# Patient Record
Sex: FEMALE | Race: BLACK OR AFRICAN AMERICAN | Employment: UNEMPLOYED
[De-identification: names, ages, dates, MRNs, and addresses within clinical notes are randomized per-mention and may not be internally consistent; named-entity substitution may affect disease eponyms.]

---
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
## 2018-01-25 ENCOUNTER — HOSPITAL ENCOUNTER (EMERGENCY)
Age: 18
Discharge: HOME | End: 2018-01-26
Attending: EMERGENCY MEDICINE
Payer: COMMERCIAL

## 2018-01-25 ENCOUNTER — APPOINTMENT (OUTPATIENT)
Dept: GENERAL RADIOLOGY | Age: 18
End: 2018-01-25
Payer: COMMERCIAL

## 2018-01-25 VITALS
OXYGEN SATURATION: 99 % | SYSTOLIC BLOOD PRESSURE: 115 MMHG | HEIGHT: 57 IN | WEIGHT: 180 LBS | HEART RATE: 98 BPM | TEMPERATURE: 97.2 F | DIASTOLIC BLOOD PRESSURE: 77 MMHG | BODY MASS INDEX: 38.83 KG/M2 | RESPIRATION RATE: 18 BRPM

## 2018-01-25 DIAGNOSIS — J45.41 MODERATE PERSISTENT ASTHMA WITH EXACERBATION: Primary | ICD-10-CM

## 2018-01-25 PROCEDURE — 6360000002 HC RX W HCPCS: Performed by: STUDENT IN AN ORGANIZED HEALTH CARE EDUCATION/TRAINING PROGRAM

## 2018-01-25 PROCEDURE — 71046 X-RAY EXAM CHEST 2 VIEWS: CPT

## 2018-01-25 PROCEDURE — 99285 EMERGENCY DEPT VISIT HI MDM: CPT

## 2018-01-25 RX ORDER — DEXAMETHASONE SODIUM PHOSPHATE 10 MG/ML
10 INJECTION INTRAMUSCULAR; INTRAVENOUS ONCE
Status: COMPLETED | OUTPATIENT
Start: 2018-01-25 | End: 2018-01-25

## 2018-01-25 RX ORDER — ALBUTEROL SULFATE 2.5 MG/3ML
5 SOLUTION RESPIRATORY (INHALATION)
Status: DISCONTINUED | OUTPATIENT
Start: 2018-01-25 | End: 2018-01-26 | Stop reason: HOSPADM

## 2018-01-25 RX ORDER — ALBUTEROL SULFATE 90 UG/1
2 AEROSOL, METERED RESPIRATORY (INHALATION)
Status: DISCONTINUED | OUTPATIENT
Start: 2018-01-25 | End: 2018-01-26 | Stop reason: HOSPADM

## 2018-01-25 RX ORDER — IPRATROPIUM BROMIDE AND ALBUTEROL SULFATE 2.5; .5 MG/3ML; MG/3ML
1 SOLUTION RESPIRATORY (INHALATION)
Status: DISCONTINUED | OUTPATIENT
Start: 2018-01-25 | End: 2018-01-26 | Stop reason: HOSPADM

## 2018-01-25 RX ORDER — IBUPROFEN 800 MG/1
800 TABLET ORAL ONCE
Status: COMPLETED | OUTPATIENT
Start: 2018-01-26 | End: 2018-01-26

## 2018-01-25 RX ADMIN — DEXAMETHASONE SODIUM PHOSPHATE 10 MG: 10 INJECTION INTRAMUSCULAR; INTRAVENOUS at 23:33

## 2018-01-25 ASSESSMENT — PAIN DESCRIPTION - LOCATION: LOCATION: CHEST

## 2018-01-25 ASSESSMENT — PAIN DESCRIPTION - PAIN TYPE: TYPE: ACUTE PAIN

## 2018-01-25 ASSESSMENT — PAIN SCALES - GENERAL: PAINLEVEL_OUTOF10: 6

## 2018-01-26 LAB
DIRECT EXAM: NORMAL
Lab: NORMAL
SPECIMEN DESCRIPTION: NORMAL
STATUS: NORMAL

## 2018-01-26 PROCEDURE — 87804 INFLUENZA ASSAY W/OPTIC: CPT

## 2018-01-26 PROCEDURE — 94640 AIRWAY INHALATION TREATMENT: CPT

## 2018-01-26 PROCEDURE — 6370000000 HC RX 637 (ALT 250 FOR IP): Performed by: EMERGENCY MEDICINE

## 2018-01-26 PROCEDURE — 6360000002 HC RX W HCPCS: Performed by: STUDENT IN AN ORGANIZED HEALTH CARE EDUCATION/TRAINING PROGRAM

## 2018-01-26 PROCEDURE — 94664 DEMO&/EVAL PT USE INHALER: CPT

## 2018-01-26 RX ORDER — PREDNISONE 10 MG/1
TABLET ORAL
Qty: 20 TABLET | Refills: 0 | Status: SHIPPED | OUTPATIENT
Start: 2018-01-26 | End: 2018-02-05

## 2018-01-26 RX ADMIN — ALBUTEROL SULFATE 5 MG: 5 SOLUTION RESPIRATORY (INHALATION) at 00:40

## 2018-01-26 RX ADMIN — IBUPROFEN 800 MG: 800 TABLET, FILM COATED ORAL at 00:46

## 2018-01-26 ASSESSMENT — ENCOUNTER SYMPTOMS
NAUSEA: 0
CHEST TIGHTNESS: 0
TROUBLE SWALLOWING: 0
WHEEZING: 1
COLOR CHANGE: 0
PHOTOPHOBIA: 0
CHOKING: 0
SHORTNESS OF BREATH: 1
CONSTIPATION: 0
COUGH: 0
ABDOMINAL DISTENTION: 0
VOMITING: 0
BACK PAIN: 0
DIARRHEA: 0
ABDOMINAL PAIN: 0
SORE THROAT: 0

## 2018-01-26 ASSESSMENT — PAIN SCALES - GENERAL: PAINLEVEL_OUTOF10: 7

## 2018-01-26 NOTE — ED NOTES
JOEY called provider Sy Altman to assist with transportation home upon D/c from ED   [address] [place] [address]  01/26/18 5348

## 2018-01-26 NOTE — ED NOTES
Body aches, asthma exacerbation. Pt states symptoms began approx 2 days ago. Chills, denies fever. Pt alert and oriented. NAD at this time. Even non labored RR. Will continue to monitor.         Leonid Hill RN  01/25/18 8049

## 2018-01-26 NOTE — ED PROVIDER NOTES
1 [facility]     Emergency Department     Faculty Attestation    I performed a history and physical examination of the patient and discussed management with the resident. I have reviewed and agree with the residents findings including all diagnostic interpretations, and treatment plans as written. Any areas of disagreement are noted on the chart. I was personally present for the key portions of any procedures. I have documented in the chart those procedures where I was not present during the key portions. I have reviewed the emergency nurses triage note. I agree with the chief complaint, past medical history, past surgical history, allergies, medications, social and family history as documented unless otherwise noted below. Documentation of the HPI, Physical Exam and Medical Decision Making performed by scribannemarie is based on my personal performance of the HPI, PE and MDM. For Physician Assistant/ Nurse Practitioner cases/documentation I have personally evaluated this patient and have completed at least one if not all key elements of the E/M (history, physical exam, and MDM). Additional findings are as noted. Primary Care Physician: Luis Enrique Parsons MD    History: This is a 21 y.o. female who presents to the Emergency Department with complaint of worsening shortness of breath, and cough. Ongoing over the past 4 days. Patient has a history of asthma, uses an albuterol inhaler but states it has not been working. Also complains of body aches, and chills here G denies any fevers, no productive sputum, but does have some runny nose and congestion. She also had an episode of emesis yesterday and 1 earlier this morning. Patient has tolerated oral intake since last episode of emesis, she denies any abdominal pain. Her last menstrual period finished yesterday. She denies any other vaginal bleeding or vaginal discharge.     Physical:   height is 4' 9\" (1.448 m)
is not diaphoretic. Plan     DIAGNOSTIC ORDERS:  Orders Placed This Encounter   Procedures    RAPID INFLUENZA A/B ANTIGENS    XR CHEST STANDARD (2 VW)     MEDICATION ORDERS:  Orders Placed This Encounter   Medications    DISCONTD: albuterol (PROVENTIL) nebulizer solution 5 mg    DISCONTD: ipratropium-albuterol (DUONEB) nebulizer solution 1 ampule    DISCONTD: albuterol (PROVENTIL) nebulizer solution 5 mg    DISCONTD: albuterol sulfate  (90 Base) MCG/ACT inhaler 2 puff    DISCONTD: albuterol sulfate  (90 Base) MCG/ACT inhaler 2 puff    DISCONTD: ipratropium (ATROVENT HFA) 17 MCG/ACT inhaler 2 puff    DISCONTD: ipratropium (ATROVENT) 0.02 % nebulizer solution 0.5 mg    dexamethasone (DECADRON) injection 10 mg    ibuprofen (ADVIL;MOTRIN) tablet 800 mg    predniSONE (DELTASONE) 10 MG tablet     Sig: Take 4 tablets by mouth once daily for 5 days     Dispense:  20 tablet     Refill:  0     Differential diagnosis      Asthma:  Evaluate for: mild/ moderate/ severe respiratory distress, toxic appearance, dry cough/ nasal congestion, speaking in full sentences, nasal flaring, inspiratory/ expiratory wheezing, retractions and abdominal breathing. Past intubations, hospitalizations, ED visits, steroid use/ home meds, triggers, fevers/ chills. Diagnostic Results     LABS:  Results for orders placed or performed during the hospital encounter of 01/25/18   RAPID INFLUENZA A/B ANTIGENS   Result Value Ref Range    Specimen Description . NASOPHARYNGEAL SWAB     Special Requests NOT REPORTED     Direct Exam PRESUMPTIVE NEGATIVE for Influenza A + B antigens. Direct Exam       PCR testing to confirm this result is available upon request.  Specimen will be    Direct Exam        saved in the laboratory for 7 days. Please call [phone] if PCR testing is    Direct Exam  indicated.      Direct Exam       [facility] [address] [facility], [address] [place] [address] [phone]    Status FINAL 01/26/2018

## 2018-02-19 ENCOUNTER — HOSPITAL ENCOUNTER (EMERGENCY)
Age: 18
Discharge: HOME | End: 2018-02-19
Attending: EMERGENCY MEDICINE
Payer: COMMERCIAL

## 2018-02-19 VITALS
TEMPERATURE: 97.9 F | HEART RATE: 89 BPM | RESPIRATION RATE: 12 BRPM | SYSTOLIC BLOOD PRESSURE: 122 MMHG | DIASTOLIC BLOOD PRESSURE: 78 MMHG | BODY MASS INDEX: 39.27 KG/M2 | WEIGHT: 200 LBS | OXYGEN SATURATION: 100 % | HEIGHT: 60 IN

## 2018-02-19 DIAGNOSIS — J34.89 STUFFY AND RUNNY NOSE: ICD-10-CM

## 2018-02-19 DIAGNOSIS — H92.03 OTALGIA OF BOTH EARS: ICD-10-CM

## 2018-02-19 DIAGNOSIS — J02.9 ACUTE PHARYNGITIS, UNSPECIFIED ETIOLOGY: ICD-10-CM

## 2018-02-19 DIAGNOSIS — I10 HYPERTENSION, UNSPECIFIED TYPE: ICD-10-CM

## 2018-02-19 DIAGNOSIS — B34.9 VIRAL ILLNESS: Primary | ICD-10-CM

## 2018-02-19 PROCEDURE — 99282 EMERGENCY DEPT VISIT SF MDM: CPT

## 2018-02-19 PROCEDURE — 6370000000 HC RX 637 (ALT 250 FOR IP): Performed by: EMERGENCY MEDICINE

## 2018-02-19 RX ORDER — IBUPROFEN 600 MG/1
600 TABLET ORAL EVERY 6 HOURS PRN
Qty: 30 TABLET | Refills: 0 | Status: SHIPPED | OUTPATIENT
Start: 2018-02-19 | End: 2018-05-03 | Stop reason: ALTCHOICE

## 2018-02-19 RX ORDER — IBUPROFEN 800 MG/1
800 TABLET ORAL ONCE
Status: COMPLETED | OUTPATIENT
Start: 2018-02-19 | End: 2018-02-19

## 2018-02-19 RX ORDER — ACETAMINOPHEN 325 MG/1
650 TABLET ORAL EVERY 6 HOURS PRN
Qty: 30 TABLET | Refills: 0 | Status: SHIPPED | OUTPATIENT
Start: 2018-02-19 | End: 2018-05-02 | Stop reason: SDUPTHER

## 2018-02-19 RX ADMIN — BENZOCAINE, MENTHOL 1 LOZENGE: 15; 3.6 LOZENGE ORAL at 07:40

## 2018-02-19 RX ADMIN — IBUPROFEN 800 MG: 800 TABLET, FILM COATED ORAL at 07:40

## 2018-02-19 ASSESSMENT — PAIN DESCRIPTION - LOCATION: LOCATION: THROAT;EAR

## 2018-02-19 ASSESSMENT — PAIN DESCRIPTION - PAIN TYPE: TYPE: ACUTE PAIN

## 2018-02-19 ASSESSMENT — PAIN DESCRIPTION - DESCRIPTORS: DESCRIPTORS: DULL

## 2018-02-19 ASSESSMENT — PAIN SCALES - GENERAL: PAINLEVEL_OUTOF10: 7

## 2018-02-19 ASSESSMENT — PAIN SCALES - WONG BAKER: WONGBAKER_NUMERICALRESPONSE: 4

## 2018-02-19 NOTE — ED PROVIDER NOTES
form     Other Topics Concern    Not on file     Social History Narrative    No narrative on file       Family History   Problem Relation Age of Onset    Asthma Mother     Lupus Mother     High Blood Pressure Father     Diabetes Father     Diabetes Maternal Grandfather     High Blood Pressure Maternal Grandfather     Heart Disease Paternal Grandmother     Diabetes Paternal Grandmother     High Blood Pressure Paternal Grandfather     Heart Disease Paternal Uncle     Heart Disease Paternal Aunt     Arthritis Maternal Aunt     Cancer Maternal Aunt     Birth Defects Neg Hx     Depression Neg Hx     Early Death Neg Hx     Hearing Loss Neg Hx     High Cholesterol Neg Hx     Kidney Disease Neg Hx     Learning Disabilities Neg Hx     Mental Illness Neg Hx     Mental Retardation Neg Hx     Miscarriages / Stillbirths Neg Hx     Stroke Neg Hx     Substance Abuse Neg Hx     Vision Loss Neg Hx     Other Neg Hx        Allergies:  Peanut-containing drug products and Food    Home Medications:  Prior to Admission medications    Medication Sig Start Date End Date Taking?  Authorizing Provider   ibuprofen (ADVIL;MOTRIN) 600 MG tablet Take 1 tablet by mouth every 6 hours as needed for Pain 2/19/18  Yes Lane Sorenson MD   Benzocaine-Menthol (CEPACOL EXTRA STRENGTH) 15-2.6 MG LOZG lozenge Take 1 lozenge by mouth 3 times daily as needed for Sore Throat 2/19/18  Yes Lane Sorenson MD   acetaminophen (TYLENOL) 325 MG tablet Take 2 tablets by mouth every 6 hours as needed for Pain 2/19/18  Yes Lane Sorenson MD   Spacer/Aero-Holding Duc Gallardo 1 Device by Does not apply route daily 9/8/17  Yes Kisha Robin MD   VENTOLIN  (90 Base) MCG/ACT inhaler Inhale 2 puffs into the lungs every 6 hours as needed for Wheezing 7/6/17  Yes Robert Palomares MD   EPINEPHrine (EPIPEN) 0.3 MG/0.3ML SOAJ injection Use as directed for allergic reaction call 911 Give second dose in 5 to 10 minutes if needed 7/1/16 8/29/25 Yes Maddie Negron MD   sodium chloride (OCEAN NASAL SPRAY) 0.65 % nasal spray 1 spray by Nasal route as needed for Congestion 9/19/17   Delma Hinojosa MD   fluticasone (FLOVENT HFA) 220 MCG/ACT inhaler Inhale 2 puffs into the lungs 2 times daily 9/12/17 9/12/18  Anthony Emmanuel MD   butalbital-APAP-caffeine (FIORICET) -40 MG CAPS per capsule Take 1 capsule by mouth every 4 hours as needed for Headaches 9/8/17   Uri Pehlps MD   topiramate (TOPAMAX) 25 MG tablet Take 1 tablet by mouth nightly 9/8/17   Uri Phelps MD   beclomethasone (QVAR) 80 MCG/ACT inhaler Inhale 1 puff into the lungs 2 times daily 9/8/17   Uri Phelps MD   loratadine (CLARITIN) 10 MG tablet Take 1 tablet by mouth daily 9/8/17   Uri Phelps MD   gentamicin (GARAMYCIN) 0.3 % ophthalmic solution 2 drops in the left eye every 4 hours while awake until symptoms gone for a full 24 hours. 8/24/17   Jassi Mehta PA-C   famotidine (PEPCID) 20 MG tablet Take 1 tablet by mouth 2 times daily 6/14/17   Hali Byrd MD   ondansetron (ZOFRAN) 4 MG tablet Take 1 tablet by mouth every 8 hours as needed for Nausea or Vomiting 3/5/17   Dara Sorenson MD   diphenhydrAMINE (BENADRYL) 25 MG tablet Take 1 tablet by mouth every 8 hours as needed for Itching or Allergies 3/5/17   Dara Sorenson MD   vitamin E 400 UNIT capsule Take 1 capsule by mouth 2 times daily 11/21/16   Daniella Hart DO   mometasone-formoterol [facility]) 200-5 MCG/ACT inhaler Inhale 2 puffs into the lungs 2 times daily 10/11/16   Ольга Jackson MD   cetirizine (ZYRTEC ALLERGY) 10 MG tablet Take 1 tablet by mouth daily as needed for Allergies 9/23/16   Denisse Rico MD   fluticasone (FLONASE) 50 MCG/ACT nasal spray Instill 1 spray into each nostril once daily for allergies and stuffy nose.  9/23/16   Denisse Rico MD       REVIEW OF SYSTEMS    (2-9 systems for level 4, 10 or more for level 5)        ROS: For rest of ROS please see HPI  Constitutional: Denied fever  Pscyh: Denied altered mental status  Integumentary:Denies skin changes, rashes  Eyes: Denied change in vision, eye pain  GI: Denied change in stools, blood in stools    PHYSICAL EXAM   (up to 7 for level 4, 8 or more for level 5)      INITIAL VITALS:   /78   Pulse 89   Temp 97.9 °F (36.6 °C) (Oral)   Resp 12   Ht 5' (1.524 m)   Wt 200 lb (90.7 kg)   LMP 01/24/2018   SpO2 100%   BMI 39.06 kg/m²       Vital signs reviewed. Nursing note reviewed    Constitutional: Well developed; well-nourished resting comfortably  HENT: Normocephalic, atraumatic, MMM, TM clear bilaterally no lad, + tonsillitis no exudates or erythema with slight cobblestoning. Uvula is midline there is no trismus or drooling or bulging of a tonsil, sinus without purulent drainage but there is clear drainage, there is no submandibular swelling, erythema, patient is tolerating secretions  Eyes: Conj nl  Neck: ROM nl Supple  Pulmonary/Chest Wall: Effort normal limit, clear to ausculte bilaterally   Abdomen: Soft, non-tender  Musculoskeletal: Normal ROM as witnessed by ambulation  Neurological: Alert and Oriented x3,   Skin: Warm and dry cap refill <2 seconds   Psych: Mood/affect normal, behavior normal    DIFFERENTIAL  DIAGNOSIS     PLAN (LABS / IMAGING / EKG):  No orders of the defined types were placed in this encounter.       MEDICATIONS ORDERED:  Orders Placed This Encounter   Medications    benzocaine-menthol (CEPACOL SORE THROAT) lozenge 1 lozenge    ibuprofen (ADVIL;MOTRIN) tablet 800 mg    ibuprofen (ADVIL;MOTRIN) 600 MG tablet     Sig: Take 1 tablet by mouth every 6 hours as needed for Pain     Dispense:  30 tablet     Refill:  0    Benzocaine-Menthol (CEPACOL EXTRA STRENGTH) 15-2.6 MG LOZG lozenge     Sig: Take 1 lozenge by mouth 3 times daily as needed for Sore Throat     Dispense:  30 lozenge     Refill:  0    acetaminophen (TYLENOL) 325 MG tablet     Sig: Take 2 tablets by mouth every 6 hours as needed for

## 2018-02-20 ENCOUNTER — HOSPITAL ENCOUNTER (EMERGENCY)
Age: 18
Discharge: HOME | End: 2018-02-21
Attending: EMERGENCY MEDICINE
Payer: COMMERCIAL

## 2018-02-20 VITALS
WEIGHT: 199.56 LBS | OXYGEN SATURATION: 100 % | HEIGHT: 58 IN | HEART RATE: 65 BPM | TEMPERATURE: 97.8 F | SYSTOLIC BLOOD PRESSURE: 118 MMHG | BODY MASS INDEX: 41.89 KG/M2 | RESPIRATION RATE: 16 BRPM | DIASTOLIC BLOOD PRESSURE: 69 MMHG

## 2018-02-20 DIAGNOSIS — H66.001 ACUTE SUPPURATIVE OTITIS MEDIA OF RIGHT EAR WITHOUT SPONTANEOUS RUPTURE OF TYMPANIC MEMBRANE, RECURRENCE NOT SPECIFIED: Primary | ICD-10-CM

## 2018-02-20 LAB
DIRECT EXAM: NORMAL
Lab: NORMAL
SPECIMEN DESCRIPTION: NORMAL
STATUS: NORMAL

## 2018-02-20 PROCEDURE — 96372 THER/PROPH/DIAG INJ SC/IM: CPT

## 2018-02-20 PROCEDURE — 99283 EMERGENCY DEPT VISIT LOW MDM: CPT

## 2018-02-20 PROCEDURE — 6360000002 HC RX W HCPCS: Performed by: EMERGENCY MEDICINE

## 2018-02-20 PROCEDURE — 87804 INFLUENZA ASSAY W/OPTIC: CPT

## 2018-02-20 PROCEDURE — 2500000003 HC RX 250 WO HCPCS: Performed by: EMERGENCY MEDICINE

## 2018-02-20 RX ORDER — AMOXICILLIN 500 MG/1
500 CAPSULE ORAL 3 TIMES DAILY
Qty: 21 CAPSULE | Refills: 0 | Status: SHIPPED | OUTPATIENT
Start: 2018-02-20 | End: 2018-02-27

## 2018-02-20 RX ORDER — KETOROLAC TROMETHAMINE 30 MG/ML
60 INJECTION, SOLUTION INTRAMUSCULAR; INTRAVENOUS ONCE
Status: COMPLETED | OUTPATIENT
Start: 2018-02-20 | End: 2018-02-20

## 2018-02-20 RX ORDER — LIDOCAINE HYDROCHLORIDE 10 MG/ML
5 INJECTION, SOLUTION INFILTRATION; PERINEURAL ONCE
Status: COMPLETED | OUTPATIENT
Start: 2018-02-20 | End: 2018-02-20

## 2018-02-20 RX ORDER — CEFTRIAXONE 1 G/1
1 INJECTION, POWDER, FOR SOLUTION INTRAMUSCULAR; INTRAVENOUS ONCE
Status: COMPLETED | OUTPATIENT
Start: 2018-02-20 | End: 2018-02-20

## 2018-02-20 RX ORDER — HYDROCODONE BITARTRATE AND ACETAMINOPHEN 5; 325 MG/1; MG/1
1 TABLET ORAL EVERY 6 HOURS PRN
Qty: 10 TABLET | Refills: 0 | Status: SHIPPED | OUTPATIENT
Start: 2018-02-20 | End: 2018-02-23

## 2018-02-20 RX ADMIN — KETOROLAC TROMETHAMINE 60 MG: 30 INJECTION, SOLUTION INTRAMUSCULAR at 23:24

## 2018-02-20 RX ADMIN — LIDOCAINE HYDROCHLORIDE 5 ML: 10 INJECTION, SOLUTION INFILTRATION; PERINEURAL at 23:24

## 2018-02-20 RX ADMIN — CEFTRIAXONE SODIUM 1 G: 1 INJECTION, POWDER, FOR SOLUTION INTRAMUSCULAR; INTRAVENOUS at 23:23

## 2018-02-20 ASSESSMENT — ENCOUNTER SYMPTOMS
COUGH: 1
SORE THROAT: 1
GASTROINTESTINAL NEGATIVE: 1

## 2018-02-20 ASSESSMENT — PAIN DESCRIPTION - FREQUENCY: FREQUENCY: CONTINUOUS

## 2018-02-20 ASSESSMENT — PAIN DESCRIPTION - LOCATION
LOCATION: EAR;THROAT
LOCATION: EAR;THROAT

## 2018-02-20 ASSESSMENT — PAIN DESCRIPTION - PAIN TYPE
TYPE: ACUTE PAIN
TYPE: ACUTE PAIN

## 2018-02-20 ASSESSMENT — PAIN SCALES - GENERAL
PAINLEVEL_OUTOF10: 10
PAINLEVEL_OUTOF10: 10

## 2018-02-20 ASSESSMENT — PAIN DESCRIPTION - DESCRIPTORS: DESCRIPTORS: ACHING

## 2018-02-20 ASSESSMENT — PAIN DESCRIPTION - ORIENTATION: ORIENTATION: RIGHT

## 2018-02-21 NOTE — ED PROVIDER NOTES
TempSrc: Oral    SpO2: 100% 100%   Weight: 199 lb 14.4 oz (90.7 kg) 199 lb 9 oz (90.5 kg)   Height: (!) 4' 10\" (1.473 m) (!) 4' 10\" (1.473 m)       Physical exam reflects a well-nourished well-hydrated female. She is currently afebrile but she has had both Tylenol and Motrin earlier today. She is alert conversive appropriate in behavior. Right TM is bulging and erythematous with effusion noted. She has canal erythema noted. Mild rhinorrhea noted. Left TM is dark. She has very small nonspecific scattered cervical lymphadenopathy. Oropharyngeal exam shows no erythema exudate or lesion. Heart regular rate and rhythm normal S1-S2 no murmurs rubs gallops. Lungs are clear to auscultation no wheezes rales rhonchi. Integument without rash or lesion. No neurovascular deficits are noted      DIAGNOSTIC RESULTS         LABS:  Labs Reviewed   RAPID INFLUENZA A/B ANTIGENS     DIRECT EXAM.   Flu A/B Ag Detection   Collected: 02/20/18 2330   Resulting lab: [facility]   Value: PRESUMPTIVE NEGATIVE for Influenza A + B antigens. Comment:        All other labs were within normal range or not returned as of this dictation. EMERGENCY DEPARTMENT COURSE and DIFFERENTIAL DIAGNOSIS/MDM:   Vitals:    Vitals:    02/20/18 2304 02/20/18 2320   BP: 118/69 118/69   Pulse: 65 65   Resp: 16 16   Temp: 97.8 °F (36.6 °C) 97.8 °F (36.6 °C)   TempSrc: Oral    SpO2: 100% 100%   Weight: 199 lb 14.4 oz (90.7 kg) 199 lb 9 oz (90.5 kg)   Height: (!) 4' 10\" (1.473 m) (!) 4' 10\" (1.473 m)      Patient is evaluated. Today she does have evidence of acute otitis media. She is started on antibiotic coverage with Rocephin pain treated with Toradol while here. Flu swab is obtained. She is discharged home on conservative management with regard to her ear infection she is advised follow-up in the outpatient setting. CONSULTS:  None    PROCEDURES:  None    FINAL IMPRESSION      1.  Acute suppurative otitis media of right

## 2018-02-21 NOTE — ED NOTES
Pt presents to ED with reports of right sided ear pain. Pt states she has a hx of ear infections. Pt denies any other concerns at this time. Respirations even, non labored. No distress to report.       Sadie Sam RN  02/20/18 0703

## 2018-02-28 ENCOUNTER — OFFICE VISIT (OUTPATIENT)
Dept: INTERNAL MEDICINE | Age: 18
End: 2018-02-28
Payer: COMMERCIAL

## 2018-02-28 ENCOUNTER — HOSPITAL ENCOUNTER (OUTPATIENT)
Age: 18
Setting detail: SPECIMEN
Discharge: HOME | End: 2018-02-28
Payer: COMMERCIAL

## 2018-02-28 VITALS
WEIGHT: 202 LBS | DIASTOLIC BLOOD PRESSURE: 65 MMHG | HEART RATE: 193 BPM | BODY MASS INDEX: 42.22 KG/M2 | SYSTOLIC BLOOD PRESSURE: 107 MMHG

## 2018-02-28 DIAGNOSIS — R73.03 PREDIABETES: Primary | ICD-10-CM

## 2018-02-28 DIAGNOSIS — J45.30 MILD PERSISTENT ALLERGIC ASTHMA: ICD-10-CM

## 2018-02-28 DIAGNOSIS — R71.8 MICROCYTOSIS: ICD-10-CM

## 2018-02-28 LAB
ABSOLUTE EOS #: 0.11 K/UL (ref 0–0.44)
ABSOLUTE IMMATURE GRANULOCYTE: <0.03 K/UL (ref 0–0.3)
ABSOLUTE LYMPH #: 2.98 K/UL (ref 1.2–5.2)
ABSOLUTE MONO #: 0.37 K/UL (ref 0.1–1.4)
BASOPHILS # BLD: 1 % (ref 0–2)
BASOPHILS ABSOLUTE: 0.03 K/UL (ref 0–0.2)
DIFFERENTIAL TYPE: ABNORMAL
EOSINOPHILS RELATIVE PERCENT: 2 % (ref 1–4)
HBA1C MFR BLD: 5.7 %
HCT VFR BLD CALC: 39.1 % (ref 36.3–47.1)
HEMOGLOBIN: 11.4 G/DL (ref 11.9–15.1)
IMMATURE GRANULOCYTES: 0 %
LYMPHOCYTES # BLD: 45 % (ref 25–45)
MCH RBC QN AUTO: 22.6 PG (ref 25.2–33.5)
MCHC RBC AUTO-ENTMCNC: 29.2 G/DL (ref 28.4–34.8)
MCV RBC AUTO: 77.4 FL (ref 82.6–102.9)
MONOCYTES # BLD: 6 % (ref 2–8)
NRBC AUTOMATED: 0 PER 100 WBC
PDW BLD-RTO: 14.2 % (ref 11.8–14.4)
PLATELET # BLD: 272 K/UL (ref 138–453)
PLATELET ESTIMATE: ABNORMAL
PMV BLD AUTO: 11.5 FL (ref 8.1–13.5)
RBC # BLD: 5.05 M/UL (ref 3.95–5.11)
RBC # BLD: ABNORMAL 10*6/UL
SEG NEUTROPHILS: 46 % (ref 34–64)
SEGMENTED NEUTROPHILS ABSOLUTE COUNT: 3.02 K/UL (ref 1.8–8)
WBC # BLD: 6.5 K/UL (ref 4.5–13.5)
WBC # BLD: ABNORMAL 10*3/UL

## 2018-02-28 PROCEDURE — 83020 HEMOGLOBIN ELECTROPHORESIS: CPT

## 2018-02-28 PROCEDURE — 99213 OFFICE O/P EST LOW 20 MIN: CPT | Performed by: INTERNAL MEDICINE

## 2018-02-28 PROCEDURE — 36415 COLL VENOUS BLD VENIPUNCTURE: CPT

## 2018-02-28 PROCEDURE — G8417 CALC BMI ABV UP PARAM F/U: HCPCS | Performed by: INTERNAL MEDICINE

## 2018-02-28 PROCEDURE — G8484 FLU IMMUNIZE NO ADMIN: HCPCS | Performed by: INTERNAL MEDICINE

## 2018-02-28 PROCEDURE — 85025 COMPLETE CBC W/AUTO DIFF WBC: CPT

## 2018-02-28 PROCEDURE — 1036F TOBACCO NON-USER: CPT | Performed by: INTERNAL MEDICINE

## 2018-02-28 PROCEDURE — 83036 HEMOGLOBIN GLYCOSYLATED A1C: CPT | Performed by: INTERNAL MEDICINE

## 2018-02-28 PROCEDURE — G8427 DOCREV CUR MEDS BY ELIG CLIN: HCPCS | Performed by: INTERNAL MEDICINE

## 2018-02-28 RX ORDER — ALBUTEROL SULFATE 1.25 MG/3ML
1 SOLUTION RESPIRATORY (INHALATION)
COMMUNITY
End: 2018-04-23 | Stop reason: ALTCHOICE

## 2018-02-28 ASSESSMENT — PATIENT HEALTH QUESTIONNAIRE - PHQ9
2. FEELING DOWN, DEPRESSED OR HOPELESS: 0
SUM OF ALL RESPONSES TO PHQ QUESTIONS 1-9: 0
SUM OF ALL RESPONSES TO PHQ9 QUESTIONS 1 & 2: 0
1. LITTLE INTEREST OR PLEASURE IN DOING THINGS: 0

## 2018-03-01 ENCOUNTER — TELEPHONE (OUTPATIENT)
Dept: INTERNAL MEDICINE CLINIC | Age: 18
End: 2018-03-01

## 2018-03-01 DIAGNOSIS — D50.9 MICROCYTIC ANEMIA: Primary | ICD-10-CM

## 2018-03-01 LAB
HGB ELECTROPHORESIS INTERP: NORMAL
PATHOLOGIST: NORMAL

## 2018-03-13 ENCOUNTER — HOSPITAL ENCOUNTER (OUTPATIENT)
Age: 18
Setting detail: SPECIMEN
Discharge: HOME | End: 2018-03-13
Payer: COMMERCIAL

## 2018-03-13 DIAGNOSIS — D50.9 MICROCYTIC ANEMIA: ICD-10-CM

## 2018-03-13 LAB
FERRITIN: 51 UG/L (ref 13–150)
IRON SATURATION: 32 % (ref 20–55)
IRON: 99 UG/DL (ref 37–145)
TOTAL IRON BINDING CAPACITY: 312 UG/DL (ref 250–450)
UNSATURATED IRON BINDING CAPACITY: 213 UG/DL (ref 112–347)

## 2018-03-13 PROCEDURE — 36415 COLL VENOUS BLD VENIPUNCTURE: CPT

## 2018-03-13 PROCEDURE — 82728 ASSAY OF FERRITIN: CPT

## 2018-03-13 PROCEDURE — 83540 ASSAY OF IRON: CPT

## 2018-03-13 PROCEDURE — 83550 IRON BINDING TEST: CPT

## 2018-04-23 ENCOUNTER — OFFICE VISIT (OUTPATIENT)
Dept: INTERNAL MEDICINE | Age: 18
End: 2018-04-23
Payer: COMMERCIAL

## 2018-04-23 ENCOUNTER — TELEPHONE (OUTPATIENT)
Dept: INTERNAL MEDICINE | Age: 18
End: 2018-04-23

## 2018-04-23 VITALS
HEIGHT: 58 IN | HEART RATE: 78 BPM | DIASTOLIC BLOOD PRESSURE: 82 MMHG | SYSTOLIC BLOOD PRESSURE: 133 MMHG | BODY MASS INDEX: 42.61 KG/M2 | WEIGHT: 203 LBS

## 2018-04-23 DIAGNOSIS — J45.40 MODERATE PERSISTENT ASTHMA WITHOUT COMPLICATION: ICD-10-CM

## 2018-04-23 PROCEDURE — 1036F TOBACCO NON-USER: CPT | Performed by: INTERNAL MEDICINE

## 2018-04-23 PROCEDURE — G8417 CALC BMI ABV UP PARAM F/U: HCPCS | Performed by: INTERNAL MEDICINE

## 2018-04-23 PROCEDURE — 99213 OFFICE O/P EST LOW 20 MIN: CPT | Performed by: INTERNAL MEDICINE

## 2018-04-23 PROCEDURE — 99213 OFFICE O/P EST LOW 20 MIN: CPT

## 2018-04-23 PROCEDURE — G8427 DOCREV CUR MEDS BY ELIG CLIN: HCPCS | Performed by: INTERNAL MEDICINE

## 2018-04-23 RX ORDER — IPRATROPIUM BROMIDE AND ALBUTEROL SULFATE 2.5; .5 MG/3ML; MG/3ML
1 SOLUTION RESPIRATORY (INHALATION) EVERY 4 HOURS
Qty: 360 ML | Status: CANCELLED | OUTPATIENT
Start: 2018-04-23

## 2018-04-23 RX ORDER — ALBUTEROL SULFATE 90 UG/1
2 AEROSOL, METERED RESPIRATORY (INHALATION) EVERY 6 HOURS PRN
Qty: 1 INHALER | Refills: 3 | Status: SHIPPED | OUTPATIENT
Start: 2018-04-23 | End: 2018-06-24

## 2018-04-23 RX ORDER — ALBUTEROL SULFATE 2.5 MG/3ML
2.5 SOLUTION RESPIRATORY (INHALATION) EVERY 6 HOURS PRN
Qty: 120 EACH | Refills: 3 | Status: SHIPPED | OUTPATIENT
Start: 2018-04-23 | End: 2018-06-26 | Stop reason: SDUPTHER

## 2018-04-23 RX ORDER — MONTELUKAST SODIUM 10 MG/1
10 TABLET ORAL NIGHTLY
Qty: 90 TABLET | Refills: 1 | Status: SHIPPED | OUTPATIENT
Start: 2018-04-23 | End: 2019-01-10

## 2018-04-23 RX ORDER — ALBUTEROL SULFATE 1.25 MG/3ML
1 SOLUTION RESPIRATORY (INHALATION)
Qty: 360 ML | Status: CANCELLED | OUTPATIENT
Start: 2018-04-23

## 2018-04-23 RX ORDER — FLUTICASONE PROPIONATE 220 UG/1
2 AEROSOL, METERED RESPIRATORY (INHALATION) 2 TIMES DAILY
Qty: 1 INHALER | Refills: 3 | Status: CANCELLED | OUTPATIENT
Start: 2018-04-23 | End: 2019-04-23

## 2018-04-27 ENCOUNTER — OFFICE VISIT (OUTPATIENT)
Dept: INTERNAL MEDICINE | Age: 18
End: 2018-04-27
Payer: COMMERCIAL

## 2018-04-27 VITALS
WEIGHT: 207.4 LBS | DIASTOLIC BLOOD PRESSURE: 69 MMHG | BODY MASS INDEX: 43.35 KG/M2 | SYSTOLIC BLOOD PRESSURE: 101 MMHG | HEART RATE: 84 BPM

## 2018-04-27 DIAGNOSIS — M79.602 PAIN OF LEFT UPPER EXTREMITY: ICD-10-CM

## 2018-04-27 DIAGNOSIS — J45.40 MODERATE PERSISTENT ASTHMA WITHOUT COMPLICATION: Primary | ICD-10-CM

## 2018-04-27 DIAGNOSIS — J02.9 SORE THROAT: ICD-10-CM

## 2018-04-27 DIAGNOSIS — E66.01 MORBID OBESITY WITH BMI OF 40.0-44.9, ADULT (HCC): ICD-10-CM

## 2018-04-27 PROCEDURE — 99213 OFFICE O/P EST LOW 20 MIN: CPT | Performed by: INTERNAL MEDICINE

## 2018-04-27 PROCEDURE — G8417 CALC BMI ABV UP PARAM F/U: HCPCS | Performed by: INTERNAL MEDICINE

## 2018-04-27 PROCEDURE — 99213 OFFICE O/P EST LOW 20 MIN: CPT

## 2018-04-27 PROCEDURE — G8427 DOCREV CUR MEDS BY ELIG CLIN: HCPCS | Performed by: INTERNAL MEDICINE

## 2018-04-27 PROCEDURE — 1036F TOBACCO NON-USER: CPT | Performed by: INTERNAL MEDICINE

## 2018-05-02 ENCOUNTER — HOSPITAL ENCOUNTER (EMERGENCY)
Age: 18
Discharge: HOME | End: 2018-05-02
Attending: EMERGENCY MEDICINE
Payer: COMMERCIAL

## 2018-05-02 VITALS
HEART RATE: 85 BPM | OXYGEN SATURATION: 98 % | DIASTOLIC BLOOD PRESSURE: 77 MMHG | RESPIRATION RATE: 16 BRPM | TEMPERATURE: 97.2 F | SYSTOLIC BLOOD PRESSURE: 121 MMHG

## 2018-05-02 DIAGNOSIS — J06.9 UPPER RESPIRATORY TRACT INFECTION, UNSPECIFIED TYPE: Primary | ICD-10-CM

## 2018-05-02 PROCEDURE — 94640 AIRWAY INHALATION TREATMENT: CPT

## 2018-05-02 PROCEDURE — 6370000000 HC RX 637 (ALT 250 FOR IP): Performed by: NURSE PRACTITIONER

## 2018-05-02 PROCEDURE — 99284 EMERGENCY DEPT VISIT MOD MDM: CPT

## 2018-05-02 RX ORDER — GUAIFENESIN 600 MG/1
1200 TABLET, EXTENDED RELEASE ORAL 2 TIMES DAILY
Qty: 14 TABLET | Refills: 0 | Status: SHIPPED | OUTPATIENT
Start: 2018-05-02 | End: 2018-05-02

## 2018-05-02 RX ORDER — CETIRIZINE HYDROCHLORIDE 10 MG/1
10 TABLET ORAL DAILY
Qty: 30 TABLET | Refills: 0 | Status: SHIPPED | OUTPATIENT
Start: 2018-05-02 | End: 2018-05-02

## 2018-05-02 RX ORDER — ACETAMINOPHEN 500 MG
500 TABLET ORAL EVERY 6 HOURS PRN
Qty: 30 TABLET | Refills: 0 | Status: SHIPPED | OUTPATIENT
Start: 2018-05-02 | End: 2018-06-26 | Stop reason: ALTCHOICE

## 2018-05-02 RX ORDER — CETIRIZINE HYDROCHLORIDE 10 MG/1
10 TABLET ORAL DAILY
Qty: 30 TABLET | Refills: 0 | Status: SHIPPED | OUTPATIENT
Start: 2018-05-02 | End: 2018-05-21 | Stop reason: SDUPTHER

## 2018-05-02 RX ORDER — ALBUTEROL SULFATE 90 UG/1
2 AEROSOL, METERED RESPIRATORY (INHALATION) EVERY 6 HOURS PRN
Status: DISCONTINUED | OUTPATIENT
Start: 2018-05-02 | End: 2018-05-02 | Stop reason: HOSPADM

## 2018-05-02 RX ORDER — ACETAMINOPHEN 500 MG
500 TABLET ORAL EVERY 6 HOURS PRN
Qty: 30 TABLET | Refills: 0 | Status: SHIPPED | OUTPATIENT
Start: 2018-05-02 | End: 2018-05-02

## 2018-05-02 RX ORDER — GUAIFENESIN 600 MG/1
1200 TABLET, EXTENDED RELEASE ORAL 2 TIMES DAILY
Qty: 14 TABLET | Refills: 0 | Status: SHIPPED | OUTPATIENT
Start: 2018-05-02 | End: 2018-05-03 | Stop reason: ALTCHOICE

## 2018-05-02 RX ORDER — ACETAMINOPHEN 500 MG
1000 TABLET ORAL ONCE
Status: COMPLETED | OUTPATIENT
Start: 2018-05-02 | End: 2018-05-02

## 2018-05-02 RX ADMIN — ALBUTEROL SULFATE 2 PUFF: 90 AEROSOL, METERED RESPIRATORY (INHALATION) at 11:43

## 2018-05-02 RX ADMIN — ACETAMINOPHEN 1000 MG: 500 TABLET ORAL at 11:46

## 2018-05-02 ASSESSMENT — ENCOUNTER SYMPTOMS
COUGH: 1
SHORTNESS OF BREATH: 0
SORE THROAT: 0
RHINORRHEA: 1
SINUS PAIN: 1

## 2018-05-02 ASSESSMENT — PAIN DESCRIPTION - FREQUENCY: FREQUENCY: CONTINUOUS

## 2018-05-02 ASSESSMENT — PAIN SCALES - GENERAL
PAINLEVEL_OUTOF10: 9
PAINLEVEL_OUTOF10: 9

## 2018-05-02 ASSESSMENT — PAIN DESCRIPTION - DESCRIPTORS: DESCRIPTORS: ACHING

## 2018-05-02 ASSESSMENT — PAIN DESCRIPTION - LOCATION: LOCATION: HEAD

## 2018-05-03 ENCOUNTER — OFFICE VISIT (OUTPATIENT)
Dept: INTERNAL MEDICINE | Age: 18
End: 2018-05-03
Payer: COMMERCIAL

## 2018-05-03 VITALS
BODY MASS INDEX: 43.87 KG/M2 | HEART RATE: 84 BPM | WEIGHT: 209 LBS | HEIGHT: 58 IN | DIASTOLIC BLOOD PRESSURE: 82 MMHG | SYSTOLIC BLOOD PRESSURE: 113 MMHG

## 2018-05-03 DIAGNOSIS — G43.709 CHRONIC MIGRAINE WITHOUT AURA WITHOUT STATUS MIGRAINOSUS, NOT INTRACTABLE: Primary | ICD-10-CM

## 2018-05-03 DIAGNOSIS — J45.40 MODERATE PERSISTENT ASTHMA WITHOUT COMPLICATION: ICD-10-CM

## 2018-05-03 PROCEDURE — 99213 OFFICE O/P EST LOW 20 MIN: CPT | Performed by: INTERNAL MEDICINE

## 2018-05-03 PROCEDURE — 99213 OFFICE O/P EST LOW 20 MIN: CPT | Performed by: STUDENT IN AN ORGANIZED HEALTH CARE EDUCATION/TRAINING PROGRAM

## 2018-05-03 RX ORDER — BUTALBITAL, ACETAMINOPHEN AND CAFFEINE 300; 40; 50 MG/1; MG/1; MG/1
1 CAPSULE ORAL EVERY 4 HOURS PRN
Qty: 120 CAPSULE | Refills: 2 | Status: SHIPPED | OUTPATIENT
Start: 2018-05-03 | End: 2018-06-26 | Stop reason: ALTCHOICE

## 2018-05-03 RX ORDER — TOPIRAMATE 25 MG/1
25 TABLET ORAL NIGHTLY
Qty: 30 TABLET | Refills: 0 | Status: SHIPPED | OUTPATIENT
Start: 2018-05-03 | End: 2018-06-26 | Stop reason: ALTCHOICE

## 2018-05-14 ENCOUNTER — HOSPITAL ENCOUNTER (EMERGENCY)
Age: 18
Discharge: HOME | End: 2018-05-14
Attending: EMERGENCY MEDICINE
Payer: COMMERCIAL

## 2018-05-14 VITALS
TEMPERATURE: 98.8 F | SYSTOLIC BLOOD PRESSURE: 105 MMHG | RESPIRATION RATE: 24 BRPM | WEIGHT: 204 LBS | DIASTOLIC BLOOD PRESSURE: 72 MMHG | HEIGHT: 59 IN | OXYGEN SATURATION: 98 % | HEART RATE: 89 BPM | BODY MASS INDEX: 41.12 KG/M2

## 2018-05-14 DIAGNOSIS — J45.21 MILD INTERMITTENT ASTHMA WITH EXACERBATION: Primary | ICD-10-CM

## 2018-05-14 PROCEDURE — 6370000000 HC RX 637 (ALT 250 FOR IP): Performed by: EMERGENCY MEDICINE

## 2018-05-14 PROCEDURE — 94640 AIRWAY INHALATION TREATMENT: CPT

## 2018-05-14 PROCEDURE — 6360000002 HC RX W HCPCS: Performed by: EMERGENCY MEDICINE

## 2018-05-14 PROCEDURE — G0382 LEV 3 HOSP TYPE B ED VISIT: HCPCS

## 2018-05-14 RX ORDER — ONDANSETRON 4 MG/1
4 TABLET, ORALLY DISINTEGRATING ORAL EVERY 8 HOURS PRN
Qty: 10 TABLET | Refills: 0 | Status: SHIPPED | OUTPATIENT
Start: 2018-05-14 | End: 2018-07-20 | Stop reason: ALTCHOICE

## 2018-05-14 RX ORDER — ALBUTEROL SULFATE 90 UG/1
2 AEROSOL, METERED RESPIRATORY (INHALATION)
Status: DISCONTINUED | OUTPATIENT
Start: 2018-05-14 | End: 2018-05-14 | Stop reason: HOSPADM

## 2018-05-14 RX ORDER — PREDNISONE 50 MG/1
50 TABLET ORAL DAILY
Qty: 4 TABLET | Refills: 0 | Status: SHIPPED | OUTPATIENT
Start: 2018-05-14 | End: 2018-05-21 | Stop reason: ALTCHOICE

## 2018-05-14 RX ORDER — PREDNISONE 20 MG/1
40 TABLET ORAL ONCE
Status: COMPLETED | OUTPATIENT
Start: 2018-05-14 | End: 2018-05-14

## 2018-05-14 RX ORDER — ALBUTEROL SULFATE 90 UG/1
2 AEROSOL, METERED RESPIRATORY (INHALATION)
Status: DISCONTINUED | OUTPATIENT
Start: 2018-05-14 | End: 2018-05-14

## 2018-05-14 RX ORDER — ALBUTEROL SULFATE 2.5 MG/3ML
5 SOLUTION RESPIRATORY (INHALATION)
Status: DISCONTINUED | OUTPATIENT
Start: 2018-05-14 | End: 2018-05-14

## 2018-05-14 RX ORDER — IPRATROPIUM BROMIDE AND ALBUTEROL SULFATE 2.5; .5 MG/3ML; MG/3ML
1 SOLUTION RESPIRATORY (INHALATION)
Status: DISCONTINUED | OUTPATIENT
Start: 2018-05-14 | End: 2018-05-14

## 2018-05-14 RX ORDER — ONDANSETRON 4 MG/1
4 TABLET, FILM COATED ORAL ONCE
Status: COMPLETED | OUTPATIENT
Start: 2018-05-14 | End: 2018-05-14

## 2018-05-14 RX ORDER — IBUPROFEN 800 MG/1
800 TABLET ORAL ONCE
Status: COMPLETED | OUTPATIENT
Start: 2018-05-14 | End: 2018-05-14

## 2018-05-14 RX ORDER — ALBUTEROL SULFATE 2.5 MG/3ML
5 SOLUTION RESPIRATORY (INHALATION)
Status: DISCONTINUED | OUTPATIENT
Start: 2018-05-14 | End: 2018-05-14 | Stop reason: HOSPADM

## 2018-05-14 RX ORDER — IBUPROFEN 800 MG/1
800 TABLET ORAL EVERY 8 HOURS PRN
Qty: 30 TABLET | Refills: 0 | Status: SHIPPED | OUTPATIENT
Start: 2018-05-14 | End: 2018-07-13 | Stop reason: SDUPTHER

## 2018-05-14 RX ADMIN — IBUPROFEN 800 MG: 800 TABLET ORAL at 11:37

## 2018-05-14 RX ADMIN — ONDANSETRON HYDROCHLORIDE 4 MG: 4 TABLET, FILM COATED ORAL at 11:36

## 2018-05-14 RX ADMIN — PREDNISONE 40 MG: 20 TABLET ORAL at 11:37

## 2018-05-14 RX ADMIN — ALBUTEROL SULFATE 5 MG: 5 SOLUTION RESPIRATORY (INHALATION) at 11:07

## 2018-05-14 ASSESSMENT — ENCOUNTER SYMPTOMS
SINUS PRESSURE: 0
COUGH: 0
NAUSEA: 1
SORE THROAT: 1
SHORTNESS OF BREATH: 1
VOMITING: 0
ABDOMINAL PAIN: 0
RHINORRHEA: 1
BLOOD IN STOOL: 0
EYE PAIN: 0
CONSTIPATION: 1
BACK PAIN: 0
DIARRHEA: 0
WHEEZING: 1

## 2018-05-14 ASSESSMENT — PAIN SCALES - GENERAL
PAINLEVEL_OUTOF10: 9
PAINLEVEL_OUTOF10: 9

## 2018-05-14 ASSESSMENT — PAIN DESCRIPTION - PAIN TYPE: TYPE: ACUTE PAIN

## 2018-05-14 ASSESSMENT — PAIN DESCRIPTION - FREQUENCY: FREQUENCY: CONTINUOUS

## 2018-05-14 ASSESSMENT — PAIN DESCRIPTION - DESCRIPTORS: DESCRIPTORS: ACHING

## 2018-05-14 ASSESSMENT — PAIN DESCRIPTION - ORIENTATION: ORIENTATION: OTHER (COMMENT)

## 2018-05-14 ASSESSMENT — PAIN DESCRIPTION - LOCATION: LOCATION: GENERALIZED

## 2018-05-15 ENCOUNTER — TELEPHONE (OUTPATIENT)
Dept: INTERNAL MEDICINE | Age: 18
End: 2018-05-15

## 2018-05-21 ENCOUNTER — OFFICE VISIT (OUTPATIENT)
Dept: INTERNAL MEDICINE | Age: 18
End: 2018-05-21
Payer: COMMERCIAL

## 2018-05-21 VITALS
DIASTOLIC BLOOD PRESSURE: 84 MMHG | SYSTOLIC BLOOD PRESSURE: 138 MMHG | HEIGHT: 58 IN | OXYGEN SATURATION: 98 % | HEART RATE: 61 BPM | BODY MASS INDEX: 42.82 KG/M2 | WEIGHT: 204 LBS

## 2018-05-21 DIAGNOSIS — M54.9 MID BACK PAIN: ICD-10-CM

## 2018-05-21 DIAGNOSIS — J45.40 MODERATE PERSISTENT ASTHMA WITHOUT COMPLICATION: ICD-10-CM

## 2018-05-21 DIAGNOSIS — J30.2 CHRONIC SEASONAL ALLERGIC RHINITIS, UNSPECIFIED TRIGGER: Primary | ICD-10-CM

## 2018-05-21 PROCEDURE — 99213 OFFICE O/P EST LOW 20 MIN: CPT | Performed by: INTERNAL MEDICINE

## 2018-05-21 PROCEDURE — 1036F TOBACCO NON-USER: CPT | Performed by: INTERNAL MEDICINE

## 2018-05-21 PROCEDURE — G8427 DOCREV CUR MEDS BY ELIG CLIN: HCPCS | Performed by: INTERNAL MEDICINE

## 2018-05-21 PROCEDURE — G8417 CALC BMI ABV UP PARAM F/U: HCPCS | Performed by: INTERNAL MEDICINE

## 2018-05-21 RX ORDER — CETIRIZINE HYDROCHLORIDE 10 MG/1
10 TABLET ORAL DAILY
Qty: 30 TABLET | Refills: 2 | Status: SHIPPED | OUTPATIENT
Start: 2018-05-21 | End: 2018-09-26

## 2018-06-24 ENCOUNTER — HOSPITAL ENCOUNTER (EMERGENCY)
Age: 18
Discharge: HOME | End: 2018-06-25
Attending: EMERGENCY MEDICINE
Payer: COMMERCIAL

## 2018-06-24 ENCOUNTER — APPOINTMENT (OUTPATIENT)
Dept: GENERAL RADIOLOGY | Age: 18
End: 2018-06-24
Payer: COMMERCIAL

## 2018-06-24 VITALS
WEIGHT: 180 LBS | BODY MASS INDEX: 37.62 KG/M2 | TEMPERATURE: 98 F | OXYGEN SATURATION: 99 % | DIASTOLIC BLOOD PRESSURE: 83 MMHG | RESPIRATION RATE: 20 BRPM | SYSTOLIC BLOOD PRESSURE: 134 MMHG | HEART RATE: 90 BPM

## 2018-06-24 DIAGNOSIS — J45.40 MODERATE PERSISTENT ASTHMA WITHOUT COMPLICATION: ICD-10-CM

## 2018-06-24 DIAGNOSIS — M79.631 RIGHT FOREARM PAIN: ICD-10-CM

## 2018-06-24 DIAGNOSIS — J45.41 MODERATE PERSISTENT ASTHMA WITH EXACERBATION: Primary | ICD-10-CM

## 2018-06-24 PROCEDURE — 6360000002 HC RX W HCPCS: Performed by: STUDENT IN AN ORGANIZED HEALTH CARE EDUCATION/TRAINING PROGRAM

## 2018-06-24 PROCEDURE — 73090 X-RAY EXAM OF FOREARM: CPT

## 2018-06-24 PROCEDURE — 99283 EMERGENCY DEPT VISIT LOW MDM: CPT

## 2018-06-24 PROCEDURE — 6370000000 HC RX 637 (ALT 250 FOR IP): Performed by: STUDENT IN AN ORGANIZED HEALTH CARE EDUCATION/TRAINING PROGRAM

## 2018-06-24 PROCEDURE — 94640 AIRWAY INHALATION TREATMENT: CPT

## 2018-06-24 PROCEDURE — 71046 X-RAY EXAM CHEST 2 VIEWS: CPT

## 2018-06-24 PROCEDURE — 96372 THER/PROPH/DIAG INJ SC/IM: CPT

## 2018-06-24 RX ORDER — ALBUTEROL SULFATE 2.5 MG/3ML
5 SOLUTION RESPIRATORY (INHALATION)
Status: DISCONTINUED | OUTPATIENT
Start: 2018-06-24 | End: 2018-06-24

## 2018-06-24 RX ORDER — KETOROLAC TROMETHAMINE 15 MG/ML
15 INJECTION, SOLUTION INTRAMUSCULAR; INTRAVENOUS ONCE
Status: COMPLETED | OUTPATIENT
Start: 2018-06-24 | End: 2018-06-24

## 2018-06-24 RX ORDER — PREDNISONE 20 MG/1
60 TABLET ORAL ONCE
Status: COMPLETED | OUTPATIENT
Start: 2018-06-24 | End: 2018-06-24

## 2018-06-24 RX ORDER — ALBUTEROL SULFATE 90 UG/1
2 AEROSOL, METERED RESPIRATORY (INHALATION)
Status: DISCONTINUED | OUTPATIENT
Start: 2018-06-24 | End: 2018-06-24

## 2018-06-24 RX ORDER — IPRATROPIUM BROMIDE AND ALBUTEROL SULFATE 2.5; .5 MG/3ML; MG/3ML
1 SOLUTION RESPIRATORY (INHALATION)
Status: DISCONTINUED | OUTPATIENT
Start: 2018-06-24 | End: 2018-06-24

## 2018-06-24 RX ORDER — ALBUTEROL SULFATE 90 UG/1
2 AEROSOL, METERED RESPIRATORY (INHALATION) EVERY 6 HOURS PRN
Qty: 1 INHALER | Refills: 3 | Status: SHIPPED | OUTPATIENT
Start: 2018-06-24 | End: 2018-09-18 | Stop reason: SDUPTHER

## 2018-06-24 RX ORDER — ALBUTEROL SULFATE 90 UG/1
2 AEROSOL, METERED RESPIRATORY (INHALATION)
Status: DISCONTINUED | OUTPATIENT
Start: 2018-06-24 | End: 2018-06-25 | Stop reason: HOSPADM

## 2018-06-24 RX ORDER — PREDNISONE 50 MG/1
50 TABLET ORAL DAILY
Qty: 4 TABLET | Refills: 0 | Status: SHIPPED | OUTPATIENT
Start: 2018-06-24 | End: 2018-06-26 | Stop reason: ALTCHOICE

## 2018-06-24 RX ORDER — PREDNISONE 20 MG/1
50 TABLET ORAL ONCE
Status: DISCONTINUED | OUTPATIENT
Start: 2018-06-24 | End: 2018-06-24

## 2018-06-24 RX ADMIN — ALBUTEROL SULFATE 5 MG: 5 SOLUTION RESPIRATORY (INHALATION) at 22:41

## 2018-06-24 RX ADMIN — KETOROLAC TROMETHAMINE 15 MG: 15 INJECTION, SOLUTION INTRAMUSCULAR; INTRAVENOUS at 22:54

## 2018-06-24 RX ADMIN — PREDNISONE 60 MG: 20 TABLET ORAL at 23:04

## 2018-06-24 ASSESSMENT — PAIN DESCRIPTION - PAIN TYPE: TYPE: ACUTE PAIN

## 2018-06-24 ASSESSMENT — PAIN DESCRIPTION - LOCATION: LOCATION: WRIST;CHEST

## 2018-06-24 ASSESSMENT — PAIN SCALES - GENERAL
PAINLEVEL_OUTOF10: 9
PAINLEVEL_OUTOF10: 9

## 2018-06-25 ASSESSMENT — ENCOUNTER SYMPTOMS
VOMITING: 0
WHEEZING: 1
SHORTNESS OF BREATH: 1
NAUSEA: 0
RHINORRHEA: 1

## 2018-06-26 ENCOUNTER — OFFICE VISIT (OUTPATIENT)
Dept: INTERNAL MEDICINE | Age: 18
End: 2018-06-26
Payer: COMMERCIAL

## 2018-06-26 ENCOUNTER — HOSPITAL ENCOUNTER (OUTPATIENT)
Age: 18
Setting detail: SPECIMEN
Discharge: HOME | End: 2018-06-26
Payer: COMMERCIAL

## 2018-06-26 VITALS
DIASTOLIC BLOOD PRESSURE: 66 MMHG | WEIGHT: 214 LBS | HEART RATE: 77 BPM | HEIGHT: 58 IN | SYSTOLIC BLOOD PRESSURE: 125 MMHG | BODY MASS INDEX: 44.92 KG/M2

## 2018-06-26 DIAGNOSIS — J30.2 CHRONIC SEASONAL ALLERGIC RHINITIS, UNSPECIFIED TRIGGER: ICD-10-CM

## 2018-06-26 DIAGNOSIS — J45.51 SEVERE PERSISTENT ASTHMA WITH ACUTE EXACERBATION: ICD-10-CM

## 2018-06-26 DIAGNOSIS — M79.89 PAIN AND SWELLING OF RIGHT FOREARM: Primary | ICD-10-CM

## 2018-06-26 DIAGNOSIS — R73.02 IGT (IMPAIRED GLUCOSE TOLERANCE): ICD-10-CM

## 2018-06-26 DIAGNOSIS — E66.01 MORBID OBESITY WITH BMI OF 40.0-44.9, ADULT (HCC): ICD-10-CM

## 2018-06-26 DIAGNOSIS — M79.631 PAIN AND SWELLING OF RIGHT FOREARM: Primary | ICD-10-CM

## 2018-06-26 LAB — IGG: 861 MG/DL (ref 700–1600)

## 2018-06-26 PROCEDURE — G8427 DOCREV CUR MEDS BY ELIG CLIN: HCPCS | Performed by: INTERNAL MEDICINE

## 2018-06-26 PROCEDURE — 99212 OFFICE O/P EST SF 10 MIN: CPT | Performed by: INTERNAL MEDICINE

## 2018-06-26 PROCEDURE — 36415 COLL VENOUS BLD VENIPUNCTURE: CPT

## 2018-06-26 PROCEDURE — G8417 CALC BMI ABV UP PARAM F/U: HCPCS | Performed by: INTERNAL MEDICINE

## 2018-06-26 PROCEDURE — 1036F TOBACCO NON-USER: CPT | Performed by: INTERNAL MEDICINE

## 2018-06-26 PROCEDURE — 99214 OFFICE O/P EST MOD 30 MIN: CPT | Performed by: INTERNAL MEDICINE

## 2018-06-26 PROCEDURE — 82784 ASSAY IGA/IGD/IGG/IGM EACH: CPT

## 2018-06-26 RX ORDER — ALBUTEROL SULFATE 2.5 MG/3ML
2.5 SOLUTION RESPIRATORY (INHALATION) EVERY 6 HOURS PRN
Qty: 120 EACH | Refills: 3 | Status: SHIPPED | OUTPATIENT
Start: 2018-06-26 | End: 2021-03-03

## 2018-06-26 RX ORDER — FLUTICASONE PROPIONATE 50 MCG
SPRAY, SUSPENSION (ML) NASAL
Qty: 1 BOTTLE | Refills: 3 | Status: SHIPPED | OUTPATIENT
Start: 2018-06-26 | End: 2018-09-26

## 2018-06-26 ASSESSMENT — ENCOUNTER SYMPTOMS
COUGH: 1
SPUTUM PRODUCTION: 0
SHORTNESS OF BREATH: 1
WHEEZING: 1
SINUS PAIN: 0

## 2018-06-26 NOTE — PROGRESS NOTES
[facility]    Progress Note    Date of patient's visit: 6/26/2018    Patient's Name:  Karen Nguyen    YOB: 2000            Patient Care Team:  Esperanza Haywood MD as PCP - Favio Goldberg MD as PCP - S Attributed Provider    REASON FOR VISIT: Routine outpatient follow     Chief Complaint   Patient presents with    Follow-Up from Hospital     Pt was seen at [facility] on 6/25/18 for moderate persistant asthma with exacerbation and right forearm pain     Referral - General     Pt would like to see respiratory.  Wrist Pain     Pt states that she hit her R wrist the day she went to ER did xrays and said it looks okay but she states that she is still having pain          HISTORY OF PRESENT ILLNESS:    History was obtained from the patient. Karen Nguyen is a 21 y.o. is here for Follow-up after ER visit 2 days ago. She was seen there for asthma exacerbation and for right forearm and wrist pain. She was playing acting with some friends and injured her right wrist.  It has been very swollen. In the ER x-rays were negative. She was advised ice pack and ibuprofen. She is wearing a forearm splint. She continues to have a lot of swelling and tenderness especially in the tip of her ulna. She's also had problems with her asthma for the last few months. She says she is using her albuterol nebulizer treatments several times a day. She was discharged home yesterday with prednisone 50 mg. She has several allergies. She says she used to see a pulmonologist and had a lot of allergy testing done. She knows she has several allergies in the spring and summer to grass and pollen and tree nuts. She is on Zyrtec and Flonase daily along with Singulair but it's not helping. She would like a referral to a pulmonologist.  She says she is using all her inhalers regularly. She is morbidly obese. Also has prediabetes.   She is not a healthy eater. No exercise. Forearm xray 2018  FINDINGS:   There is no evidence of acute fracture.  There is normal alignment.  No acute   joint abnormality.  No focal osseous lesion. No focal soft tissue abnormality.           Impression   No acute osseous abnormality. Past Medical History:   Diagnosis Date    Allergic     Allergic rhinitis     Allergy     Asthma     GERD (gastroesophageal reflux disease)     Headache(784.0) 3/1/2012    Morbid obesity with BMI of 40.0-44.9, adult ([address] 12/8/2016    Pure hypercholesterolemia 12/8/2016    Unspecified sleep apnea     Vision disturbance 1/31/2014    wears glassses       Past Surgical History:   Procedure Laterality Date    ADENOIDECTOMY      OTHER SURGICAL HISTORY  08/28/14    Nexplanon placement    TONSILLECTOMY           ALLERGIES      Allergies   Allergen Reactions    Peanut-Containing Drug Products     Food Rash     Peanuts,walnuts,cashews,pecans apples cherries       MEDICATIONS:      Current Outpatient Prescriptions on File Prior to Visit   Medication Sig Dispense Refill    albuterol sulfate HFA (VENTOLIN HFA) 108 (90 Base) MCG/ACT inhaler Inhale 2 puffs into the lungs every 6 hours as needed for Wheezing 1 Inhaler 3    mometasone-formoterol (DULERA) 200-5 MCG/ACT inhaler Inhale 2 puffs into the lungs 2 times daily Rinse mouth after using.  1 Inhaler 2    cetirizine (ZYRTEC) 10 MG tablet Take 1 tablet by mouth daily 30 tablet 2    ondansetron (ZOFRAN ODT) 4 MG disintegrating tablet Take 1 tablet by mouth every 8 hours as needed for Nausea 10 tablet 0    ibuprofen (ADVIL;MOTRIN) 800 MG tablet Take 1 tablet by mouth every 8 hours as needed for Pain 30 tablet 0    albuterol (PROVENTIL) (2.5 MG/3ML) 0.083% nebulizer solution Take 3 mLs by nebulization every 6 hours as needed for Wheezing 120 each 3    montelukast (SINGULAIR) 10 MG tablet Take 1 tablet by mouth nightly 90 tablet 1    fluticasone (FLONASE) 50 MCG/ACT nasal spray Instill 214 lb (97.1 kg) (98 %, Z= 2.14)*   06/24/18 180 lb (81.6 kg) (94 %, Z= 1.59)*   05/21/18 204 lb (92.5 kg) (98 %, Z= 2.00)*     * Growth percentiles are based on [facility] 2-20 Years data. Physical Exam   Constitutional: She is oriented to person, place, and time. No distress. HENT:   Head: Normocephalic and atraumatic. Mouth/Throat: Oropharynx is clear and moist.   Eyes: Conjunctivae and EOM are normal. Pupils are equal, round, and reactive to light. No scleral icterus. Neck: Normal range of motion. Neck supple. Cardiovascular: Normal rate, regular rhythm and normal heart sounds. Pulmonary/Chest: Effort normal. No respiratory distress. She has wheezes. She has no rales. She exhibits no tenderness. Musculoskeletal: She exhibits tenderness. She exhibits no edema. Arms:  Lymphadenopathy:     She has no cervical adenopathy. Neurological: She is alert and oriented to person, place, and time. She has normal sensation and normal strength. Skin: She is not diaphoretic. Nursing note and vitals reviewed.         LABORATORY FINDINGS:    CBC:  Lab Results   Component Value Date    WBC 6.5 02/28/2018    HGB 11.4 02/28/2018     02/28/2018     10/11/2011     BMP:    Lab Results   Component Value Date     01/28/2017    K 3.9 01/28/2017     01/28/2017    CO2 23 01/28/2017    BUN 14 01/28/2017    CREATININE 0.63 01/28/2017    GLUCOSE 120 01/28/2017    GLUCOSE 78 09/22/2011     HEMOGLOBIN A1C:   Lab Results   Component Value Date    LABA1C 5.7 02/28/2018     MICROALBUMIN URINE: No results found for: MICROALBUR  FASTING LIPID PANEL:  Lab Results   Component Value Date    CHOL 209 (H) 09/23/2016    HDL 45 09/23/2016    TRIG 51 09/23/2016     Lab Results   Component Value Date    LDLCHOLESTEROL 154 (H) 09/23/2016       LIVER PROFILE:  Lab Results   Component Value Date    ALT 16 01/28/2017    AST 21 01/28/2017    PROT 8.0 01/28/2017    BILITOT 0.37 01/28/2017    BILIDIR 0.08 01/28/2017

## 2018-07-01 ASSESSMENT — ENCOUNTER SYMPTOMS
BLURRED VISION: 0
BACK PAIN: 0
BLOOD IN STOOL: 0
EYE REDNESS: 0
ABDOMINAL PAIN: 0
NAUSEA: 0
CONSTIPATION: 0

## 2018-07-03 ENCOUNTER — TELEPHONE (OUTPATIENT)
Dept: INTERNAL MEDICINE | Age: 18
End: 2018-07-03

## 2018-07-03 DIAGNOSIS — K59.00 CONSTIPATION, UNSPECIFIED CONSTIPATION TYPE: Primary | ICD-10-CM

## 2018-07-03 RX ORDER — POLYETHYLENE GLYCOL 3350 17 G/17G
17 POWDER, FOR SOLUTION ORAL DAILY
Qty: 510 G | Refills: 0 | Status: SHIPPED | OUTPATIENT
Start: 2018-07-03 | End: 2018-07-20 | Stop reason: ALTCHOICE

## 2018-07-03 RX ORDER — DOCUSATE SODIUM 100 MG/1
100 CAPSULE, LIQUID FILLED ORAL DAILY PRN
Qty: 30 CAPSULE | Refills: 0 | Status: SHIPPED | OUTPATIENT
Start: 2018-07-03 | End: 2018-07-20 | Stop reason: ALTCHOICE

## 2018-07-13 ENCOUNTER — TELEPHONE (OUTPATIENT)
Dept: INTERNAL MEDICINE | Age: 18
End: 2018-07-13

## 2018-07-13 RX ORDER — IBUPROFEN 800 MG/1
800 TABLET ORAL EVERY 8 HOURS PRN
Qty: 60 TABLET | Refills: 0 | Status: SHIPPED | OUTPATIENT
Start: 2018-07-13 | End: 2018-07-24

## 2018-07-16 ENCOUNTER — HOSPITAL ENCOUNTER (EMERGENCY)
Age: 18
Discharge: HOME | End: 2018-07-16
Attending: EMERGENCY MEDICINE
Payer: COMMERCIAL

## 2018-07-16 VITALS
SYSTOLIC BLOOD PRESSURE: 145 MMHG | OXYGEN SATURATION: 98 % | HEART RATE: 106 BPM | BODY MASS INDEX: 35.34 KG/M2 | RESPIRATION RATE: 18 BRPM | WEIGHT: 180 LBS | DIASTOLIC BLOOD PRESSURE: 91 MMHG | HEIGHT: 60 IN | TEMPERATURE: 98.8 F

## 2018-07-16 DIAGNOSIS — J02.9 VIRAL PHARYNGITIS: Primary | ICD-10-CM

## 2018-07-16 LAB
DIRECT EXAM: NORMAL
Lab: NORMAL
SPECIMEN DESCRIPTION: NORMAL
STATUS: NORMAL

## 2018-07-16 PROCEDURE — 87880 STREP A ASSAY W/OPTIC: CPT

## 2018-07-16 PROCEDURE — 96372 THER/PROPH/DIAG INJ SC/IM: CPT

## 2018-07-16 PROCEDURE — 6360000002 HC RX W HCPCS: Performed by: EMERGENCY MEDICINE

## 2018-07-16 PROCEDURE — 99283 EMERGENCY DEPT VISIT LOW MDM: CPT

## 2018-07-16 PROCEDURE — 6370000000 HC RX 637 (ALT 250 FOR IP): Performed by: EMERGENCY MEDICINE

## 2018-07-16 RX ORDER — KETOROLAC TROMETHAMINE 30 MG/ML
30 INJECTION, SOLUTION INTRAMUSCULAR; INTRAVENOUS ONCE
Status: COMPLETED | OUTPATIENT
Start: 2018-07-16 | End: 2018-07-16

## 2018-07-16 RX ORDER — BENZONATATE 100 MG/1
100 CAPSULE ORAL 3 TIMES DAILY PRN
Qty: 30 CAPSULE | Refills: 0 | Status: SHIPPED | OUTPATIENT
Start: 2018-07-16 | End: 2018-07-20 | Stop reason: ALTCHOICE

## 2018-07-16 RX ORDER — BENZONATATE 100 MG/1
200 CAPSULE ORAL ONCE
Status: COMPLETED | OUTPATIENT
Start: 2018-07-16 | End: 2018-07-16

## 2018-07-16 RX ADMIN — BENZONATATE 200 MG: 100 CAPSULE ORAL at 21:23

## 2018-07-16 RX ADMIN — BENZOCAINE AND MENTHOL 1 LOZENGE: 15; 3.6 LOZENGE ORAL at 21:23

## 2018-07-16 RX ADMIN — KETOROLAC TROMETHAMINE 30 MG: 30 INJECTION, SOLUTION INTRAMUSCULAR at 21:23

## 2018-07-16 ASSESSMENT — ENCOUNTER SYMPTOMS
NAUSEA: 0
SHORTNESS OF BREATH: 0
DIARRHEA: 0
EYE DISCHARGE: 0
COUGH: 1
VOMITING: 0
EYE PAIN: 0
CONSTIPATION: 1
SORE THROAT: 1
ABDOMINAL PAIN: 0

## 2018-07-16 ASSESSMENT — PAIN DESCRIPTION - PAIN TYPE: TYPE: ACUTE PAIN

## 2018-07-16 ASSESSMENT — PAIN SCALES - GENERAL: PAINLEVEL_OUTOF10: 9

## 2018-07-16 ASSESSMENT — PAIN DESCRIPTION - LOCATION: LOCATION: THROAT;HEAD

## 2018-07-17 ENCOUNTER — HOSPITAL ENCOUNTER (EMERGENCY)
Age: 18
Discharge: HOME | End: 2018-07-17
Attending: EMERGENCY MEDICINE
Payer: COMMERCIAL

## 2018-07-17 ENCOUNTER — CARE COORDINATION (OUTPATIENT)
Dept: CARE COORDINATION | Age: 18
End: 2018-07-17

## 2018-07-17 VITALS
OXYGEN SATURATION: 98 % | DIASTOLIC BLOOD PRESSURE: 78 MMHG | TEMPERATURE: 98.2 F | HEART RATE: 88 BPM | SYSTOLIC BLOOD PRESSURE: 135 MMHG | RESPIRATION RATE: 16 BRPM

## 2018-07-17 DIAGNOSIS — R52 BODY ACHES: Primary | ICD-10-CM

## 2018-07-17 DIAGNOSIS — B34.9 VIRAL ILLNESS: ICD-10-CM

## 2018-07-17 PROCEDURE — G0382 LEV 3 HOSP TYPE B ED VISIT: HCPCS

## 2018-07-17 PROCEDURE — 6370000000 HC RX 637 (ALT 250 FOR IP): Performed by: EMERGENCY MEDICINE

## 2018-07-17 RX ORDER — IBUPROFEN 800 MG/1
800 TABLET ORAL ONCE
Status: COMPLETED | OUTPATIENT
Start: 2018-07-17 | End: 2018-07-17

## 2018-07-17 RX ADMIN — IBUPROFEN 800 MG: 800 TABLET, FILM COATED ORAL at 17:18

## 2018-07-17 ASSESSMENT — ENCOUNTER SYMPTOMS
CONSTIPATION: 0
CHEST TIGHTNESS: 0
NAUSEA: 0
RHINORRHEA: 0
DIARRHEA: 0
VOMITING: 0
ABDOMINAL PAIN: 0
SORE THROAT: 1
SHORTNESS OF BREATH: 0
BACK PAIN: 0
BLOOD IN STOOL: 0

## 2018-07-17 ASSESSMENT — PAIN SCALES - GENERAL: PAINLEVEL_OUTOF10: 8

## 2018-07-17 ASSESSMENT — PAIN DESCRIPTION - PAIN TYPE: TYPE: ACUTE PAIN

## 2018-07-17 ASSESSMENT — PAIN DESCRIPTION - LOCATION: LOCATION: GENERALIZED

## 2018-07-17 NOTE — ED PROVIDER NOTES
deterioration in this patient's condition which required my urgent intervention. Total critical care time was  0   minutes. This excludes any time for separately reportable procedures.        [address], [place]  07/17/18 3143

## 2018-07-17 NOTE — ED PROVIDER NOTES
[facility]  Emergency Department  Faculty Attestation     I performed a history and physical examination of the patient and discussed management with the resident. I reviewed the residents note and agree with the documented findings and plan of care. Any areas of disagreement are noted on the chart. I was personally present for the key portions of any procedures. I have documented in the chart those procedures where I was not present during the key portions. I have reviewed the emergency nurses triage note. I agree with the chief complaint, past medical history, past surgical history, allergies, medications, social and family history as documented unless otherwise noted below. For Physician Assistant/ Nurse Practitioner cases/documentation I have personally evaluated this patient and have completed at least one if not all key elements of the E/M (history, physical exam, and MDM). Additional findings are as noted. Primary Care Physician:  Esperanza Haywood MD    Screenings:  [unfilled]    CHIEF COMPLAINT       Chief Complaint   Patient presents with    Constipation     X2 weeks, tried stool softeners at home    Pharyngitis     x2 days    Headache     X2 days, tried motrin and tylenol at home with no relief       RECENT VITALS:   Temp: 98.8 °F (37.1 °C),  Pulse: 106, Resp: 18, BP: (!) 145/91    LABS:  Labs Reviewed   STREP SCREEN GROUP A THROAT       Radiology  No orders to display         Attending Physician Additional  Notes    Patient's been having recurrent sore throats for the past 2 years ever since she had a tonsillectomy. She has no fevers rhinorrhea cough congestion. No hoarseness or change in voice. She also has chronic abdominal pain is on relax. She states her abdominal illness hard. She has been moving her bowels today. On exam she is hypertensive tachycardic afebrile nontoxic. Normal speech.   Extremities are moist.  There is minimal injection of the tonsillar pillars. Abdomen is soft nontender no distention or tympany. Impression is pharyngitis, constipation, possible dehydration. Plan is strep screen, high-fiber diet, consider Metamucil, continue Duralex, consider enema, saltwater gargling, lozenges, follow-up. Marta Mars.  Rosalind Wilson MD, [facility]  Attending Emergency  Physician               Margarita Van MD  07/16/18 [id number]

## 2018-07-17 NOTE — ED PROVIDER NOTES
nebulizer solution Take 3 mLs by nebulization every 6 hours as needed for Wheezing 6/26/18  Yes Kirk Moser MD   fluticasone [facility]) 50 MCG/ACT nasal spray Instill 1 spray into each nostril once daily for allergies and stuffy nose. 6/26/18  Yes Kirk Moser MD   albuterol sulfate HFA (VENTOLIN HFA) 108 (90 Base) MCG/ACT inhaler Inhale 2 puffs into the lungs every 6 hours as needed for Wheezing 6/24/18  Yes Michelle Le MD   mometasone-formoterol (DULERA) 200-5 MCG/ACT inhaler Inhale 2 puffs into the lungs 2 times daily Rinse mouth after using. 6/24/18  Yes Michelle Le MD   cetirizine (ZYRTEC) 10 MG tablet Take 1 tablet by mouth daily 5/21/18  Yes Rae Frankel, MD   ondansetron (ZOFRAN ODT) 4 MG disintegrating tablet Take 1 tablet by mouth every 8 hours as needed for Nausea 5/14/18  Yes Frutoso Base, DO   montelukast (SINGULAIR) 10 MG tablet Take 1 tablet by mouth nightly 4/23/18  Yes Rae Frankel, MD   vitamin E 400 UNIT capsule Take 1 capsule by mouth 2 times daily 11/21/16  Yes Shukri Mcclure,    magnesium hydroxide (MILK OF MAGNESIA) 400 MG/5ML suspension Take 30 mLs by mouth daily as needed for Constipation 7/3/18   Kirk Moser MD       REVIEW OF SYSTEMS    (2-9 systems for level 4, 10 or more for level 5)      Review of Systems   Constitutional: Negative for chills, diaphoresis and fever. HENT: Positive for sore throat. Negative for congestion. Eyes: Negative for pain and discharge. Respiratory: Positive for cough. Negative for shortness of breath. Cardiovascular: Negative for chest pain and leg swelling. Gastrointestinal: Positive for constipation. Negative for abdominal pain, diarrhea, nausea and vomiting. Endocrine: Negative for polydipsia and polyuria. Genitourinary: Negative for dysuria, hematuria, vaginal bleeding, vaginal discharge and vaginal pain. Musculoskeletal: Negative for neck pain and neck stiffness. Skin: Negative for pallor and rash. Allergic/Immunologic: Negative for environmental allergies and food allergies. Neurological: Positive for headaches. Negative for dizziness, weakness, light-headedness and numbness. Hematological: Negative for adenopathy. Does not bruise/bleed easily. Psychiatric/Behavioral: Negative for hallucinations and suicidal ideas. PHYSICAL EXAM   (up to 7 for level 4, 8 or more for level 5)      INITIAL VITALS:   BP (!) 145/91   Pulse 106   Temp 98.8 °F (37.1 °C) (Oral)   Resp 18   Ht 5' (1.524 m)   Wt 180 lb (81.6 kg)   LMP 06/10/2018   SpO2 98%   BMI 35.15 kg/m²     Physical Exam   Constitutional: She is oriented to person, place, and time. She appears well-developed and well-nourished. No distress. Resting comfortably back, playing on phone, no distress   HENT:   Head: Normocephalic and atraumatic. Mouth/Throat: Oropharynx is clear and moist.   No signs of trauma to the scalp or face  Minimal erythema to the posterior oral pharynx, no exudate, no peritonsillar abscess   Eyes: Conjunctivae and EOM are normal. Pupils are equal, round, and reactive to light. Extraocular movements intact bilaterally without discomfort   Neck: Normal range of motion. Neck supple. Neck supple, no signs of meningismus, no cervical lymphadenopathy   Cardiovascular: Normal rate and regular rhythm. Exam reveals no gallop and no friction rub. No murmur heard. Pulmonary/Chest: Effort normal and breath sounds normal. No respiratory distress. She has no wheezes. She has no rales. Lungs clear to auscultation bilaterally   Abdominal: Soft. Bowel sounds are normal. There is no tenderness. There is no rebound and no guarding. Abdomen may be slightly distended, is soft, nontender, no masses   Musculoskeletal: She exhibits no edema or deformity. Neurological: She is alert and oriented to person, place, and time. She exhibits normal muscle tone.    Alert and oriented ×3  Cranial nerves II through XII grossly intact bilaterally  5/5 strength and sensation intact in bilateral upper and lower extremities  Negative finger-nose bilaterally, negative heel-to-shin bilaterally, negative pronator drift   Skin: Skin is warm and dry. No rash noted. Psychiatric: She has a normal mood and affect. Thought content normal.   Nursing note and vitals reviewed. DIFFERENTIAL  DIAGNOSIS     PLAN (LABS / IMAGING / EKG):  Orders Placed This Encounter   Procedures    Strep Screen Group A Throat       MEDICATIONS ORDERED:  Orders Placed This Encounter   Medications    ketorolac (TORADOL) injection 30 mg    benzonatate (TESSALON) capsule 200 mg    benzocaine-menthol (CEPACOL SORE THROAT) lozenge 1 lozenge    benzonatate (TESSALON PERLES) 100 MG capsule     Sig: Take 1 capsule by mouth 3 times daily as needed for Cough     Dispense:  30 capsule     Refill:  0    menthol-cetylpyridinium (CEPACOL REGULAR STRENGTH) 3 MG lozenge     Sig: Take 1 lozenge by mouth as needed for Sore Throat     Dispense:  30 lozenge     Refill:  0       DDX: Strep pharyngitis, mononucleosis, viral pharyngitis, tension headache, dehydration, migraine, constipation    DIAGNOSTIC RESULTS / EMERGENCY DEPARTMENT COURSE / MDM     LABS:  Results for orders placed or performed during the hospital encounter of 07/16/18   Strep Screen Group A Throat   Result Value Ref Range    Specimen Description . THROAT     Special Requests NOT REPORTED     Direct Exam       Rapid Strep A negative. A negative Rapid Group A Strep Screen result does not    Direct Exam        rule out the possibility of Group A Streptococci in the specimen. A Group A    Direct Exam  Strep DNA test is available upon request.     Status FINAL 07/16/2018        IMPRESSION: 78-year-old female complaining of sore throat without any evidence of peritonsillar abscess or exudate, afebrile, appears well, will swab for strep, treat symptoms.   Patient with constipation although having daily bowel movements, using Colace and laxatives at home, declining any further treatment here, abdomen is soft and nontender, no nausea or vomiting. Headache is mild in nature, consistent with viral syndrome, no signs of meningismus, afebrile, patient appears well, neuro intact, no trauma to the head, will treat with Toradol and reassess. RADIOLOGY:  None    EKG  None    All EKG's are interpreted by the Emergency Department Physician who either signs or Co-signs this chart in the absence of a cardiologist.    EMERGENCY DEPARTMENT COURSE:  Strep swab negative, on reassessment patient states she feels mildly better after Toradol and throat lozenges, on reassessment she still appears well, no longer tachycardic, heart rate in the 90s, discussed discharge plan patient, follow-up plan and return precautions, patient extends and agrees with discharge plan. PROCEDURES:  None    CONSULTS:  None    CRITICAL CARE:  None    FINAL IMPRESSION      1.  Viral pharyngitis          DISPOSITION / PLAN     DISPOSITION        PATIENT REFERRED TO:  Jackie Kelly MD  [facility] [address]  [phone]    Schedule an appointment as soon as possible for a visit in 3 days        DISCHARGE MEDICATIONS:  New Prescriptions    BENZONATATE (TESSALON PERLES) 100 MG CAPSULE    Take 1 capsule by mouth 3 times daily as needed for Cough    MENTHOL-CETYLPYRIDINIUM (CEPACOL REGULAR STRENGTH) 3 MG LOZENGE    Take 1 lozenge by mouth as needed for Sore Throat       Kayla Cullen DO  Emergency Medicine Resident    (Please note that portions of this note were completed with a voice recognition program.  Efforts were made to edit the dictations but occasionally words are mis-transcribed.)       Kayla Cullen DO  Resident  07/16/18 [id number]

## 2018-07-17 NOTE — ED PROVIDER NOTES
[facility] ED  Emergency Department Encounter  Emergency Medicine Resident     Pt Name: Sasha Martinez  MRN: [id number]  [place] 2000  Date of evaluation: 7/17/18  PCP:  Manoj Jason MD    CHIEF COMPLAINT       Chief Complaint   Patient presents with    Generalized Body Aches       HISTORY OF PRESENT ILLNESS  (Location/Symptom, Timing/Onset, Context/Setting, Quality, Duration, Modifying Factors, Severity.)      Sasha Martinez is a 21 y.o. female who presents with Complaints of generalized body aches. Patient states that her symptoms have been ongoing for the past week with associated congestion and cough and chills but no fever, chest pain, shortness of breath, abdominal pain, nausea, vomiting, or changes in bowel or urinary habits. Patient states that she has history of constipation for which she takes 2 different stool softeners. Patient states that she took Tylenol 2 hours prior to arrival.  Patient initially stated to nurse that she had not filled her prescription for menthol and Tessalon Perles but admitted to me that she has been taking these. She also reports history of asthma for which she has not needed to use her inhaler. PAST MEDICAL / SURGICAL / SOCIAL / FAMILY HISTORY      has a past medical history of Allergic; Allergic rhinitis; Allergy; Asthma; GERD (gastroesophageal reflux disease); Headache(784.0); Morbid obesity with BMI of 40.0-44.9, adult ([facility], [place] [address] Pure hypercholesterolemia; Unspecified sleep apnea; and Vision disturbance. has a past surgical history that includes Adenoidectomy; Tonsillectomy; and other surgical history (08/28/14). Social History     Social History    Marital status: Single     Spouse name: N/A    Number of children: N/A    Years of education: N/A     Occupational History    Not on file.      Social History Main Topics    Smoking status: Former Smoker     Types: Cigarettes     Start date: 8/11/2016    Smokeless tobacco: Never Used      Comment:  visitors smoke outside    Alcohol use No    Drug use: No    Sexual activity: Yes     Partners: Male      Comment: See adol form     Other Topics Concern    Not on file     Social History Narrative    No narrative on file       Family History   Problem Relation Age of Onset    Asthma Mother     Lupus Mother     High Blood Pressure Father     Diabetes Father     Diabetes Maternal Grandfather     High Blood Pressure Maternal Grandfather     Heart Disease Paternal Grandmother     Diabetes Paternal Grandmother     High Blood Pressure Paternal Grandfather     Heart Disease Paternal Uncle     Heart Disease Paternal Aunt     Arthritis Maternal Aunt     Cancer Maternal Aunt     Birth Defects Neg Hx     Depression Neg Hx     Early Death Neg Hx     Hearing Loss Neg Hx     High Cholesterol Neg Hx     Kidney Disease Neg Hx     Learning Disabilities Neg Hx     Mental Illness Neg Hx     Mental Retardation Neg Hx     Miscarriages / Stillbirths Neg Hx     Stroke Neg Hx     Substance Abuse Neg Hx     Vision Loss Neg Hx     Other Neg Hx        Allergies:  Peanut-containing drug products and Food    Home Medications:  Prior to Admission medications    Medication Sig Start Date End Date Taking?  Authorizing Provider   benzonatate (TESSALON PERLES) 100 MG capsule Take 1 capsule by mouth 3 times daily as needed for Cough 7/16/18 7/23/18  Nic Marcelo, DO   menthol-cetylpyridinium (CEPACOL REGULAR STRENGTH) 3 MG lozenge Take 1 lozenge by mouth as needed for Sore Throat 7/16/18   Marcene Esters, DO   ibuprofen (ADVIL;MOTRIN) 800 MG tablet Take 1 tablet by mouth every 8 hours as needed for Pain 7/13/18   Rae Frankel, MD   polyethylene glycol (MIRALAX) powder Take 17 g by mouth daily 7/3/18 8/2/18  Kirk Moser MD   docusate sodium (COLACE) 100 MG capsule Take 1 capsule by mouth daily as needed for Constipation 7/3/18   Kirk Moser MD   magnesium hydroxide (MILK OF 07/17/18. IMPRESSION: 20-year-old female presented with complaints of myalgia ×1 week with associated cough and congestion. Patient was seen yesterday for severe complaint and sent home with Tessalon Perles and menthol. Patient is well-appearing and does not appear to be in acute distress. Stable vitals. Benign exam except for mild erythema noted in the oropharynx. Tonsils are not present. Patient likely has a viral infection and was advised to continue taking medications are prescribed to her yesterday as well as Tylenol and that she can alternate between Tylenol and Motrin every 4 hours for the next few days as needed for pain. Patient given a dose of Motrin here. Patient stated that she had enough Motrin at home and did not need a prescription. Patient advised to follow up with PCP and to return to the ED for any worsening symptoms. Patient voiced understanding and agreed with plan. RADIOLOGY:  None    EKG  None    All EKG's are interpreted by the Emergency Department Physician who either signs or Co-signs this chart in the absence of a cardiologist.    EMERGENCY DEPARTMENT COURSE:  Refer to [facility]    PROCEDURES:  None    CONSULTS:  None    CRITICAL CARE:  None    FINAL IMPRESSION      1. Body aches    2.  Viral illness          DISPOSITION / PLAN     DISPOSITION Decision To Discharge 07/17/2018 05:06:39 PM      PATIENT REFERRED TO:  Sulaiman Renteria MD  [facility] [address] [address]  [phone]    Call today  As needed, If symptoms worsen      DISCHARGE MEDICATIONS:  New Prescriptions    No medications on file       Ashley Olivo MD  Emergency Medicine Resident    (Please note that portions of this note were completed with a voice recognition program.  Efforts were made to edit the dictations but occasionally words are mis-transcribed.)        Ashley Olivo MD  Resident  07/17/18 [id number]

## 2018-07-18 ENCOUNTER — CARE COORDINATION (OUTPATIENT)
Dept: CARE COORDINATION | Age: 18
End: 2018-07-18

## 2018-07-20 ENCOUNTER — OFFICE VISIT (OUTPATIENT)
Dept: PULMONOLOGY | Age: 18
End: 2018-07-20
Payer: COMMERCIAL

## 2018-07-20 ENCOUNTER — APPOINTMENT (OUTPATIENT)
Dept: GENERAL RADIOLOGY | Age: 18
End: 2018-07-20
Payer: COMMERCIAL

## 2018-07-20 ENCOUNTER — HOSPITAL ENCOUNTER (EMERGENCY)
Age: 18
Discharge: HOME | End: 2018-07-20
Payer: COMMERCIAL

## 2018-07-20 VITALS
TEMPERATURE: 98.2 F | HEART RATE: 118 BPM | DIASTOLIC BLOOD PRESSURE: 71 MMHG | BODY MASS INDEX: 41.62 KG/M2 | WEIGHT: 212 LBS | OXYGEN SATURATION: 100 % | RESPIRATION RATE: 18 BRPM | SYSTOLIC BLOOD PRESSURE: 151 MMHG | HEIGHT: 60 IN

## 2018-07-20 VITALS
HEIGHT: 59 IN | OXYGEN SATURATION: 97 % | SYSTOLIC BLOOD PRESSURE: 135 MMHG | WEIGHT: 209 LBS | HEART RATE: 94 BPM | DIASTOLIC BLOOD PRESSURE: 89 MMHG | BODY MASS INDEX: 42.13 KG/M2

## 2018-07-20 VITALS — OXYGEN SATURATION: 97 % | HEIGHT: 58 IN | BODY MASS INDEX: 43.87 KG/M2 | HEART RATE: 76 BPM | WEIGHT: 209 LBS

## 2018-07-20 DIAGNOSIS — J45.50 SEVERE PERSISTENT ASTHMA, UNSPECIFIED WHETHER COMPLICATED: Primary | ICD-10-CM

## 2018-07-20 DIAGNOSIS — E66.9 OBESITY (BMI 35.0-39.9 WITHOUT COMORBIDITY): ICD-10-CM

## 2018-07-20 DIAGNOSIS — J20.9 ACUTE BRONCHITIS, UNSPECIFIED ORGANISM: ICD-10-CM

## 2018-07-20 DIAGNOSIS — J45.40 MODERATE PERSISTENT ASTHMA WITHOUT COMPLICATION: Primary | ICD-10-CM

## 2018-07-20 DIAGNOSIS — K59.00 CONSTIPATION, UNSPECIFIED CONSTIPATION TYPE: ICD-10-CM

## 2018-07-20 DIAGNOSIS — G47.33 OSA (OBSTRUCTIVE SLEEP APNEA): ICD-10-CM

## 2018-07-20 DIAGNOSIS — J30.0 CHRONIC VASOMOTOR RHINITIS: ICD-10-CM

## 2018-07-20 DIAGNOSIS — J02.9 ACUTE PHARYNGITIS, UNSPECIFIED ETIOLOGY: Primary | ICD-10-CM

## 2018-07-20 LAB
-: ABNORMAL
ABSOLUTE EOS #: 0.1 K/UL (ref 0–0.4)
ABSOLUTE IMMATURE GRANULOCYTE: ABNORMAL K/UL (ref 0–0.3)
ABSOLUTE LYMPH #: 2.1 K/UL (ref 1.2–5.2)
ABSOLUTE MONO #: 0.4 K/UL (ref 0.2–0.8)
ALBUMIN SERPL-MCNC: 3.9 G/DL (ref 3.5–5.2)
ALBUMIN/GLOBULIN RATIO: ABNORMAL (ref 1–2.5)
ALP BLD-CCNC: 82 U/L (ref 35–104)
ALT SERPL-CCNC: 38 U/L (ref 5–33)
AMORPHOUS: ABNORMAL
AMYLASE: 44 U/L (ref 28–100)
ANION GAP SERPL CALCULATED.3IONS-SCNC: 13 MMOL/L (ref 9–17)
AST SERPL-CCNC: 29 U/L
BACTERIA: ABNORMAL
BASOPHILS # BLD: 0 % (ref 0–2)
BASOPHILS ABSOLUTE: 0 K/UL (ref 0–0.2)
BILIRUB SERPL-MCNC: 0.21 MG/DL (ref 0.3–1.2)
BILIRUBIN DIRECT: <0.08 MG/DL
BILIRUBIN URINE: NEGATIVE
BILIRUBIN, INDIRECT: ABNORMAL MG/DL (ref 0–1)
BUN BLDV-MCNC: 11 MG/DL (ref 6–20)
BUN/CREAT BLD: 17 (ref 9–20)
CALCIUM SERPL-MCNC: 9.4 MG/DL (ref 8.6–10.4)
CASTS UA: ABNORMAL /LPF
CHLORIDE BLD-SCNC: 103 MMOL/L (ref 98–107)
CO2: 24 MMOL/L (ref 20–31)
COLOR: YELLOW
COMMENT UA: ABNORMAL
CREAT SERPL-MCNC: 0.63 MG/DL (ref 0.5–0.9)
CRYSTALS, UA: ABNORMAL /HPF
D-DIMER QUANTITATIVE: <0.17 MG/L FEU
DIFFERENTIAL TYPE: ABNORMAL
DIRECT EXAM: NORMAL
EOSINOPHILS RELATIVE PERCENT: 1 % (ref 1–4)
EPITHELIAL CELLS UA: ABNORMAL /HPF (ref 0–5)
GFR AFRICAN AMERICAN: ABNORMAL ML/MIN
GFR NON-AFRICAN AMERICAN: ABNORMAL ML/MIN
GFR SERPL CREATININE-BSD FRML MDRD: ABNORMAL ML/MIN/{1.73_M2}
GFR SERPL CREATININE-BSD FRML MDRD: ABNORMAL ML/MIN/{1.73_M2}
GLOBULIN: ABNORMAL G/DL (ref 1.5–3.8)
GLUCOSE BLD-MCNC: 111 MG/DL (ref 70–99)
GLUCOSE URINE: NEGATIVE
HCG QUALITATIVE: NEGATIVE
HCT VFR BLD CALC: 38.4 % (ref 36–46)
HEMOGLOBIN: 12 G/DL (ref 12–16)
IMMATURE GRANULOCYTES: ABNORMAL %
KETONES, URINE: NEGATIVE
LACTIC ACID, SEPSIS WHOLE BLOOD: NORMAL MMOL/L (ref 0.5–1.9)
LACTIC ACID, SEPSIS: 1.3 MMOL/L (ref 0.5–1.9)
LEUKOCYTE ESTERASE, URINE: NEGATIVE
LIPASE: 30 U/L (ref 13–60)
LYMPHOCYTES # BLD: 21 % (ref 25–45)
Lab: NORMAL
MAGNESIUM: 2 MG/DL (ref 1.7–2.2)
MCH RBC QN AUTO: 22.9 PG (ref 26–34)
MCHC RBC AUTO-ENTMCNC: 31.2 G/DL (ref 31–37)
MCV RBC AUTO: 73.6 FL (ref 80–100)
MONOCYTES # BLD: 4 % (ref 2–8)
MONONUCLEOSIS SCREEN: NEGATIVE
MUCUS: ABNORMAL
NITRITE, URINE: NEGATIVE
NRBC AUTOMATED: ABNORMAL PER 100 WBC
OTHER OBSERVATIONS UA: ABNORMAL
PDW BLD-RTO: 13.5 % (ref 11.5–14.5)
PH UA: 6 (ref 5–8)
PLATELET # BLD: 275 K/UL (ref 130–400)
PLATELET ESTIMATE: ABNORMAL
PMV BLD AUTO: 9.4 FL (ref 6–12)
POTASSIUM SERPL-SCNC: 3 MMOL/L (ref 3.7–5.3)
PROTEIN UA: NEGATIVE
RBC # BLD: 5.22 M/UL (ref 4–5.2)
RBC # BLD: ABNORMAL 10*6/UL
RBC UA: ABNORMAL /HPF (ref 0–2)
RENAL EPITHELIAL, UA: ABNORMAL /HPF
SEG NEUTROPHILS: 74 % (ref 34–64)
SEGMENTED NEUTROPHILS ABSOLUTE COUNT: 7.8 K/UL (ref 1.8–8)
SODIUM BLD-SCNC: 140 MMOL/L (ref 135–144)
SPECIFIC GRAVITY UA: 1.03 (ref 1–1.03)
SPECIMEN DESCRIPTION: NORMAL
STATUS: NORMAL
TOTAL PROTEIN: 7.3 G/DL (ref 6.4–8.3)
TRICHOMONAS: ABNORMAL
TURBIDITY: ABNORMAL
URINE HGB: NEGATIVE
UROBILINOGEN, URINE: NORMAL
WBC # BLD: 10.4 K/UL (ref 4.5–13.5)
WBC # BLD: ABNORMAL 10*3/UL
WBC UA: ABNORMAL /HPF (ref 0–5)
YEAST: ABNORMAL

## 2018-07-20 PROCEDURE — 83690 ASSAY OF LIPASE: CPT

## 2018-07-20 PROCEDURE — 94726 PLETHYSMOGRAPHY LUNG VOLUMES: CPT | Performed by: INTERNAL MEDICINE

## 2018-07-20 PROCEDURE — 83735 ASSAY OF MAGNESIUM: CPT

## 2018-07-20 PROCEDURE — 74022 RADEX COMPL AQT ABD SERIES: CPT

## 2018-07-20 PROCEDURE — 94375 RESPIRATORY FLOW VOLUME LOOP: CPT | Performed by: INTERNAL MEDICINE

## 2018-07-20 PROCEDURE — 99204 OFFICE O/P NEW MOD 45 MIN: CPT | Performed by: INTERNAL MEDICINE

## 2018-07-20 PROCEDURE — 1036F TOBACCO NON-USER: CPT | Performed by: INTERNAL MEDICINE

## 2018-07-20 PROCEDURE — 94664 DEMO&/EVAL PT USE INHALER: CPT

## 2018-07-20 PROCEDURE — 85025 COMPLETE CBC W/AUTO DIFF WBC: CPT

## 2018-07-20 PROCEDURE — 70360 X-RAY EXAM OF NECK: CPT

## 2018-07-20 PROCEDURE — 87040 BLOOD CULTURE FOR BACTERIA: CPT

## 2018-07-20 PROCEDURE — G8427 DOCREV CUR MEDS BY ELIG CLIN: HCPCS | Performed by: INTERNAL MEDICINE

## 2018-07-20 PROCEDURE — 81001 URINALYSIS AUTO W/SCOPE: CPT

## 2018-07-20 PROCEDURE — 87086 URINE CULTURE/COLONY COUNT: CPT

## 2018-07-20 PROCEDURE — 86308 HETEROPHILE ANTIBODY SCREEN: CPT

## 2018-07-20 PROCEDURE — 82150 ASSAY OF AMYLASE: CPT

## 2018-07-20 PROCEDURE — 85379 FIBRIN DEGRADATION QUANT: CPT

## 2018-07-20 PROCEDURE — 84703 CHORIONIC GONADOTROPIN ASSAY: CPT

## 2018-07-20 PROCEDURE — 94729 DIFFUSING CAPACITY: CPT | Performed by: INTERNAL MEDICINE

## 2018-07-20 PROCEDURE — 80048 BASIC METABOLIC PNL TOTAL CA: CPT

## 2018-07-20 PROCEDURE — 87880 STREP A ASSAY W/OPTIC: CPT

## 2018-07-20 PROCEDURE — 83605 ASSAY OF LACTIC ACID: CPT

## 2018-07-20 PROCEDURE — G8417 CALC BMI ABV UP PARAM F/U: HCPCS | Performed by: INTERNAL MEDICINE

## 2018-07-20 PROCEDURE — 6360000002 HC RX W HCPCS

## 2018-07-20 PROCEDURE — 6370000000 HC RX 637 (ALT 250 FOR IP)

## 2018-07-20 PROCEDURE — 80076 HEPATIC FUNCTION PANEL: CPT

## 2018-07-20 PROCEDURE — 99284 EMERGENCY DEPT VISIT MOD MDM: CPT

## 2018-07-20 PROCEDURE — 94640 AIRWAY INHALATION TREATMENT: CPT

## 2018-07-20 PROCEDURE — 2580000003 HC RX 258

## 2018-07-20 RX ORDER — DEXAMETHASONE SODIUM PHOSPHATE 10 MG/ML
10 INJECTION INTRAMUSCULAR; INTRAVENOUS ONCE
Status: COMPLETED | OUTPATIENT
Start: 2018-07-20 | End: 2018-07-20

## 2018-07-20 RX ORDER — CEPHALEXIN 500 MG/1
500 CAPSULE ORAL 3 TIMES DAILY
Qty: 21 CAPSULE | Refills: 0 | Status: SHIPPED | OUTPATIENT
Start: 2018-07-20 | End: 2018-09-18 | Stop reason: ALTCHOICE

## 2018-07-20 RX ORDER — AZITHROMYCIN 500 MG/1
500 TABLET, FILM COATED ORAL DAILY
Qty: 3 TABLET | Refills: 0 | Status: SHIPPED | OUTPATIENT
Start: 2018-07-20 | End: 2018-07-23

## 2018-07-20 RX ORDER — 0.9 % SODIUM CHLORIDE 0.9 %
1000 INTRAVENOUS SOLUTION INTRAVENOUS ONCE
Status: COMPLETED | OUTPATIENT
Start: 2018-07-20 | End: 2018-07-20

## 2018-07-20 RX ORDER — PREDNISONE 20 MG/1
20 TABLET ORAL 2 TIMES DAILY
Qty: 10 TABLET | Refills: 0 | Status: SHIPPED | OUTPATIENT
Start: 2018-07-20 | End: 2018-07-30

## 2018-07-20 RX ORDER — POLYETHYLENE GLYCOL 3350 17 G/17G
17 POWDER, FOR SOLUTION ORAL DAILY
Qty: 1 BOTTLE | Refills: 0 | Status: SHIPPED | OUTPATIENT
Start: 2018-07-20 | End: 2018-07-27

## 2018-07-20 RX ORDER — PREDNISONE 20 MG/1
40 TABLET ORAL DAILY
Qty: 20 TABLET | Refills: 3 | Status: SHIPPED | OUTPATIENT
Start: 2018-07-20 | End: 2018-07-20 | Stop reason: ALTCHOICE

## 2018-07-20 RX ORDER — FLUCONAZOLE 150 MG/1
150 TABLET ORAL ONCE
Qty: 1 TABLET | Refills: 0 | Status: SHIPPED | OUTPATIENT
Start: 2018-07-20 | End: 2018-07-20

## 2018-07-20 RX ADMIN — DEXAMETHASONE SODIUM PHOSPHATE 10 MG: 10 INJECTION INTRAMUSCULAR; INTRAVENOUS at 20:55

## 2018-07-20 RX ADMIN — SODIUM CHLORIDE 1000 ML: 9 INJECTION, SOLUTION INTRAVENOUS at 20:55

## 2018-07-20 RX ADMIN — RACEPINEPHRINE HYDROCHLORIDE 11.25 MG: 11.25 SOLUTION RESPIRATORY (INHALATION) at 20:17

## 2018-07-20 ASSESSMENT — PAIN DESCRIPTION - LOCATION: LOCATION: ABDOMEN

## 2018-07-20 ASSESSMENT — PAIN SCALES - GENERAL: PAINLEVEL_OUTOF10: 9

## 2018-07-20 ASSESSMENT — PAIN DESCRIPTION - FREQUENCY: FREQUENCY: CONTINUOUS

## 2018-07-20 ASSESSMENT — PAIN DESCRIPTION - PAIN TYPE: TYPE: ACUTE PAIN

## 2018-07-20 ASSESSMENT — PAIN DESCRIPTION - PROGRESSION: CLINICAL_PROGRESSION: GRADUALLY WORSENING

## 2018-07-20 ASSESSMENT — PAIN DESCRIPTION - ONSET: ONSET: ON-GOING

## 2018-07-20 ASSESSMENT — PAIN DESCRIPTION - DESCRIPTORS: DESCRIPTORS: CONSTANT

## 2018-07-20 NOTE — PROGRESS NOTES
to lose much weight. In spite of her efforts. She does have clinical features suggestive of sleep apnea syndrome. She does snore. Her sleep is not refreshing. Doesn't complain of morning headaches and is sleepy during the daytime. As mentioned above. She does have symptoms of vasomotor rhinitis. Patient does not have any pets at home. She does not smoke and does not use any drugs. No flowsheet data found. Review of Systems -the use of the system was conducted for all other system including upper and lower extremities and no additional information was noted other than being overweight. General ROS: negative for - chills, fatigue, fever or weight loss  ENT ROS: negative for - headaches, oral lesions or sore throat  Cardiovascular ROS: no chest pain , orthopnea or pnd   Gastrointestinal ROS: no abdominal pain, change in bowel habits, or black or bloody stools  Skin - no rash   Neuro - no blurry vision , no loc . No focal weakness   msk - no jt tenderness or swelling    Vascular - no claudication , rest completed and negative   Lymphatic - complete and negative   Hematology - oncology - complete and negative   Allergy immunology - complete and negative    no burning or hematuria       LUNG CANCER SCREENING     1. CRITERIA MET    []     CT ORDERED  []      2. CRITERIA NOT MET   [x]      3. REFUSED                    []        REASON CRITERIA NOT MET     1. SMOKING LESS THAN 30 PY  [x]      2. AGE LESS THAN 55 or GREATER 77 YEARS  [x]      3. QUIT SMOKING 15 YEARS OR GREATER   []      4. RECENT CT WITH IN 11 MONTHS    []      5. LIFE EXPECTANCY < 5 YEARS   []      6.  SIGNS  AND SYMPTOMS OF LUNG CANCER   []           Immunization   Immunization History   Administered Date(s) Administered    DTaP 1998, 01/27/1999, 03/26/1999, 02/28/2000, 04/05/2005    HPV Gardasil Quadrivalent 11/23/2011, 01/31/2014, 06/27/2014    Hepatitis A 10/11/2013, 06/27/2014    Hepatitis B, unspecified formulation  OTHER SURGICAL HISTORY  08/28/14    Nexplanon placement    TONSILLECTOMY                Not in a hospital admission. Allergies   Allergen Reactions    Peanut-Containing Drug Products     Food Rash     Peanuts,walnuts,cashews,pecans apples cherries     History   Smoking Status    Former Smoker    Types: Cigarettes    Start date: 8/11/2016   Smokeless Tobacco    Never Used     Comment:  visitors smoke outside     Prior to Admission medications    Medication Sig Start Date End Date Taking? Authorizing Provider   predniSONE (DELTASONE) 20 MG tablet Take 2 tablets by mouth daily for 10 days 7/20/18 7/30/18 Yes Julio Johnson MD   azithromycin (ZITHROMAX) 500 MG tablet Take 1 tablet by mouth daily for 3 days 7/20/18 7/23/18 Yes Julio Johnson MD   ibuprofen (ADVIL;MOTRIN) 800 MG tablet Take 1 tablet by mouth every 8 hours as needed for Pain 7/13/18  Yes Ann Padron MD   polyethylene glycol (MIRALAX) powder Take 17 g by mouth daily 7/3/18 8/2/18 Yes Caryle Donate, MD   magnesium hydroxide (MILK OF MAGNESIA) 400 MG/5ML suspension Take 30 mLs by mouth daily as needed for Constipation 7/3/18  Yes Caryle Donate, MD   fluticasone (FLONASE) 50 MCG/ACT nasal spray Instill 1 spray into each nostril once daily for allergies and stuffy nose.  6/26/18  Yes Caryle Donate, MD   cetirizine (ZYRTEC) 10 MG tablet Take 1 tablet by mouth daily 5/21/18  Yes Ann Padron MD   montelukast (SINGULAIR) 10 MG tablet Take 1 tablet by mouth nightly 4/23/18  Yes Ann Padron MD   vitamin E 400 UNIT capsule Take 1 capsule by mouth 2 times daily 11/21/16  Yes Marleen Mariano DO   albuterol (PROVENTIL) (2.5 MG/3ML) 0.083% nebulizer solution Take 3 mLs by nebulization every 6 hours as needed for Wheezing 6/26/18   Caryle Donate, MD   albuterol sulfate HFA (VENTOLIN HFA) 108 (90 Base) MCG/ACT inhaler Inhale 2 puffs into the lungs every 6 hours as needed for Wheezing 6/24/18   Kayley Baires MD mometasone-formoterol (DULERA) 200-5 MCG/ACT inhaler Inhale 2 puffs into the lungs 2 times daily Rinse mouth after using. 6/24/18   Eliana Bustamante MD         Physical Exam  General Appearance:    Alert, cooperative, no distress, appears stated age, Morbid obesity    Head:    Normocephalic, without obvious abnormality, atraumatic   Eyes reveal no jaundice     Nasal mucosa not congested. No polyps are noted. No sinus tenderness is noted     . Throat examination is unremarkable     Her examination is not    :    Neck:   Supple, symmetrical, trachea midline, no adenopathy;     thyroid:  no enlargement/tenderness/nodules; no carotid    bruit or JVD. Short obese neck    Back:     Symmetric, no curvature, ROM normal, no CVA tenderness   Lungs:       Poor air entry on both sides. There are expiration prolonged expiratory rhonchi are audible on both sides    Chest Wall:    No tenderness or deformity      Heart:    Regular rate and rhythm, S1 and S2 normal, no murmur, rub        or gallop no rvh                           Abdomen:                                                 Pulses:                                            Lymph nodes:                    Neurologic:                  Soft, non-tender, bowel sounds active all four quadrants,     no masses, no organomegaly         2+ and symmetric all extremities            Cervical, supraclavicular not enlarged or matted or tender      CNII-XII intact, normal strength 5/5 . Sensation grossly normal  and reflexes normal 2+  throughout     Clubbing   Lower ext edema No1+   [] , 2 +  [] , 3+   []  Upper ext edema No       Musculoskeletal - no joint swelling or tenderness or synovitis               /89   Pulse 94   Ht 4' 11\" (1.499 m)   Wt 209 lb (94.8 kg)   LMP 06/10/2018   SpO2 97% Comment: at room air and at rest  BMI 42.21 kg/m²     CXR  The last chest x-ray was reviewed. It was within normal limits.       CT Scans  No CT scan    No echo  Ventilatory dysfunction is well preserved diffusion capacity. This is probably a combination restriction and obstruction. function study revealed mild      Assessment   Diagnosis Orders   1. Severe persistent asthma, unspecified whether complicated     2. Obesity (BMI 35.0-39.9 without comorbidity)     3. Acute bronchitis, unspecified organism     4. Chronic vasomotor rhinitis     5. NELSON (obstructive sleep apnea)  Baseline Diagnostic Sleep Study       Plan:  1. Patient has a severe persistent asthma. I advised her to continue inhaled steroid in the form of gout. 2.   3. .  She'll continue albuterol on as-needed basis. I gave her a short course of prednisone 40 mg daily for next 5 days. I advised the patient to take adequate percussion against exposure to the hot, humid weather. 4. I also gave her a short course of antibiotics in the form of Zithromax Tri-Dionisio. I plan to see her follow-up in few weeks. Patient is concerned about the hoarseness of voice. I educated her that her hoarseness is very likely related to the use of inhaled steroids and should resolve with time. 5.   6. A pulmonary function study done in the office revealed that the patient has a mild degree of ventilatory dysfunction, probably a combination restriction and obstruction. Diffusion capacity is well preserved. Nitric oxide excretion was a high indicating intermittently changes in the airways. 7. Patient encouraged to lose weight. 8. She also likely has obstructive sleep apnea syndrome. His sleep study was ordered. 9. Patient was advised not to smoke, which could worsen her pulmonary symptoms. 10. She was advised to get influenza vaccine in the fall and also get Pneumovax. Electronically signed by Jules Worthy MD on   7/20/18 at 1:51 PM  Please note that this chart was generated using voice recognition Dragon dictation software.   Although every effort was made to ensure the accuracy of this automated transcription, some errors in

## 2018-07-21 LAB
CULTURE: NORMAL
Lab: NORMAL
SPECIMEN DESCRIPTION: NORMAL
STATUS: NORMAL

## 2018-07-21 ASSESSMENT — ENCOUNTER SYMPTOMS
SORE THROAT: 1
SINUS PAIN: 1
VOICE CHANGE: 1
SHORTNESS OF BREATH: 1
ABDOMINAL DISTENTION: 0
PHOTOPHOBIA: 0

## 2018-07-21 NOTE — ED NOTES
Patient on phone when trying to explain side effects of decadron. When med was given med started to yell that it hurt when it was explained to patient that she would feel a warmth to the alise area.        Susan Chaney RN  07/20/18 [id number]

## 2018-07-21 NOTE — ED NOTES
Patient comes in from home with throat pain and cough for three days. Patient does not volunteer much information and is more interested in phone.        Nancy Honeycutt RN  07/20/18 [id number]

## 2018-07-21 NOTE — ED NOTES
Patient sent home with supplies for milk and molassis enema. Patient voices understanding of /dc instructions and how to give enema. Patient is discharged with family.      Des Mcmahon RN  07/20/18 [id number]

## 2018-07-21 NOTE — ED PROVIDER NOTES
[address] [place] [address] ED  eMERGENCY dEPARTMENT eNCOUnter      Pt Name: Karen Nguyen  MRN: [id number]  [place] 2000  Date of evaluation: 7/20/2018  Provider: Rogelio Braswell MD    CHIEF COMPLAINT       Chief Complaint   Patient presents with    Abdominal Pain     no bm x2 weeks    Headache    Pharyngitis     laryngitis x 1 week         HISTORY OF PRESENT ILLNESS  (Location/Symptom, Timing/Onset, Context/Setting, Quality, Duration, Modifying Factors, Severity.)   Karen Nguyen is a 21 y.o. female who presents to the emergency department Presents with a chief complaint of sore throat cough and constipation. She states she hasn't had a bowel movement in 2 weeks. States had a sore throat for a week has been evaluated at various hospitals but not treated for anything. Past medical history is significant for constipation according to her mother who had called me and gave me some of her childhood history. Nursing Notes were reviewed.     ALLERGIES     Peanut-containing drug products and Food    CURRENT MEDICATIONS       Discharge Medication List as of 7/20/2018 10:14 PM      CONTINUE these medications which have NOT CHANGED    Details   azithromycin (ZITHROMAX) 500 MG tablet Take 1 tablet by mouth daily for 3 days, Disp-3 tablet, R-0Normal      magnesium hydroxide (MILK OF MAGNESIA) 400 MG/5ML suspension Take 30 mLs by mouth daily as needed for Constipation, Disp-100 mL, R-0Normal      albuterol (PROVENTIL) (2.5 MG/3ML) 0.083% nebulizer solution Take 3 mLs by nebulization every 6 hours as needed for Wheezing, Disp-120 each, R-3Normal      fluticasone (FLONASE) 50 MCG/ACT nasal spray Instill 1 spray into each nostril once daily for allergies and stuffy nose., Disp-1 Bottle, R-3Normal      albuterol sulfate HFA (VENTOLIN HFA) 108 (90 Base) MCG/ACT inhaler Inhale 2 puffs into the lungs every 6 hours as needed for Wheezing, Disp-1 Inhaler, R-3Print      mometasone-formoterol (DULERA) 200-5 Relation Status    Mother Alive    Father Alive    MGF Alive    PGM     Sister Alive    MGM Alive    PGF Alive    PUnc (Not Specified)    PAunt (Not Specified)    MAunt (Not Specified)    Neg Hx (Not Specified)        SOCIAL HISTORY      reports that she has quit smoking. Her smoking use included Cigarettes. She started smoking about 23 months ago. She has never used smokeless tobacco. She reports that she does not drink alcohol or use drugs. REVIEW OF SYSTEMS    (2-9 systems for level 4, 10 or more for level 5)     Review of Systems   Constitutional: Positive for activity change and appetite change. HENT: Positive for congestion, sinus pain, sore throat and voice change. Negative for drooling and nosebleeds. Eyes: Negative for photophobia and visual disturbance. Respiratory: Positive for shortness of breath. Gastrointestinal: Negative for abdominal distention. Endocrine: Negative for polyphagia. Genitourinary: Positive for dysuria and urgency. Musculoskeletal: Positive for myalgias. Neurological: Positive for dizziness, weakness and light-headedness. Psychiatric/Behavioral: Negative for agitation and behavioral problems. Except as noted above the remainder of the review of systems was reviewed and negative. PHYSICAL EXAM    (up to 7 for level 4, 8 or more for level 5)     ED Triage Vitals [18 1821]   BP Temp Temp Source Pulse Resp SpO2 Height Weight   (!) 151/71 98.2 °F (36.8 °C) Oral 118 18 100 % 5' (1.524 m) 212 lb (96.2 kg)       Physical Exam   Constitutional: She is oriented to person, place, and time. She appears well-developed and well-nourished. HENT:   Head: Normocephalic and atraumatic. Eyes: Conjunctivae are normal. Right eye exhibits no discharge. Neck: Neck supple. Cardiovascular: Normal rate. Pulmonary/Chest: Effort normal and breath sounds normal.   Abdominal: Soft.  Bowel sounds are normal.   Musculoskeletal: Normal range of Cardiomediastinal silhouette and bony thorax are without acute abnormality. No evidence of intraperitoneal free air. Nonspecific bowel gas pattern without evidence of obstruction. No abnormal calcifications. Osseous structures are intact. No acute process in the chest. No bowel obstruction or free air. Increased stool. Interpretation per the Radiologist below, if available at the time of this note:    XR Acute Abd Series Chest 1 VW   Final Result   No acute process in the chest.      No bowel obstruction or free air. Increased stool. XR Neck Soft Tissue   Final Result   Normal               ED BEDSIDE ULTRASOUND:   Performed by ED Physician - none    LABS:  Labs Reviewed   HEPATIC FUNCTION PANEL - Abnormal; Notable for the following:        Result Value    ALT 38 (*)     Total Bilirubin 0.21 (*)     All other components within normal limits   BASIC METABOLIC PANEL - Abnormal; Notable for the following:     Glucose 111 (*)     Potassium 3.0 (*)     All other components within normal limits   URINE RT REFLEX TO CULTURE - Abnormal; Notable for the following:     Turbidity UA SLIGHTLY CLOUDY (*)     All other components within normal limits   CBC WITH AUTO DIFFERENTIAL - Abnormal; Notable for the following:     RBC 5.22 (*)     MCV 73.6 (*)     MCH 22.9 (*)     Seg Neutrophils 74 (*)     Lymphocytes 21 (*)     All other components within normal limits   MICROSCOPIC URINALYSIS - Abnormal; Notable for the following:     Bacteria, UA MODERATE (*)     Mucus, UA 4+ (*)     All other components within normal limits   CULTURE BLOOD #1   CULTURE BLOOD #1   STREP SCREEN GROUP A THROAT   URINE CULTURE CLEAN CATCH   AMYLASE   LACTATE, SEPSIS   LIPASE   MAGNESIUM   MONONUCLEOSIS SCREEN   D-DIMER, QUANTITATIVE   HCG, SERUM, QUALITATIVE       All other labs were within normal range or not returned as of this dictation.     EMERGENCY DEPARTMENT COURSE and DIFFERENTIAL DIAGNOSIS/MDM:   Vitals:    Vitals:    07/20/18 1821   BP: (!) 151/71   Pulse: 118   Resp: 18   Temp: 98.2 °F (36.8 °C)   TempSrc: Oral   SpO2: 100%   Weight: 212 lb (96.2 kg)   Height: 5' (1.524 m)     Patient is discharged by his follow-up care    CONSULTS:  None    PROCEDURES:  Not clinically indicated. FINAL IMPRESSION      1. Acute pharyngitis, unspecified etiology    2.  Constipation, unspecified constipation type          DISPOSITION/PLAN   DISPOSITION Decision To Discharge 07/20/2018 10:05:31 PM      PATIENT REFERRED TO:   Sarina Kent MD  [facility]  [phone]            DISCHARGE MEDICATIONS:     Discharge Medication List as of 7/20/2018 10:14 PM      START taking these medications    Details   cephALEXin (KEFLEX) 500 MG capsule Take 1 capsule by mouth 3 times daily, Disp-21 capsule, R-0Print      fluconazole (DIFLUCAN) 150 MG tablet Take 1 tablet by mouth once for 1 dose, Disp-1 tablet, R-0Print      predniSONE (DELTASONE) 20 MG tablet Take 1 tablet by mouth 2 times daily for 10 days, Disp-10 tablet, R-0Print      polyethylene glycol (MIRALAX) powder Take 17 g by mouth daily for 7 days PRN constipation, Disp-1 Bottle, R-0Print                 (Please note that portions of this note were completed with a voice recognition program.  Efforts were made to edit the dictations but occasionally words are mis-transcribed.)    Tayler Rueda MD  Attending Emergency Physician           Tayler Rueda MD  07/21/18 [id number]

## 2018-07-23 ENCOUNTER — TELEPHONE (OUTPATIENT)
Dept: INTERNAL MEDICINE | Age: 18
End: 2018-07-23

## 2018-07-23 LAB — HCG, PREGNANCY URINE (POC): NEGATIVE

## 2018-07-23 RX ORDER — DOCUSATE SODIUM 100 MG/1
100 CAPSULE, LIQUID FILLED ORAL 2 TIMES DAILY
Qty: 60 CAPSULE | Refills: 2 | COMMUNITY
Start: 2018-07-23 | End: 2018-09-26

## 2018-07-23 NOTE — TELEPHONE ENCOUNTER
Pt would like a stool softner sent to [address] she said she has been constipated x 2 wks and also her back is hurting from it please review and advise    Health Maintenance   Topic Date Due    Chlamydia screen  03/05/2018    Pneumococcal med risk (1 of 1 - PPSV23) 07/27/2018 (Originally 9/5/2017)    Flu vaccine (1) 09/01/2018    A1C test (Diabetic or Prediabetic)  02/28/2019    DTaP/Tdap/Td vaccine (7 - Td) 11/23/2021    Meningococcal (MCV) Vaccine Age 0-22 Years  Completed    HIV screen  Completed             (applicable per patient's age: Cancer Screenings, Depression Screening, Fall Risk Screening, Immunizations)    Hemoglobin A1C (%)   Date Value   02/28/2018 5.7   08/16/2016 5.9     LDL Cholesterol (mg/dL)   Date Value   09/23/2016 154 (H)     AST (U/L)   Date Value   07/20/2018 29     ALT (U/L)   Date Value   07/20/2018 38 (H)     BUN (mg/dL)   Date Value   07/20/2018 11      (goal A1C is < 7)   (goal LDL is <100) need 30-50% reduction from baseline     BP Readings from Last 3 Encounters:   07/20/18 (!) 151/71   07/20/18 135/89   07/17/18 135/78    (goal /80)      All Future Testing planned in [facility]:  Lab Frequency Next Occurrence   Baseline Diagnostic Sleep Study Once 07/27/2018       Next Visit Date:  Future Appointments  Date Time Provider [facility]   8/31/2018 10:45 AM Izabel Allison MD Resp Spec [place]   9/28/2018 11:00 AM Scotty See MD [id number]            Patient Active Problem List:     Atopic dermatitis     Thalassemia minor     Food allergy     Allergic rhinitis     GERD (gastroesophageal reflux disease)     Chronic headache     Constipation     Hearing loss mild     Morbid obesity with BMI of 40.0-44.9, adult ([address]     Prediabetes     Pure hypercholesterolemia     Moderate persistent asthma without complication

## 2018-07-24 ENCOUNTER — APPOINTMENT (OUTPATIENT)
Dept: CT IMAGING | Age: 18
End: 2018-07-24
Payer: COMMERCIAL

## 2018-07-24 ENCOUNTER — HOSPITAL ENCOUNTER (EMERGENCY)
Age: 18
Discharge: HOME | End: 2018-07-24
Attending: EMERGENCY MEDICINE
Payer: COMMERCIAL

## 2018-07-24 VITALS
DIASTOLIC BLOOD PRESSURE: 78 MMHG | SYSTOLIC BLOOD PRESSURE: 135 MMHG | TEMPERATURE: 98.8 F | HEART RATE: 97 BPM | OXYGEN SATURATION: 99 % | RESPIRATION RATE: 16 BRPM

## 2018-07-24 DIAGNOSIS — S00.83XA CONTUSION OF FACE, INITIAL ENCOUNTER: Primary | ICD-10-CM

## 2018-07-24 PROCEDURE — 70486 CT MAXILLOFACIAL W/O DYE: CPT

## 2018-07-24 PROCEDURE — 6370000000 HC RX 637 (ALT 250 FOR IP): Performed by: STUDENT IN AN ORGANIZED HEALTH CARE EDUCATION/TRAINING PROGRAM

## 2018-07-24 PROCEDURE — 99283 EMERGENCY DEPT VISIT LOW MDM: CPT

## 2018-07-24 RX ORDER — IBUPROFEN 400 MG/1
600 TABLET ORAL ONCE
Status: COMPLETED | OUTPATIENT
Start: 2018-07-24 | End: 2018-07-24

## 2018-07-24 RX ORDER — IBUPROFEN 200 MG
400 TABLET ORAL EVERY 8 HOURS PRN
Qty: 30 TABLET | Refills: 0 | Status: SHIPPED | OUTPATIENT
Start: 2018-07-24 | End: 2018-09-18 | Stop reason: SDUPTHER

## 2018-07-24 RX ADMIN — IBUPROFEN 600 MG: 400 TABLET ORAL at 02:46

## 2018-07-24 ASSESSMENT — PAIN SCALES - GENERAL
PAINLEVEL_OUTOF10: 10
PAINLEVEL_OUTOF10: 10

## 2018-07-24 ASSESSMENT — PAIN DESCRIPTION - PAIN TYPE: TYPE: ACUTE PAIN

## 2018-07-24 ASSESSMENT — PAIN DESCRIPTION - ORIENTATION: ORIENTATION: LEFT

## 2018-07-24 ASSESSMENT — ENCOUNTER SYMPTOMS
SHORTNESS OF BREATH: 0
CONSTIPATION: 0
DIARRHEA: 0
TROUBLE SWALLOWING: 1
RHINORRHEA: 0
SORE THROAT: 1
NAUSEA: 0
VOMITING: 0
COUGH: 1

## 2018-07-24 ASSESSMENT — PAIN DESCRIPTION - LOCATION: LOCATION: FACE

## 2018-07-24 NOTE — ED PROVIDER NOTES
tablet Take 1 tablet by mouth every 8 hours as needed for Pain 7/13/18   Duy Coyle MD   magnesium hydroxide (MILK OF MAGNESIA) 400 MG/5ML suspension Take 30 mLs by mouth daily as needed for Constipation 7/3/18   Tanvir Katz MD   albuterol (PROVENTIL) (2.5 MG/3ML) 0.083% nebulizer solution Take 3 mLs by nebulization every 6 hours as needed for Wheezing 6/26/18   Tanvir Katz MD   fluticasone (FLONASE) 50 MCG/ACT nasal spray Instill 1 spray into each nostril once daily for allergies and stuffy nose. 6/26/18   Tanvir Katz MD   albuterol sulfate HFA (VENTOLIN HFA) 108 (90 Base) MCG/ACT inhaler Inhale 2 puffs into the lungs every 6 hours as needed for Wheezing 6/24/18   Ata Olivas MD   mometasone-formoterol (DULERA) 200-5 MCG/ACT inhaler Inhale 2 puffs into the lungs 2 times daily Rinse mouth after using. 6/24/18   Ata Olivas MD   cetirizine (ZYRTEC) 10 MG tablet Take 1 tablet by mouth daily 5/21/18   Austin Rico MD   montelukast (SINGULAIR) 10 MG tablet Take 1 tablet by mouth nightly 4/23/18   Duy Coyle MD   vitamin E 400 UNIT capsule Take 1 capsule by mouth 2 times daily 11/21/16   Sarina Saint, DO       REVIEW OF SYSTEMS    (2-9 systems for level 4, 10 or more for level 5)      Review of Systems   Constitutional: Negative for chills and fever. HENT: Positive for ear pain, sore throat and trouble swallowing. Negative for hearing loss, rhinorrhea and tinnitus. Eyes: Negative for visual disturbance. Respiratory: Positive for cough. Negative for shortness of breath. Cardiovascular: Negative for chest pain. Gastrointestinal: Negative for constipation, diarrhea, nausea and vomiting. Endocrine: Positive for cold intolerance. Musculoskeletal: Positive for neck pain. Neurological: Negative for light-headedness and numbness.        PHYSICAL EXAM   (up to 7 for level 4, 8 or more for level 5)      INITIAL VITALS:   /78   Pulse 97   Temp 98.8 °F (37.1 °C) (Oral) normal.  No radiopaque foreign body. Normal     Xr Chest Standard (2 Vw)    Result Date: 6/24/2018  EXAMINATION: TWO VIEWS OF THE CHEST 6/24/2018 11:14 pm COMPARISON: None. HISTORY: ORDERING SYSTEM PROVIDED HISTORY: asthma exacerbation TECHNOLOGIST PROVIDED HISTORY: Reason for exam:->asthma exacerbation FINDINGS: The lungs are without acute focal process. There is no effusion or pneumothorax. The cardiomediastinal silhouette is without acute process. The osseous structures are without acute process. No acute process. Xr Radius Ulna Right (2 Views)    Result Date: 6/24/2018  EXAMINATION: AP AND LATERAL XRAY VIEWS OF THE RIGHT FOREARM 6/24/2018 11:14 pm COMPARISON: October 20, 2016 HISTORY: ORDERING SYSTEM PROVIDED HISTORY: forearm pain TECHNOLOGIST PROVIDED HISTORY: Reason for exam:->forearm pain FINDINGS: There is no evidence of acute fracture. There is normal alignment. No acute joint abnormality. No focal osseous lesion. No focal soft tissue abnormality. No acute osseous abnormality. Xr Acute Abd Series Chest 1 Vw    Result Date: 7/20/2018  EXAMINATION: TWO XRAY VIEWS OF THE ABDOMEN AND SINGLE  XRAY VIEW OF THE CHEST 7/20/2018 8:34 pm COMPARISON: March 19, 2017 HISTORY: ORDERING SYSTEM PROVIDED HISTORY: Abdominal pain constipation TECHNOLOGIST PROVIDED HISTORY: Reason for exam:->Abdominal pain constipation Ordering Physician Provided Reason for Exam: belly pain Acuity: Unknown Type of Exam: Unknown Additional signs and symptoms: constipation Relevant Medical/Surgical History: asthma  GERD FINDINGS: Lungs are clear without acute process. No pleural effusion or pneumothorax. No focal consolidation or edema. Cardiomediastinal silhouette and bony thorax are without acute abnormality. No evidence of intraperitoneal free air. Nonspecific bowel gas pattern without evidence of obstruction. No abnormal calcifications. Osseous structures are intact.      No acute process in the chest. No bowel contusion vs facial fx    DISPOSITION / PLAN     DISPOSITION    Discharge to home with pain medication and Teslon pearls if CT facial bones is negative. PATIENT REFERRED TO:  No follow-up provider specified.     DISCHARGE MEDICATIONS:  New Prescriptions    No medications on file       Selam Guerrero MD  Transitional Year Resident    (Please note that portions of this note were completed with a voice recognition program.  Efforts were made to edit the dictations but occasionally words are mis-transcribed.)       Selam Guerrero MD  Resident  07/24/18 [id number]
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
## 2018-07-27 LAB
CULTURE: NORMAL
CULTURE: NORMAL
Lab: NORMAL
Lab: NORMAL
SPECIMEN DESCRIPTION: NORMAL
SPECIMEN DESCRIPTION: NORMAL
STATUS: NORMAL
STATUS: NORMAL

## 2018-08-02 ENCOUNTER — TELEPHONE (OUTPATIENT)
Dept: PULMONOLOGY | Age: 18
End: 2018-08-02

## 2018-08-02 NOTE — TELEPHONE ENCOUNTER
patient called and stated she needs a letter stating her asthma is not uncontrol so she can get SSI. I informed the patient to go and apply and if our office needs to fill out paperwork or the doctor than the social security office will have to fax us the forms (record request). And I spoke with the mother also and gave our fax number.

## 2018-09-18 ENCOUNTER — OFFICE VISIT (OUTPATIENT)
Dept: INTERNAL MEDICINE | Age: 18
End: 2018-09-18
Payer: COMMERCIAL

## 2018-09-18 VITALS
BODY MASS INDEX: 41.62 KG/M2 | DIASTOLIC BLOOD PRESSURE: 66 MMHG | HEART RATE: 65 BPM | SYSTOLIC BLOOD PRESSURE: 128 MMHG | HEIGHT: 60 IN | WEIGHT: 212 LBS

## 2018-09-18 DIAGNOSIS — M25.532 LEFT WRIST PAIN: ICD-10-CM

## 2018-09-18 DIAGNOSIS — M54.6 ACUTE MIDLINE THORACIC BACK PAIN: Primary | ICD-10-CM

## 2018-09-18 DIAGNOSIS — J45.40 MODERATE PERSISTENT ASTHMA WITHOUT COMPLICATION: ICD-10-CM

## 2018-09-18 DIAGNOSIS — E66.01 MORBID OBESITY WITH BMI OF 40.0-44.9, ADULT (HCC): ICD-10-CM

## 2018-09-18 PROCEDURE — 99213 OFFICE O/P EST LOW 20 MIN: CPT | Performed by: INTERNAL MEDICINE

## 2018-09-18 PROCEDURE — 1036F TOBACCO NON-USER: CPT | Performed by: INTERNAL MEDICINE

## 2018-09-18 PROCEDURE — G8417 CALC BMI ABV UP PARAM F/U: HCPCS | Performed by: INTERNAL MEDICINE

## 2018-09-18 PROCEDURE — 99211 OFF/OP EST MAY X REQ PHY/QHP: CPT | Performed by: INTERNAL MEDICINE

## 2018-09-18 PROCEDURE — G8427 DOCREV CUR MEDS BY ELIG CLIN: HCPCS | Performed by: INTERNAL MEDICINE

## 2018-09-18 RX ORDER — ALBUTEROL SULFATE 90 UG/1
2 AEROSOL, METERED RESPIRATORY (INHALATION) EVERY 6 HOURS PRN
Qty: 1 INHALER | Refills: 3 | Status: SHIPPED | OUTPATIENT
Start: 2018-09-18 | End: 2019-01-10

## 2018-09-18 RX ORDER — HYDROCORTISONE 0.5 %
CREAM (GRAM) TOPICAL 3 TIMES DAILY PRN
Qty: 30 G | Refills: 0 | Status: SHIPPED | OUTPATIENT
Start: 2018-09-18 | End: 2019-01-10

## 2018-09-18 RX ORDER — IBUPROFEN 200 MG
400 TABLET ORAL EVERY 8 HOURS PRN
Qty: 30 TABLET | Refills: 0 | Status: SHIPPED | OUTPATIENT
Start: 2018-09-18 | End: 2018-10-28

## 2018-09-18 RX ORDER — CYCLOBENZAPRINE HCL 5 MG
5 TABLET ORAL 2 TIMES DAILY PRN
Qty: 10 TABLET | Refills: 0 | Status: SHIPPED | OUTPATIENT
Start: 2018-09-18 | End: 2018-09-23

## 2018-09-18 ASSESSMENT — ENCOUNTER SYMPTOMS: BACK PAIN: 1

## 2018-09-18 NOTE — PROGRESS NOTES
[facility]    Progress Note    Date of patient's visit: 9/18/2018    Patient's Name:  Russell Crawford    YOB: 2000            Patient Care Team:  Christy Malone MD as PCP - Ирина Hernandez MD as PCP - S Attributed Provider  Benedicto Holcomb MD as Consulting Physician (Pulmonology)    REASON FOR VISIT: Routine outpatient follow     Chief Complaint   Patient presents with    Follow-Up from Hospital     pt states that she went to the urgent care for right index finger cut pt state that the stitches fell out this was 9/10   [facility] Maintenance     pt due for Chlamydia screen pt refused vaccines     Wrist Pain     pt state that she has left wrist pain          HISTORY OF PRESENT ILLNESS:    History was obtained from the patient. Russell Crawford is a 19 y.o. is here for Mid back pain and left wrist pain and a fissure on her right index finger. She was seen at urgent care and had stitches placed but she said this has come out and she still has the cut. She is morbidly obese. She doesn't exercise. She has mid back pain for 2 weeks. She is also today complaining of left wrist pain. She denies any injury. She does not work or do any lifting. No swelling. Last time she was complaining of right wrist pain which seems to have resolved.         Past Medical History:   Diagnosis Date    Allergic     Allergic rhinitis     Allergy     Asthma     GERD (gastroesophageal reflux disease)     Headache(784.0) 3/1/2012    Morbid obesity with BMI of 40.0-44.9, adult ([facility] [address] 12/8/2016    Pure hypercholesterolemia 12/8/2016    Unspecified sleep apnea     Vision disturbance 1/31/2014    wears glassses       Past Surgical History:   Procedure Laterality Date    ADENOIDECTOMY      OTHER SURGICAL HISTORY  08/28/14    Nexplanon placement    TONSILLECTOMY           ALLERGIES      Allergies   Allergen Reactions    Peanut-Containing symptoms completed and found to be normal except noted in the HPI    Review of Systems   Constitutional: Positive for malaise/fatigue. Respiratory: Negative for cough, sputum production and shortness of breath. Cardiovascular: Negative for chest pain, palpitations and leg swelling. Gastrointestinal: Negative for abdominal pain, constipation and nausea. Musculoskeletal: Positive for back pain and joint pain. Neurological: Negative for dizziness, tingling, sensory change and focal weakness. Psychiatric/Behavioral: Positive for depression. Negative for substance abuse and suicidal ideas. The patient is not nervous/anxious. PHYSICAL EXAM:      Vitals:    09/18/18 1438 09/18/18 1454   BP: (!) 141/62 128/66   Site: Right Upper Arm    Position: Sitting    Cuff Size: Large Adult    Pulse: 65    Weight: 212 lb (96.2 kg)    Height: 5' (1.524 m)      Body mass index is 41.4 kg/m². BP Readings from Last 3 Encounters:   09/18/18 128/66   07/24/18 135/78   07/20/18 (!) 151/71        Wt Readings from Last 3 Encounters:   09/18/18 212 lb (96.2 kg)   07/20/18 212 lb (96.2 kg) (98 %, Z= 2.11)*   07/20/18 209 lb (94.8 kg) (98 %, Z= 2.07)*     * Growth percentiles are based on [facility] 2-20 Years data. Physical Exam   Constitutional: She is oriented to person, place, and time and well-developed, well-nourished, and in no distress. No distress. HENT:   Head: Normocephalic and atraumatic. Eyes: Pupils are equal, round, and reactive to light. Conjunctivae and EOM are normal.   Cardiovascular: Normal rate, regular rhythm and normal heart sounds. Pulmonary/Chest: Effort normal and breath sounds normal. She has no wheezes. Musculoskeletal: She exhibits no edema. Left wrist: She exhibits normal range of motion, no tenderness, no bony tenderness and no swelling. Thoracic back: She exhibits normal range of motion, no tenderness, no bony tenderness and no deformity.    Neurological: She is alert and oriented to person, place, and time. Skin: She is not diaphoretic. Nursing note and vitals reviewed. LABORATORY FINDINGS:    CBC:  Lab Results   Component Value Date    WBC 10.4 07/20/2018    HGB 12.0 07/20/2018     07/20/2018     10/11/2011     BMP:    Lab Results   Component Value Date     07/20/2018    K 3.0 07/20/2018     07/20/2018    CO2 24 07/20/2018    BUN 11 07/20/2018    CREATININE 0.63 07/20/2018    GLUCOSE 111 07/20/2018    GLUCOSE 78 09/22/2011     HEMOGLOBIN A1C:   Lab Results   Component Value Date    LABA1C 5.7 02/28/2018     MICROALBUMIN URINE: No results found for: MICROALBUR  FASTING LIPID PANEL:  Lab Results   Component Value Date    CHOL 209 (H) 09/23/2016    HDL 45 09/23/2016    TRIG 51 09/23/2016     Lab Results   Component Value Date    LDLCHOLESTEROL 154 (H) 09/23/2016       LIVER PROFILE:  Lab Results   Component Value Date    ALT 38 07/20/2018    AST 29 07/20/2018    PROT 7.3 07/20/2018    BILITOT 0.21 07/20/2018    BILIDIR <0.08 07/20/2018    LABALBU 3.9 07/20/2018      THYROID FUNCTION:   Lab Results   Component Value Date    TSH 1.98 09/23/2016      URINE ANALYSIS: No results found for: LABURIN  ASSESSMENT AND PLAN:    1. Acute midline thoracic back pain  Back stretches    - cyclobenzaprine (FLEXERIL) 5 MG tablet; Take 1 tablet by mouth 2 times daily as needed for Muscle spasms  Dispense: 10 tablet; Refill: 0  - methyl salicylate-menthol (ТАТЬЯНА BEGUM GREASELESS) 10-15 % CREA; Apply topically 3 times daily as needed for Pain  Dispense: 30 g; Refill: 0    2. Moderate persistent asthma without complication    - albuterol sulfate HFA (VENTOLIN HFA) 108 (90 Base) MCG/ACT inhaler; Inhale 2 puffs into the lungs every 6 hours as needed for Wheezing  Dispense: 1 Inhaler; Refill: 3  - mometasone-formoterol (DULERA) 200-5 MCG/ACT inhaler; Inhale 2 puffs into the lungs 2 times daily Rinse mouth after using. Dispense: 1 Inhaler; Refill: 2    3.  Left wrist pain  No swelling or deformity  Tylenol prn    4. Morbid obesity with BMI of 40.0-44.9, adult (HCC)  Diet changes          FOLLOW UP AND INSTRUCTIONS:   1. No Follow-up on file.     2. Donald received counseling on the following healthy behaviors: nutrition and exercise      Sarah Kaufman  Attending Physician, [facility]   Faculty, Internal Medicine Residency Program  [facility]  9/18/2018, 3:03 PM

## 2018-09-23 ASSESSMENT — ENCOUNTER SYMPTOMS
CONSTIPATION: 0
SHORTNESS OF BREATH: 0
SPUTUM PRODUCTION: 0
ABDOMINAL PAIN: 0
COUGH: 0
NAUSEA: 0

## 2018-09-24 ENCOUNTER — TELEPHONE (OUTPATIENT)
Dept: INTERNAL MEDICINE | Age: 18
End: 2018-09-24

## 2018-09-24 DIAGNOSIS — G56.00 CARPAL TUNNEL SYNDROME, UNSPECIFIED LATERALITY: Primary | ICD-10-CM

## 2018-09-24 NOTE — TELEPHONE ENCOUNTER
Pt is calling asking for a brace for her left wrist -- she states that it has been swollen and in some pain especially at night time please advise          Health Maintenance   Topic Date Due    Pneumococcal med risk (1 of 1 - PPSV23) 09/05/2017    Chlamydia screen  03/05/2018    Flu vaccine (1) 09/01/2018    A1C test (Diabetic or Prediabetic)  02/28/2019    DTaP/Tdap/Td vaccine (7 - Td) 11/23/2021    Meningococcal (MCV) Vaccine Age 0-22 Years  Completed    HIV screen  Completed             (applicable per patient's age: Cancer Screenings, Depression Screening, Fall Risk Screening, Immunizations)    Hemoglobin A1C (%)   Date Value   02/28/2018 5.7   08/16/2016 5.9     LDL Cholesterol (mg/dL)   Date Value   09/23/2016 154 (H)     AST (U/L)   Date Value   07/20/2018 29     ALT (U/L)   Date Value   07/20/2018 38 (H)     BUN (mg/dL)   Date Value   07/20/2018 11      (goal A1C is < 7)   (goal LDL is <100) need 30-50% reduction from baseline     BP Readings from Last 3 Encounters:   09/18/18 128/66   07/24/18 135/78   07/20/18 (!) 151/71    (goal /80)      All Future Testing planned in [facility]:  Lab Frequency Next Occurrence   Baseline Diagnostic Sleep Study Once 07/27/2018       Next Visit Date:  Future Appointments  Date Time Provider [facility]   9/28/2018 1:45 PM Damien Anaya MD [address] [place] [address] [facility]   10/29/2018 10:00 AM Michaela Fernandez MD Resp Spec [facility]            Patient Active Problem List:     Atopic dermatitis     Thalassemia minor     Food allergy     Allergic rhinitis     GERD (gastroesophageal reflux disease)     Chronic headache     Constipation     Hearing loss mild     Morbid obesity with BMI of 40.0-44.9, adult ([address]     Prediabetes     Pure hypercholesterolemia     Moderate persistent asthma without complication

## 2018-09-26 ENCOUNTER — HOSPITAL ENCOUNTER (EMERGENCY)
Age: 18
Discharge: HOME | End: 2018-09-26
Attending: EMERGENCY MEDICINE
Payer: COMMERCIAL

## 2018-09-26 VITALS
RESPIRATION RATE: 16 BRPM | WEIGHT: 212 LBS | BODY MASS INDEX: 41.4 KG/M2 | TEMPERATURE: 97.7 F | HEART RATE: 72 BPM | DIASTOLIC BLOOD PRESSURE: 58 MMHG | OXYGEN SATURATION: 99 % | SYSTOLIC BLOOD PRESSURE: 130 MMHG

## 2018-09-26 DIAGNOSIS — K29.90 GASTRITIS AND DUODENITIS: Primary | ICD-10-CM

## 2018-09-26 PROCEDURE — 6370000000 HC RX 637 (ALT 250 FOR IP): Performed by: EMERGENCY MEDICINE

## 2018-09-26 PROCEDURE — 99283 EMERGENCY DEPT VISIT LOW MDM: CPT

## 2018-09-26 RX ORDER — CYCLOBENZAPRINE HCL 5 MG
5 TABLET ORAL 3 TIMES DAILY PRN
COMMUNITY
End: 2019-01-10

## 2018-09-26 RX ORDER — NAPROXEN 250 MG/1
500 TABLET ORAL 2 TIMES DAILY WITH MEALS
COMMUNITY
End: 2018-11-05 | Stop reason: ALTCHOICE

## 2018-09-26 RX ORDER — PANTOPRAZOLE SODIUM 40 MG/1
40 TABLET, DELAYED RELEASE ORAL DAILY
Qty: 30 TABLET | Refills: 0 | Status: SHIPPED | OUTPATIENT
Start: 2018-09-26 | End: 2019-01-10

## 2018-09-26 RX ORDER — MAGNESIUM HYDROXIDE/ALUMINUM HYDROXICE/SIMETHICONE 120; 1200; 1200 MG/30ML; MG/30ML; MG/30ML
30 SUSPENSION ORAL
Status: COMPLETED | OUTPATIENT
Start: 2018-09-26 | End: 2018-09-26

## 2018-09-26 RX ORDER — PANTOPRAZOLE SODIUM 40 MG/1
40 TABLET, DELAYED RELEASE ORAL ONCE
Status: COMPLETED | OUTPATIENT
Start: 2018-09-26 | End: 2018-09-26

## 2018-09-26 RX ADMIN — PANTOPRAZOLE SODIUM 40 MG: 40 TABLET, DELAYED RELEASE ORAL at 13:36

## 2018-09-26 RX ADMIN — ALUMINUM HYDROXIDE, MAGNESIUM HYDROXIDE, AND SIMETHICONE 30 ML: 200; 200; 20 SUSPENSION ORAL at 13:36

## 2018-09-26 ASSESSMENT — PAIN SCALES - GENERAL: PAINLEVEL_OUTOF10: 9

## 2018-09-26 ASSESSMENT — ENCOUNTER SYMPTOMS
BLOOD IN STOOL: 0
CONSTIPATION: 0
VOMITING: 0
DIARRHEA: 0
BACK PAIN: 0
CHEST TIGHTNESS: 0
ABDOMINAL PAIN: 1
RHINORRHEA: 0
SORE THROAT: 0
NAUSEA: 0
SHORTNESS OF BREATH: 0

## 2018-09-26 ASSESSMENT — PAIN DESCRIPTION - LOCATION: LOCATION: ABDOMEN

## 2018-09-26 ASSESSMENT — PAIN DESCRIPTION - ORIENTATION: ORIENTATION: LOWER

## 2018-09-26 NOTE — ED PROVIDER NOTES
[facility] ED  Emergency Department Encounter  Emergency Medicine Resident     Pt Name: Wil Wu  MRN: [id number]  [place] 2000  Date of evaluation: 9/26/18  PCP:  Gregg Soriano MD    [address] [place] [address]       Chief Complaint   Patient presents with    Abdominal Pain     no ujrinary problems, no n/v/d, no vag discharge or odor. no concern for STIs       HISTORY OF PRESENT ILLNESS  (Location/Symptom, Timing/Onset, Context/Setting, Quality, Duration, Modifying Factors, Severity.)      Wil Wu is a 19 y.o. female who presents with Complaints of intermittent midepigastric abdominal pain for the past week. Patient states that about 2 weeks ago she had back pain and went to her PCP who prescribed her some Flexeril which she's been taking since then. Patient also states that about one week ago she started taking naproxen 500 mg daily, Motrin 400 mg daily, and Pepcid 20 mg daily. Patient denies any prior history of reflux. She also denies any ectatic taste in her mouth, nausea, vomiting, chest pain, shortness of breath, vaginal discharge, dysuria, hematuria, urinary frequency, or changes in bowel habits. Patient denies prior history of abdominal surgery. She also denies being concerned for STDs or prior history of STDs. LMP was earlier this month and normal.    PAST MEDICAL / SURGICAL / SOCIAL / FAMILY HISTORY      has a past medical history of Allergic; Allergic rhinitis; Allergy; Asthma; GERD (gastroesophageal reflux disease); Headache(784.0); Morbid obesity with BMI of 40.0-44.9, adult ([address] Pure hypercholesterolemia; Unspecified sleep apnea; and Vision disturbance. has a past surgical history that includes Adenoidectomy; Tonsillectomy; and other surgical history (08/28/14).     Social History     Social History    Marital status: Single     Spouse name: N/A    Number of children: N/A    Years of education: N/A     Occupational History    Not on are equal, round, and reactive to light. Conjunctivae and EOM are normal. Right eye exhibits no discharge. Left eye exhibits no discharge. Neck: Normal range of motion. Neck supple. Cardiovascular: Normal rate, regular rhythm, normal heart sounds and intact distal pulses. Exam reveals no gallop and no friction rub. No murmur heard. Pulmonary/Chest: Effort normal and breath sounds normal. No respiratory distress. She has no wheezes. She has no rales. She exhibits no tenderness. Abdominal: Soft. Bowel sounds are normal. She exhibits no distension and no mass. There is no tenderness. There is no rebound and no guarding. Obese abdomen, very mild mid epigastric abdominal tenderness, no rebound tenderness, no tenderness in the right upper quadrant or right lower quadrant, negative Rovsing sign   Musculoskeletal: Normal range of motion. She exhibits no edema, tenderness or deformity. Neurological: She is alert and oriented to person, place, and time. Skin: Skin is warm and dry. She is not diaphoretic. DIFFERENTIAL  DIAGNOSIS     PLAN (LABS / IMAGING / EKG):  No orders of the defined types were placed in this encounter. MEDICATIONS ORDERED:  Orders Placed This Encounter   Medications    pantoprazole (PROTONIX) tablet 40 mg    aluminum & magnesium hydroxide-simethicone (MAALOX) 200-200-20 MG/5ML suspension 30 mL    pantoprazole (PROTONIX) 40 MG tablet     Sig: Take 1 tablet by mouth daily     Dispense:  30 tablet     Refill:  0       DDX: Gastritis, ulcers,     DIAGNOSTIC RESULTS / EMERGENCY DEPARTMENT COURSE / MDM     LABS:  No results found for this visit on 09/26/18. IMPRESSION: 78-year-old female presented with complaints of mild intermittent abdominal pain after taking multiple NSAIDs for the past week without any associated symptoms such as nausea, vomiting, metallic taste in mouth, or changes in bowel or urinary habits. No  complaints. LMP earlier this month.   No prior abdominal

## 2018-09-27 ENCOUNTER — CARE COORDINATION (OUTPATIENT)
Dept: CARE COORDINATION | Age: 18
End: 2018-09-27

## 2018-09-27 NOTE — CARE COORDINATION
Ambulatory Care Coordination ED Follow up Call       Reason for ED Visit:  Gastritis and duodenitis  Care Management Risk Score: CMRS 10  How are you feeling? :     not changed  Patient Reports the following:  Pt stills has abdominal pain. She states mainly at night. She states she just started taking Protonix. Write advised her to continue taking it and be sure to see PCP in on 10-16-18. If symptoms worsen, pt will call PCP office. Contact [facility] regarding any worsening symptoms from above. Did you call your PCP prior to going to the ED? No          Post Discharge Status:  What health concerns since you left the Emergency Room?  none    Do you have wounds that you are caring for at home? No    Do you have a follow up appt scheduled? yes    Review of Instructions:                                 Do you have any questions regarding your discharge instructions?:  No  Medications:    What questions do you have about your medications? No  Are you taking your medications as directed? If not - why? Yes   Can you afford your medications? yes  ADLS:  Do you need assistance of any kind at home? No   What assistance is needed?  none      FU appts/Provider:    Future Appointments  Date Time Provider [place] Rojas   10/16/2018 3:00 PM Vincent Lambert MD [facility] IM [facility]   10/29/2018 10:00 AM Juhi Puckett  [address] Maintenance Due   Topic Date Due    Pneumococcal med risk (1 of 1 - PPSV23) 09/05/2017    Chlamydia screen  03/05/2018    Flu vaccine (1) 09/01/2018     Patient advised to contact PCP office to have HM items/records faxed to PCP Office directly?   N/A

## 2018-10-11 ENCOUNTER — TELEPHONE (OUTPATIENT)
Dept: INTERNAL MEDICINE | Age: 18
End: 2018-10-11

## 2018-10-11 RX ORDER — FLUTICASONE PROPIONATE 50 MCG
2 SPRAY, SUSPENSION (ML) NASAL DAILY
Qty: 1 BOTTLE | Refills: 2 | Status: SHIPPED | OUTPATIENT
Start: 2018-10-11 | End: 2019-01-10

## 2018-10-11 RX ORDER — LORATADINE 10 MG/1
10 TABLET ORAL DAILY
Qty: 30 TABLET | Refills: 1 | Status: SHIPPED | OUTPATIENT
Start: 2018-10-11 | End: 2019-01-10

## 2018-10-28 ENCOUNTER — HOSPITAL ENCOUNTER (EMERGENCY)
Age: 18
Discharge: HOME | End: 2018-10-28
Attending: EMERGENCY MEDICINE
Payer: COMMERCIAL

## 2018-10-28 ENCOUNTER — APPOINTMENT (OUTPATIENT)
Dept: GENERAL RADIOLOGY | Age: 18
End: 2018-10-28
Payer: COMMERCIAL

## 2018-10-28 VITALS
WEIGHT: 215 LBS | BODY MASS INDEX: 46.38 KG/M2 | HEART RATE: 78 BPM | OXYGEN SATURATION: 97 % | RESPIRATION RATE: 14 BRPM | HEIGHT: 57 IN | TEMPERATURE: 98.9 F

## 2018-10-28 DIAGNOSIS — M25.532 LEFT WRIST PAIN: Primary | ICD-10-CM

## 2018-10-28 PROCEDURE — 6370000000 HC RX 637 (ALT 250 FOR IP): Performed by: EMERGENCY MEDICINE

## 2018-10-28 PROCEDURE — 73110 X-RAY EXAM OF WRIST: CPT

## 2018-10-28 PROCEDURE — 99283 EMERGENCY DEPT VISIT LOW MDM: CPT

## 2018-10-28 RX ORDER — IBUPROFEN 800 MG/1
800 TABLET ORAL EVERY 8 HOURS PRN
Qty: 30 TABLET | Refills: 0 | Status: SHIPPED | OUTPATIENT
Start: 2018-10-28 | End: 2019-01-10

## 2018-10-28 RX ORDER — ACETAMINOPHEN 500 MG
1000 TABLET ORAL ONCE
Status: COMPLETED | OUTPATIENT
Start: 2018-10-28 | End: 2018-10-28

## 2018-10-28 RX ORDER — IBUPROFEN 800 MG/1
800 TABLET ORAL ONCE
Status: COMPLETED | OUTPATIENT
Start: 2018-10-28 | End: 2018-10-28

## 2018-10-28 RX ORDER — ACETAMINOPHEN 500 MG
500 TABLET ORAL EVERY 6 HOURS PRN
Qty: 30 TABLET | Refills: 0 | Status: SHIPPED | OUTPATIENT
Start: 2018-10-28 | End: 2019-01-10

## 2018-10-28 RX ADMIN — IBUPROFEN 800 MG: 800 TABLET, FILM COATED ORAL at 22:36

## 2018-10-28 RX ADMIN — ACETAMINOPHEN 1000 MG: 500 TABLET ORAL at 22:36

## 2018-10-28 ASSESSMENT — PAIN DESCRIPTION - PAIN TYPE: TYPE: ACUTE PAIN

## 2018-10-28 ASSESSMENT — PAIN SCALES - GENERAL
PAINLEVEL_OUTOF10: 10
PAINLEVEL_OUTOF10: 10

## 2018-10-28 ASSESSMENT — PAIN DESCRIPTION - DESCRIPTORS: DESCRIPTORS: ACHING

## 2018-10-28 ASSESSMENT — PAIN DESCRIPTION - LOCATION: LOCATION: WRIST

## 2018-10-28 ASSESSMENT — PAIN DESCRIPTION - ORIENTATION: ORIENTATION: LEFT

## 2018-10-29 ENCOUNTER — CARE COORDINATION (OUTPATIENT)
Dept: CARE COORDINATION | Age: 18
End: 2018-10-29

## 2018-10-29 ASSESSMENT — ENCOUNTER SYMPTOMS
RHINORRHEA: 0
SHORTNESS OF BREATH: 0
ABDOMINAL PAIN: 0

## 2018-10-29 NOTE — ED PROVIDER NOTES
radiographs. EMERGENCY DEPARTMENT COURSE:    MDM:X-rays negative for any abnormalities. We'll place patient on a splint. We'll also give her anti-inflammatories. Gave her follow-up for hand surgery for follow-up of her acute on chronic pain. Advised return if symptoms worsen. No snuffbox tenderness. We'll discharge with follow-up and return as needed if symptoms do not improve or worsen. PROCEDURES:  None    CONSULTS:  None    FINAL IMPRESSION      1. Left wrist pain          DISPOSITION / PLAN     DISPOSITION  discharged      PATIENT REFERRED TO:  [facility]  [address] [facility] [address]  [phone]    If symptoms worsen    Anna Sheikh MD  [address] [id number]  [phone]      For follow up of left wrist pain.       DISCHARGE MEDICATIONS:  Discharge Medication List as of 10/28/2018 10:55 PM      START taking these medications    Details   acetaminophen (APAP EXTRA STRENGTH) 500 MG tablet Take 1 tablet by mouth every 6 hours as needed for Pain, Disp-30 tablet, R-0Print             Mariann Musa MD  Emergency Medicine Resident  [address] [facility]    (Please note that portions of this note were completed with a voice recognition program.  Efforts were made to edit thedictations but occasionally words are mis-transcribed.)       Mariann Musa MD  Resident  10/29/18 6744

## 2018-10-29 NOTE — CARE COORDINATION
Patient was called to follow up with most recent ER visit. There was no answer. A message was left on voicemail to have patient call back regarding ER visit. Office number given [phone].

## 2018-11-02 DIAGNOSIS — M25.532 LEFT WRIST PAIN: Primary | ICD-10-CM

## 2018-11-05 ENCOUNTER — OFFICE VISIT (OUTPATIENT)
Dept: ORTHOPEDIC SURGERY | Age: 18
End: 2018-11-05

## 2018-11-05 VITALS — HEIGHT: 57 IN | BODY MASS INDEX: 46.37 KG/M2 | WEIGHT: 214.95 LBS

## 2018-11-05 DIAGNOSIS — R22.32 MASS OF LEFT WRIST: ICD-10-CM

## 2018-11-05 DIAGNOSIS — M25.532 LEFT WRIST PAIN: Primary | ICD-10-CM

## 2018-11-05 PROCEDURE — 1036F TOBACCO NON-USER: CPT | Performed by: STUDENT IN AN ORGANIZED HEALTH CARE EDUCATION/TRAINING PROGRAM

## 2018-11-05 PROCEDURE — G8417 CALC BMI ABV UP PARAM F/U: HCPCS | Performed by: STUDENT IN AN ORGANIZED HEALTH CARE EDUCATION/TRAINING PROGRAM

## 2018-11-05 PROCEDURE — 99203 OFFICE O/P NEW LOW 30 MIN: CPT | Performed by: STUDENT IN AN ORGANIZED HEALTH CARE EDUCATION/TRAINING PROGRAM

## 2018-11-05 PROCEDURE — G8427 DOCREV CUR MEDS BY ELIG CLIN: HCPCS | Performed by: STUDENT IN AN ORGANIZED HEALTH CARE EDUCATION/TRAINING PROGRAM

## 2018-11-05 PROCEDURE — G8484 FLU IMMUNIZE NO ADMIN: HCPCS | Performed by: STUDENT IN AN ORGANIZED HEALTH CARE EDUCATION/TRAINING PROGRAM

## 2018-11-05 ASSESSMENT — ENCOUNTER SYMPTOMS
NAUSEA: 0
CHEST TIGHTNESS: 0
ABDOMINAL PAIN: 0
CHOKING: 0
EYE DISCHARGE: 0
WHEEZING: 0
VOMITING: 0
DIARRHEA: 0

## 2018-11-05 NOTE — PROGRESS NOTES
[address] [facility] [address]  Dept: [phone]    AmbulatoryOrthopedic New Patient Visit      CHIEF COMPLAINT:    Chief Complaint   Patient presents with    Wrist Pain     left       HISTORY OF PRESENT ILLNESS:      The patient is a 19 y.o. female who is being seen for consultation and evaluation of Dorsal left wrist pain has been present for the past few months. Patient states that this pain came on insidiously with no acute injury noted. Patient has had this previously about 2 years ago over the dorsal aspect of the wrist with no associated mass. Patient is noted this pea-sized mass over the dorsal aspect that is not to her knowledge changed in size since it presented. She initially presented to the emergency department about a week and a half ago where x-rays were taken and she was told there is no radiologic injury a present but a palpable mass. Patient denies any associated fevers, chills/weight loss. No other reported oncologic processes. Patient states that pain is made worse with wrist extension and when she applies forced to her wrists in a standard position. Denies any numbness or tingling to her hands or associated weakness.       Past Medical History:    Past Medical History:   Diagnosis Date    Acute bronchitis     unspecified organism    Allergic     Allergic rhinitis     Allergy     Asthma     Chronic vasomotor rhinitis     GERD (gastroesophageal reflux disease)     Headache(784.0) 3/1/2012    Morbid obesity with BMI of 40.0-44.9, adult ([address] 12/8/2016    Pure hypercholesterolemia 12/8/2016    Severe persistent asthma     Unspecified sleep apnea     Vision disturbance 1/31/2014    wears glassses       Past SurgicalHistory:    Past Surgical History:   Procedure Laterality Date    ADENOIDECTOMY      OTHER SURGICAL HISTORY  08/28/14    Nexplanon placement    TONSILLECTOMY         Current Medications:   Current

## 2019-01-10 ENCOUNTER — APPOINTMENT (OUTPATIENT)
Dept: GENERAL RADIOLOGY | Age: 19
End: 2019-01-10
Payer: MEDICAID

## 2019-01-10 ENCOUNTER — HOSPITAL ENCOUNTER (EMERGENCY)
Age: 19
Discharge: HOME | End: 2019-01-10
Attending: EMERGENCY MEDICINE
Payer: MEDICAID

## 2019-01-10 VITALS
SYSTOLIC BLOOD PRESSURE: 133 MMHG | OXYGEN SATURATION: 98 % | HEART RATE: 74 BPM | DIASTOLIC BLOOD PRESSURE: 74 MMHG | RESPIRATION RATE: 17 BRPM | TEMPERATURE: 98 F

## 2019-01-10 DIAGNOSIS — S63.632A SPRAIN OF INTERPHALANGEAL JOINT OF RIGHT MIDDLE FINGER, INITIAL ENCOUNTER: Primary | ICD-10-CM

## 2019-01-10 PROCEDURE — 73130 X-RAY EXAM OF HAND: CPT

## 2019-01-10 PROCEDURE — 99283 EMERGENCY DEPT VISIT LOW MDM: CPT

## 2019-01-10 RX ORDER — IBUPROFEN 800 MG/1
800 TABLET ORAL EVERY 8 HOURS PRN
Qty: 20 TABLET | Refills: 0 | Status: SHIPPED | OUTPATIENT
Start: 2019-01-10 | End: 2019-02-03

## 2019-01-10 ASSESSMENT — ENCOUNTER SYMPTOMS
COLOR CHANGE: 0
WHEEZING: 0
VOMITING: 0
ABDOMINAL PAIN: 0
COUGH: 0
NAUSEA: 0

## 2019-01-10 ASSESSMENT — PAIN SCALES - GENERAL: PAINLEVEL_OUTOF10: 10

## 2019-01-10 ASSESSMENT — PAIN DESCRIPTION - LOCATION: LOCATION: FINGER (COMMENT WHICH ONE)

## 2019-01-10 ASSESSMENT — PAIN DESCRIPTION - DESCRIPTORS: DESCRIPTORS: THROBBING

## 2019-01-10 ASSESSMENT — PAIN DESCRIPTION - ORIENTATION: ORIENTATION: RIGHT

## 2019-01-10 ASSESSMENT — PAIN DESCRIPTION - PAIN TYPE: TYPE: ACUTE PAIN

## 2019-01-11 ENCOUNTER — CARE COORDINATION (OUTPATIENT)
Dept: CARE COORDINATION | Age: 19
End: 2019-01-11

## 2019-02-03 ENCOUNTER — HOSPITAL ENCOUNTER (EMERGENCY)
Age: 19
Discharge: HOME | End: 2019-02-03
Attending: EMERGENCY MEDICINE
Payer: MEDICAID

## 2019-02-03 VITALS
SYSTOLIC BLOOD PRESSURE: 135 MMHG | BODY MASS INDEX: 40.32 KG/M2 | OXYGEN SATURATION: 99 % | HEIGHT: 59 IN | HEART RATE: 118 BPM | DIASTOLIC BLOOD PRESSURE: 80 MMHG | RESPIRATION RATE: 18 BRPM | WEIGHT: 200 LBS | TEMPERATURE: 97.7 F

## 2019-02-03 DIAGNOSIS — R11.2 NON-INTRACTABLE VOMITING WITH NAUSEA, UNSPECIFIED VOMITING TYPE: Primary | ICD-10-CM

## 2019-02-03 DIAGNOSIS — R10.9 ABDOMINAL PAIN, UNSPECIFIED ABDOMINAL LOCATION: ICD-10-CM

## 2019-02-03 LAB
-: ABNORMAL
ABSOLUTE EOS #: <0.03 K/UL (ref 0–0.44)
ABSOLUTE IMMATURE GRANULOCYTE: <0.03 K/UL (ref 0–0.3)
ABSOLUTE LYMPH #: 1.1 K/UL (ref 1.2–5.2)
ABSOLUTE MONO #: 0.38 K/UL (ref 0.1–1.4)
ALBUMIN SERPL-MCNC: 3.9 G/DL (ref 3.5–5.2)
ALBUMIN/GLOBULIN RATIO: 1.3 (ref 1–2.5)
ALP BLD-CCNC: 61 U/L (ref 35–104)
ALT SERPL-CCNC: 10 U/L (ref 5–33)
AMORPHOUS: ABNORMAL
ANION GAP SERPL CALCULATED.3IONS-SCNC: 11 MMOL/L (ref 9–17)
AST SERPL-CCNC: 12 U/L
BACTERIA: ABNORMAL
BASOPHILS # BLD: 0 % (ref 0–2)
BASOPHILS ABSOLUTE: <0.03 K/UL (ref 0–0.2)
BILIRUB SERPL-MCNC: 0.23 MG/DL (ref 0.3–1.2)
BILIRUBIN DIRECT: 0.09 MG/DL
BILIRUBIN URINE: NEGATIVE
BILIRUBIN, INDIRECT: 0.14 MG/DL (ref 0–1)
BUN BLDV-MCNC: 11 MG/DL (ref 6–20)
BUN/CREAT BLD: ABNORMAL (ref 9–20)
CALCIUM SERPL-MCNC: 9.1 MG/DL (ref 8.6–10.4)
CASTS UA: ABNORMAL /LPF (ref 0–8)
CHLORIDE BLD-SCNC: 108 MMOL/L (ref 98–107)
CO2: 22 MMOL/L (ref 20–31)
COLOR: YELLOW
COMMENT UA: ABNORMAL
CREAT SERPL-MCNC: 0.71 MG/DL (ref 0.5–0.9)
CRYSTALS, UA: ABNORMAL /HPF
DIFFERENTIAL TYPE: ABNORMAL
EKG ATRIAL RATE: 95 BPM
EKG P AXIS: 40 DEGREES
EKG P-R INTERVAL: 144 MS
EKG Q-T INTERVAL: 338 MS
EKG QRS DURATION: 74 MS
EKG QTC CALCULATION (BAZETT): 424 MS
EKG R AXIS: 45 DEGREES
EKG T AXIS: 49 DEGREES
EKG VENTRICULAR RATE: 95 BPM
EOSINOPHILS RELATIVE PERCENT: 0 % (ref 1–4)
EPITHELIAL CELLS UA: ABNORMAL /HPF (ref 0–5)
GFR AFRICAN AMERICAN: >60 ML/MIN
GFR NON-AFRICAN AMERICAN: >60 ML/MIN
GFR SERPL CREATININE-BSD FRML MDRD: ABNORMAL ML/MIN/{1.73_M2}
GFR SERPL CREATININE-BSD FRML MDRD: ABNORMAL ML/MIN/{1.73_M2}
GLOBULIN: ABNORMAL G/DL (ref 1.5–3.8)
GLUCOSE BLD-MCNC: 95 MG/DL (ref 70–99)
GLUCOSE URINE: NEGATIVE
HCG QUALITATIVE: NEGATIVE
HCT VFR BLD CALC: 41.8 % (ref 36.3–47.1)
HEMOGLOBIN: 12.4 G/DL (ref 11.9–15.1)
IMMATURE GRANULOCYTES: 0 %
KETONES, URINE: ABNORMAL
LEUKOCYTE ESTERASE, URINE: NEGATIVE
LIPASE: 22 U/L (ref 13–60)
LYMPHOCYTES # BLD: 19 % (ref 25–45)
MCH RBC QN AUTO: 23.4 PG (ref 25.2–33.5)
MCHC RBC AUTO-ENTMCNC: 29.7 G/DL (ref 28.4–34.8)
MCV RBC AUTO: 78.7 FL (ref 82.6–102.9)
MONOCYTES # BLD: 7 % (ref 2–8)
MUCUS: ABNORMAL
NITRITE, URINE: NEGATIVE
NRBC AUTOMATED: 0 PER 100 WBC
OTHER OBSERVATIONS UA: ABNORMAL
PDW BLD-RTO: 14.8 % (ref 11.8–14.4)
PH UA: >9 (ref 5–8)
PLATELET # BLD: 236 K/UL (ref 138–453)
PLATELET ESTIMATE: ABNORMAL
PMV BLD AUTO: 12.3 FL (ref 8.1–13.5)
POTASSIUM SERPL-SCNC: 3.4 MMOL/L (ref 3.7–5.3)
PROTEIN UA: ABNORMAL
RBC # BLD: 5.31 M/UL (ref 3.95–5.11)
RBC # BLD: ABNORMAL 10*6/UL
RBC UA: ABNORMAL /HPF (ref 0–4)
RENAL EPITHELIAL, UA: ABNORMAL /HPF
SEG NEUTROPHILS: 74 % (ref 34–64)
SEGMENTED NEUTROPHILS ABSOLUTE COUNT: 4.32 K/UL (ref 1.8–8)
SODIUM BLD-SCNC: 141 MMOL/L (ref 135–144)
SPECIFIC GRAVITY UA: 1.02 (ref 1–1.03)
TOTAL PROTEIN: 6.9 G/DL (ref 6.4–8.3)
TRICHOMONAS: ABNORMAL
TURBIDITY: CLEAR
URINE HGB: NEGATIVE
UROBILINOGEN, URINE: NORMAL
WBC # BLD: 5.9 K/UL (ref 4.5–13.5)
WBC # BLD: ABNORMAL 10*3/UL
WBC UA: ABNORMAL /HPF (ref 0–5)
YEAST: ABNORMAL

## 2019-02-03 PROCEDURE — 2580000003 HC RX 258: Performed by: EMERGENCY MEDICINE

## 2019-02-03 PROCEDURE — 93005 ELECTROCARDIOGRAM TRACING: CPT

## 2019-02-03 PROCEDURE — 81001 URINALYSIS AUTO W/SCOPE: CPT

## 2019-02-03 PROCEDURE — 80048 BASIC METABOLIC PNL TOTAL CA: CPT

## 2019-02-03 PROCEDURE — 85025 COMPLETE CBC W/AUTO DIFF WBC: CPT

## 2019-02-03 PROCEDURE — 96372 THER/PROPH/DIAG INJ SC/IM: CPT

## 2019-02-03 PROCEDURE — 96375 TX/PRO/DX INJ NEW DRUG ADDON: CPT

## 2019-02-03 PROCEDURE — 2500000003 HC RX 250 WO HCPCS: Performed by: EMERGENCY MEDICINE

## 2019-02-03 PROCEDURE — 99284 EMERGENCY DEPT VISIT MOD MDM: CPT

## 2019-02-03 PROCEDURE — 6360000002 HC RX W HCPCS: Performed by: EMERGENCY MEDICINE

## 2019-02-03 PROCEDURE — 84703 CHORIONIC GONADOTROPIN ASSAY: CPT

## 2019-02-03 PROCEDURE — 83690 ASSAY OF LIPASE: CPT

## 2019-02-03 PROCEDURE — 80076 HEPATIC FUNCTION PANEL: CPT

## 2019-02-03 PROCEDURE — 96374 THER/PROPH/DIAG INJ IV PUSH: CPT

## 2019-02-03 RX ORDER — 0.9 % SODIUM CHLORIDE 0.9 %
1000 INTRAVENOUS SOLUTION INTRAVENOUS ONCE
Status: COMPLETED | OUTPATIENT
Start: 2019-02-03 | End: 2019-02-03

## 2019-02-03 RX ORDER — DICYCLOMINE HYDROCHLORIDE 10 MG/ML
20 INJECTION INTRAMUSCULAR ONCE
Status: COMPLETED | OUTPATIENT
Start: 2019-02-03 | End: 2019-02-03

## 2019-02-03 RX ORDER — ONDANSETRON 2 MG/ML
4 INJECTION INTRAMUSCULAR; INTRAVENOUS ONCE
Status: COMPLETED | OUTPATIENT
Start: 2019-02-03 | End: 2019-02-03

## 2019-02-03 RX ORDER — DICYCLOMINE HYDROCHLORIDE 10 MG/1
10 CAPSULE ORAL EVERY 6 HOURS PRN
Qty: 10 CAPSULE | Refills: 0 | Status: SHIPPED | OUTPATIENT
Start: 2019-02-03 | End: 2019-05-09 | Stop reason: ALTCHOICE

## 2019-02-03 RX ORDER — ONDANSETRON 4 MG/1
4 TABLET, FILM COATED ORAL EVERY 8 HOURS PRN
Qty: 8 TABLET | Refills: 0 | Status: SHIPPED | OUTPATIENT
Start: 2019-02-03 | End: 2019-05-09 | Stop reason: ALTCHOICE

## 2019-02-03 RX ADMIN — DICYCLOMINE HYDROCHLORIDE 20 MG: 20 INJECTION, SOLUTION INTRAMUSCULAR at 14:06

## 2019-02-03 RX ADMIN — FAMOTIDINE 20 MG: 10 INJECTION INTRAVENOUS at 14:06

## 2019-02-03 RX ADMIN — SODIUM CHLORIDE 1000 ML: 9 INJECTION, SOLUTION INTRAVENOUS at 14:06

## 2019-02-03 RX ADMIN — ONDANSETRON 4 MG: 2 INJECTION INTRAMUSCULAR; INTRAVENOUS at 14:06

## 2019-02-03 ASSESSMENT — ENCOUNTER SYMPTOMS
ABDOMINAL PAIN: 1
CONSTIPATION: 0
EYE PAIN: 0
DIARRHEA: 0
SHORTNESS OF BREATH: 0
NAUSEA: 1
COUGH: 0
VOMITING: 1
RHINORRHEA: 0

## 2019-02-03 ASSESSMENT — PAIN SCALES - GENERAL: PAINLEVEL_OUTOF10: 10

## 2019-02-04 ENCOUNTER — APPOINTMENT (OUTPATIENT)
Dept: CT IMAGING | Age: 19
End: 2019-02-04
Payer: COMMERCIAL

## 2019-02-04 ENCOUNTER — HOSPITAL ENCOUNTER (OUTPATIENT)
Age: 19
Setting detail: OBSERVATION
Discharge: HOME | End: 2019-02-06
Attending: EMERGENCY MEDICINE | Admitting: EMERGENCY MEDICINE
Payer: COMMERCIAL

## 2019-02-04 DIAGNOSIS — R10.13 EPIGASTRIC ABDOMINAL PAIN: ICD-10-CM

## 2019-02-04 DIAGNOSIS — R11.2 INTRACTABLE VOMITING WITH NAUSEA, UNSPECIFIED VOMITING TYPE: ICD-10-CM

## 2019-02-04 DIAGNOSIS — R10.13 EPIGASTRIC PAIN: Primary | ICD-10-CM

## 2019-02-04 LAB
ABSOLUTE EOS #: <0.03 K/UL (ref 0–0.44)
ABSOLUTE IMMATURE GRANULOCYTE: <0.03 K/UL (ref 0–0.3)
ABSOLUTE LYMPH #: 1.24 K/UL (ref 1.2–5.2)
ABSOLUTE MONO #: 0.48 K/UL (ref 0.1–1.4)
ALBUMIN SERPL-MCNC: 3.7 G/DL (ref 3.5–5.2)
ALBUMIN/GLOBULIN RATIO: 1.3 (ref 1–2.5)
ALP BLD-CCNC: 58 U/L (ref 35–104)
ALT SERPL-CCNC: 12 U/L (ref 5–33)
ANION GAP SERPL CALCULATED.3IONS-SCNC: 10 MMOL/L (ref 9–17)
AST SERPL-CCNC: 15 U/L
BASOPHILS # BLD: 0 % (ref 0–2)
BASOPHILS ABSOLUTE: <0.03 K/UL (ref 0–0.2)
BILIRUB SERPL-MCNC: 0.18 MG/DL (ref 0.3–1.2)
BUN BLDV-MCNC: 11 MG/DL (ref 6–20)
BUN/CREAT BLD: ABNORMAL (ref 9–20)
CALCIUM SERPL-MCNC: 9 MG/DL (ref 8.6–10.4)
CHLORIDE BLD-SCNC: 107 MMOL/L (ref 98–107)
CO2: 22 MMOL/L (ref 20–31)
CREAT SERPL-MCNC: 0.69 MG/DL (ref 0.5–0.9)
DIFFERENTIAL TYPE: ABNORMAL
EOSINOPHILS RELATIVE PERCENT: 0 % (ref 1–4)
GFR AFRICAN AMERICAN: >60 ML/MIN
GFR NON-AFRICAN AMERICAN: >60 ML/MIN
GFR SERPL CREATININE-BSD FRML MDRD: ABNORMAL ML/MIN/{1.73_M2}
GFR SERPL CREATININE-BSD FRML MDRD: ABNORMAL ML/MIN/{1.73_M2}
GLUCOSE BLD-MCNC: 104 MG/DL (ref 70–99)
HCT VFR BLD CALC: 38.2 % (ref 36.3–47.1)
HEMOGLOBIN: 11.5 G/DL (ref 11.9–15.1)
IMMATURE GRANULOCYTES: 0 %
LIPASE: 26 U/L (ref 13–60)
LYMPHOCYTES # BLD: 23 % (ref 25–45)
MCH RBC QN AUTO: 23.8 PG (ref 25.2–33.5)
MCHC RBC AUTO-ENTMCNC: 30.1 G/DL (ref 28.4–34.8)
MCV RBC AUTO: 78.9 FL (ref 82.6–102.9)
MONOCYTES # BLD: 9 % (ref 2–8)
NRBC AUTOMATED: 0 PER 100 WBC
PDW BLD-RTO: 14.7 % (ref 11.8–14.4)
PLATELET # BLD: 201 K/UL (ref 138–453)
PLATELET ESTIMATE: ABNORMAL
PMV BLD AUTO: 11.8 FL (ref 8.1–13.5)
POTASSIUM SERPL-SCNC: 3.4 MMOL/L (ref 3.7–5.3)
RBC # BLD: 4.84 M/UL (ref 3.95–5.11)
RBC # BLD: ABNORMAL 10*6/UL
SEG NEUTROPHILS: 68 % (ref 34–64)
SEGMENTED NEUTROPHILS ABSOLUTE COUNT: 3.67 K/UL (ref 1.8–8)
SODIUM BLD-SCNC: 139 MMOL/L (ref 135–144)
TOTAL PROTEIN: 6.6 G/DL (ref 6.4–8.3)
WBC # BLD: 5.4 K/UL (ref 4.5–13.5)
WBC # BLD: ABNORMAL 10*3/UL

## 2019-02-04 PROCEDURE — 2500000003 HC RX 250 WO HCPCS: Performed by: EMERGENCY MEDICINE

## 2019-02-04 PROCEDURE — 6360000002 HC RX W HCPCS: Performed by: EMERGENCY MEDICINE

## 2019-02-04 PROCEDURE — 87493 C DIFF AMPLIFIED PROBE: CPT

## 2019-02-04 PROCEDURE — G0378 HOSPITAL OBSERVATION PER HR: HCPCS

## 2019-02-04 PROCEDURE — 99285 EMERGENCY DEPT VISIT HI MDM: CPT

## 2019-02-04 PROCEDURE — 83690 ASSAY OF LIPASE: CPT

## 2019-02-04 PROCEDURE — 6370000000 HC RX 637 (ALT 250 FOR IP): Performed by: STUDENT IN AN ORGANIZED HEALTH CARE EDUCATION/TRAINING PROGRAM

## 2019-02-04 PROCEDURE — 96375 TX/PRO/DX INJ NEW DRUG ADDON: CPT

## 2019-02-04 PROCEDURE — 96374 THER/PROPH/DIAG INJ IV PUSH: CPT

## 2019-02-04 PROCEDURE — 2580000003 HC RX 258: Performed by: EMERGENCY MEDICINE

## 2019-02-04 PROCEDURE — 96372 THER/PROPH/DIAG INJ SC/IM: CPT

## 2019-02-04 PROCEDURE — 85025 COMPLETE CBC W/AUTO DIFF WBC: CPT

## 2019-02-04 PROCEDURE — 6360000004 HC RX CONTRAST MEDICATION: Performed by: EMERGENCY MEDICINE

## 2019-02-04 PROCEDURE — 96376 TX/PRO/DX INJ SAME DRUG ADON: CPT

## 2019-02-04 PROCEDURE — 80053 COMPREHEN METABOLIC PANEL: CPT

## 2019-02-04 PROCEDURE — 74177 CT ABD & PELVIS W/CONTRAST: CPT

## 2019-02-04 RX ORDER — SODIUM CHLORIDE 0.9 % (FLUSH) 0.9 %
10 SYRINGE (ML) INJECTION EVERY 12 HOURS SCHEDULED
Status: DISCONTINUED | OUTPATIENT
Start: 2019-02-04 | End: 2019-02-06 | Stop reason: HOSPADM

## 2019-02-04 RX ORDER — FENTANYL CITRATE 50 UG/ML
50 INJECTION, SOLUTION INTRAMUSCULAR; INTRAVENOUS ONCE
Status: COMPLETED | OUTPATIENT
Start: 2019-02-04 | End: 2019-02-04

## 2019-02-04 RX ORDER — ACETAMINOPHEN 325 MG/1
650 TABLET ORAL EVERY 4 HOURS PRN
Status: DISCONTINUED | OUTPATIENT
Start: 2019-02-04 | End: 2019-02-06 | Stop reason: HOSPADM

## 2019-02-04 RX ORDER — MORPHINE SULFATE 4 MG/ML
2 INJECTION, SOLUTION INTRAMUSCULAR; INTRAVENOUS
Status: DISCONTINUED | OUTPATIENT
Start: 2019-02-04 | End: 2019-02-05

## 2019-02-04 RX ORDER — MORPHINE SULFATE 4 MG/ML
4 INJECTION, SOLUTION INTRAMUSCULAR; INTRAVENOUS
Status: DISCONTINUED | OUTPATIENT
Start: 2019-02-04 | End: 2019-02-05

## 2019-02-04 RX ORDER — ONDANSETRON 2 MG/ML
4 INJECTION INTRAMUSCULAR; INTRAVENOUS ONCE
Status: COMPLETED | OUTPATIENT
Start: 2019-02-04 | End: 2019-02-04

## 2019-02-04 RX ORDER — DIPHENHYDRAMINE HCL 25 MG
25 TABLET ORAL EVERY 6 HOURS PRN
Status: DISCONTINUED | OUTPATIENT
Start: 2019-02-04 | End: 2019-02-06 | Stop reason: HOSPADM

## 2019-02-04 RX ORDER — DICYCLOMINE HYDROCHLORIDE 10 MG/ML
20 INJECTION INTRAMUSCULAR ONCE
Status: COMPLETED | OUTPATIENT
Start: 2019-02-04 | End: 2019-02-04

## 2019-02-04 RX ORDER — MORPHINE SULFATE 4 MG/ML
4 INJECTION, SOLUTION INTRAMUSCULAR; INTRAVENOUS ONCE
Status: COMPLETED | OUTPATIENT
Start: 2019-02-04 | End: 2019-02-04

## 2019-02-04 RX ORDER — SODIUM CHLORIDE 0.9 % (FLUSH) 0.9 %
10 SYRINGE (ML) INJECTION PRN
Status: DISCONTINUED | OUTPATIENT
Start: 2019-02-04 | End: 2019-02-06 | Stop reason: HOSPADM

## 2019-02-04 RX ORDER — PROMETHAZINE HYDROCHLORIDE 25 MG/ML
6.25 INJECTION, SOLUTION INTRAMUSCULAR; INTRAVENOUS EVERY 6 HOURS PRN
Status: DISCONTINUED | OUTPATIENT
Start: 2019-02-04 | End: 2019-02-06 | Stop reason: HOSPADM

## 2019-02-04 RX ORDER — 0.9 % SODIUM CHLORIDE 0.9 %
1000 INTRAVENOUS SOLUTION INTRAVENOUS ONCE
Status: COMPLETED | OUTPATIENT
Start: 2019-02-04 | End: 2019-02-04

## 2019-02-04 RX ORDER — SODIUM CHLORIDE 9 MG/ML
INJECTION, SOLUTION INTRAVENOUS CONTINUOUS
Status: DISCONTINUED | OUTPATIENT
Start: 2019-02-04 | End: 2019-02-06 | Stop reason: HOSPADM

## 2019-02-04 RX ADMIN — MORPHINE SULFATE 4 MG: 4 INJECTION INTRAVENOUS at 09:54

## 2019-02-04 RX ADMIN — DIPHENHYDRAMINE HCL 25 MG: 25 TABLET ORAL at 18:43

## 2019-02-04 RX ADMIN — FAMOTIDINE 20 MG: 10 INJECTION, SOLUTION INTRAVENOUS at 12:04

## 2019-02-04 RX ADMIN — MORPHINE SULFATE 4 MG: 4 INJECTION INTRAVENOUS at 20:52

## 2019-02-04 RX ADMIN — MORPHINE SULFATE 4 MG: 4 INJECTION INTRAVENOUS at 13:19

## 2019-02-04 RX ADMIN — ONDANSETRON 4 MG: 2 INJECTION INTRAMUSCULAR; INTRAVENOUS at 09:54

## 2019-02-04 RX ADMIN — IOPAMIDOL 75 ML: 755 INJECTION, SOLUTION INTRAVENOUS at 10:41

## 2019-02-04 RX ADMIN — SODIUM CHLORIDE 1000 ML: 9 INJECTION, SOLUTION INTRAVENOUS at 09:54

## 2019-02-04 RX ADMIN — SODIUM CHLORIDE: 9 INJECTION, SOLUTION INTRAVENOUS at 13:19

## 2019-02-04 RX ADMIN — FENTANYL CITRATE 50 MCG: 50 INJECTION, SOLUTION INTRAMUSCULAR; INTRAVENOUS at 12:05

## 2019-02-04 RX ADMIN — DICYCLOMINE HYDROCHLORIDE 20 MG: 20 INJECTION, SOLUTION INTRAMUSCULAR at 12:05

## 2019-02-04 RX ADMIN — MORPHINE SULFATE 4 MG: 4 INJECTION INTRAVENOUS at 16:14

## 2019-02-04 ASSESSMENT — PAIN SCALES - GENERAL
PAINLEVEL_OUTOF10: 9
PAINLEVEL_OUTOF10: 10
PAINLEVEL_OUTOF10: 9
PAINLEVEL_OUTOF10: 10
PAINLEVEL_OUTOF10: 9
PAINLEVEL_OUTOF10: 10

## 2019-02-04 ASSESSMENT — ENCOUNTER SYMPTOMS
EYE PAIN: 0
SORE THROAT: 0
VOMITING: 1
COLOR CHANGE: 0
RHINORRHEA: 0
COUGH: 0
ABDOMINAL PAIN: 1
SHORTNESS OF BREATH: 0
NAUSEA: 1

## 2019-02-04 ASSESSMENT — PAIN DESCRIPTION - PAIN TYPE: TYPE: ACUTE PAIN

## 2019-02-04 ASSESSMENT — PAIN DESCRIPTION - DESCRIPTORS: DESCRIPTORS: ACHING;CRAMPING

## 2019-02-04 ASSESSMENT — PAIN DESCRIPTION - LOCATION: LOCATION: ABDOMEN

## 2019-02-04 ASSESSMENT — PAIN DESCRIPTION - FREQUENCY: FREQUENCY: INTERMITTENT

## 2019-02-05 PROCEDURE — 96376 TX/PRO/DX INJ SAME DRUG ADON: CPT

## 2019-02-05 PROCEDURE — 87324 CLOSTRIDIUM AG IA: CPT

## 2019-02-05 PROCEDURE — 96375 TX/PRO/DX INJ NEW DRUG ADDON: CPT

## 2019-02-05 PROCEDURE — 87449 NOS EACH ORGANISM AG IA: CPT

## 2019-02-05 PROCEDURE — G0378 HOSPITAL OBSERVATION PER HR: HCPCS

## 2019-02-05 PROCEDURE — 6360000002 HC RX W HCPCS: Performed by: EMERGENCY MEDICINE

## 2019-02-05 PROCEDURE — 6360000002 HC RX W HCPCS: Performed by: STUDENT IN AN ORGANIZED HEALTH CARE EDUCATION/TRAINING PROGRAM

## 2019-02-05 PROCEDURE — C9113 INJ PANTOPRAZOLE SODIUM, VIA: HCPCS | Performed by: STUDENT IN AN ORGANIZED HEALTH CARE EDUCATION/TRAINING PROGRAM

## 2019-02-05 PROCEDURE — 2580000003 HC RX 258: Performed by: EMERGENCY MEDICINE

## 2019-02-05 PROCEDURE — 2580000003 HC RX 258: Performed by: STUDENT IN AN ORGANIZED HEALTH CARE EDUCATION/TRAINING PROGRAM

## 2019-02-05 RX ORDER — PANTOPRAZOLE SODIUM 40 MG/1
40 TABLET, DELAYED RELEASE ORAL
Status: DISCONTINUED | OUTPATIENT
Start: 2019-02-06 | End: 2019-02-06 | Stop reason: HOSPADM

## 2019-02-05 RX ORDER — FENTANYL CITRATE 50 UG/ML
25 INJECTION, SOLUTION INTRAMUSCULAR; INTRAVENOUS
Status: DISCONTINUED | OUTPATIENT
Start: 2019-02-05 | End: 2019-02-06 | Stop reason: HOSPADM

## 2019-02-05 RX ORDER — 0.9 % SODIUM CHLORIDE 0.9 %
10 VIAL (ML) INJECTION ONCE
Status: COMPLETED | OUTPATIENT
Start: 2019-02-05 | End: 2019-02-05

## 2019-02-05 RX ORDER — PANTOPRAZOLE SODIUM 40 MG/10ML
40 INJECTION, POWDER, LYOPHILIZED, FOR SOLUTION INTRAVENOUS ONCE
Status: COMPLETED | OUTPATIENT
Start: 2019-02-05 | End: 2019-02-05

## 2019-02-05 RX ADMIN — MORPHINE SULFATE 4 MG: 4 INJECTION INTRAVENOUS at 08:45

## 2019-02-05 RX ADMIN — SODIUM CHLORIDE 10 ML: 9 INJECTION INTRAMUSCULAR; INTRAVENOUS; SUBCUTANEOUS at 13:00

## 2019-02-05 RX ADMIN — FENTANYL CITRATE 25 MCG: 50 INJECTION, SOLUTION INTRAMUSCULAR; INTRAVENOUS at 21:16

## 2019-02-05 RX ADMIN — FENTANYL CITRATE 25 MCG: 50 INJECTION, SOLUTION INTRAMUSCULAR; INTRAVENOUS at 13:04

## 2019-02-05 RX ADMIN — Medication 10 ML: at 21:16

## 2019-02-05 RX ADMIN — SODIUM CHLORIDE: 9 INJECTION, SOLUTION INTRAVENOUS at 08:28

## 2019-02-05 RX ADMIN — FENTANYL CITRATE 25 MCG: 50 INJECTION, SOLUTION INTRAMUSCULAR; INTRAVENOUS at 16:49

## 2019-02-05 RX ADMIN — PANTOPRAZOLE SODIUM 40 MG: 40 INJECTION, POWDER, FOR SOLUTION INTRAVENOUS at 13:00

## 2019-02-05 RX ADMIN — SODIUM CHLORIDE: 9 INJECTION, SOLUTION INTRAVENOUS at 16:51

## 2019-02-05 RX ADMIN — MORPHINE SULFATE 4 MG: 4 INJECTION INTRAVENOUS at 03:03

## 2019-02-05 ASSESSMENT — PAIN DESCRIPTION - DESCRIPTORS
DESCRIPTORS: CRAMPING
DESCRIPTORS: CRAMPING

## 2019-02-05 ASSESSMENT — PAIN DESCRIPTION - LOCATION
LOCATION: ABDOMEN
LOCATION: ABDOMEN

## 2019-02-05 ASSESSMENT — PAIN DESCRIPTION - PROGRESSION

## 2019-02-05 ASSESSMENT — PAIN SCALES - GENERAL
PAINLEVEL_OUTOF10: 9
PAINLEVEL_OUTOF10: 10
PAINLEVEL_OUTOF10: 10
PAINLEVEL_OUTOF10: 9
PAINLEVEL_OUTOF10: 9

## 2019-02-05 ASSESSMENT — PAIN DESCRIPTION - FREQUENCY: FREQUENCY: INTERMITTENT

## 2019-02-05 ASSESSMENT — PAIN DESCRIPTION - PAIN TYPE
TYPE: ACUTE PAIN
TYPE: ACUTE PAIN

## 2019-02-05 ASSESSMENT — PAIN DESCRIPTION - ONSET
ONSET: ON-GOING
ONSET: ON-GOING

## 2019-02-05 ASSESSMENT — PAIN DESCRIPTION - ORIENTATION: ORIENTATION: MID;UPPER

## 2019-02-06 VITALS
DIASTOLIC BLOOD PRESSURE: 95 MMHG | BODY MASS INDEX: 40.12 KG/M2 | WEIGHT: 199 LBS | HEART RATE: 68 BPM | RESPIRATION RATE: 16 BRPM | HEIGHT: 59 IN | SYSTOLIC BLOOD PRESSURE: 125 MMHG | TEMPERATURE: 98.8 F | OXYGEN SATURATION: 99 %

## 2019-02-06 LAB
ANION GAP SERPL CALCULATED.3IONS-SCNC: 13 MMOL/L (ref 9–17)
BUN BLDV-MCNC: 3 MG/DL (ref 6–20)
BUN/CREAT BLD: ABNORMAL (ref 9–20)
CALCIUM SERPL-MCNC: 8.8 MG/DL (ref 8.6–10.4)
CHLORIDE BLD-SCNC: 109 MMOL/L (ref 98–107)
CO2: 21 MMOL/L (ref 20–31)
CREAT SERPL-MCNC: 0.58 MG/DL (ref 0.5–0.9)
GFR AFRICAN AMERICAN: >60 ML/MIN
GFR NON-AFRICAN AMERICAN: >60 ML/MIN
GFR SERPL CREATININE-BSD FRML MDRD: ABNORMAL ML/MIN/{1.73_M2}
GFR SERPL CREATININE-BSD FRML MDRD: ABNORMAL ML/MIN/{1.73_M2}
GLUCOSE BLD-MCNC: 87 MG/DL (ref 70–99)
POTASSIUM SERPL-SCNC: 3.4 MMOL/L (ref 3.7–5.3)
SODIUM BLD-SCNC: 143 MMOL/L (ref 135–144)

## 2019-02-06 PROCEDURE — 2580000003 HC RX 258: Performed by: EMERGENCY MEDICINE

## 2019-02-06 PROCEDURE — 6370000000 HC RX 637 (ALT 250 FOR IP): Performed by: STUDENT IN AN ORGANIZED HEALTH CARE EDUCATION/TRAINING PROGRAM

## 2019-02-06 PROCEDURE — 6360000002 HC RX W HCPCS: Performed by: STUDENT IN AN ORGANIZED HEALTH CARE EDUCATION/TRAINING PROGRAM

## 2019-02-06 PROCEDURE — 6360000002 HC RX W HCPCS: Performed by: EMERGENCY MEDICINE

## 2019-02-06 PROCEDURE — 96375 TX/PRO/DX INJ NEW DRUG ADDON: CPT

## 2019-02-06 PROCEDURE — 80048 BASIC METABOLIC PNL TOTAL CA: CPT

## 2019-02-06 PROCEDURE — G0378 HOSPITAL OBSERVATION PER HR: HCPCS

## 2019-02-06 PROCEDURE — 36415 COLL VENOUS BLD VENIPUNCTURE: CPT

## 2019-02-06 PROCEDURE — 96376 TX/PRO/DX INJ SAME DRUG ADON: CPT

## 2019-02-06 RX ORDER — CAPSAICIN 0.025 %
CREAM (GRAM) TOPICAL 2 TIMES DAILY
Status: DISCONTINUED | OUTPATIENT
Start: 2019-02-06 | End: 2019-02-06 | Stop reason: HOSPADM

## 2019-02-06 RX ORDER — PANTOPRAZOLE SODIUM 40 MG/1
40 TABLET, DELAYED RELEASE ORAL
Qty: 30 TABLET | Refills: 0 | Status: SHIPPED | OUTPATIENT
Start: 2019-02-07 | End: 2019-02-12 | Stop reason: SDUPTHER

## 2019-02-06 RX ORDER — MAGNESIUM HYDROXIDE/ALUMINUM HYDROXICE/SIMETHICONE 120; 1200; 1200 MG/30ML; MG/30ML; MG/30ML
30 SUSPENSION ORAL EVERY 6 HOURS PRN
Status: DISCONTINUED | OUTPATIENT
Start: 2019-02-06 | End: 2019-02-06 | Stop reason: HOSPADM

## 2019-02-06 RX ORDER — KETOROLAC TROMETHAMINE 15 MG/ML
15 INJECTION, SOLUTION INTRAMUSCULAR; INTRAVENOUS EVERY 6 HOURS PRN
Status: DISCONTINUED | OUTPATIENT
Start: 2019-02-06 | End: 2019-02-06 | Stop reason: HOSPADM

## 2019-02-06 RX ORDER — DICYCLOMINE HYDROCHLORIDE 10 MG/ML
20 INJECTION INTRAMUSCULAR ONCE
Status: DISCONTINUED | OUTPATIENT
Start: 2019-02-06 | End: 2019-02-06 | Stop reason: HOSPADM

## 2019-02-06 RX ORDER — ONDANSETRON 2 MG/ML
4 INJECTION INTRAMUSCULAR; INTRAVENOUS EVERY 6 HOURS PRN
Status: DISCONTINUED | OUTPATIENT
Start: 2019-02-06 | End: 2019-02-06 | Stop reason: HOSPADM

## 2019-02-06 RX ADMIN — Medication 10 ML: at 06:50

## 2019-02-06 RX ADMIN — SODIUM CHLORIDE: 9 INJECTION, SOLUTION INTRAVENOUS at 09:04

## 2019-02-06 RX ADMIN — Medication 10 ML: at 01:29

## 2019-02-06 RX ADMIN — FENTANYL CITRATE 25 MCG: 50 INJECTION, SOLUTION INTRAMUSCULAR; INTRAVENOUS at 06:48

## 2019-02-06 RX ADMIN — SODIUM CHLORIDE: 9 INJECTION, SOLUTION INTRAVENOUS at 01:29

## 2019-02-06 RX ADMIN — PANTOPRAZOLE SODIUM 40 MG: 40 TABLET, DELAYED RELEASE ORAL at 09:01

## 2019-02-06 RX ADMIN — KETOROLAC TROMETHAMINE 15 MG: 15 INJECTION, SOLUTION INTRAMUSCULAR; INTRAVENOUS at 09:35

## 2019-02-06 RX ADMIN — FENTANYL CITRATE 25 MCG: 50 INJECTION, SOLUTION INTRAMUSCULAR; INTRAVENOUS at 01:29

## 2019-02-06 ASSESSMENT — PAIN DESCRIPTION - PROGRESSION
CLINICAL_PROGRESSION: NOT CHANGED

## 2019-02-06 ASSESSMENT — PAIN SCALES - GENERAL
PAINLEVEL_OUTOF10: 3
PAINLEVEL_OUTOF10: 10
PAINLEVEL_OUTOF10: 10
PAINLEVEL_OUTOF10: 8

## 2019-02-06 ASSESSMENT — PAIN DESCRIPTION - ONSET: ONSET: ON-GOING

## 2019-02-06 ASSESSMENT — PAIN DESCRIPTION - DESCRIPTORS: DESCRIPTORS: CRAMPING

## 2019-02-06 ASSESSMENT — PAIN DESCRIPTION - ORIENTATION: ORIENTATION: MID

## 2019-02-06 ASSESSMENT — PAIN DESCRIPTION - PAIN TYPE: TYPE: ACUTE PAIN

## 2019-02-06 ASSESSMENT — PAIN DESCRIPTION - LOCATION: LOCATION: ABDOMEN

## 2019-02-06 ASSESSMENT — PAIN DESCRIPTION - FREQUENCY: FREQUENCY: INTERMITTENT

## 2019-02-08 ENCOUNTER — TELEPHONE (OUTPATIENT)
Dept: INTERNAL MEDICINE | Age: 19
End: 2019-02-08

## 2019-02-12 ENCOUNTER — OFFICE VISIT (OUTPATIENT)
Dept: INTERNAL MEDICINE | Age: 19
End: 2019-02-12
Payer: MEDICAID

## 2019-02-12 VITALS
HEART RATE: 86 BPM | BODY MASS INDEX: 39.92 KG/M2 | WEIGHT: 198 LBS | SYSTOLIC BLOOD PRESSURE: 109 MMHG | HEIGHT: 59 IN | DIASTOLIC BLOOD PRESSURE: 75 MMHG

## 2019-02-12 DIAGNOSIS — J45.40 MODERATE PERSISTENT ASTHMA WITHOUT COMPLICATION: ICD-10-CM

## 2019-02-12 DIAGNOSIS — K21.9 GASTROESOPHAGEAL REFLUX DISEASE WITHOUT ESOPHAGITIS: ICD-10-CM

## 2019-02-12 DIAGNOSIS — F40.240 CLAUSTROPHOBIA: ICD-10-CM

## 2019-02-12 DIAGNOSIS — R10.13 EPIGASTRIC PAIN: Primary | ICD-10-CM

## 2019-02-12 PROCEDURE — 99211 OFF/OP EST MAY X REQ PHY/QHP: CPT | Performed by: INTERNAL MEDICINE

## 2019-02-12 PROCEDURE — 99214 OFFICE O/P EST MOD 30 MIN: CPT | Performed by: INTERNAL MEDICINE

## 2019-02-12 RX ORDER — ALBUTEROL SULFATE 90 UG/1
2 AEROSOL, METERED RESPIRATORY (INHALATION) EVERY 6 HOURS PRN
Qty: 1 INHALER | Refills: 3 | Status: SHIPPED | OUTPATIENT
Start: 2019-02-12 | End: 2019-06-12 | Stop reason: SDUPTHER

## 2019-02-12 RX ORDER — PANTOPRAZOLE SODIUM 40 MG/1
40 TABLET, DELAYED RELEASE ORAL
Qty: 30 TABLET | Refills: 2 | Status: SHIPPED | OUTPATIENT
Start: 2019-02-12 | End: 2019-05-09 | Stop reason: ALTCHOICE

## 2019-02-12 ASSESSMENT — PATIENT HEALTH QUESTIONNAIRE - PHQ9
SUM OF ALL RESPONSES TO PHQ QUESTIONS 1-9: 0
SUM OF ALL RESPONSES TO PHQ9 QUESTIONS 1 & 2: 0
1. LITTLE INTEREST OR PLEASURE IN DOING THINGS: 0
2. FEELING DOWN, DEPRESSED OR HOPELESS: 0
SUM OF ALL RESPONSES TO PHQ QUESTIONS 1-9: 0

## 2019-03-11 ENCOUNTER — APPOINTMENT (OUTPATIENT)
Dept: GENERAL RADIOLOGY | Age: 19
End: 2019-03-11
Payer: COMMERCIAL

## 2019-03-11 ENCOUNTER — HOSPITAL ENCOUNTER (EMERGENCY)
Age: 19
Discharge: HOME | End: 2019-03-12
Attending: EMERGENCY MEDICINE
Payer: COMMERCIAL

## 2019-03-11 VITALS
HEART RATE: 128 BPM | RESPIRATION RATE: 18 BRPM | HEIGHT: 59 IN | WEIGHT: 198 LBS | TEMPERATURE: 99 F | DIASTOLIC BLOOD PRESSURE: 88 MMHG | OXYGEN SATURATION: 98 % | BODY MASS INDEX: 39.92 KG/M2 | SYSTOLIC BLOOD PRESSURE: 114 MMHG

## 2019-03-11 DIAGNOSIS — J45.901 ASTHMA EXACERBATION, MILD: Primary | ICD-10-CM

## 2019-03-11 DIAGNOSIS — J45.40 MODERATE PERSISTENT ASTHMA WITHOUT COMPLICATION: ICD-10-CM

## 2019-03-11 LAB
-: ABNORMAL
ABSOLUTE EOS #: 0.08 K/UL (ref 0–0.44)
ABSOLUTE IMMATURE GRANULOCYTE: 0.03 K/UL (ref 0–0.3)
ABSOLUTE LYMPH #: 1.25 K/UL (ref 1.2–5.2)
ABSOLUTE MONO #: 0.49 K/UL (ref 0.1–1.4)
AMORPHOUS: ABNORMAL
ANION GAP SERPL CALCULATED.3IONS-SCNC: 11 MMOL/L (ref 9–17)
BACTERIA: ABNORMAL
BASOPHILS # BLD: 0 % (ref 0–2)
BASOPHILS ABSOLUTE: <0.03 K/UL (ref 0–0.2)
BILIRUBIN URINE: NEGATIVE
BUN BLDV-MCNC: 10 MG/DL (ref 6–20)
BUN/CREAT BLD: ABNORMAL (ref 9–20)
CALCIUM SERPL-MCNC: 9.2 MG/DL (ref 8.6–10.4)
CASTS UA: ABNORMAL /LPF (ref 0–8)
CHLORIDE BLD-SCNC: 102 MMOL/L (ref 98–107)
CO2: 22 MMOL/L (ref 20–31)
COLOR: YELLOW
COMMENT UA: ABNORMAL
CREAT SERPL-MCNC: 0.6 MG/DL (ref 0.5–0.9)
CRYSTALS, UA: ABNORMAL /HPF
DIFFERENTIAL TYPE: ABNORMAL
DIRECT EXAM: NORMAL
EOSINOPHILS RELATIVE PERCENT: 1 % (ref 1–4)
EPITHELIAL CELLS UA: ABNORMAL /HPF (ref 0–5)
GFR AFRICAN AMERICAN: >60 ML/MIN
GFR NON-AFRICAN AMERICAN: >60 ML/MIN
GFR SERPL CREATININE-BSD FRML MDRD: ABNORMAL ML/MIN/{1.73_M2}
GFR SERPL CREATININE-BSD FRML MDRD: ABNORMAL ML/MIN/{1.73_M2}
GLUCOSE BLD-MCNC: 120 MG/DL (ref 70–99)
GLUCOSE URINE: NEGATIVE
HCG QUALITATIVE: NEGATIVE
HCT VFR BLD CALC: 38.9 % (ref 36.3–47.1)
HEMOGLOBIN: 12 G/DL (ref 11.9–15.1)
IMMATURE GRANULOCYTES: 0 %
KETONES, URINE: NEGATIVE
LEUKOCYTE ESTERASE, URINE: NEGATIVE
LIPASE: 37 U/L (ref 13–60)
LYMPHOCYTES # BLD: 15 % (ref 25–45)
Lab: NORMAL
MCH RBC QN AUTO: 23.5 PG (ref 25.2–33.5)
MCHC RBC AUTO-ENTMCNC: 30.8 G/DL (ref 28.4–34.8)
MCV RBC AUTO: 76.1 FL (ref 82.6–102.9)
MONOCYTES # BLD: 6 % (ref 2–8)
MUCUS: ABNORMAL
NITRITE, URINE: NEGATIVE
NRBC AUTOMATED: 0 PER 100 WBC
OTHER OBSERVATIONS UA: ABNORMAL
PDW BLD-RTO: 14.9 % (ref 11.8–14.4)
PH UA: 8.5 (ref 5–8)
PLATELET # BLD: 242 K/UL (ref 138–453)
PLATELET ESTIMATE: ABNORMAL
PMV BLD AUTO: 11.9 FL (ref 8.1–13.5)
POTASSIUM SERPL-SCNC: 4.1 MMOL/L (ref 3.7–5.3)
PROTEIN UA: NEGATIVE
RBC # BLD: 5.11 M/UL (ref 3.95–5.11)
RBC # BLD: ABNORMAL 10*6/UL
RBC UA: ABNORMAL /HPF (ref 0–4)
RENAL EPITHELIAL, UA: ABNORMAL /HPF
SEG NEUTROPHILS: 78 % (ref 34–64)
SEGMENTED NEUTROPHILS ABSOLUTE COUNT: 6.75 K/UL (ref 1.8–8)
SODIUM BLD-SCNC: 135 MMOL/L (ref 135–144)
SPECIFIC GRAVITY UA: 1.01 (ref 1–1.03)
SPECIMEN DESCRIPTION: NORMAL
TRICHOMONAS: ABNORMAL
TURBIDITY: ABNORMAL
URINE HGB: NEGATIVE
UROBILINOGEN, URINE: NORMAL
WBC # BLD: 8.6 K/UL (ref 4.5–13.5)
WBC # BLD: ABNORMAL 10*3/UL
WBC UA: ABNORMAL /HPF (ref 0–5)
YEAST: ABNORMAL

## 2019-03-11 PROCEDURE — 84703 CHORIONIC GONADOTROPIN ASSAY: CPT

## 2019-03-11 PROCEDURE — 83690 ASSAY OF LIPASE: CPT

## 2019-03-11 PROCEDURE — 85025 COMPLETE CBC W/AUTO DIFF WBC: CPT

## 2019-03-11 PROCEDURE — 96374 THER/PROPH/DIAG INJ IV PUSH: CPT

## 2019-03-11 PROCEDURE — 6360000002 HC RX W HCPCS: Performed by: STUDENT IN AN ORGANIZED HEALTH CARE EDUCATION/TRAINING PROGRAM

## 2019-03-11 PROCEDURE — 71045 X-RAY EXAM CHEST 1 VIEW: CPT

## 2019-03-11 PROCEDURE — 94640 AIRWAY INHALATION TREATMENT: CPT

## 2019-03-11 PROCEDURE — 6360000002 HC RX W HCPCS: Performed by: EMERGENCY MEDICINE

## 2019-03-11 PROCEDURE — 6370000000 HC RX 637 (ALT 250 FOR IP): Performed by: STUDENT IN AN ORGANIZED HEALTH CARE EDUCATION/TRAINING PROGRAM

## 2019-03-11 PROCEDURE — 80048 BASIC METABOLIC PNL TOTAL CA: CPT

## 2019-03-11 PROCEDURE — 99285 EMERGENCY DEPT VISIT HI MDM: CPT

## 2019-03-11 PROCEDURE — 71046 X-RAY EXAM CHEST 2 VIEWS: CPT

## 2019-03-11 PROCEDURE — 2580000003 HC RX 258: Performed by: STUDENT IN AN ORGANIZED HEALTH CARE EDUCATION/TRAINING PROGRAM

## 2019-03-11 PROCEDURE — 87804 INFLUENZA ASSAY W/OPTIC: CPT

## 2019-03-11 PROCEDURE — 81001 URINALYSIS AUTO W/SCOPE: CPT

## 2019-03-11 RX ORDER — GUAIFENESIN DEXTROMETHORPHAN HYDROBROMIDE ORAL SOLUTION 10; 100 MG/5ML; MG/5ML
10 SOLUTION ORAL EVERY 4 HOURS PRN
Status: DISCONTINUED | OUTPATIENT
Start: 2019-03-11 | End: 2019-03-12 | Stop reason: HOSPADM

## 2019-03-11 RX ORDER — ALBUTEROL SULFATE 90 UG/1
2 AEROSOL, METERED RESPIRATORY (INHALATION) EVERY 6 HOURS PRN
Status: DISCONTINUED | OUTPATIENT
Start: 2019-03-11 | End: 2019-03-11

## 2019-03-11 RX ORDER — ACETAMINOPHEN 325 MG/1
650 TABLET ORAL ONCE
Status: COMPLETED | OUTPATIENT
Start: 2019-03-11 | End: 2019-03-11

## 2019-03-11 RX ORDER — PREDNISONE 10 MG/1
5 TABLET ORAL DAILY
Qty: 5 TABLET | Refills: 0 | Status: SHIPPED | OUTPATIENT
Start: 2019-03-11 | End: 2019-03-16

## 2019-03-11 RX ORDER — ALBUTEROL SULFATE 90 UG/1
2 AEROSOL, METERED RESPIRATORY (INHALATION) 4 TIMES DAILY PRN
Qty: 3 INHALER | Refills: 1 | Status: SHIPPED | OUTPATIENT
Start: 2019-03-11 | End: 2019-05-09 | Stop reason: SDUPTHER

## 2019-03-11 RX ORDER — ALBUTEROL SULFATE 90 UG/1
2 AEROSOL, METERED RESPIRATORY (INHALATION)
Status: DISCONTINUED | OUTPATIENT
Start: 2019-03-11 | End: 2019-03-12 | Stop reason: HOSPADM

## 2019-03-11 RX ORDER — METHYLPREDNISOLONE SODIUM SUCCINATE 40 MG/ML
40 INJECTION, POWDER, LYOPHILIZED, FOR SOLUTION INTRAMUSCULAR; INTRAVENOUS ONCE
Status: COMPLETED | OUTPATIENT
Start: 2019-03-11 | End: 2019-03-11

## 2019-03-11 RX ORDER — 0.9 % SODIUM CHLORIDE 0.9 %
1000 INTRAVENOUS SOLUTION INTRAVENOUS ONCE
Status: COMPLETED | OUTPATIENT
Start: 2019-03-11 | End: 2019-03-11

## 2019-03-11 RX ORDER — ALBUTEROL SULFATE 90 UG/1
2 AEROSOL, METERED RESPIRATORY (INHALATION)
Status: DISCONTINUED | OUTPATIENT
Start: 2019-03-11 | End: 2019-03-11

## 2019-03-11 RX ORDER — IPRATROPIUM BROMIDE AND ALBUTEROL SULFATE 2.5; .5 MG/3ML; MG/3ML
1 SOLUTION RESPIRATORY (INHALATION)
Status: DISCONTINUED | OUTPATIENT
Start: 2019-03-11 | End: 2019-03-11

## 2019-03-11 RX ORDER — GUAIFENESIN DEXTROMETHORPHAN HYDROBROMIDE ORAL SOLUTION 10; 100 MG/5ML; MG/5ML
10 SOLUTION ORAL ONCE
Status: COMPLETED | OUTPATIENT
Start: 2019-03-11 | End: 2019-03-11

## 2019-03-11 RX ORDER — ALBUTEROL SULFATE 2.5 MG/3ML
5 SOLUTION RESPIRATORY (INHALATION)
Status: DISCONTINUED | OUTPATIENT
Start: 2019-03-11 | End: 2019-03-11

## 2019-03-11 RX ADMIN — ACETAMINOPHEN 650 MG: 325 TABLET ORAL at 22:10

## 2019-03-11 RX ADMIN — IPRATROPIUM BROMIDE 0.5 MG: 0.5 SOLUTION RESPIRATORY (INHALATION) at 22:13

## 2019-03-11 RX ADMIN — Medication 10 ML: at 23:07

## 2019-03-11 RX ADMIN — SODIUM CHLORIDE 1000 ML: 9 INJECTION, SOLUTION INTRAVENOUS at 22:10

## 2019-03-11 RX ADMIN — METHYLPREDNISOLONE SODIUM SUCCINATE 40 MG: 40 INJECTION, POWDER, FOR SOLUTION INTRAMUSCULAR; INTRAVENOUS at 22:10

## 2019-03-11 RX ADMIN — ALBUTEROL SULFATE 5 MG: 5 SOLUTION RESPIRATORY (INHALATION) at 22:13

## 2019-03-11 ASSESSMENT — PAIN DESCRIPTION - FREQUENCY: FREQUENCY: INTERMITTENT

## 2019-03-11 ASSESSMENT — PAIN DESCRIPTION - LOCATION: LOCATION: ABDOMEN

## 2019-03-11 ASSESSMENT — ENCOUNTER SYMPTOMS
COLOR CHANGE: 0
RHINORRHEA: 0
CONSTIPATION: 0
EYE ITCHING: 0
WHEEZING: 1
ABDOMINAL PAIN: 1
VOMITING: 0
COUGH: 1
BACK PAIN: 0
EYE DISCHARGE: 0
DIARRHEA: 0
SHORTNESS OF BREATH: 1
ABDOMINAL DISTENTION: 0
NAUSEA: 0

## 2019-03-11 ASSESSMENT — PAIN SCALES - GENERAL
PAINLEVEL_OUTOF10: 10
PAINLEVEL_OUTOF10: 10

## 2019-03-11 ASSESSMENT — PAIN DESCRIPTION - PAIN TYPE: TYPE: ACUTE PAIN

## 2019-03-11 ASSESSMENT — PAIN DESCRIPTION - DESCRIPTORS: DESCRIPTORS: ACHING;TIGHTNESS

## 2019-03-12 ENCOUNTER — CARE COORDINATION (OUTPATIENT)
Dept: CARE COORDINATION | Age: 19
End: 2019-03-12

## 2019-03-12 ENCOUNTER — TELEPHONE (OUTPATIENT)
Dept: PULMONOLOGY | Age: 19
End: 2019-03-12

## 2019-03-12 DIAGNOSIS — J45.901 ASTHMA EXACERBATION, MILD: Primary | ICD-10-CM

## 2019-04-09 ENCOUNTER — TELEPHONE (OUTPATIENT)
Dept: GASTROENTEROLOGY | Age: 19
End: 2019-04-09

## 2019-05-09 ENCOUNTER — HOSPITAL ENCOUNTER (EMERGENCY)
Age: 19
Discharge: HOME | End: 2019-05-09
Attending: EMERGENCY MEDICINE
Payer: COMMERCIAL

## 2019-05-09 DIAGNOSIS — R10.30 LOWER ABDOMINAL PAIN: Primary | ICD-10-CM

## 2019-05-09 LAB
-: ABNORMAL
AMORPHOUS: ABNORMAL
BACTERIA: ABNORMAL
BILIRUBIN URINE: NEGATIVE
CASTS UA: ABNORMAL /LPF (ref 0–2)
COLOR: YELLOW
COMMENT UA: ABNORMAL
CRYSTALS, UA: ABNORMAL /HPF
EPITHELIAL CELLS UA: ABNORMAL /HPF (ref 0–5)
GLUCOSE URINE: NEGATIVE
HCG QUALITATIVE: POSITIVE
HCG QUANTITATIVE: ABNORMAL IU/L
HCG(URINE) PREGNANCY TEST: POSITIVE
KETONES, URINE: ABNORMAL
LEUKOCYTE ESTERASE, URINE: NEGATIVE
MUCUS: ABNORMAL
NITRITE, URINE: NEGATIVE
OTHER OBSERVATIONS UA: ABNORMAL
PH UA: 5.5 (ref 5–8)
PROTEIN UA: NEGATIVE
RBC UA: ABNORMAL /HPF (ref 0–2)
RENAL EPITHELIAL, UA: ABNORMAL /HPF
SPECIFIC GRAVITY UA: 1.04 (ref 1–1.03)
TRICHOMONAS: ABNORMAL
TURBIDITY: ABNORMAL
URINE HGB: NEGATIVE
UROBILINOGEN, URINE: NORMAL
WBC UA: ABNORMAL /HPF (ref 0–5)
YEAST: ABNORMAL

## 2019-05-09 PROCEDURE — 84703 CHORIONIC GONADOTROPIN ASSAY: CPT

## 2019-05-09 PROCEDURE — 81001 URINALYSIS AUTO W/SCOPE: CPT

## 2019-05-09 PROCEDURE — 87086 URINE CULTURE/COLONY COUNT: CPT

## 2019-05-09 PROCEDURE — 84702 CHORIONIC GONADOTROPIN TEST: CPT

## 2019-05-09 PROCEDURE — 99284 EMERGENCY DEPT VISIT MOD MDM: CPT

## 2019-05-09 RX ORDER — PRENATAL VIT/IRON FUM/FOLIC AC 27 MG-1 MG
1 TABLET ORAL EVERY MORNING
Qty: 30 TABLET | Refills: 0 | Status: SHIPPED | OUTPATIENT
Start: 2019-05-09 | End: 2020-08-10

## 2019-05-09 ASSESSMENT — ENCOUNTER SYMPTOMS
CHEST TIGHTNESS: 0
ABDOMINAL PAIN: 1
NAUSEA: 1
EYE DISCHARGE: 0
COLOR CHANGE: 0
VOMITING: 0
SHORTNESS OF BREATH: 0
EYE REDNESS: 0

## 2019-05-09 ASSESSMENT — PAIN SCALES - GENERAL: PAINLEVEL_OUTOF10: 8

## 2019-05-09 ASSESSMENT — PAIN DESCRIPTION - PAIN TYPE: TYPE: ACUTE PAIN

## 2019-05-09 ASSESSMENT — PAIN DESCRIPTION - LOCATION: LOCATION: ABDOMEN

## 2019-05-09 NOTE — ED PROVIDER NOTES
Care assumed from Dr. Lizet Luz,     Pt 4 wks pregnant. Pending hcg. Pt stable, probable discharge with followup for HCG repeat in two days.       Sherri Joe MD  05/09/19 0610

## 2019-05-09 NOTE — ED PROVIDER NOTES
[address]  ED  Emergency Department Encounter  Emergency Medicine Resident     Pt Name: Ariadne Russell  MRN: [id number]  [place] 2000  Date of evaluation: 19  PCP:  Alek Deng MD    CHIEF COMPLAINT       Chief Complaint   Patient presents with    Abdominal Cramping       HISTORY OFPRESENT ILLNESS  (Location/Symptom, Timing/Onset, Context/Setting, Quality, Duration, Modifying Factors,Severity.)      Ariadne Russell is a 19 y.o. female  who presents with abdominal cramping past 3 days. Patient is pregnant with last menstrual period 4/10. Estimated gestational age of 2 weeks. She had some mild nausea in the mornings but no vomiting. Denies any chest pain, shortness of breath, leg swelling. Denies any vaginal bleeding, vaginal discharge. PAST MEDICAL / SURGICAL / SOCIAL / FAMILY HISTORY      has a past medical history of Acute bronchitis, Allergic, Allergic rhinitis, Allergy, Asthma, Chronic vasomotor rhinitis, GERD (gastroesophageal reflux disease), Headache(784.0), Morbid obesity with BMI of 40.0-44.9, adult ([facility] [address] NELSON (obstructive sleep apnea), Pure hypercholesterolemia, Severe persistent asthma, Unspecified sleep apnea, and Vision disturbance. has a past surgical history that includes Adenoidectomy; Tonsillectomy; and other surgical history (14).     Social History     Socioeconomic History    Marital status: Single     Spouse name: Not on file    Number of children: Not on file    Years of education: Not on file    Highest education level: Not on file   Occupational History    Not on file   Social Needs    Financial resource strain: Not on file    Food insecurity:     Worry: Not on file     Inability: Not on file    Transportation needs:     Medical: Not on file     Non-medical: Not on file   Tobacco Use    Smoking status: Former Smoker     Types: Cigarettes     Start date: 2016    Smokeless tobacco: Never Used   [place] Tobacco comment:  visitors smoke outside   Substance and Sexual Activity    Alcohol use: No     Alcohol/week: 0.0 oz    Drug use: No    Sexual activity: Yes   Lifestyle    Physical activity:     Days per week: Not on file     Minutes per session: Not on file    Stress: Not on file   Relationships    Social connections:     Talks on phone: Not on file     Gets together: Not on file     Attends Latter-day service: Not on file     Active member of club or organization: Not on file     Attends meetings of clubs or organizations: Not on file     Relationship status: Not on file    Intimate partner violence:     Fear of current or ex partner: Not on file     Emotionally abused: Not on file     Physically abused: Not on file     Forced sexual activity: Not on file   Other Topics Concern    Not on file   Social History Narrative    Not on file       Family History   Problem Relation Age of Onset    Asthma Mother     Lupus Mother     High Blood Pressure Father     Diabetes Father     Diabetes Maternal Grandfather     High Blood Pressure Maternal Grandfather     Heart Disease Paternal Grandmother     Diabetes Paternal Grandmother     High Blood Pressure Paternal Grandfather     Heart Disease Paternal Uncle     Heart Disease Paternal Aunt     Arthritis Maternal Aunt     Cancer Maternal Aunt     Birth Defects Neg Hx     Depression Neg Hx     Early Death Neg Hx     Hearing Loss Neg Hx     High Cholesterol Neg Hx     Kidney Disease Neg Hx     Learning Disabilities Neg Hx     Mental Illness Neg Hx     Mental Retardation Neg Hx     Miscarriages / Stillbirths Neg Hx     Stroke Neg Hx     Substance Abuse Neg Hx     Vision Loss Neg Hx     Other Neg Hx        Allergies:  Peanut-containing drug products and Food    Home Medications:  Prior to Admission medications    Medication Sig Start Date End Date Taking?  Authorizing Provider   albuterol sulfate HFA (VENTOLIN HFA) 108 (90 Base) MCG/ACT inhaler performed. The medical necessity was to evaluate the uterus for the presence or absence of IUP. The structure studied was the uterus. FINDINGS:  The uterus was assessed and and an IUP questionably visualized. The study was technically adequate    CONSULTS:  None    CRITICAL CARE:  Please seeattending note    FINAL IMPRESSION      1. Lower abdominal pain          DISPOSITION / PLAN     DISPOSITION      Home     PATIENTREFERRED TO:  No follow-up provider specified.     DISCHARGE MEDICATIONS:  New Prescriptions    No medications on file       Rey Hoffman DO  EmergencyMedicine Resident    (Please note that portions of this note were completed with a voice recognition program.  Efforts were made to edit the dictations but occasionally words are mis-transcribed.)     Rey Hoffman DO  Resident  05/09/19 4067

## 2019-05-09 NOTE — ED PROVIDER NOTES
[address] [facility]     Emergency Department     Faculty Attestation    I performed a history and physical examination of the patient and discussed management with the resident. I reviewed the residents note and agree with the documented findings and plan of care. Any areas of disagreement are noted on the chart. I was personally present for the key portions of any procedures. I have documented in the chart those procedures where I was not present during the key portions. I have reviewed the emergency nurses triage note. I agree with the chief complaint, past medical history, past surgical history, allergies, medications, social and family history as documented unless otherwise noted below. For Physician Assistant/ Nurse Practitioner cases/documentation I have personally evaluated this patient and have completed at least one if not all key elements of the E/M (history, physical exam, and MDM). Additional findings are as noted. I have personally seen and evaluated the patient. I find the patient's history and physical exam are consistent with the NP/PA documentation. I agree with the care provided, treatment rendered, disposition and follow-up plan. HPI: , with lower abdominal cramping. No  Bleeding, no discharge. No back pain. No dysuria. Nausea in the morning, no emesis. Exam:  No abdominal tenderness to palpation    MDM/ED course:  Bedside ultrasound shows possible IUP, no definitive IUP. With no bleeding, hemodynamic changes, abdominal tenderness to palpation, or pelvic free fluid, doubt ectopic pregnancy. Will rule out UTI, confirm pregnancy, and discharge with OB follow up.     Critical Care    Kat Reese MD   Attending Emergency  Physician              Kat Reese MD  [phone]

## 2019-05-09 NOTE — DISCHARGE INSTR - COC
Continuity of Care Form    Patient Name: Zaki Larson   :  2000  MRN:  [id number]    Admit date:  2019  Discharge date:  ***    Code Status Order: Prior   Advance Directives:     Admitting Physician:  No admitting provider for patient encounter. PCP: Tye Earl MD    Discharging Nurse: [facility] Unit/Room#:   Discharging Unit Phone Number: ***    Emergency Contact:   Extended Emergency Contact Information  Primary Emergency Contact: Kirsten Holbrook  Address: [address] [place] [address], [address] Phone: [phone]  Relation: Parent  Secondary Emergency Contact: Syd Reddy  Address: [address] [place] [address], [address] Phone: [phone]  Relation: Parent    Past Surgical History:  Past Surgical History:   Procedure Laterality Date    ADENOIDECTOMY      OTHER SURGICAL HISTORY  14    Nexplanon placement    TONSILLECTOMY         Immunization History:   Immunization History   Administered Date(s) Administered    DTaP 1998, 1999, 1999, 2000, 2005    HPV Gardasil Quadrivalent 2011, 2014, 2014    Hepatitis A 10/11/2013, 2014    Hepatitis B, unspecified formulation 1998, 1998, 2000    Hib, unspecified formulation 1998, 1999, 1999, 2000    IPV (Ipol) 1998, 1999, 2000, 2005    Influenza Virus Vaccine 2010, 11/10/2010, 2011, 10/11/2013, 2015    MMR 1999, 2005    Meningococcal MCV4P (Menactra) 2011, 2015    Tdap (Boostrix, Adacel) 2011    Varicella (Varivax) 1999, 2011       Active Problems:  Patient Active Problem List   Diagnosis Code    Atopic dermatitis L20.9    Thalassemia minor     Asthma exacerbation, mild J45. [address] allergy Z91.018    Allergic rhinitis J30.9    GERD (gastroesophageal reflux disease) K21.9    Chronic headache R51    Constipation K59.00    Hearing loss mild H91.90    Morbid obesity with BMI of 40.0-44.9, adult (HCC) E66.01, Z68.41    Prediabetes R73.03    Pure hypercholesterolemia E78.00    Moderate persistent asthma without complication B80.37    Epigastric abdominal pain R10.13    Claustrophobia F40.240       Isolation/Infection:   Isolation          No Isolation            Nurse Assessment:  Last Vital Signs: There were no vitals taken for this visit. Last documented pain score (0-10 scale):    Last Weight:   Wt Readings from Last 1 Encounters:   19 198 lb (89.8 kg)     Mental Status:  {IP PT MENTAL STATUS:}    IV Access:  { DIANNE IV ACCESS:947634887}    Nursing Mobility/ADLs:  Walking   {CHP DME OYGF:702232675}  Transfer  {CHP DME AHLE:974725186}  Bathing  {CHP DME JAG}  Dressing  {CHP DME RWUT:407875541}  Toileting  {CHP DME QZKB:286212890}  Feeding  {CHP DME XCDY:488840993}  Med Admin  {CHP DME BFLM:045366218}  Med Delivery   { DIANNE MED Delivery:545360599}    Wound Care Documentation and Therapy:        Elimination:  Continence:   · Bowel: {YES / RS:79055}  · Bladder: {YES / QX:60038}  Urinary Catheter: {Urinary Catheter:407082992}   Colostomy/Ileostomy/Ileal Conduit: {YES / RHONDA:74769}       Date of Last BM: ***  No intake or output data in the 24 hours ending 19 1620  No intake/output data recorded.     Safety Concerns:     [address] [email or web address] Safety Concerns:788022480}    Impairments/Disabilities:      [address] [email or web address] Impairments/Disabilities:574075183}    Nutrition Therapy:  Current Nutrition Therapy:   [address] [email or web address] Diet List:350360735}    Routes of Feeding: {CHP DME Other Feedings:462504296}  Liquids: {Slp liquid thickness:58544}  Daily Fluid Restriction: {CHP DME Yes amt example:552521141}  Last Modified Barium Swallow with Video (Video Swallowing Test): {Done Not Done FCZW:663043171}    Treatments at the Time of Hospital Discharge:

## 2019-05-10 ENCOUNTER — TELEPHONE (OUTPATIENT)
Dept: OBGYN | Age: 19
End: 2019-05-10

## 2019-05-10 LAB
CULTURE: NO GROWTH
Lab: NORMAL
SPECIMEN DESCRIPTION: NORMAL

## 2019-05-20 DIAGNOSIS — Z34.90 PREGNANCY, UNSPECIFIED GESTATIONAL AGE: Primary | ICD-10-CM

## 2019-06-12 DIAGNOSIS — J45.40 MODERATE PERSISTENT ASTHMA WITHOUT COMPLICATION: ICD-10-CM

## 2019-06-12 RX ORDER — ALBUTEROL SULFATE 90 UG/1
2 AEROSOL, METERED RESPIRATORY (INHALATION) EVERY 6 HOURS PRN
Qty: 1 INHALER | Refills: 3 | Status: SHIPPED | OUTPATIENT
Start: 2019-06-12 | End: 2019-08-21 | Stop reason: SDUPTHER

## 2019-06-12 NOTE — TELEPHONE ENCOUNTER
Khoi Request for Ventolin. Patient was having trouble breathing at time of call. Writer suggested she go to walk in please advise. Last Visit Date: 2/12/19  Next Visit Date:  No future appointments. Health Maintenance   Topic Date Due    Pneumococcal 0-64 years Vaccine (1 of 1 - PPSV23) 09/05/2004    Chlamydia screen  03/05/2018    A1C test (Diabetic or Prediabetic)  02/28/2019    Flu vaccine (Season Ended) 09/01/2019    DTaP/Tdap/Td vaccine (7 - Td) 11/23/2021    HPV vaccine  Completed    Varicella Vaccine  Completed    Meningococcal (ACWY) Vaccine  Completed    HIV screen  Completed       Hemoglobin A1C (%)   Date Value   02/28/2018 5.7   08/16/2016 5.9             ( goal A1C is < 7)   No results found for: LABMICR  LDL Cholesterol (mg/dL)   Date Value   09/23/2016 154 (H)       (goal LDL is <100)   AST (U/L)   Date Value   02/04/2019 15     ALT (U/L)   Date Value   02/04/2019 12     BUN (mg/dL)   Date Value   03/11/2019 10     BP Readings from Last 3 Encounters:   03/11/19 114/88   02/12/19 109/75   02/06/19 (!) 125/95          (goal 120/80)    All Future Testing planned in [facility]  Lab Frequency Next Occurrence   Baseline Diagnostic Sleep Study Once 07/27/2018   US EXTREMITY JOINT LEFT NON VASC COMPLETE Once 11/12/2018   US OB Transvaginal Once 05/21/2019       Next Visit Date:  No future appointments.       Patient Active Problem List:     Atopic dermatitis     Thalassemia minor     Asthma exacerbation, mild     Food allergy     Allergic rhinitis     GERD (gastroesophageal reflux disease)     Chronic headache     Constipation     Hearing loss mild     Morbid obesity with BMI of 40.0-44.9, adult ([address]     Prediabetes     Pure hypercholesterolemia     Moderate persistent asthma without complication     Epigastric abdominal pain     Claustrophobia

## 2019-07-26 ENCOUNTER — HOSPITAL ENCOUNTER (EMERGENCY)
Age: 19
Discharge: HOME | End: 2019-07-26
Attending: EMERGENCY MEDICINE
Payer: COMMERCIAL

## 2019-07-26 VITALS
OXYGEN SATURATION: 100 % | SYSTOLIC BLOOD PRESSURE: 107 MMHG | HEART RATE: 69 BPM | BODY MASS INDEX: 38.38 KG/M2 | TEMPERATURE: 98.6 F | DIASTOLIC BLOOD PRESSURE: 62 MMHG | WEIGHT: 190 LBS | RESPIRATION RATE: 14 BRPM

## 2019-07-26 DIAGNOSIS — R10.13 ABDOMINAL PAIN, EPIGASTRIC: Primary | ICD-10-CM

## 2019-07-26 LAB
-: ABNORMAL
ABSOLUTE EOS #: 0.11 K/UL (ref 0–0.44)
ABSOLUTE IMMATURE GRANULOCYTE: <0.03 K/UL (ref 0–0.3)
ABSOLUTE LYMPH #: 2.58 K/UL (ref 1.2–5.2)
ABSOLUTE MONO #: 0.45 K/UL (ref 0.1–1.4)
AMORPHOUS: ABNORMAL
ANION GAP SERPL CALCULATED.3IONS-SCNC: 10 MMOL/L (ref 9–17)
BACTERIA: ABNORMAL
BASOPHILS # BLD: 1 % (ref 0–2)
BASOPHILS ABSOLUTE: 0.03 K/UL (ref 0–0.2)
BILIRUBIN URINE: NEGATIVE
BUN BLDV-MCNC: 14 MG/DL (ref 6–20)
BUN/CREAT BLD: ABNORMAL (ref 9–20)
CALCIUM SERPL-MCNC: 9.3 MG/DL (ref 8.6–10.4)
CASTS UA: ABNORMAL /LPF (ref 0–8)
CHLORIDE BLD-SCNC: 105 MMOL/L (ref 98–107)
CO2: 23 MMOL/L (ref 20–31)
COLOR: YELLOW
COMMENT UA: ABNORMAL
CREAT SERPL-MCNC: 0.59 MG/DL (ref 0.5–0.9)
CRYSTALS, UA: ABNORMAL /HPF
DIFFERENTIAL TYPE: ABNORMAL
EOSINOPHILS RELATIVE PERCENT: 2 % (ref 1–4)
EPITHELIAL CELLS UA: ABNORMAL /HPF (ref 0–5)
GFR AFRICAN AMERICAN: >60 ML/MIN
GFR NON-AFRICAN AMERICAN: >60 ML/MIN
GFR SERPL CREATININE-BSD FRML MDRD: ABNORMAL ML/MIN/{1.73_M2}
GFR SERPL CREATININE-BSD FRML MDRD: ABNORMAL ML/MIN/{1.73_M2}
GLUCOSE BLD-MCNC: 102 MG/DL (ref 70–99)
GLUCOSE URINE: NEGATIVE
HCG(URINE) PREGNANCY TEST: NEGATIVE
HCT VFR BLD CALC: 40.5 % (ref 36.3–47.1)
HEMOGLOBIN: 11.8 G/DL (ref 11.9–15.1)
IMMATURE GRANULOCYTES: 0 %
KETONES, URINE: NEGATIVE
LEUKOCYTE ESTERASE, URINE: NEGATIVE
LIPASE: 24 U/L (ref 13–60)
LYMPHOCYTES # BLD: 51 % (ref 25–45)
MCH RBC QN AUTO: 22.9 PG (ref 25.2–33.5)
MCHC RBC AUTO-ENTMCNC: 29.1 G/DL (ref 28.4–34.8)
MCV RBC AUTO: 78.6 FL (ref 82.6–102.9)
MONOCYTES # BLD: 9 % (ref 2–8)
MUCUS: ABNORMAL
NITRITE, URINE: NEGATIVE
NRBC AUTOMATED: 0 PER 100 WBC
OTHER OBSERVATIONS UA: ABNORMAL
PDW BLD-RTO: 14.3 % (ref 11.8–14.4)
PH UA: 6.5 (ref 5–8)
PLATELET # BLD: 211 K/UL (ref 138–453)
PLATELET ESTIMATE: ABNORMAL
PMV BLD AUTO: 11.8 FL (ref 8.1–13.5)
POTASSIUM SERPL-SCNC: 3.9 MMOL/L (ref 3.7–5.3)
PROTEIN UA: NEGATIVE
RBC # BLD: 5.15 M/UL (ref 3.95–5.11)
RBC # BLD: ABNORMAL 10*6/UL
RBC UA: ABNORMAL /HPF (ref 0–4)
RENAL EPITHELIAL, UA: ABNORMAL /HPF
SEG NEUTROPHILS: 37 % (ref 34–64)
SEGMENTED NEUTROPHILS ABSOLUTE COUNT: 1.85 K/UL (ref 1.8–8)
SODIUM BLD-SCNC: 138 MMOL/L (ref 135–144)
SPECIFIC GRAVITY UA: 1.03 (ref 1–1.03)
TRICHOMONAS: ABNORMAL
TURBIDITY: ABNORMAL
URINE HGB: NEGATIVE
UROBILINOGEN, URINE: NORMAL
WBC # BLD: 5 K/UL (ref 4.5–13.5)
WBC # BLD: ABNORMAL 10*3/UL
WBC UA: ABNORMAL /HPF (ref 0–5)
YEAST: ABNORMAL

## 2019-07-26 PROCEDURE — 81025 URINE PREGNANCY TEST: CPT

## 2019-07-26 PROCEDURE — 87086 URINE CULTURE/COLONY COUNT: CPT

## 2019-07-26 PROCEDURE — 80048 BASIC METABOLIC PNL TOTAL CA: CPT

## 2019-07-26 PROCEDURE — 81001 URINALYSIS AUTO W/SCOPE: CPT

## 2019-07-26 PROCEDURE — 99284 EMERGENCY DEPT VISIT MOD MDM: CPT

## 2019-07-26 PROCEDURE — 85025 COMPLETE CBC W/AUTO DIFF WBC: CPT

## 2019-07-26 PROCEDURE — 6370000000 HC RX 637 (ALT 250 FOR IP): Performed by: NURSE PRACTITIONER

## 2019-07-26 PROCEDURE — 83690 ASSAY OF LIPASE: CPT

## 2019-07-26 RX ORDER — DICYCLOMINE HYDROCHLORIDE 10 MG/1
10 CAPSULE ORAL ONCE
Status: COMPLETED | OUTPATIENT
Start: 2019-07-26 | End: 2019-07-26

## 2019-07-26 RX ADMIN — DICYCLOMINE HYDROCHLORIDE 10 MG: 10 CAPSULE ORAL at 22:09

## 2019-07-26 ASSESSMENT — ENCOUNTER SYMPTOMS
ABDOMINAL DISTENTION: 0
PHOTOPHOBIA: 0
VOMITING: 1
EYE PAIN: 0
NAUSEA: 0
BLOOD IN STOOL: 0
COUGH: 0
SHORTNESS OF BREATH: 0
TROUBLE SWALLOWING: 0
ANAL BLEEDING: 0
WHEEZING: 0
CONSTIPATION: 0
FACIAL SWELLING: 0
ABDOMINAL PAIN: 1
BACK PAIN: 0
RECTAL PAIN: 0
STRIDOR: 0
DIARRHEA: 0
CHEST TIGHTNESS: 0
VOICE CHANGE: 0

## 2019-07-26 ASSESSMENT — PAIN DESCRIPTION - PAIN TYPE: TYPE: ACUTE PAIN

## 2019-07-26 ASSESSMENT — PAIN DESCRIPTION - ORIENTATION: ORIENTATION: MID

## 2019-07-26 ASSESSMENT — PAIN SCALES - GENERAL: PAINLEVEL_OUTOF10: 10

## 2019-07-26 ASSESSMENT — PAIN DESCRIPTION - LOCATION: LOCATION: ABDOMEN

## 2019-07-27 LAB
CULTURE: NORMAL
Lab: NORMAL
SPECIMEN DESCRIPTION: NORMAL

## 2019-07-27 NOTE — ED PROVIDER NOTES
Cognition and memory are normal.   Nursing note and vitals reviewed. DIFFERENTIAL  DIAGNOSIS     GERD  Abdominal Cramping    PLAN (LABS / IMAGING / EKG):  Orders Placed This Encounter   Procedures    Urine Culture    Urinalysis Reflex to Culture    PREGNANCY, URINE    CBC Auto Differential    BASIC METABOLIC PANEL    LIPASE    Microscopic Urinalysis       MEDICATIONS ORDERED:  Orders Placed This Encounter   Medications    dicyclomine (BENTYL) capsule 10 mg       Controlled Substances Monitoring:      DIAGNOSTIC RESULTS / EMERGENCY DEPARTMENT COURSE / MDM     Plan to obtain urine, urine pregnant and basic labs. The patient stated she missed work yesterday and today so she would need a work note especially because she told them she was coming in for that purpose. The patient pain will be treated with Bentyl here in the ER. At this time the patient states her pain is feeling better. She is requesting to have her IV removed. The patient was updated that all of her lab results and urine results were normal today. Patient was instructed to follow-up with her PCP sometime next week. She was given the work note that she requested. She was instructed to return to the ER with any worsening of symptoms including chest pain, shortness of breath, dizziness, numbness or tingling, or vomiting with blood or blood in her stool. RADIOLOGY:   I directly visualized (with the attending physician) the following  imagesand reviewed the radiologist interpretations:  No results found.     No orders to display       LABS:  Results for orders placed or performed during the hospital encounter of 07/26/19   Urinalysis Reflex to Culture   Result Value Ref Range    Color, UA YELLOW YELLOW    Turbidity UA CLOUDY (A) CLEAR    Glucose, Ur NEGATIVE NEGATIVE    Bilirubin Urine NEGATIVE NEGATIVE    Ketones, Urine NEGATIVE NEGATIVE    Specific Gravity, UA 1.028 1.005 - 1.030    Urine Hgb NEGATIVE NEGATIVE    pH, UA 6.5 5.0 - 8.0 Crystals UA NOT REPORTED None /HPF    Epithelial Cells UA 10 TO 20 0 - 5 /HPF    Renal Epithelial, Urine NOT REPORTED 0 /HPF    Bacteria, UA FEW (A) None    Mucus, UA NOT REPORTED None    Trichomonas, UA NOT REPORTED None    Amorphous, UA NOT REPORTED None    Other Observations UA NOT REPORTED NOT REQ. Yeast, UA NOT REPORTED None         CONSULTS:  None    PROCEDURES:  None    FINAL IMPRESSION      1.  Abdominal pain, epigastric          DISPOSITION / PLAN     DISPOSITION     PATIENT REFERRED TO:  Kiran Huggins [address] [place] [address]  [phone]    Call in 3 days      [facility]  [address] [address]  [phone]  Go to   As needed, If symptoms worsen      DISCHARGE MEDICATIONS:  New Prescriptions    No medications on file       MARY Zayas CNP   Emergency Medicine Physician Assistant    (Please note that portions of this note were completed with a voice recognition program.  Efforts were made to edit thedictations but occasionally words are mis-transcribed.)       MARY Zayas CNP  07/26/19 2461

## 2019-07-29 ENCOUNTER — NURSE TRIAGE (OUTPATIENT)
Dept: OTHER | Facility: CLINIC | Age: 19
End: 2019-07-29

## 2019-07-29 NOTE — TELEPHONE ENCOUNTER
Reason for Disposition   New headache and weak immune system (e.g., HIV positive, cancer chemotherapy, chronic steroid treatment)    Protocols used: HEADACHE-ADULT-OH    Received call from [facility]. Patient c/o of headaches that have lasted for 2 weeks. The patient states the headache is always in the front of the head. She states her pain at this time is a 9/10 and that she has been using Tylenol and naps to treat but that that it has not been effective in helping with the headaches. She denies double vision, nausea, neck stiffness, history of migraines, or fever. Recommendation for disposition and care advice shared with patient and patient voices understanding. Call soft transferred to [facility] to schedule appointment to be seen today. Call disconnected by [facility], BLAISE ROCKWELL. [facility] Lom[facility] called back by this writer. Patient is not established with a MD that is current in her PCP office. Blue Lomeli will work the patient to schedule.

## 2019-08-21 DIAGNOSIS — J45.40 MODERATE PERSISTENT ASTHMA WITHOUT COMPLICATION: ICD-10-CM

## 2019-08-21 RX ORDER — ALBUTEROL SULFATE 90 UG/1
2 AEROSOL, METERED RESPIRATORY (INHALATION) EVERY 6 HOURS PRN
Qty: 1 INHALER | Refills: 0 | Status: SHIPPED | OUTPATIENT
Start: 2019-08-21 | End: 2019-09-23 | Stop reason: SDUPTHER

## 2019-09-23 ENCOUNTER — OFFICE VISIT (OUTPATIENT)
Dept: PRIMARY CARE CLINIC | Age: 19
End: 2019-09-23
Payer: COMMERCIAL

## 2019-09-23 VITALS
WEIGHT: 183.2 LBS | BODY MASS INDEX: 37 KG/M2 | TEMPERATURE: 97.9 F | SYSTOLIC BLOOD PRESSURE: 124 MMHG | DIASTOLIC BLOOD PRESSURE: 81 MMHG | OXYGEN SATURATION: 99 % | HEART RATE: 88 BPM

## 2019-09-23 DIAGNOSIS — J45.40 MODERATE PERSISTENT ASTHMA WITHOUT COMPLICATION: ICD-10-CM

## 2019-09-23 DIAGNOSIS — B35.3 TINEA PEDIS OF BOTH FEET: ICD-10-CM

## 2019-09-23 DIAGNOSIS — J45.901 ASTHMA EXACERBATION, MILD: Primary | ICD-10-CM

## 2019-09-23 PROCEDURE — 99214 OFFICE O/P EST MOD 30 MIN: CPT | Performed by: INTERNAL MEDICINE

## 2019-09-23 PROCEDURE — 1036F TOBACCO NON-USER: CPT | Performed by: INTERNAL MEDICINE

## 2019-09-23 PROCEDURE — G8427 DOCREV CUR MEDS BY ELIG CLIN: HCPCS | Performed by: INTERNAL MEDICINE

## 2019-09-23 PROCEDURE — G8417 CALC BMI ABV UP PARAM F/U: HCPCS | Performed by: INTERNAL MEDICINE

## 2019-09-23 RX ORDER — CLOTRIMAZOLE AND BETAMETHASONE DIPROPIONATE 10; .64 MG/G; MG/G
CREAM TOPICAL
Qty: 45 G | Refills: 0 | Status: SHIPPED | OUTPATIENT
Start: 2019-09-23 | End: 2020-08-10

## 2019-09-23 RX ORDER — ALBUTEROL SULFATE 90 UG/1
2 AEROSOL, METERED RESPIRATORY (INHALATION) EVERY 6 HOURS PRN
Qty: 1 INHALER | Refills: 0 | Status: SHIPPED | OUTPATIENT
Start: 2019-09-23 | End: 2019-10-29 | Stop reason: SDUPTHER

## 2019-09-23 ASSESSMENT — ENCOUNTER SYMPTOMS
WHEEZING: 1
SHORTNESS OF BREATH: 1

## 2019-09-23 NOTE — PROGRESS NOTES
Visit Information    Have you changed or started any medications since your last visit including any over-the-counter medicines, vitamins, or herbal medicines? no   Are you having any side effects from any of your medications? -  no  Have you stopped taking any of your medications? Is so, why? -  no    Have you seen any other physician or provider since your last visit? No  Have you had any other diagnostic tests since your last visit? No  Have you been seen in the emergency room and/or had an admission to a hospital since we last saw you? Yes - Records Obtained  Have you had your routine dental cleaning in the past 6 months? yes -     Have you activated your [employer] account? If not, what are your barriers?  Yes     Patient Care Team:  Lin Quijano MD as PCP - General (Internal Medicine)  Charlotte MD Franky [facility]  Dominique Lujan MD as Consulting Physician (Pulmonology)  MARY Yadav - CNP as Nurse Practitioner (Nurse Practitioner)    Medical History Review  Past Medical, Family, and Social History reviewed and does not contribute to the patient presenting condition    Health Maintenance   Topic Date Due    Pneumococcal 0-64 years Vaccine (1 of 1 - PPSV23) 09/05/2004    Chlamydia screen  03/05/2018    A1C test (Diabetic or Prediabetic)  02/28/2019    Flu vaccine (1) 09/01/2019    Cervical cancer screen  09/05/2019    DTaP/Tdap/Td vaccine (7 - Td) 11/23/2021    HPV vaccine  Completed    Varicella Vaccine  Completed    Meningococcal (ACWY) Vaccine  Completed    HIV screen  Completed

## 2019-10-25 DIAGNOSIS — J45.40 MODERATE PERSISTENT ASTHMA WITHOUT COMPLICATION: ICD-10-CM

## 2019-10-29 RX ORDER — ALBUTEROL SULFATE 90 UG/1
2 AEROSOL, METERED RESPIRATORY (INHALATION) EVERY 6 HOURS PRN
Qty: 1 INHALER | Refills: 0 | Status: SHIPPED | OUTPATIENT
Start: 2019-10-29 | End: 2019-11-20 | Stop reason: SDUPTHER

## 2019-11-13 ENCOUNTER — HOSPITAL ENCOUNTER (EMERGENCY)
Age: 19
Discharge: HOME | End: 2019-11-13
Attending: EMERGENCY MEDICINE
Payer: COMMERCIAL

## 2019-11-13 VITALS
TEMPERATURE: 96.8 F | OXYGEN SATURATION: 98 % | RESPIRATION RATE: 18 BRPM | HEIGHT: 59 IN | DIASTOLIC BLOOD PRESSURE: 92 MMHG | WEIGHT: 180 LBS | SYSTOLIC BLOOD PRESSURE: 133 MMHG | BODY MASS INDEX: 36.29 KG/M2 | HEART RATE: 74 BPM

## 2019-11-13 DIAGNOSIS — R11.2 NON-INTRACTABLE VOMITING WITH NAUSEA, UNSPECIFIED VOMITING TYPE: Primary | ICD-10-CM

## 2019-11-13 LAB
-: ABNORMAL
ABSOLUTE EOS #: 0.3 K/UL (ref 0–0.44)
ABSOLUTE IMMATURE GRANULOCYTE: <0.03 K/UL (ref 0–0.3)
ABSOLUTE LYMPH #: 2.03 K/UL (ref 1.1–3.7)
ABSOLUTE MONO #: 0.24 K/UL (ref 0.1–1.4)
ALBUMIN SERPL-MCNC: 4.4 G/DL (ref 3.5–5.2)
ALBUMIN/GLOBULIN RATIO: 1.2 (ref 1–2.5)
ALP BLD-CCNC: 76 U/L (ref 35–104)
ALT SERPL-CCNC: 21 U/L (ref 5–33)
AMORPHOUS: ABNORMAL
ANION GAP SERPL CALCULATED.3IONS-SCNC: 11 MMOL/L (ref 9–17)
AST SERPL-CCNC: 32 U/L
BACTERIA: ABNORMAL
BASOPHILS # BLD: 1 % (ref 0–2)
BASOPHILS ABSOLUTE: 0.03 K/UL (ref 0–0.2)
BILIRUB SERPL-MCNC: 0.2 MG/DL (ref 0.3–1.2)
BILIRUBIN URINE: NEGATIVE
BUN BLDV-MCNC: 14 MG/DL (ref 6–20)
BUN/CREAT BLD: ABNORMAL (ref 9–20)
CALCIUM SERPL-MCNC: 9.5 MG/DL (ref 8.6–10.4)
CASTS UA: ABNORMAL /LPF (ref 0–8)
CHLORIDE BLD-SCNC: 107 MMOL/L (ref 98–107)
CO2: 25 MMOL/L (ref 20–31)
COLOR: YELLOW
CREAT SERPL-MCNC: 0.57 MG/DL (ref 0.5–0.9)
CRYSTALS, UA: ABNORMAL /HPF
DIFFERENTIAL TYPE: ABNORMAL
EOSINOPHILS RELATIVE PERCENT: 7 % (ref 1–4)
EPITHELIAL CELLS UA: ABNORMAL /HPF (ref 0–5)
GFR AFRICAN AMERICAN: >60 ML/MIN
GFR NON-AFRICAN AMERICAN: >60 ML/MIN
GFR SERPL CREATININE-BSD FRML MDRD: ABNORMAL ML/MIN/{1.73_M2}
GFR SERPL CREATININE-BSD FRML MDRD: ABNORMAL ML/MIN/{1.73_M2}
GLUCOSE BLD-MCNC: 90 MG/DL (ref 70–99)
GLUCOSE URINE: NEGATIVE
HCG QUALITATIVE: NEGATIVE
HCT VFR BLD CALC: 43.5 % (ref 36.3–47.1)
HEMOGLOBIN: 12.9 G/DL (ref 11.9–15.1)
IMMATURE GRANULOCYTES: 0 %
KETONES, URINE: NEGATIVE
LEUKOCYTE ESTERASE, URINE: NEGATIVE
LIPASE: 27 U/L (ref 13–60)
LYMPHOCYTES # BLD: 45 % (ref 25–45)
MCH RBC QN AUTO: 23.3 PG (ref 25.2–33.5)
MCHC RBC AUTO-ENTMCNC: 29.7 G/DL (ref 28.4–34.8)
MCV RBC AUTO: 78.7 FL (ref 82.6–102.9)
MONOCYTES # BLD: 5 % (ref 2–8)
MUCUS: ABNORMAL
NITRITE, URINE: NEGATIVE
NRBC AUTOMATED: 0 PER 100 WBC
OTHER OBSERVATIONS UA: ABNORMAL
PDW BLD-RTO: 14 % (ref 11.8–14.4)
PH UA: 8 (ref 5–8)
PLATELET # BLD: 290 K/UL (ref 138–453)
PLATELET ESTIMATE: ABNORMAL
PMV BLD AUTO: 11.1 FL (ref 8.1–13.5)
POTASSIUM SERPL-SCNC: 4.5 MMOL/L (ref 3.7–5.3)
PROTEIN UA: ABNORMAL
RBC # BLD: 5.53 M/UL (ref 3.95–5.11)
RBC # BLD: ABNORMAL 10*6/UL
RBC UA: ABNORMAL /HPF (ref 0–4)
RENAL EPITHELIAL, UA: ABNORMAL /HPF
SEG NEUTROPHILS: 42 % (ref 34–64)
SEGMENTED NEUTROPHILS ABSOLUTE COUNT: 1.86 K/UL (ref 1.5–8.1)
SODIUM BLD-SCNC: 143 MMOL/L (ref 135–144)
SPECIFIC GRAVITY UA: 1.03 (ref 1–1.03)
TOTAL PROTEIN: 8.1 G/DL (ref 6.4–8.3)
TRICHOMONAS: ABNORMAL
TURBIDITY: ABNORMAL
URINE HGB: NEGATIVE
UROBILINOGEN, URINE: NORMAL
WBC # BLD: 4.5 K/UL (ref 4.5–13.5)
WBC # BLD: ABNORMAL 10*3/UL
WBC UA: ABNORMAL /HPF (ref 0–5)
YEAST: ABNORMAL

## 2019-11-13 PROCEDURE — 96374 THER/PROPH/DIAG INJ IV PUSH: CPT

## 2019-11-13 PROCEDURE — 81001 URINALYSIS AUTO W/SCOPE: CPT

## 2019-11-13 PROCEDURE — 2580000003 HC RX 258: Performed by: STUDENT IN AN ORGANIZED HEALTH CARE EDUCATION/TRAINING PROGRAM

## 2019-11-13 PROCEDURE — 99284 EMERGENCY DEPT VISIT MOD MDM: CPT

## 2019-11-13 PROCEDURE — 80053 COMPREHEN METABOLIC PANEL: CPT

## 2019-11-13 PROCEDURE — 84703 CHORIONIC GONADOTROPIN ASSAY: CPT

## 2019-11-13 PROCEDURE — 6360000002 HC RX W HCPCS: Performed by: STUDENT IN AN ORGANIZED HEALTH CARE EDUCATION/TRAINING PROGRAM

## 2019-11-13 PROCEDURE — 85025 COMPLETE CBC W/AUTO DIFF WBC: CPT

## 2019-11-13 PROCEDURE — 83690 ASSAY OF LIPASE: CPT

## 2019-11-13 PROCEDURE — 96372 THER/PROPH/DIAG INJ SC/IM: CPT

## 2019-11-13 RX ORDER — 0.9 % SODIUM CHLORIDE 0.9 %
1000 INTRAVENOUS SOLUTION INTRAVENOUS ONCE
Status: DISCONTINUED | OUTPATIENT
Start: 2019-11-13 | End: 2019-11-13 | Stop reason: HOSPADM

## 2019-11-13 RX ORDER — DICYCLOMINE HYDROCHLORIDE 10 MG/ML
20 INJECTION INTRAMUSCULAR ONCE
Status: COMPLETED | OUTPATIENT
Start: 2019-11-13 | End: 2019-11-13

## 2019-11-13 RX ORDER — DICYCLOMINE HYDROCHLORIDE 10 MG/1
10 CAPSULE ORAL EVERY 6 HOURS PRN
Qty: 20 CAPSULE | Refills: 0 | Status: SHIPPED | OUTPATIENT
Start: 2019-11-13 | End: 2020-08-10

## 2019-11-13 RX ORDER — ONDANSETRON 2 MG/ML
4 INJECTION INTRAMUSCULAR; INTRAVENOUS ONCE
Status: COMPLETED | OUTPATIENT
Start: 2019-11-13 | End: 2019-11-13

## 2019-11-13 RX ORDER — 0.9 % SODIUM CHLORIDE 0.9 %
1000 INTRAVENOUS SOLUTION INTRAVENOUS ONCE
Status: COMPLETED | OUTPATIENT
Start: 2019-11-13 | End: 2019-11-13

## 2019-11-13 RX ORDER — ONDANSETRON 4 MG/1
4 TABLET, FILM COATED ORAL EVERY 12 HOURS PRN
Qty: 10 TABLET | Refills: 0 | Status: SHIPPED | OUTPATIENT
Start: 2019-11-13 | End: 2020-08-10

## 2019-11-13 RX ORDER — ACETAMINOPHEN 325 MG/1
650 TABLET ORAL EVERY 8 HOURS PRN
Qty: 30 TABLET | Refills: 0 | Status: SHIPPED | OUTPATIENT
Start: 2019-11-13 | End: 2020-08-06 | Stop reason: ALTCHOICE

## 2019-11-13 RX ORDER — IBUPROFEN 400 MG/1
400 TABLET ORAL EVERY 8 HOURS PRN
Qty: 30 TABLET | Refills: 0 | Status: SHIPPED | OUTPATIENT
Start: 2019-11-13 | End: 2020-04-19

## 2019-11-13 RX ORDER — FENTANYL CITRATE 50 UG/ML
50 INJECTION, SOLUTION INTRAMUSCULAR; INTRAVENOUS ONCE
Status: COMPLETED | OUTPATIENT
Start: 2019-11-13 | End: 2019-11-13

## 2019-11-13 RX ADMIN — DICYCLOMINE HYDROCHLORIDE 20 MG: 10 INJECTION INTRAMUSCULAR at 16:24

## 2019-11-13 RX ADMIN — SODIUM CHLORIDE 1000 ML: 9 INJECTION, SOLUTION INTRAVENOUS at 14:45

## 2019-11-13 RX ADMIN — FENTANYL CITRATE 50 MCG: 50 INJECTION, SOLUTION INTRAMUSCULAR; INTRAVENOUS at 14:45

## 2019-11-13 RX ADMIN — ONDANSETRON 4 MG: 2 INJECTION INTRAMUSCULAR; INTRAVENOUS at 14:45

## 2019-11-13 ASSESSMENT — ENCOUNTER SYMPTOMS
BLOOD IN STOOL: 0
EYE REDNESS: 0
CONSTIPATION: 0
RHINORRHEA: 1
ABDOMINAL PAIN: 1
NAUSEA: 1
SORE THROAT: 0
DIARRHEA: 0
COUGH: 0
SHORTNESS OF BREATH: 0
VOMITING: 1
EYE ITCHING: 0

## 2019-11-13 ASSESSMENT — PAIN DESCRIPTION - LOCATION: LOCATION: HEAD

## 2019-11-13 ASSESSMENT — PAIN SCALES - GENERAL
PAINLEVEL_OUTOF10: 10
PAINLEVEL_OUTOF10: 10

## 2019-11-13 ASSESSMENT — PAIN DESCRIPTION - DESCRIPTORS: DESCRIPTORS: ACHING

## 2019-11-13 ASSESSMENT — PAIN DESCRIPTION - PAIN TYPE: TYPE: ACUTE PAIN

## 2019-11-20 DIAGNOSIS — J45.40 MODERATE PERSISTENT ASTHMA WITHOUT COMPLICATION: ICD-10-CM

## 2019-11-20 RX ORDER — ALBUTEROL SULFATE 90 UG/1
2 AEROSOL, METERED RESPIRATORY (INHALATION) EVERY 6 HOURS PRN
Qty: 1 INHALER | Refills: 0 | Status: SHIPPED | OUTPATIENT
Start: 2019-11-20 | End: 2020-03-09 | Stop reason: SDUPTHER

## 2019-12-30 ENCOUNTER — HOSPITAL ENCOUNTER (EMERGENCY)
Age: 19
Discharge: LEFT BEFORE DISCHARGE | End: 2019-12-30
Attending: EMERGENCY MEDICINE
Payer: COMMERCIAL

## 2020-01-31 ENCOUNTER — NURSE TRIAGE (OUTPATIENT)
Dept: OTHER | Facility: CLINIC | Age: 20
End: 2020-01-31

## 2020-01-31 NOTE — TELEPHONE ENCOUNTER
Reason for Disposition   MILD difficulty breathing (e.g., minimal/no SOB at rest, SOB with walking, pulse < 100) of new onset or worse than normal    Protocols used: BREATHING DIFFICULTY-ADULT-OH    Patient called [facility] pre-service center [facility]) to schedule appointment, with red flag complaint, transferred to RN access for triage. Patient calling in to report shortness of breath. States having increased shortness of breath for past two to three days. Reports having to use her inhaler more frequently recently. States experiencing shortness of breath with minimal exertion. Patient able to answer triager's questions without difficulty. Denied any other symptoms. Patient reports symptoms as documented above. Patient informed of disposition. Care advice as documented. Soft transfer to pre-service center to schedule appointment as recommended. Please do not respond to the triage nurse through this encounter. Any subsequent communication should be directly with the patient.

## 2020-02-15 ENCOUNTER — HOSPITAL ENCOUNTER (EMERGENCY)
Age: 20
Discharge: HOME | End: 2020-02-15
Attending: EMERGENCY MEDICINE
Payer: COMMERCIAL

## 2020-02-15 ENCOUNTER — APPOINTMENT (OUTPATIENT)
Dept: GENERAL RADIOLOGY | Age: 20
End: 2020-02-15
Payer: COMMERCIAL

## 2020-02-15 VITALS
BODY MASS INDEX: 35.99 KG/M2 | RESPIRATION RATE: 16 BRPM | HEIGHT: 56 IN | OXYGEN SATURATION: 99 % | SYSTOLIC BLOOD PRESSURE: 127 MMHG | HEART RATE: 87 BPM | DIASTOLIC BLOOD PRESSURE: 81 MMHG | WEIGHT: 160 LBS | TEMPERATURE: 98.1 F

## 2020-02-15 PROCEDURE — 6370000000 HC RX 637 (ALT 250 FOR IP): Performed by: EMERGENCY MEDICINE

## 2020-02-15 PROCEDURE — 73630 X-RAY EXAM OF FOOT: CPT

## 2020-02-15 PROCEDURE — 99283 EMERGENCY DEPT VISIT LOW MDM: CPT

## 2020-02-15 PROCEDURE — 73610 X-RAY EXAM OF ANKLE: CPT

## 2020-02-15 RX ORDER — IBUPROFEN 800 MG/1
800 TABLET ORAL EVERY 8 HOURS PRN
Qty: 30 TABLET | Refills: 0 | Status: SHIPPED | OUTPATIENT
Start: 2020-02-15 | End: 2020-03-17 | Stop reason: SDUPTHER

## 2020-02-15 RX ORDER — IBUPROFEN 800 MG/1
800 TABLET ORAL ONCE
Status: COMPLETED | OUTPATIENT
Start: 2020-02-15 | End: 2020-02-15

## 2020-02-15 RX ORDER — ACETAMINOPHEN 325 MG/1
650 TABLET ORAL EVERY 6 HOURS PRN
Qty: 60 TABLET | Refills: 0 | Status: SHIPPED | OUTPATIENT
Start: 2020-02-15 | End: 2020-08-06 | Stop reason: ALTCHOICE

## 2020-02-15 RX ORDER — ACETAMINOPHEN 325 MG/1
650 TABLET ORAL ONCE
Status: COMPLETED | OUTPATIENT
Start: 2020-02-15 | End: 2020-02-15

## 2020-02-15 RX ORDER — ONDANSETRON 4 MG/1
4 TABLET, ORALLY DISINTEGRATING ORAL ONCE
Status: COMPLETED | OUTPATIENT
Start: 2020-02-15 | End: 2020-02-15

## 2020-02-15 RX ADMIN — ONDANSETRON 4 MG: 4 TABLET, ORALLY DISINTEGRATING ORAL at 09:55

## 2020-02-15 RX ADMIN — IBUPROFEN 800 MG: 800 TABLET, FILM COATED ORAL at 09:33

## 2020-02-15 RX ADMIN — ACETAMINOPHEN 650 MG: 325 TABLET ORAL at 09:32

## 2020-02-15 ASSESSMENT — ENCOUNTER SYMPTOMS
SHORTNESS OF BREATH: 0
SORE THROAT: 0
BACK PAIN: 0
ABDOMINAL PAIN: 0
VOMITING: 0

## 2020-02-15 ASSESSMENT — PAIN SCALES - GENERAL
PAINLEVEL_OUTOF10: 10

## 2020-02-15 ASSESSMENT — PAIN DESCRIPTION - LOCATION: LOCATION: FOOT;ANKLE;LEG

## 2020-02-15 ASSESSMENT — PAIN DESCRIPTION - ORIENTATION: ORIENTATION: LEFT

## 2020-02-15 ASSESSMENT — PAIN DESCRIPTION - DESCRIPTORS: DESCRIPTORS: THROBBING

## 2020-02-15 ASSESSMENT — PAIN DESCRIPTION - FREQUENCY: FREQUENCY: CONTINUOUS

## 2020-02-15 ASSESSMENT — PAIN DESCRIPTION - PAIN TYPE: TYPE: ACUTE PAIN

## 2020-02-15 NOTE — ED NOTES
Pt and her friends are cursing, yelling, stating, \" we dont have all damn day, we need to get the fuck out of here and they hate this place\". They are being rude, shouting and talking with friends on speaker phone.       Kade Hamlin, SIDDHARTHA  02/15/20 3331

## 2020-02-15 NOTE — ED PROVIDER NOTES
Food    Home Medications:  Prior to Admission medications    Medication Sig Start Date End Date Taking? Authorizing Provider   ibuprofen (ADVIL;MOTRIN) 800 MG tablet Take 1 tablet by mouth every 8 hours as needed for Pain 2/15/20  Yes Lucille Summers MD   acetaminophen (TYLENOL) 325 MG tablet Take 2 tablets by mouth every 6 hours as needed for Pain 2/15/20  Yes Lucille Summers MD   albuterol sulfate HFA (VENTOLIN HFA) 108 (90 Base) MCG/ACT inhaler Inhale 2 puffs into the lungs every 6 hours as needed for Wheezing 11/20/19  Yes Alicia Murphy MD   acetaminophen (TYLENOL) 325 MG tablet Take 2 tablets by mouth every 8 hours as needed for Pain 11/13/19  Yes Valentina Vargas, DO   ibuprofen (IBU) 400 MG tablet Take 1 tablet by mouth every 8 hours as needed for Pain 11/13/19  Yes Valentina Vargas, DO   ondansetron [facility]) 4 MG tablet Take 1 tablet by mouth every 12 hours as needed for Nausea or Vomiting 11/13/19   Valentina Vargas, DO   dicyclomine (BENTYL) 10 MG capsule Take 1 capsule by mouth every 6 hours as needed (cramps) 11/13/19   Valentina Vargas, DO   clotrimazole-betamethasone (LOTRISONE) 1-0.05 % cream Apply topically 2 times daily. 9/23/19   Neeru Wilkinson MD   Prenatal Vit-Fe Fumarate-FA (PRENATAL/FOLIC ACID) TABS Take 1 tablet by mouth every morning  Patient not taking: Reported on 9/23/2019 5/9/19   Rochelle Friday, DO       REVIEW OF SYSTEMS    (2-9 systems for level 4, 10 or more for level 5)      Review of Systems   Constitutional: Negative for fever. HENT: Negative for sore throat. Eyes: Negative for visual disturbance. Respiratory: Negative for shortness of breath. Cardiovascular: Negative for chest pain. Gastrointestinal: Negative for abdominal pain and vomiting. Genitourinary: Negative for hematuria. Musculoskeletal: Positive for arthralgias. Negative for back pain and neck pain. Skin: Negative for rash and wound. Neurological: Negative for headaches.

## 2020-03-09 ENCOUNTER — OFFICE VISIT (OUTPATIENT)
Dept: PRIMARY CARE CLINIC | Age: 20
End: 2020-03-09
Payer: COMMERCIAL

## 2020-03-09 VITALS
TEMPERATURE: 98.3 F | SYSTOLIC BLOOD PRESSURE: 111 MMHG | WEIGHT: 187 LBS | DIASTOLIC BLOOD PRESSURE: 75 MMHG | HEART RATE: 92 BPM | BODY MASS INDEX: 41.92 KG/M2

## 2020-03-09 PROCEDURE — 1036F TOBACCO NON-USER: CPT | Performed by: INTERNAL MEDICINE

## 2020-03-09 PROCEDURE — G8417 CALC BMI ABV UP PARAM F/U: HCPCS | Performed by: INTERNAL MEDICINE

## 2020-03-09 PROCEDURE — G8427 DOCREV CUR MEDS BY ELIG CLIN: HCPCS | Performed by: INTERNAL MEDICINE

## 2020-03-09 PROCEDURE — G8484 FLU IMMUNIZE NO ADMIN: HCPCS | Performed by: INTERNAL MEDICINE

## 2020-03-09 PROCEDURE — 99213 OFFICE O/P EST LOW 20 MIN: CPT | Performed by: INTERNAL MEDICINE

## 2020-03-09 RX ORDER — AZITHROMYCIN 250 MG/1
TABLET, FILM COATED ORAL
Qty: 1 PACKET | Refills: 0 | Status: SHIPPED | OUTPATIENT
Start: 2020-03-09 | End: 2020-08-10

## 2020-03-09 RX ORDER — ALBUTEROL SULFATE 90 UG/1
2 AEROSOL, METERED RESPIRATORY (INHALATION) EVERY 6 HOURS PRN
Qty: 1 INHALER | Refills: 0 | Status: SHIPPED | OUTPATIENT
Start: 2020-03-09 | End: 2020-04-19 | Stop reason: SDUPTHER

## 2020-03-09 RX ORDER — ONDANSETRON 4 MG/1
4 TABLET, FILM COATED ORAL 3 TIMES DAILY PRN
Qty: 15 TABLET | Refills: 0 | Status: SHIPPED | OUTPATIENT
Start: 2020-03-09 | End: 2020-08-10

## 2020-03-09 ASSESSMENT — ENCOUNTER SYMPTOMS
SHORTNESS OF BREATH: 1
COUGH: 1

## 2020-03-09 ASSESSMENT — PATIENT HEALTH QUESTIONNAIRE - PHQ9
SUM OF ALL RESPONSES TO PHQ QUESTIONS 1-9: 0
2. FEELING DOWN, DEPRESSED OR HOPELESS: 0
1. LITTLE INTEREST OR PLEASURE IN DOING THINGS: 0
SUM OF ALL RESPONSES TO PHQ QUESTIONS 1-9: 0
SUM OF ALL RESPONSES TO PHQ9 QUESTIONS 1 & 2: 0

## 2020-03-09 NOTE — LETTER
[facility]  [facility] [address]  Phone: [phone]  Fax: [phone]    Jared Barahona MD        March 9, 2020     Patient: Jack Gerber   YOB: 2000   Date of Visit: 3/9/2020       To Whom it May Concern:    Jack Gerber was seen in my clinic on 3/9/2020. She may return to work on 3/11/2020. If you have any questions or concerns, please don't hesitate to call.     Sincerely,         Facundo Rubio, [address] [facility]

## 2020-03-09 NOTE — PROGRESS NOTES
Visit Information    Have you changed or started any medications since your last visit including any over-the-counter medicines, vitamins, or herbal medicines? no   Are you having any side effects from any of your medications? -  no  Have you stopped taking any of your medications? Is so, why? -  no    Have you seen any other physician or provider since your last visit? No  Have you had any other diagnostic tests since your last visit? No  Have you been seen in the emergency room and/or had an admission to a hospital since we last saw you? No  Have you had your routine dental cleaning in the past 6 months? no    Have you activated your [employer] account? If not, what are your barriers?  Yes     Patient Care Team:  Sherman Rod MD as PCP - [facility] Provider  Derick Blanton MD as Consulting Physician (Pulmonology)  MARY Alonso CNP as Nurse Practitioner (Nurse Practitioner)    Medical History Review  Past Medical, Family, and Social History reviewed and does not contribute to the patient presenting condition    Health Maintenance   Topic Date Due    Pneumococcal 0-64 years Vaccine (1 of 1 - PPSV23) 09/05/2004    Chlamydia screen  03/05/2018    A1C test (Diabetic or Prediabetic)  02/28/2019    Flu vaccine (1) 09/01/2019    Cervical cancer screen  09/05/2019    DTaP/Tdap/Td vaccine (7 - Td) 11/23/2021    Shingles Vaccine (1 of 2) 09/05/2048    Hepatitis A vaccine  Completed    Hepatitis B vaccine  Completed    Hib vaccine  Completed    HPV vaccine  Completed    Varicella vaccine  Completed    Meningococcal (ACWY) vaccine  Completed    HIV screen  Completed

## 2020-03-17 ENCOUNTER — HOSPITAL ENCOUNTER (EMERGENCY)
Age: 20
Discharge: HOME | End: 2020-03-17
Attending: EMERGENCY MEDICINE
Payer: COMMERCIAL

## 2020-03-17 VITALS
HEIGHT: 59 IN | RESPIRATION RATE: 17 BRPM | SYSTOLIC BLOOD PRESSURE: 130 MMHG | TEMPERATURE: 98.8 F | WEIGHT: 160 LBS | OXYGEN SATURATION: 99 % | DIASTOLIC BLOOD PRESSURE: 73 MMHG | HEART RATE: 82 BPM | BODY MASS INDEX: 32.25 KG/M2

## 2020-03-17 LAB
DIRECT EXAM: NORMAL
DIRECT EXAM: NORMAL
Lab: NORMAL
Lab: NORMAL
SPECIMEN DESCRIPTION: NORMAL
SPECIMEN DESCRIPTION: NORMAL

## 2020-03-17 PROCEDURE — 99283 EMERGENCY DEPT VISIT LOW MDM: CPT

## 2020-03-17 PROCEDURE — 6370000000 HC RX 637 (ALT 250 FOR IP): Performed by: STUDENT IN AN ORGANIZED HEALTH CARE EDUCATION/TRAINING PROGRAM

## 2020-03-17 PROCEDURE — 87804 INFLUENZA ASSAY W/OPTIC: CPT

## 2020-03-17 PROCEDURE — 87880 STREP A ASSAY W/OPTIC: CPT

## 2020-03-17 RX ORDER — IBUPROFEN 800 MG/1
800 TABLET ORAL ONCE
Status: COMPLETED | OUTPATIENT
Start: 2020-03-17 | End: 2020-03-17

## 2020-03-17 RX ORDER — BENZOCAINE AND MENTHOL 15; 2.6 MG/1; MG/1
1 LOZENGE ORAL
Qty: 20 LOZENGE | Refills: 0 | Status: SHIPPED | OUTPATIENT
Start: 2020-03-17 | End: 2020-08-10

## 2020-03-17 RX ORDER — IBUPROFEN 800 MG/1
800 TABLET ORAL EVERY 8 HOURS PRN
Qty: 30 TABLET | Refills: 0 | Status: SHIPPED | OUTPATIENT
Start: 2020-03-17 | End: 2020-04-19

## 2020-03-17 RX ADMIN — IBUPROFEN 800 MG: 800 TABLET, FILM COATED ORAL at 10:56

## 2020-03-17 ASSESSMENT — ENCOUNTER SYMPTOMS
SORE THROAT: 1
SHORTNESS OF BREATH: 0
ABDOMINAL PAIN: 0
NAUSEA: 0
VOMITING: 0

## 2020-03-17 ASSESSMENT — PAIN SCALES - GENERAL
PAINLEVEL_OUTOF10: 10
PAINLEVEL_OUTOF10: 10

## 2020-03-17 ASSESSMENT — PAIN DESCRIPTION - LOCATION: LOCATION: THROAT

## 2020-03-17 ASSESSMENT — PAIN DESCRIPTION - PAIN TYPE: TYPE: ACUTE PAIN

## 2020-03-17 NOTE — ED PROVIDER NOTES
[facility] ED  Emergency Department Encounter  EmergencyMedicine Resident     Pt Alyssa Zaragoza  MRN: [id number]  [place] 2000  Date of evaluation: 3/17/20  PCP:  No primary care provider on file. CHIEF COMPLAINT       Chief Complaint   Patient presents with    Pharyngitis     since yesterday. Associated body aches. HISTORY OF PRESENT ILLNESS  (Location/Symptom, Timing/Onset, Context/Setting, Quality, Duration, Modifying Factors, Severity.)      Rema Royal is a 22 y.o. female Patient planing of sore throat, chills, sweats, no cough, headache for 1 day. Patient recently stayed in a hotel locally but has not traveled outside of [facility]. Patient denying any vomiting but complains of loose stools. No abdominal pain. Patient does not have any chronic medical conditions or does not take any daily medications. She states that she did try 200 mg of Motrin which did not improve her headache. PAST MEDICAL / SURGICAL / SOCIAL / FAMILY HISTORY      has a past medical history of Acute bronchitis, Allergic, Allergic rhinitis, Allergy, Asthma, Chronic vasomotor rhinitis, GERD (gastroesophageal reflux disease), Headache(784.0), Morbid obesity with BMI of 40.0-44.9, adult ([address] NELSON (obstructive sleep apnea), Pure hypercholesterolemia, Severe persistent asthma, Unspecified sleep apnea, and Vision disturbance. has a past surgical history that includes Adenoidectomy; Tonsillectomy; and other surgical history (08/28/14).     Social History     Socioeconomic History    Marital status: Single     Spouse name: Not on file    Number of children: Not on file    Years of education: Not on file    Highest education level: Not on file   Occupational History    Not on file   Social Needs    Financial resource strain: Not on file    Food insecurity     Worry: Not on file     Inability: Not on file    Transportation needs     Medical: Not on file     Non-medical: Not on file   Tobacco Use    Smoking status: Former Smoker     Types: Cigarettes     Start date: 8/11/2016    Smokeless tobacco: Never Used    Tobacco comment:  visitors smoke outside   Substance and Sexual Activity    Alcohol use: No     Alcohol/week: 0.0 standard drinks    Drug use: No    Sexual activity: Yes   Lifestyle    Physical activity     Days per week: Not on file     Minutes per session: Not on file    Stress: Not on file   Relationships    Social connections     Talks on phone: Not on file     Gets together: Not on file     Attends Rastafarian service: Not on file     Active member of club or organization: Not on file     Attends meetings of clubs or organizations: Not on file     Relationship status: Not on file    Intimate partner violence     Fear of current or ex partner: Not on file     Emotionally abused: Not on file     Physically abused: Not on file     Forced sexual activity: Not on file   Other Topics Concern    Not on file   Social History Narrative    Not on file       Family History   Problem Relation Age of Onset    Asthma Mother     Lupus Mother     High Blood Pressure Father     Diabetes Father     Diabetes Maternal Grandfather     High Blood Pressure Maternal Grandfather     Heart Disease Paternal Grandmother     Diabetes Paternal Grandmother     High Blood Pressure Paternal Grandfather     Heart Disease Paternal Uncle     Heart Disease Paternal Aunt     Arthritis Maternal Aunt     Cancer Maternal Aunt     Birth Defects Neg Hx     Depression Neg Hx     Early Death Neg Hx     Hearing Loss Neg Hx     High Cholesterol Neg Hx     Kidney Disease Neg Hx     Learning Disabilities Neg Hx     Mental Illness Neg Hx     Mental Retardation Neg Hx     Miscarriages / Stillbirths Neg Hx     Stroke Neg Hx     Substance Abuse Neg Hx     Vision Loss Neg Hx     Other Neg Hx        Allergies:  Peanut-containing drug products and Food    Home Medications:  Prior to

## 2020-04-16 NOTE — ED PROVIDER NOTES
[facility]     Emergency Department     Faculty Note/ Attestation      Pt Name: Jonn Kaufman                                       MRN: [id number]  [place] 2000  Date of evaluation: 3/17/2020  Patients PCP:    No primary care provider on file. Attestation  I performed a history and physical examination of the patient/ or directly observed  and discussed management with the resident. I reviewed the residents note and agree with the documented findings and plan of care. Any areas of disagreement are noted on the chart. I was personally present for the key portions of any procedures. I have documented in the chart those procedures where I was not present during the key portions. I have reviewed the emergency nurses triage note. I agree with the chief complaint, past medical history, past surgical history, allergies, medications, social and family history as documented unless otherwise noted below. For Physician Assistant/ Nurse Practitioner cases/documentation I have personally evaluated this patient and have completed at least one if not all key elements of the E/M (history, physical exam, and MDM). Additional findings are as noted. Initial Screens:             Vitals:    Vitals:    03/17/20 1048   BP: 130/73   Pulse: 82   Resp: 17   Temp: 98.8 °F (37.1 °C)   TempSrc: Oral   SpO2: 99%   Weight: 160 lb (72.6 kg)   Height: 4' 11\" (1.499 m)       Chief Complaint      Chief Complaint   Patient presents with    Pharyngitis     since yesterday. Associated body aches. height is 4' 11\" (1.499 m) and weight is 160 lb (72.6 kg). Her oral temperature is 98.8 °F (37.1 °C). Her blood pressure is 130/73 and her pulse is 82. Her respiration is 17 and oxygen saturation is 99%.             DIAGNOSTIC RESULTS       RADIOLOGY:   No orders to display         LABS:  Labs Reviewed   RAPID INFLUENZA A/B ANTIGENS   STREP SCREEN GROUP A THROAT         EMERGENCY DEPARTMENT

## 2020-04-19 ENCOUNTER — APPOINTMENT (OUTPATIENT)
Dept: CT IMAGING | Age: 20
End: 2020-04-19
Payer: COMMERCIAL

## 2020-04-19 ENCOUNTER — APPOINTMENT (OUTPATIENT)
Dept: GENERAL RADIOLOGY | Age: 20
End: 2020-04-19
Payer: COMMERCIAL

## 2020-04-19 ENCOUNTER — HOSPITAL ENCOUNTER (EMERGENCY)
Age: 20
Discharge: HOME | End: 2020-04-19
Attending: EMERGENCY MEDICINE
Payer: COMMERCIAL

## 2020-04-19 VITALS
SYSTOLIC BLOOD PRESSURE: 148 MMHG | OXYGEN SATURATION: 98 % | BODY MASS INDEX: 32.32 KG/M2 | DIASTOLIC BLOOD PRESSURE: 82 MMHG | RESPIRATION RATE: 18 BRPM | HEIGHT: 59 IN | TEMPERATURE: 97.9 F | HEART RATE: 90 BPM

## 2020-04-19 LAB
CHP ED QC CHECK: NORMAL
PREGNANCY TEST URINE, POC: NEGATIVE

## 2020-04-19 PROCEDURE — 99284 EMERGENCY DEPT VISIT MOD MDM: CPT

## 2020-04-19 PROCEDURE — 6370000000 HC RX 637 (ALT 250 FOR IP): Performed by: STUDENT IN AN ORGANIZED HEALTH CARE EDUCATION/TRAINING PROGRAM

## 2020-04-19 PROCEDURE — 73502 X-RAY EXAM HIP UNI 2-3 VIEWS: CPT

## 2020-04-19 PROCEDURE — 72125 CT NECK SPINE W/O DYE: CPT

## 2020-04-19 PROCEDURE — 6370000000 HC RX 637 (ALT 250 FOR IP): Performed by: EMERGENCY MEDICINE

## 2020-04-19 RX ORDER — IBUPROFEN 800 MG/1
800 TABLET ORAL ONCE
Status: COMPLETED | OUTPATIENT
Start: 2020-04-19 | End: 2020-04-19

## 2020-04-19 RX ORDER — ALBUTEROL SULFATE 90 UG/1
2 AEROSOL, METERED RESPIRATORY (INHALATION) EVERY 6 HOURS PRN
Qty: 1 INHALER | Refills: 0 | Status: SHIPPED | OUTPATIENT
Start: 2020-04-19 | End: 2020-06-09 | Stop reason: SDUPTHER

## 2020-04-19 RX ORDER — CYCLOBENZAPRINE HCL 10 MG
10 TABLET ORAL NIGHTLY PRN
Qty: 5 TABLET | Refills: 0 | Status: SHIPPED | OUTPATIENT
Start: 2020-04-19 | End: 2020-04-24

## 2020-04-19 RX ORDER — CYCLOBENZAPRINE HCL 10 MG
10 TABLET ORAL ONCE
Status: COMPLETED | OUTPATIENT
Start: 2020-04-19 | End: 2020-04-19

## 2020-04-19 RX ORDER — IBUPROFEN 800 MG/1
800 TABLET ORAL EVERY 8 HOURS PRN
Qty: 30 TABLET | Refills: 0 | Status: SHIPPED | OUTPATIENT
Start: 2020-04-19 | End: 2020-08-06 | Stop reason: ALTCHOICE

## 2020-04-19 RX ORDER — ACETAMINOPHEN 500 MG
1000 TABLET ORAL ONCE
Status: COMPLETED | OUTPATIENT
Start: 2020-04-19 | End: 2020-04-19

## 2020-04-19 RX ADMIN — ACETAMINOPHEN 1000 MG: 500 TABLET ORAL at 12:38

## 2020-04-19 RX ADMIN — CYCLOBENZAPRINE HYDROCHLORIDE 10 MG: 10 TABLET, FILM COATED ORAL at 11:41

## 2020-04-19 RX ADMIN — IBUPROFEN 800 MG: 800 TABLET, FILM COATED ORAL at 11:41

## 2020-04-19 ASSESSMENT — PAIN DESCRIPTION - ORIENTATION: ORIENTATION: LEFT

## 2020-04-19 ASSESSMENT — ENCOUNTER SYMPTOMS
VOMITING: 0
ABDOMINAL PAIN: 0
NAUSEA: 0
SHORTNESS OF BREATH: 0
PHOTOPHOBIA: 0

## 2020-04-19 ASSESSMENT — PAIN SCALES - GENERAL
PAINLEVEL_OUTOF10: 9
PAINLEVEL_OUTOF10: 10
PAINLEVEL_OUTOF10: 10

## 2020-04-19 ASSESSMENT — PAIN DESCRIPTION - LOCATION: LOCATION: NECK;ARM;LEG

## 2020-04-19 ASSESSMENT — PAIN DESCRIPTION - DESCRIPTORS: DESCRIPTORS: ACHING;DISCOMFORT;SHARP;SHOOTING
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
## 2020-04-21 ENCOUNTER — HOSPITAL ENCOUNTER (EMERGENCY)
Age: 20
Discharge: HOME | End: 2020-04-21
Attending: EMERGENCY MEDICINE
Payer: COMMERCIAL

## 2020-04-21 ENCOUNTER — APPOINTMENT (OUTPATIENT)
Dept: GENERAL RADIOLOGY | Age: 20
End: 2020-04-21
Payer: COMMERCIAL

## 2020-04-21 VITALS
OXYGEN SATURATION: 100 % | TEMPERATURE: 98.9 F | SYSTOLIC BLOOD PRESSURE: 142 MMHG | HEART RATE: 63 BPM | RESPIRATION RATE: 16 BRPM | DIASTOLIC BLOOD PRESSURE: 102 MMHG

## 2020-04-21 PROCEDURE — 6370000000 HC RX 637 (ALT 250 FOR IP): Performed by: STUDENT IN AN ORGANIZED HEALTH CARE EDUCATION/TRAINING PROGRAM

## 2020-04-21 PROCEDURE — 99285 EMERGENCY DEPT VISIT HI MDM: CPT

## 2020-04-21 PROCEDURE — 73552 X-RAY EXAM OF FEMUR 2/>: CPT

## 2020-04-21 PROCEDURE — 71045 X-RAY EXAM CHEST 1 VIEW: CPT

## 2020-04-21 PROCEDURE — 94640 AIRWAY INHALATION TREATMENT: CPT

## 2020-04-21 RX ORDER — ACETAMINOPHEN 500 MG
1000 TABLET ORAL ONCE
Status: COMPLETED | OUTPATIENT
Start: 2020-04-21 | End: 2020-04-21

## 2020-04-21 RX ORDER — ALBUTEROL SULFATE 90 UG/1
2 AEROSOL, METERED RESPIRATORY (INHALATION) EVERY 6 HOURS PRN
Status: DISCONTINUED | OUTPATIENT
Start: 2020-04-21 | End: 2020-04-21 | Stop reason: HOSPADM

## 2020-04-21 RX ORDER — PREDNISONE 10 MG/1
TABLET ORAL
Qty: 20 TABLET | Refills: 0 | Status: SHIPPED | OUTPATIENT
Start: 2020-04-21 | End: 2020-05-01

## 2020-04-21 RX ORDER — IBUPROFEN 400 MG/1
400 TABLET ORAL ONCE
Status: DISCONTINUED | OUTPATIENT
Start: 2020-04-21 | End: 2020-04-21 | Stop reason: HOSPADM

## 2020-04-21 RX ORDER — PREDNISONE 20 MG/1
40 TABLET ORAL ONCE
Status: COMPLETED | OUTPATIENT
Start: 2020-04-21 | End: 2020-04-21

## 2020-04-21 RX ADMIN — Medication 2 PUFF: at 11:37

## 2020-04-21 RX ADMIN — ACETAMINOPHEN 1000 MG: 500 TABLET ORAL at 11:25

## 2020-04-21 RX ADMIN — PREDNISONE 40 MG: 20 TABLET ORAL at 12:28

## 2020-04-21 ASSESSMENT — ENCOUNTER SYMPTOMS
SORE THROAT: 0
SHORTNESS OF BREATH: 1
DIARRHEA: 0
BACK PAIN: 0
NAUSEA: 0
VOMITING: 0
WHEEZING: 1
COUGH: 1
CONSTIPATION: 0
PHOTOPHOBIA: 0
ABDOMINAL PAIN: 0

## 2020-04-21 ASSESSMENT — PAIN SCALES - GENERAL: PAINLEVEL_OUTOF10: 9
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
## 2020-04-22 ENCOUNTER — CARE COORDINATION (OUTPATIENT)
Dept: CARE COORDINATION | Age: 20
End: 2020-04-22

## 2020-04-22 ENCOUNTER — TELEPHONE (OUTPATIENT)
Dept: OBGYN CLINIC | Age: 20
End: 2020-04-22

## 2020-04-22 NOTE — TELEPHONE ENCOUNTER
Patient LOOP enrolled. Attempted to contact patient. Phone went straight to VM and VM box was not set up.

## 2020-05-18 ENCOUNTER — NURSE TRIAGE (OUTPATIENT)
Dept: OTHER | Facility: CLINIC | Age: 20
End: 2020-05-18

## 2020-05-18 NOTE — TELEPHONE ENCOUNTER
Caller complaining of severe abdominal pain x1 week. Caller states it came on suddenly and has been constant since. States it's worse after eating. Denies fever, constipation, diarrhea, nausea, or vomiting. Pain in is the middle of abdomen and is rated at a 9/10. Heating pad, rest, and ibuprofen help with symptoms. Triage recommendation is to go to ED now for further evaluation. Care advice provided per protocol. Reason for Disposition   SEVERE abdominal pain (e.g., excruciating)    Answer Assessment - Initial Assessment Questions  1. LOCATION: \"Where does it hurt? \"       Middle of abdomen. 2. RADIATION: \"Does the pain shoot anywhere else? \" (e.g., chest, back)      Denies. 3. ONSET: \"When did the pain begin? \" (e.g., minutes, hours or days ago)       Began a week ago. 4. SUDDEN: \"Gradual or sudden onset? \"  Sudden onset. 5. PATTERN \"Does the pain come and go, or is it constant? \"     - If constant: \"Is it getting better, staying the same, or worsening? \"       (Note: Constant means the pain never goes away completely; most serious pain is constant and it progresses)      - If intermittent: \"How long does it last?\" \"Do you have pain now? \"      (Note: Intermittent means the pain goes away completely between bouts)    Constant since it started a week ago, worse with eating. 6. SEVERITY: \"How bad is the pain? \"  (e.g., Scale 1-10; mild, moderate, or severe)    - MILD (1-3): doesn't interfere with normal activities, abdomen soft and not tender to touch     - MODERATE (4-7): interferes with normal activities or awakens from sleep, tender to touch     - SEVERE (8-10): excruciating pain, doubled over, unable to do any normal activities   9/10.   7. RECURRENT SYMPTOM: \"Have you ever had this type of abdominal pain before? \" If so, ask: \"When was the last time? \" and \"What happened that time? \"     Denies. 8. CAUSE: \"What do you think is causing the abdominal pain? \"     Unknown.    9. RELIEVING/AGGRAVATING FACTORS:

## 2020-05-27 ENCOUNTER — APPOINTMENT (OUTPATIENT)
Dept: ULTRASOUND IMAGING | Age: 20
End: 2020-05-27
Payer: COMMERCIAL

## 2020-05-27 ENCOUNTER — HOSPITAL ENCOUNTER (EMERGENCY)
Age: 20
Discharge: HOME | End: 2020-05-27
Attending: EMERGENCY MEDICINE
Payer: COMMERCIAL

## 2020-05-27 VITALS
TEMPERATURE: 97.9 F | BODY MASS INDEX: 36.29 KG/M2 | HEART RATE: 69 BPM | OXYGEN SATURATION: 100 % | WEIGHT: 180 LBS | HEIGHT: 59 IN | RESPIRATION RATE: 16 BRPM | DIASTOLIC BLOOD PRESSURE: 79 MMHG | SYSTOLIC BLOOD PRESSURE: 114 MMHG

## 2020-05-27 PROBLEM — D57.00 SICKLE CELL CRISIS (HCC): Status: ACTIVE | Noted: 2020-05-27

## 2020-05-27 LAB
-: ABNORMAL
ABSOLUTE EOS #: 0.21 K/UL (ref 0–0.44)
ABSOLUTE IMMATURE GRANULOCYTE: <0.03 K/UL (ref 0–0.3)
ABSOLUTE LYMPH #: 2.06 K/UL (ref 1.1–3.7)
ABSOLUTE MONO #: 0.37 K/UL (ref 0.1–1.4)
AMORPHOUS: ABNORMAL
ANION GAP SERPL CALCULATED.3IONS-SCNC: 10 MMOL/L (ref 9–17)
BACTERIA: ABNORMAL
BASOPHILS # BLD: 0 % (ref 0–2)
BASOPHILS ABSOLUTE: <0.03 K/UL (ref 0–0.2)
BILIRUBIN URINE: NEGATIVE
BUN BLDV-MCNC: 20 MG/DL (ref 6–20)
BUN/CREAT BLD: ABNORMAL (ref 9–20)
C-REACTIVE PROTEIN: 8.1 MG/L (ref 0–5)
CALCIUM SERPL-MCNC: 9 MG/DL (ref 8.6–10.4)
CASTS UA: ABNORMAL /LPF (ref 0–8)
CHLORIDE BLD-SCNC: 106 MMOL/L (ref 98–107)
CO2: 22 MMOL/L (ref 20–31)
COLOR: YELLOW
CREAT SERPL-MCNC: 0.62 MG/DL (ref 0.5–0.9)
CRYSTALS, UA: ABNORMAL /HPF
DIFFERENTIAL TYPE: ABNORMAL
EOSINOPHILS RELATIVE PERCENT: 3 % (ref 1–4)
EPITHELIAL CELLS UA: ABNORMAL /HPF (ref 0–5)
GFR AFRICAN AMERICAN: >60 ML/MIN
GFR NON-AFRICAN AMERICAN: >60 ML/MIN
GFR SERPL CREATININE-BSD FRML MDRD: ABNORMAL ML/MIN/{1.73_M2}
GFR SERPL CREATININE-BSD FRML MDRD: ABNORMAL ML/MIN/{1.73_M2}
GLUCOSE BLD-MCNC: 103 MG/DL (ref 70–99)
GLUCOSE URINE: NEGATIVE
HCG QUALITATIVE: NEGATIVE
HCT VFR BLD CALC: 40.6 % (ref 36.3–47.1)
HEMOGLOBIN: 12.2 G/DL (ref 11.9–15.1)
IMMATURE GRANULOCYTES: 0 %
KETONES, URINE: NEGATIVE
LEUKOCYTE ESTERASE, URINE: NEGATIVE
LIPASE: 29 U/L (ref 13–60)
LYMPHOCYTES # BLD: 34 % (ref 25–45)
MCH RBC QN AUTO: 23.7 PG (ref 25.2–33.5)
MCHC RBC AUTO-ENTMCNC: 30 G/DL (ref 28.4–34.8)
MCV RBC AUTO: 79 FL (ref 82.6–102.9)
MONOCYTES # BLD: 6 % (ref 2–8)
MUCUS: ABNORMAL
NITRITE, URINE: NEGATIVE
NRBC AUTOMATED: 0 PER 100 WBC
OTHER OBSERVATIONS UA: ABNORMAL
PDW BLD-RTO: 14.5 % (ref 11.8–14.4)
PH UA: 7.5 (ref 5–8)
PLATELET # BLD: 216 K/UL (ref 138–453)
PLATELET ESTIMATE: ABNORMAL
PMV BLD AUTO: 11.9 FL (ref 8.1–13.5)
POTASSIUM SERPL-SCNC: 4 MMOL/L (ref 3.7–5.3)
PROTEIN UA: NEGATIVE
RBC # BLD: 5.14 M/UL (ref 3.95–5.11)
RBC # BLD: ABNORMAL 10*6/UL
RBC UA: ABNORMAL /HPF (ref 0–4)
RENAL EPITHELIAL, UA: ABNORMAL /HPF
SEG NEUTROPHILS: 57 % (ref 34–64)
SEGMENTED NEUTROPHILS ABSOLUTE COUNT: 3.46 K/UL (ref 1.5–8.1)
SODIUM BLD-SCNC: 138 MMOL/L (ref 135–144)
SPECIFIC GRAVITY UA: 1.02 (ref 1–1.03)
TRICHOMONAS: ABNORMAL
TURBIDITY: ABNORMAL
URINE HGB: NEGATIVE
UROBILINOGEN, URINE: NORMAL
WBC # BLD: 6.1 K/UL (ref 4.5–13.5)
WBC # BLD: ABNORMAL 10*3/UL
WBC UA: ABNORMAL /HPF (ref 0–5)
YEAST: ABNORMAL

## 2020-05-27 PROCEDURE — 86140 C-REACTIVE PROTEIN: CPT

## 2020-05-27 PROCEDURE — 6370000000 HC RX 637 (ALT 250 FOR IP): Performed by: STUDENT IN AN ORGANIZED HEALTH CARE EDUCATION/TRAINING PROGRAM

## 2020-05-27 PROCEDURE — 85025 COMPLETE CBC W/AUTO DIFF WBC: CPT

## 2020-05-27 PROCEDURE — 81001 URINALYSIS AUTO W/SCOPE: CPT

## 2020-05-27 PROCEDURE — 80048 BASIC METABOLIC PNL TOTAL CA: CPT

## 2020-05-27 PROCEDURE — 96374 THER/PROPH/DIAG INJ IV PUSH: CPT

## 2020-05-27 PROCEDURE — 83690 ASSAY OF LIPASE: CPT

## 2020-05-27 PROCEDURE — 99284 EMERGENCY DEPT VISIT MOD MDM: CPT

## 2020-05-27 PROCEDURE — 84703 CHORIONIC GONADOTROPIN ASSAY: CPT

## 2020-05-27 PROCEDURE — 76705 ECHO EXAM OF ABDOMEN: CPT

## 2020-05-27 PROCEDURE — 6360000002 HC RX W HCPCS

## 2020-05-27 RX ORDER — DICYCLOMINE HYDROCHLORIDE 10 MG/1
10 CAPSULE ORAL EVERY 6 HOURS PRN
Qty: 20 CAPSULE | Refills: 0 | Status: SHIPPED | OUTPATIENT
Start: 2020-05-27 | End: 2020-08-10

## 2020-05-27 RX ORDER — ONDANSETRON 2 MG/ML
4 INJECTION INTRAMUSCULAR; INTRAVENOUS ONCE
Status: DISCONTINUED | OUTPATIENT
Start: 2020-05-27 | End: 2020-05-27

## 2020-05-27 RX ORDER — ONDANSETRON 2 MG/ML
INJECTION INTRAMUSCULAR; INTRAVENOUS
Status: COMPLETED
Start: 2020-05-27 | End: 2020-05-27

## 2020-05-27 RX ORDER — IBUPROFEN 800 MG/1
800 TABLET ORAL ONCE
Status: DISCONTINUED | OUTPATIENT
Start: 2020-05-27 | End: 2020-05-27

## 2020-05-27 RX ORDER — ONDANSETRON 4 MG/1
4 TABLET, FILM COATED ORAL EVERY 8 HOURS PRN
Qty: 15 TABLET | Refills: 0 | Status: SHIPPED | OUTPATIENT
Start: 2020-05-27 | End: 2020-08-10

## 2020-05-27 RX ORDER — ACETAMINOPHEN 500 MG
1000 TABLET ORAL ONCE
Status: COMPLETED | OUTPATIENT
Start: 2020-05-27 | End: 2020-05-27

## 2020-05-27 RX ADMIN — ONDANSETRON 4 MG: 2 INJECTION INTRAMUSCULAR; INTRAVENOUS at 08:42

## 2020-05-27 RX ADMIN — ACETAMINOPHEN 1000 MG: 500 TABLET ORAL at 08:20

## 2020-05-27 ASSESSMENT — PAIN DESCRIPTION - ONSET
ONSET: ON-GOING
ONSET: ON-GOING

## 2020-05-27 ASSESSMENT — PAIN SCALES - GENERAL
PAINLEVEL_OUTOF10: 10
PAINLEVEL_OUTOF10: 8

## 2020-05-27 ASSESSMENT — PAIN DESCRIPTION - PAIN TYPE
TYPE: ACUTE PAIN
TYPE: ACUTE PAIN

## 2020-05-27 ASSESSMENT — PAIN DESCRIPTION - LOCATION
LOCATION: ABDOMEN
LOCATION: ABDOMEN

## 2020-05-27 ASSESSMENT — PAIN DESCRIPTION - PROGRESSION
CLINICAL_PROGRESSION: NOT CHANGED
CLINICAL_PROGRESSION: NOT CHANGED

## 2020-05-27 ASSESSMENT — PAIN DESCRIPTION - DESCRIPTORS
DESCRIPTORS: CRAMPING
DESCRIPTORS: CRAMPING

## 2020-05-27 ASSESSMENT — PAIN DESCRIPTION - FREQUENCY: FREQUENCY: INTERMITTENT

## 2020-05-27 NOTE — ED PROVIDER NOTES
[address] [facility]  [phone]            DISCHARGE MEDICATIONS:  Discharge Medication List as of 5/27/2020 11:03 AM      START taking these medications    Details   !! dicyclomine (BENTYL) 10 MG capsule Take 1 capsule by mouth every 6 hours as needed (cramps), Disp-20 capsule, R-0Print      !! ondansetron (ZOFRAN) 4 MG tablet Take 1 tablet by mouth every 8 hours as needed for Nausea, Disp-15 tablet, R-0Print       !! - Potential duplicate medications found. Please discuss with provider.           Trent Arreaga DO  Emergency Medicine Resident    (Please note that portions of this note were completed with a voice recognition program.Efforts were made to edit the dictations but occasionally words are mis-transcribed.)        Trent Arreaga DO  Resident  05/27/20 [id number]

## 2020-05-27 NOTE — ED TRIAGE NOTES
Pt ambulatory to room 17 with steady gait, c/o abdominal pain, nausea, and a headache x1 week. Pt stated her abdominal pain is worse with eating and laying down. PT stated she took tylenol and motrin without any symptom relief. Denies chest pain/SOB or other symptoms at this time. Pt resting on stretch, NAD. Call light in reach.

## 2020-05-28 ENCOUNTER — CARE COORDINATION (OUTPATIENT)
Dept: CARE COORDINATION | Age: 20
End: 2020-05-28

## 2020-05-29 ENCOUNTER — CARE COORDINATION (OUTPATIENT)
Dept: CARE COORDINATION | Age: 20
End: 2020-05-29

## 2020-06-09 ENCOUNTER — TELEPHONE (OUTPATIENT)
Dept: PULMONOLOGY | Age: 20
End: 2020-06-09

## 2020-06-10 RX ORDER — ALBUTEROL SULFATE 90 UG/1
2 AEROSOL, METERED RESPIRATORY (INHALATION) EVERY 6 HOURS PRN
Qty: 1 INHALER | Refills: 0 | Status: SHIPPED | OUTPATIENT
Start: 2020-06-10 | End: 2020-07-02 | Stop reason: SDUPTHER

## 2020-06-17 ENCOUNTER — OFFICE VISIT (OUTPATIENT)
Dept: PRIMARY CARE CLINIC | Age: 20
End: 2020-06-17
Payer: COMMERCIAL

## 2020-06-17 VITALS
BODY MASS INDEX: 38.17 KG/M2 | HEART RATE: 75 BPM | SYSTOLIC BLOOD PRESSURE: 119 MMHG | OXYGEN SATURATION: 100 % | TEMPERATURE: 97.3 F | DIASTOLIC BLOOD PRESSURE: 72 MMHG | WEIGHT: 189 LBS

## 2020-06-17 PROCEDURE — 99213 OFFICE O/P EST LOW 20 MIN: CPT | Performed by: INTERNAL MEDICINE

## 2020-06-17 PROCEDURE — G8417 CALC BMI ABV UP PARAM F/U: HCPCS | Performed by: INTERNAL MEDICINE

## 2020-06-17 PROCEDURE — 1036F TOBACCO NON-USER: CPT | Performed by: INTERNAL MEDICINE

## 2020-06-17 PROCEDURE — G8427 DOCREV CUR MEDS BY ELIG CLIN: HCPCS | Performed by: INTERNAL MEDICINE

## 2020-06-17 RX ORDER — LOPERAMIDE HYDROCHLORIDE 2 MG/1
2 CAPSULE ORAL 2 TIMES DAILY PRN
Qty: 20 CAPSULE | Refills: 0 | Status: SHIPPED | OUTPATIENT
Start: 2020-06-17 | End: 2020-06-27

## 2020-06-17 ASSESSMENT — ENCOUNTER SYMPTOMS
ABDOMINAL PAIN: 1
DIARRHEA: 1

## 2020-06-17 NOTE — LETTER
[facility]  [facility] [address]  Phone: [phone]  Fax: [phone]    Caesar Williamson MD        June 17, 2020     Patient: Humble Berry   YOB: 2000   Date of Visit: 6/17/2020       To Whom it May Concern:    Humble Berry was seen in my clinic on 6/17/2020. She may return to work on June 22, 2020. If you have any questions or concerns, please don't hesitate to call.     Sincerely,         Caesar Williamson MD

## 2020-06-17 NOTE — PROGRESS NOTES
for Pain 60 tablet 0    acetaminophen (TYLENOL) 325 MG tablet Take 2 tablets by mouth every 8 hours as needed for Pain 30 tablet 0    dicyclomine (BENTYL) 10 MG capsule Take 1 capsule by mouth every 6 hours as needed (cramps) (Patient not taking: Reported on 6/17/2020) 20 capsule 0    ondansetron (ZOFRAN) 4 MG tablet Take 1 tablet by mouth every 8 hours as needed for Nausea (Patient not taking: Reported on 6/17/2020) 15 tablet 0    ibuprofen (ADVIL;MOTRIN) 800 MG tablet Take 1 tablet by mouth every 8 hours as needed for Pain (Patient not taking: Reported on 6/17/2020) 30 tablet 0    Benzocaine-Menthol (CEPACOL EXTRA STRENGTH) 15-2.6 MG LOZG lozenge Take 1 lozenge by mouth every 2 hours as needed for Sore Throat (Patient not taking: Reported on 6/17/2020) 20 lozenge 0    azithromycin (ZITHROMAX) 250 MG tablet Take 2 tabs (500 mg) on Day 1, and take 1 tab (250 mg) on days 2 through 5. (Patient not taking: Reported on 6/17/2020) 1 packet 0    ondansetron (ZOFRAN) 4 MG tablet Take 1 tablet by mouth 3 times daily as needed for Nausea or Vomiting (Patient not taking: Reported on 6/17/2020) 15 tablet 0    ondansetron (ZOFRAN) 4 MG tablet Take 1 tablet by mouth every 12 hours as needed for Nausea or Vomiting (Patient not taking: Reported on 6/17/2020) 10 tablet 0    dicyclomine (BENTYL) 10 MG capsule Take 1 capsule by mouth every 6 hours as needed (cramps) (Patient not taking: Reported on 6/17/2020) 20 capsule 0    clotrimazole-betamethasone (LOTRISONE) 1-0.05 % cream Apply topically 2 times daily. (Patient not taking: Reported on 6/17/2020) 45 g 0    Prenatal Vit-Fe Fumarate-FA (PRENATAL/FOLIC ACID) TABS Take 1 tablet by mouth every morning (Patient not taking: Reported on 9/23/2019) 30 tablet 0     No current facility-administered medications for this visit.       Allergies   Allergen Reactions    Peanut-Containing Drug Products     Food Rash     Peanuts,walnuts,cashews,pecans apples cherries       Health Maintenance   Topic Date Due    Pneumococcal 0-64 years Vaccine (1 of 3 - PCV13) 09/05/2004    Chlamydia screen  03/05/2018    A1C test (Diabetic or Prediabetic)  02/28/2019    Cervical cancer screen  09/05/2019    Flu vaccine (Season Ended) 09/01/2020    DTaP/Tdap/Td vaccine (7 - Td) 11/23/2021    Hepatitis A vaccine  Completed    Hepatitis B vaccine  Completed    Hib vaccine  Completed    HPV vaccine  Completed    Varicella vaccine  Completed    Meningococcal (ACWY) vaccine  Completed    HIV screen  Completed       Subjective:      Review of Systems   Gastrointestinal: Positive for abdominal pain and diarrhea. All other systems reviewed and are negative. Objective:     Physical Exam  Vitals signs reviewed. Constitutional:       Appearance: Normal appearance. HENT:      Head: Normocephalic and atraumatic. Pulmonary:      Effort: Pulmonary effort is normal.      Breath sounds: Normal breath sounds. Abdominal:      General: Abdomen is flat. Bowel sounds are normal.      Palpations: Abdomen is soft. Tenderness: There is abdominal tenderness in the right upper quadrant and left upper quadrant. There is no guarding or rebound. Skin:     General: Skin is warm and dry. Neurological:      General: No focal deficit present. Mental Status: She is alert and oriented to person, place, and time. Psychiatric:         Mood and Affect: Mood normal.         Behavior: Behavior normal.       /72 (Site: Left Lower Arm, Position: Sitting, Cuff Size: Medium Adult)   Pulse 75   Temp 97.3 °F (36.3 °C)   Wt 189 lb (85.7 kg)   SpO2 100%   Breastfeeding No   BMI 38.17 kg/m²       Assessment:       Diagnosis Orders   1. Pain of upper abdomen  XR ABDOMEN (2 VIEWS)   2. Diarrhea of presumed infectious origin  XR ABDOMEN (2 VIEWS)    Clostridium Difficile Toxin/Antigen    Giardia / Cryptosporidum antigens       Plan:      No follow-ups on file.     Orders Placed This Encounter   Medications

## 2020-06-22 ENCOUNTER — HOSPITAL ENCOUNTER (OUTPATIENT)
Dept: GENERAL RADIOLOGY | Age: 20
Discharge: HOME | End: 2020-06-24
Payer: COMMERCIAL

## 2020-06-22 ENCOUNTER — HOSPITAL ENCOUNTER (OUTPATIENT)
Age: 20
Discharge: HOME | End: 2020-06-22
Payer: COMMERCIAL

## 2020-06-22 ENCOUNTER — HOSPITAL ENCOUNTER (OUTPATIENT)
Age: 20
Discharge: HOME | End: 2020-06-24
Payer: COMMERCIAL

## 2020-06-22 PROCEDURE — 74019 RADEX ABDOMEN 2 VIEWS: CPT

## 2020-06-29 ENCOUNTER — TELEPHONE (OUTPATIENT)
Dept: PRIMARY CARE CLINIC | Age: 20
End: 2020-06-29

## 2020-07-02 ENCOUNTER — VIRTUAL VISIT (OUTPATIENT)
Dept: PULMONOLOGY | Age: 20
End: 2020-07-02
Payer: COMMERCIAL

## 2020-07-02 PROCEDURE — 99442 PR PHYS/QHP TELEPHONE EVALUATION 11-20 MIN: CPT | Performed by: INTERNAL MEDICINE

## 2020-07-02 RX ORDER — ALBUTEROL SULFATE 90 UG/1
2 AEROSOL, METERED RESPIRATORY (INHALATION) EVERY 6 HOURS PRN
Qty: 1 INHALER | Refills: 5 | Status: SHIPPED | OUTPATIENT
Start: 2020-07-02 | End: 2021-07-28 | Stop reason: SDUPTHER
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
## 2020-07-07 ENCOUNTER — TELEPHONE (OUTPATIENT)
Dept: PULMONOLOGY | Age: 20
End: 2020-07-07

## 2020-07-07 NOTE — TELEPHONE ENCOUNTER
RECEIVED A PA notice for Breo. This has been processed with covermymeds and the out come is Outcome   Approvedtoday   07-JUL-20:07-JUL-21 Breo Ellipta 200-25MCG/INH IN AEPB Quantity:60;      CALLED [facility] INFORMED KATHARINE OF THE APPROVAL.

## 2020-07-15 ENCOUNTER — TELEPHONE (OUTPATIENT)
Dept: PULMONOLOGY | Age: 20
End: 2020-07-15

## 2020-07-27 ENCOUNTER — OFFICE VISIT (OUTPATIENT)
Dept: PRIMARY CARE CLINIC | Age: 20
End: 2020-07-27
Payer: COMMERCIAL

## 2020-07-27 ENCOUNTER — HOSPITAL ENCOUNTER (OUTPATIENT)
Age: 20
Setting detail: SPECIMEN
Discharge: HOME | End: 2020-07-27
Payer: COMMERCIAL

## 2020-07-27 VITALS
OXYGEN SATURATION: 98 % | TEMPERATURE: 97.9 F | WEIGHT: 189 LBS | SYSTOLIC BLOOD PRESSURE: 113 MMHG | HEART RATE: 85 BPM | BODY MASS INDEX: 38.17 KG/M2 | DIASTOLIC BLOOD PRESSURE: 79 MMHG

## 2020-07-27 LAB
BILIRUBIN, POC: NEGATIVE
BLOOD URINE, POC: NEGATIVE
CLARITY, POC: ABNORMAL
COLOR, POC: YELLOW
GLUCOSE URINE, POC: NEGATIVE
KETONES, POC: NEGATIVE
LEUKOCYTE EST, POC: NEGATIVE
NITRITE, POC: NEGATIVE
PH, POC: 6
PROTEIN, POC: NEGATIVE
SPECIFIC GRAVITY, POC: 1.03
UROBILINOGEN, POC: NEGATIVE

## 2020-07-27 PROCEDURE — 1036F TOBACCO NON-USER: CPT | Performed by: NURSE PRACTITIONER

## 2020-07-27 PROCEDURE — G8417 CALC BMI ABV UP PARAM F/U: HCPCS | Performed by: NURSE PRACTITIONER

## 2020-07-27 PROCEDURE — 87338 HPYLORI STOOL AG IA: CPT

## 2020-07-27 PROCEDURE — G8427 DOCREV CUR MEDS BY ELIG CLIN: HCPCS | Performed by: NURSE PRACTITIONER

## 2020-07-27 PROCEDURE — 81003 URINALYSIS AUTO W/O SCOPE: CPT | Performed by: NURSE PRACTITIONER

## 2020-07-27 PROCEDURE — 99213 OFFICE O/P EST LOW 20 MIN: CPT | Performed by: NURSE PRACTITIONER

## 2020-07-27 RX ORDER — PANTOPRAZOLE SODIUM 20 MG/1
20 TABLET, DELAYED RELEASE ORAL
Qty: 30 TABLET | Refills: 0 | Status: SHIPPED | OUTPATIENT
Start: 2020-07-27 | End: 2020-09-10

## 2020-07-27 ASSESSMENT — ENCOUNTER SYMPTOMS
ABDOMINAL DISTENTION: 0
SHORTNESS OF BREATH: 0
DIARRHEA: 0
ANAL BLEEDING: 0
CONSTIPATION: 0
RECTAL PAIN: 0
NAUSEA: 1
ABDOMINAL PAIN: 1
BACK PAIN: 1
BLOOD IN STOOL: 0
VOMITING: 0
CHEST TIGHTNESS: 0

## 2020-07-27 NOTE — PROGRESS NOTES
[facility]  [id number] [facility] [address]  Dept: [phone]  Dept Fax: [phone]    Patient Care Team:  MARY Skinner CNP as PCP - General (Family Medicine)  Cooper Desouza MD as PCP - [address] [place] [address] [facility]  Timoteo Gonsalves MD as Consulting Physician (Pulmonology)  MARY Cruz CNP as Nurse Practitioner (Nurse Practitioner)    2020    Donald Reddy (:  2000) marko 22 y.o. female, here for evaluation of the following medical concerns:   Chief Complaint   Patient presents with   [address]     pt states that she has been having abdominal pain and that she has had it for a while. pt states that after she eats that her stomach hurts and that she gets full real fast. pt states that her stomach gets real hard         Marly Licona is a 23-year-old -American female here to establish care. She reports today that she is experiencing ongoing abdominal pain, mainly in lower abdomen. She admits to intermittent pain urination  No painful BM's, no straining to have a bowel movement  No diarrhea  Saw blood 2 weeks ago after urination, was not on her cycle  + abd pain within 5 minutes after eating, all foods bother her stomach    Is sexually active, prefers female partners. Not active in the last year. She has an upcoming gynecological appointment next month. The patient has been to the emergency room as well as the walk-in clinic for this complaint. She states the walk-in provider did order an x-ray as well as a stool sample, which she did return to the lab. Abdominal Pain   This is a chronic problem. The current episode started more than 1 month ago. The problem occurs daily (lasts a few hours). The pain is located in the suprapubic region, RLQ and LLQ. Associated symptoms include dysuria, headaches and nausea (after meals).  Pertinent negatives Benzocaine-Menthol (CEPACOL EXTRA STRENGTH) 15-2.6 MG LOZG lozenge Take 1 lozenge by mouth every 2 hours as needed for Sore Throat  Patient not taking: Reported on 7/27/2020  Jimmie Dee MD   azithromycin (ZITHROMAX) 250 MG tablet Take 2 tabs (500 mg) on Day 1, and take 1 tab (250 mg) on days 2 through 5. Patient not taking: Reported on 7/27/2020  Mandi Milan MD   ondansetron [facility]) 4 MG tablet Take 1 tablet by mouth 3 times daily as needed for Nausea or Vomiting  Patient not taking: Reported on 7/27/2020  Mandi Milan MD   acetaminophen (TYLENOL) 325 MG tablet Take 2 tablets by mouth every 6 hours as needed for Pain  Patient not taking: Reported on 7/27/2020  Demetri Arriaga MD   acetaminophen (TYLENOL) 325 MG tablet Take 2 tablets by mouth every 8 hours as needed for Pain  Patient not taking: Reported on 7/27/2020  Cherelle Collins DO   ondansetron [facility]) 4 MG tablet Take 1 tablet by mouth every 12 hours as needed for Nausea or Vomiting  Patient not taking: Reported on 7/27/2020  Cherelle Collins DO   dicyclomine (BENTYL) 10 MG capsule Take 1 capsule by mouth every 6 hours as needed (cramps)  Patient not taking: Reported on 7/27/2020  Cherelle Collins DO   clotrimazole-betamethasone (LOTRISONE) 1-0.05 % cream Apply topically 2 times daily.   Patient not taking: Reported on 7/27/2020  Mandi Milan MD   Prenatal Vit-Fe Fumarate-FA (PRENATAL/FOLIC ACID) TABS Take 1 tablet by mouth every morning  Patient not taking: Reported on 7/27/2020  Jeanine Hawk DO        Allergies   Allergen Reactions    Banana     Peanut-Containing Drug Products     Food Rash     Peanuts,walnuts,cashews,pecans apples cherries       Past Medical History:   Diagnosis Date    Acute bronchitis     unspecified organism    Allergic     Allergic rhinitis     Allergy     Asthma     Severe persistent asthma, unspecified whether complicated    Chronic vasomotor rhinitis     GERD (gastroesophageal reflux disease)     Headache(784.0) 3/1/2012    Morbid obesity with BMI of 40.0-44.9, adult ([facility] [address] 2016    NELSON (obstructive sleep apnea)     Pure hypercholesterolemia 2016    Severe persistent asthma     Unspecified sleep apnea     Vision disturbance 2014    wears glassses       Past Surgical History:   Procedure Laterality Date    ADENOIDECTOMY      OTHER SURGICAL HISTORY  14    Nexplanon placement    TONSILLECTOMY         Social History     Socioeconomic History    Marital status: Single     Spouse name: Not on file    Number of children: Not on file    Years of education: Not on file    Highest education level: Not on file   Occupational History    Not on file   Social Needs    Financial resource strain: Not on file    Food insecurity     Worry: Not on file     Inability: Not on file    Transportation needs     Medical: Not on file     Non-medical: Not on file   Tobacco Use    Smoking status: Former Smoker     Types: Cigarettes     Start date: 2016     Last attempt to quit: 2020     Years since quittin.2    Smokeless tobacco: Never Used    Tobacco comment:  visitors smoke outside   Substance and Sexual Activity    Alcohol use: No     Alcohol/week: 0.0 standard drinks    Drug use: No    Sexual activity: Yes   Lifestyle    Physical activity     Days per week: Not on file     Minutes per session: Not on file    Stress: Not on file   Relationships    Social connections     Talks on phone: Not on file     Gets together: Not on file     Attends Mandaen service: Not on file     Active member of club or organization: Not on file     Attends meetings of clubs or organizations: Not on file     Relationship status: Not on file    Intimate partner violence     Fear of current or ex partner: Not on file     Emotionally abused: Not on file     Physically abused: Not on file     Forced sexual activity: Not on file   Other Topics Concern    Not on file   Social History Narrative    Not on file        Family History   Problem Relation Age of Onset    Asthma Mother     Lupus Mother     High Blood Pressure Father     Diabetes Father     Diabetes Maternal Grandfather     High Blood Pressure Maternal Grandfather     Heart Disease Paternal Grandmother     Diabetes Paternal Grandmother     High Blood Pressure Paternal Grandfather     Heart Disease Paternal Uncle     Heart Disease Paternal Aunt     Arthritis Maternal Aunt     Cancer Maternal Aunt     Birth Defects Neg Hx     Depression Neg Hx     Early Death Neg Hx     Hearing Loss Neg Hx     High Cholesterol Neg Hx     Kidney Disease Neg Hx     Learning Disabilities Neg Hx     Mental Illness Neg Hx     Mental Retardation Neg Hx     Miscarriages / Stillbirths Neg Hx     Stroke Neg Hx     Substance Abuse Neg Hx     Vision Loss Neg Hx     Other Neg Hx     Ovarian Cancer Neg Hx     Breast Cancer Neg Hx        Vitals:    07/27/20 1058   BP: 113/79   Site: Left Upper Arm   Position: Sitting   Cuff Size: Large Adult   Pulse: 85   Temp: 97.9 °F (36.6 °C)   SpO2: 98%   Weight: 189 lb (85.7 kg)     Estimated body mass index is 38.17 kg/m² as calculated from the following:    Height as of 5/27/20: 4' 11\" (1.499 m). Weight as of this encounter: 189 lb (85.7 kg). Physical Exam  Vitals signs and nursing note reviewed. Constitutional:       General: She is not in acute distress. Appearance: Normal appearance. She is well-developed. HENT:      Head: Normocephalic and atraumatic. Eyes:      General: No scleral icterus. Pupils: Pupils are equal, round, and reactive to light. Neck:      Musculoskeletal: Normal range of motion and neck supple. Cardiovascular:      Rate and Rhythm: Normal rate and regular rhythm. Pulmonary:      Effort: Pulmonary effort is normal.      Breath sounds: Normal breath sounds. Abdominal:      General: Abdomen is protuberant.  Bowel sounds are normal. There is no distension. Palpations: Abdomen is soft. There is no mass. Tenderness: There is abdominal tenderness in the right upper quadrant, right lower quadrant and periumbilical area. There is no right CVA tenderness, left CVA tenderness, guarding or rebound. Skin:     General: Skin is warm and dry. Neurological:      General: No focal deficit present. Mental Status: She is alert and oriented to person, place, and time. Coordination: Coordination normal.   Psychiatric:         Mood and Affect: Mood normal.         Behavior: Behavior normal. Behavior is cooperative. Thought Content: Thought content normal.         Judgment: Judgment normal.         ASSESSMENT     Diagnosis Orders   1. Generalized abdominal pain  POCT Urinalysis No Micro (Auto)    VAGINITIS DNA PROBE    CT ABDOMEN PELVIS W IV CONTRAST Additional Contrast? Oral    H. Pylori Antigen, EIA    C.trachomatis N.gonorrhoeae DNA, Urine   2. Morbid obesity with BMI of 40.0-44.9, adult ([facility] [address]            PLAN:  Orders Placed This Encounter   Medications    pantoprazole (PROTONIX) 20 MG tablet     Sig: Take 1 tablet by mouth every morning (before breakfast)     Dispense:  30 tablet     Refill:  0         1. This is a 47-year-old homosexual female, patient denies any male partners. Urine pregnancy was checked in May in the ER and negative. UA completed in office, no blood or nitrates present. Will send to rule out STDs. 2. Vaginal swab obtained, patient did self swab. 3. On exam, pain is less pelvic. Right sided lower and upper abdominal pain to palpation. Ultrasound evaluation was completed in May to rule out gallstones, no evidence of gallbladder wall thickening or stones. Will further evaluate with a CT pelvis abdomen at this time. 4. Patient has a history of GERD. Also with complaints of pain within 5 to 10 minutes of eating. Concern for gastric ulcers.   Start PPI today as H2 blockers are currently unavailable due to Zantac recall. Stool for H. pylori ordered. There is no record of further stool studies resulted. KUB completed 6/22/2020, unremarkable. No hepatosplenomegaly, no constipation. 5. I encouraged bland diet at this time. Avoid greasy, spicy, and acidic foods for the next week. 6.   FOLLOW UP AND INSTRUCTIONS:  Return in about 2 weeks (around 8/10/2020) for Follow up after labs resulted. · Kalen Hernández received counseling on the following healthy behaviors:nutrition and medication adherence    · Discussed use, benefit, and side effects of prescribed medications. Barriers to  medication compliance addressed. All patient questions answered. Pt voiced  understanding. · Patient given educational materials - see patient instructions    · Patient advised to contact scheduling offices for any referrals or imaging orders  placed today if they have not been contacted in 48 hours. Return in about 2 weeks (around 8/10/2020) for Follow up after labs resulted. An  electronic signature was used to authenticate this note. --MARY Rosenbaum CNP on 7/27/2020 at 1:01 PM  Visit Information    Have you changed or started any medications since your last visit including any over-the-counter medicines, vitamins, or herbal medicines? no   Are you having any side effects from any of your medications? -  no  Have you stopped taking any of your medications? Is so, why? -  no    Have you seen any other physician or provider since your last visit? Yes - Records Requested  Have you had any other diagnostic tests since your last visit? No  Have you been seen in the emergency room and/or had an admission to a hospital since we last saw you? No  Have you had your routine dental cleaning in the past 6 months? no    Have you activated your [employer] account? If not, what are your barriers?  Yes     Patient Care Team:  MARY Rosenbaum CNP as PCP - General (Family Medicine)  Sina Mulligan MD as PCP - [facility] Provider  Wendi Rashid MD as Consulting Physician (Pulmonology)  MARY Hunter CNP as Nurse Practitioner (Nurse Practitioner)    Medical History Review  Past Medical, Family, and Social History reviewed and does not contribute to the patient presenting condition    Health Maintenance   Topic Date Due    Pneumococcal 0-64 years Vaccine (1 of 3 - PCV13) 09/05/2004    Chlamydia screen  03/05/2018    A1C test (Diabetic or Prediabetic)  02/28/2019    Cervical cancer screen  09/05/2019    Flu vaccine (1) 09/01/2020    DTaP/Tdap/Td vaccine (7 - Td) 11/23/2021    Hepatitis A vaccine  Completed    Hepatitis B vaccine  Completed    Hib vaccine  Completed    HPV vaccine  Completed    Varicella vaccine  Completed    Meningococcal (ACWY) vaccine  Completed    HIV screen  Completed

## 2020-07-28 ENCOUNTER — TELEPHONE (OUTPATIENT)
Dept: PRIMARY CARE CLINIC | Age: 20
End: 2020-07-28

## 2020-07-28 LAB
C. TRACHOMATIS DNA ,URINE: NEGATIVE
DIRECT EXAM: ABNORMAL
DIRECT EXAM: NEGATIVE
Lab: ABNORMAL
Lab: NORMAL
N. GONORRHOEAE DNA, URINE: NEGATIVE
SPECIMEN DESCRIPTION: ABNORMAL
SPECIMEN DESCRIPTION: NORMAL
SPECIMEN DESCRIPTION: NORMAL

## 2020-07-28 RX ORDER — METRONIDAZOLE 500 MG/1
500 TABLET ORAL 2 TIMES DAILY
Qty: 14 TABLET | Refills: 0 | Status: SHIPPED | OUTPATIENT
Start: 2020-07-28 | End: 2020-08-04

## 2020-07-28 NOTE — TELEPHONE ENCOUNTER
Let patient know their labs show they have bacterial vaginosis an infection  treated with flagyl  500 mg po bid x7d.     Keep her current follow-up as planned, along with the plan to continue her abdominal CT scan

## 2020-08-03 ENCOUNTER — TELEPHONE (OUTPATIENT)
Dept: PRIMARY CARE CLINIC | Age: 20
End: 2020-08-03

## 2020-08-03 NOTE — TELEPHONE ENCOUNTER
Patient called, wanted to let you know she has been vomiting after eating meals. She also stated she has been vomiting after meals, she does have her CT scan tomorrow.

## 2020-08-04 ENCOUNTER — HOSPITAL ENCOUNTER (OUTPATIENT)
Dept: CT IMAGING | Age: 20
Discharge: HOME | End: 2020-08-06
Payer: COMMERCIAL

## 2020-08-04 PROCEDURE — 6360000004 HC RX CONTRAST MEDICATION: Performed by: NURSE PRACTITIONER

## 2020-08-04 PROCEDURE — 74177 CT ABD & PELVIS W/CONTRAST: CPT

## 2020-08-04 RX ADMIN — IOHEXOL 50 ML: 240 INJECTION, SOLUTION INTRATHECAL; INTRAVASCULAR; INTRAVENOUS; ORAL at 15:18

## 2020-08-04 RX ADMIN — IOHEXOL 75 ML: 350 INJECTION, SOLUTION INTRAVENOUS at 15:16

## 2020-08-05 ENCOUNTER — APPOINTMENT (OUTPATIENT)
Dept: GENERAL RADIOLOGY | Age: 20
End: 2020-08-05
Payer: COMMERCIAL

## 2020-08-05 ENCOUNTER — HOSPITAL ENCOUNTER (EMERGENCY)
Age: 20
Discharge: HOME | End: 2020-08-06
Attending: EMERGENCY MEDICINE
Payer: COMMERCIAL

## 2020-08-05 VITALS
WEIGHT: 186 LBS | OXYGEN SATURATION: 98 % | RESPIRATION RATE: 16 BRPM | DIASTOLIC BLOOD PRESSURE: 80 MMHG | BODY MASS INDEX: 37.5 KG/M2 | HEIGHT: 59 IN | HEART RATE: 80 BPM | SYSTOLIC BLOOD PRESSURE: 116 MMHG | TEMPERATURE: 98.4 F

## 2020-08-05 PROCEDURE — 99283 EMERGENCY DEPT VISIT LOW MDM: CPT

## 2020-08-05 PROCEDURE — 6370000000 HC RX 637 (ALT 250 FOR IP): Performed by: STUDENT IN AN ORGANIZED HEALTH CARE EDUCATION/TRAINING PROGRAM

## 2020-08-05 RX ORDER — IBUPROFEN 800 MG/1
800 TABLET ORAL ONCE
Status: COMPLETED | OUTPATIENT
Start: 2020-08-05 | End: 2020-08-05

## 2020-08-05 RX ORDER — ACETAMINOPHEN 500 MG
1000 TABLET ORAL ONCE
Status: COMPLETED | OUTPATIENT
Start: 2020-08-05 | End: 2020-08-05

## 2020-08-05 RX ADMIN — IBUPROFEN 800 MG: 800 TABLET, FILM COATED ORAL at 23:48

## 2020-08-05 RX ADMIN — ACETAMINOPHEN 1000 MG: 500 TABLET ORAL at 23:48

## 2020-08-05 ASSESSMENT — ENCOUNTER SYMPTOMS
SHORTNESS OF BREATH: 0
EYE DISCHARGE: 0
ABDOMINAL PAIN: 0
EYE REDNESS: 0
COLOR CHANGE: 0

## 2020-08-05 ASSESSMENT — PAIN DESCRIPTION - FREQUENCY: FREQUENCY: CONTINUOUS

## 2020-08-05 ASSESSMENT — PAIN DESCRIPTION - ORIENTATION: ORIENTATION: LEFT

## 2020-08-05 ASSESSMENT — PAIN DESCRIPTION - LOCATION: LOCATION: WRIST

## 2020-08-05 ASSESSMENT — PAIN SCALES - GENERAL
PAINLEVEL_OUTOF10: 9
PAINLEVEL_OUTOF10: 9

## 2020-08-05 ASSESSMENT — PAIN DESCRIPTION - PAIN TYPE: TYPE: ACUTE PAIN

## 2020-08-05 ASSESSMENT — PAIN DESCRIPTION - DESCRIPTORS: DESCRIPTORS: THROBBING

## 2020-08-06 ENCOUNTER — HOSPITAL ENCOUNTER (OUTPATIENT)
Dept: SLEEP CENTER | Age: 20
Discharge: HOME | End: 2020-08-08
Payer: COMMERCIAL

## 2020-08-06 ENCOUNTER — APPOINTMENT (OUTPATIENT)
Dept: GENERAL RADIOLOGY | Age: 20
End: 2020-08-06
Payer: COMMERCIAL

## 2020-08-06 VITALS
BODY MASS INDEX: 37.5 KG/M2 | HEART RATE: 64 BPM | OXYGEN SATURATION: 94 % | RESPIRATION RATE: 16 BRPM | WEIGHT: 186 LBS | HEIGHT: 59 IN

## 2020-08-06 PROCEDURE — 73110 X-RAY EXAM OF WRIST: CPT

## 2020-08-06 PROCEDURE — 73130 X-RAY EXAM OF HAND: CPT

## 2020-08-06 PROCEDURE — 95810 POLYSOM 6/> YRS 4/> PARAM: CPT

## 2020-08-06 RX ORDER — IBUPROFEN 600 MG/1
600 TABLET ORAL EVERY 6 HOURS PRN
Qty: 20 TABLET | Refills: 0 | Status: SHIPPED | OUTPATIENT
Start: 2020-08-06 | End: 2021-01-06

## 2020-08-06 RX ORDER — ACETAMINOPHEN 500 MG
1000 TABLET ORAL EVERY 6 HOURS PRN
Qty: 30 TABLET | Refills: 0 | Status: SHIPPED | OUTPATIENT
Start: 2020-08-06 | End: 2020-11-05 | Stop reason: ALTCHOICE

## 2020-08-06 NOTE — ED PROVIDER NOTES
[facility]  ED  Emergency Department Encounter  Emergency Medicine Resident     Pt Name: Michelle Berry  MRN: [id number]  [place] 2000  Date of evaluation: 8/5/20  PCP:  MARY Healy CNP    CHIEF COMPLAINT       Chief Complaint   Patient presents with    Wrist Injury       HISTORY OFPRESENT ILLNESS  (Location/Symptom, Timing/Onset, Context/Setting, Quality, Duration, Modifying Factors,Severity.)      Michelle Berry is a 22 y.o. female who presents with left wrist pain. She states she fell on an outstretched hand approximately a week ago. Is complaining progressively worsening rest pain. Pain worse on extension of the fingers. Pain is moderate. Intermittent. Worse with palpation or movement. Denies any falling or striking of her head. Denies any loss of consciousness. PAST MEDICAL / SURGICAL / SOCIAL / FAMILY HISTORY      has a past medical history of Acute bronchitis, Allergic, Allergic rhinitis, Allergy, Asthma, Chronic vasomotor rhinitis, GERD (gastroesophageal reflux disease), Headache(784.0), Morbid obesity with BMI of 40.0-44.9, adult ([facility] [address] NELSON (obstructive sleep apnea), Pure hypercholesterolemia, Severe persistent asthma, Unspecified sleep apnea, and Vision disturbance. has a past surgical history that includes Adenoidectomy; Tonsillectomy; and other surgical history (08/28/14).     Social History     Socioeconomic History    Marital status: Single     Spouse name: Not on file    Number of children: Not on file    Years of education: Not on file    Highest education level: Not on file   Occupational History    Not on file   Social Needs    Financial resource strain: Not on file    Food insecurity     Worry: Not on file     Inability: Not on file    Transportation needs     Medical: Not on file     Non-medical: Not on file   Tobacco Use    Smoking status: Former Smoker     Types: Cigarettes     Start date: 8/11/2016     Last attempt to quit: 2020     Years since quittin.2    Smokeless tobacco: Never Used    Tobacco comment:  visitors smoke outside   Substance and Sexual Activity    Alcohol use: No     Alcohol/week: 0.0 standard drinks    Drug use: No    Sexual activity: Yes   Lifestyle    Physical activity     Days per week: Not on file     Minutes per session: Not on file    Stress: Not on file   Relationships    Social connections     Talks on phone: Not on file     Gets together: Not on file     Attends [employer] service: Not on file     Active member of club or organization: Not on file     Attends meetings of clubs or organizations: Not on file     Relationship status: Not on file    Intimate partner violence     Fear of current or ex partner: Not on file     Emotionally abused: Not on file     Physically abused: Not on file     Forced sexual activity: Not on file   Other Topics Concern    Not on file   Social History Narrative    Not on file       Family History   Problem Relation Age of Onset    Asthma Mother     Lupus Mother     High Blood Pressure Father     Diabetes Father     Diabetes Maternal Grandfather     High Blood Pressure Maternal Grandfather     Heart Disease Paternal Grandmother     Diabetes Paternal Grandmother     High Blood Pressure Paternal Grandfather     Heart Disease Paternal Uncle     Heart Disease Paternal Aunt     Arthritis Maternal Aunt     Cancer Maternal Aunt     Birth Defects Neg Hx     Depression Neg Hx     Early Death Neg Hx     Hearing Loss Neg Hx     High Cholesterol Neg Hx     Kidney Disease Neg Hx     Learning Disabilities Neg Hx     Mental Illness Neg Hx     Mental Retardation Neg Hx     Miscarriages / Stillbirths Neg Hx     Stroke Neg Hx     Substance Abuse Neg Hx     Vision Loss Neg Hx     Other Neg Hx     Ovarian Cancer Neg Hx     Breast Cancer Neg Hx        Allergies:  Banana;  Peanut-containing drug products; and Food    Home Medications:  Prior to Admission medications    Medication Sig Start Date End Date Taking? Authorizing Provider   acetaminophen (APAP EXTRA STRENGTH) 500 MG tablet Take 2 tablets by mouth every 6 hours as needed for Pain 8/6/20  Yes Belinda Combs, DO   ibuprofen (ADVIL;MOTRIN) 600 MG tablet Take 1 tablet by mouth every 6 hours as needed for Pain 8/6/20  Yes Belinda Combs, DO   pantoprazole (PROTONIX) 20 MG tablet Take 1 tablet by mouth every morning (before breakfast) 7/27/20 8/26/20  MARY Anderson - MADAY   mometasone-formoterol [facility]) 200-5 MCG/ACT inhaler Inhale 2 puffs into the lungs every 12 hours  Patient not taking: Reported on 7/27/2020 7/16/20   Kriss Wolf MD   albuterol sulfate HFA (VENTOLIN HFA) 108 (90 Base) MCG/ACT inhaler Inhale 2 puffs into the lungs every 6 hours as needed for Wheezing 7/2/20   Kriss Wolf MD   dicyclomine (BENTYL) 10 MG capsule Take 1 capsule by mouth every 6 hours as needed (cramps)  Patient not taking: Reported on 7/27/2020 5/27/20   Franklin Mckeon,    ondansetron (ZOFRAN) 4 MG tablet Take 1 tablet by mouth every 8 hours as needed for Nausea  Patient not taking: Reported on 7/27/2020 5/27/20   Karen Mckeon,    Benzocaine-Menthol (CEPACOL EXTRA STRENGTH) 15-2.6 MG LOZG lozenge Take 1 lozenge by mouth every 2 hours as needed for Sore Throat  Patient not taking: Reported on 7/27/2020 3/17/20   Verona Bourgeois MD   azithromycin (ZITHROMAX) 250 MG tablet Take 2 tabs (500 mg) on Day 1, and take 1 tab (250 mg) on days 2 through 5.   Patient not taking: Reported on 7/27/2020 3/9/20   MD dae Pop [facility]) 4 MG tablet Take 1 tablet by mouth 3 times daily as needed for Nausea or Vomiting  Patient not taking: Reported on 7/27/2020 3/9/20   MD dae Pop [facility] 4 MG tablet Take 1 tablet by mouth every 12 hours as needed for Nausea or Vomiting  Patient not taking: Reported on 7/27/2020 11/13/19   Mohan Hannon DO dicyclomine (BENTYL) 10 MG capsule Take 1 capsule by mouth every 6 hours as needed (cramps)  Patient not taking: Reported on 7/27/2020 11/13/19   Philipp Holland DO   clotrimazole-betamethasone (LOTRISONE) 1-0.05 % cream Apply topically 2 times daily. Patient not taking: Reported on 7/27/2020 9/23/19   Betty Fregoso MD   Prenatal Vit-Fe Fumarate-FA (PRENATAL/FOLIC ACID) TABS Take 1 tablet by mouth every morning  Patient not taking: Reported on 7/27/2020 5/9/19   Lorraine Huertas DO       REVIEW OF SYSTEMS    (2-9 systems for level 4, 10 or more for level 5)      Review of Systems   Constitutional: Negative for chills and fever. Eyes: Negative for discharge and redness. Respiratory: Negative for shortness of breath. Cardiovascular: Negative for chest pain. Gastrointestinal: Negative for abdominal pain. Genitourinary: Negative for flank pain. Musculoskeletal: Negative for myalgias. Skin: Negative for color change and rash. Allergic/Immunologic: Positive for environmental allergies. Neurological: Negative for headaches. Psychiatric/Behavioral: Negative for agitation and confusion. PHYSICAL EXAM   (up to 7 for level 4, 8 or more for level 5)     INITIAL VITALS:    height is 4' 11\" (1.499 m) and weight is 186 lb (84.4 kg). Her oral temperature is 98.4 °F (36.9 °C). Her blood pressure is 116/80 and her pulse is 80. Her respiration is 16 and oxygen saturation is 98%. Physical Exam  Vitals signs and nursing note reviewed. Constitutional:       Appearance: She is well-developed. HENT:      Head: Normocephalic and atraumatic. Nose: Nose normal.      Mouth/Throat:      Mouth: Mucous membranes are moist.   Eyes:      General: No scleral icterus. Conjunctiva/sclera: Conjunctivae normal.      Pupils: Pupils are equal, round, and reactive to light. Cardiovascular:      Rate and Rhythm: Normal rate and regular rhythm. Heart sounds: Normal heart sounds. No murmur.  No friction rub. No gallop. Pulmonary:      Effort: Pulmonary effort is normal. No respiratory distress. Breath sounds: Normal breath sounds. No wheezing or rales. Musculoskeletal: Normal range of motion. Comments: PMS intact distally. Tender to active extension of hand. Full strength to extension and flexion of fingers. Pulses 2/4. Cap refill less than 2 seconds. Mild tenderness over snuffbox. Skin:     General: Skin is warm and dry. Findings: No erythema or rash. Neurological:      Mental Status: She is alert and oriented to person, place, and time. Psychiatric:         Behavior: Behavior normal.         DIFFERENTIAL  DIAGNOSIS     PLAN (LABS / IMAGING / EKG):  Orders Placed This Encounter   Procedures    XR HAND LEFT (MIN 3 VIEWS)    XR WRIST LEFT (MIN 3 VIEWS)    [facility] Wrist Brace, Left       MEDICATIONS ORDERED:  Orders Placed This Encounter   Medications    ibuprofen (ADVIL;MOTRIN) tablet 800 mg    acetaminophen (TYLENOL) tablet 1,000 mg    acetaminophen (APAP EXTRA STRENGTH) 500 MG tablet     Sig: Take 2 tablets by mouth every 6 hours as needed for Pain     Dispense:  30 tablet     Refill:  0    ibuprofen (ADVIL;MOTRIN) 600 MG tablet     Sig: Take 1 tablet by mouth every 6 hours as needed for Pain     Dispense:  20 tablet     Refill:  0       DDX: Scaphoid fracture versus wrist fracture versus MCP fracture versus musculoskeletal pain. Initial MDM/Plan: 22 y.o. female who presents with wrist pain approximately week out from fall on outstretched hand. We had x-rays of hand and wrist.  Stable allergies for pain. Dissipate discharge. Will reassess after simple analgesics possible thumb spica.     DIAGNOSTIC RESULTS / EMERGENCY DEPARTMENT COURSE / MDM     LABS:  Labs Reviewed - No data to display      RADIOLOGY:  Xr Wrist Left (min 3 Views)    Result Date: 8/6/2020  EXAMINATION: THREE XRAY VIEWS OF THE LEFT HAND; 4 XRAY VIEWS OF THE LEFT WRIST 8/6/2020 12:49 am COMPARISON: None. HISTORY: ORDERING SYSTEM PROVIDED HISTORY: Fall, pain, scaphoid view please TECHNOLOGIST PROVIDED HISTORY: Fall, pain, scaphoid view please Reason for Exam: fall, pain Acuity: Acute Type of Exam: Initial; ORDERING SYSTEM PROVIDED HISTORY: Fall, pain TECHNOLOGIST PROVIDED HISTORY: Fall, pain Reason for Exam: fall, pain Acuity: Acute Type of Exam: Initial FINDINGS: No acute fracture or dislocation of the left hand. Joint spaces are preserved. No acute fracture or dislocation of the left wrist.  Joint spaces are preserved. No acute abnormality. Xr Hand Left (min 3 Views)    Result Date: 8/6/2020  EXAMINATION: THREE XRAY VIEWS OF THE LEFT HAND; 4 XRAY VIEWS OF THE LEFT WRIST 8/6/2020 12:49 am COMPARISON: None. HISTORY: ORDERING SYSTEM PROVIDED HISTORY: Fall, pain, scaphoid view please TECHNOLOGIST PROVIDED HISTORY: Fall, pain, scaphoid view please Reason for Exam: fall, pain Acuity: Acute Type of Exam: Initial; ORDERING SYSTEM PROVIDED HISTORY: Fall, pain TECHNOLOGIST PROVIDED HISTORY: Fall, pain Reason for Exam: fall, pain Acuity: Acute Type of Exam: Initial FINDINGS: No acute fracture or dislocation of the left hand. Joint spaces are preserved. No acute fracture or dislocation of the left wrist.  Joint spaces are preserved. No acute abnormality. EMERGENCY DEPARTMENT COURSE:  Patient well-appearing. X-rays negative. Reassessed with no pain over snuffbox. Did discuss with her that there is always the possibility of a occult scaphoid fracture however x-rays approximately a week out from injury make this extremely unlikely. I did discuss with her that she should follow-up with her primary care doctor  Approximately 2 weeks out from the injury if her symptoms do not improve after being in this brace. Simple analgesics and close follow-up.     · Based on the low acuity of concerning symptoms and improvement of symptoms, patient will be discharged with follow up and

## 2020-08-06 NOTE — ED PROVIDER NOTES
[address] [facility]     Emergency Department     Faculty Attestation    I performed a history and physical examination of the patient and discussed management with the resident. I have reviewed and agree with the residents findings including all diagnostic interpretations, and treatment plans as written. Any areas of disagreement are noted on the chart. I was personally present for the key portions of any procedures. I have documented in the chart those procedures where I was not present during the key portions. I have reviewed the emergency nurses triage note. I agree with the chief complaint, past medical history, past surgical history, allergies, medications, social and family history as documented unless otherwise noted below. Documentation of the HPI, Physical Exam and Medical Decision Making performed by scribannemarie is based on my personal performance of the HPI, PE and MDM. For Physician Assistant/ Nurse Practitioner cases/documentation I have personally evaluated this patient and have completed at least one if not all key elements of the E/M (history, physical exam, and MDM). Additional findings are as noted. 23 yo F L wrist pain pt fell from standing 1 wk prior, pt denies other injury,   pe vss, tender radial aspect L wrist, no deformity, nv intact, skin intact, compartments soft, distally nv intact,     L wrist xr-  L hand xr-    EKG Interpretation    Interpreted by me      CRITICAL CARE: There was a high probability of clinically significant/life threatening deterioration in this patient's condition which required my urgent intervention. Total critical care time was 0 minutes. This excludes any time for separately reportable procedures.        Neal 24, DO  08/05/20 [address] [place] [address],   08/06/20 858

## 2020-08-06 NOTE — ED NOTES
Pt arrived to ED c/o left wrist pain. Pt states about a week ago she was playing with her friends when she fell forward onto wrist. No deformity noted. No swelling. Pt reports n/t at night. Full sensation. Decreased movement. Radial pulse normal. Normal cap refill. RR even and NL. NAD. Pt denies CP, SOB, cough, fever. Dr. Goodwin Molt bedside for evaluation.      Karthik Pichardo RN  08/05/20 [id number]

## 2020-08-10 ENCOUNTER — TELEPHONE (OUTPATIENT)
Dept: GASTROENTEROLOGY | Age: 20
End: 2020-08-10

## 2020-08-10 ENCOUNTER — OFFICE VISIT (OUTPATIENT)
Dept: PRIMARY CARE CLINIC | Age: 20
End: 2020-08-10
Payer: COMMERCIAL

## 2020-08-10 VITALS
SYSTOLIC BLOOD PRESSURE: 119 MMHG | WEIGHT: 180 LBS | TEMPERATURE: 97.2 F | HEART RATE: 65 BPM | BODY MASS INDEX: 36.36 KG/M2 | DIASTOLIC BLOOD PRESSURE: 85 MMHG | OXYGEN SATURATION: 98 %

## 2020-08-10 PROBLEM — J44.9 CHRONIC OBSTRUCTIVE PULMONARY DISEASE (HCC): Status: ACTIVE | Noted: 2020-08-10

## 2020-08-10 PROCEDURE — 3023F SPIROM DOC REV: CPT | Performed by: NURSE PRACTITIONER

## 2020-08-10 PROCEDURE — 1036F TOBACCO NON-USER: CPT | Performed by: NURSE PRACTITIONER

## 2020-08-10 PROCEDURE — 99214 OFFICE O/P EST MOD 30 MIN: CPT | Performed by: NURSE PRACTITIONER

## 2020-08-10 PROCEDURE — G8427 DOCREV CUR MEDS BY ELIG CLIN: HCPCS | Performed by: NURSE PRACTITIONER

## 2020-08-10 PROCEDURE — G8417 CALC BMI ABV UP PARAM F/U: HCPCS | Performed by: NURSE PRACTITIONER

## 2020-08-10 PROCEDURE — G8926 SPIRO NO PERF OR DOC: HCPCS | Performed by: NURSE PRACTITIONER

## 2020-08-10 ASSESSMENT — ENCOUNTER SYMPTOMS
NAUSEA: 0
ABDOMINAL DISTENTION: 0
VOMITING: 0
CHOKING: 0
CHEST TIGHTNESS: 0
RECTAL PAIN: 0
DIARRHEA: 1
BACK PAIN: 1
BLOOD IN STOOL: 0
CONSTIPATION: 0
ABDOMINAL PAIN: 1
TROUBLE SWALLOWING: 0
ANAL BLEEDING: 0
SHORTNESS OF BREATH: 1

## 2020-08-10 NOTE — LETTER
[facility]  [address] [address]  Phone: [phone]  Fax: [address] [facility], MARY - CNP        August 10, 2020     Patient: Tyree Massey   YOB: 2000   Date of Visit: 8/10/2020       To Whom it May Concern:    Tyree Massey was seen in my clinic on 8/10/2020. She may return to work on August 10, 2020. If you have any questions or concerns, please don't hesitate to call.     Sincerely,         MARY Dumont CNP

## 2020-08-10 NOTE — TELEPHONE ENCOUNTER
Patient called to schedule from her referral.  Scheduled for 8/17/20 11:15 am at the [place] office. Address given of [address], ins, id, co-pay,mask, and no more than 5 minutes early. Writer thanked and call ended.

## 2020-08-10 NOTE — PROGRESS NOTES
[facility]  [id number] [facility] [address]  Dept: [phone]  Dept Fax: [phone]    Patient Care Team:  MARY Gregg CNP as PCP - General (Family Medicine)  MARY Gregg CNP as PCP - [facility] Empaneled Provider  Vladimir Hopkins MD as Consulting Physician (Pulmonology)  MARY Garcia CNP as Nurse Practitioner (Nurse Practitioner)    8/10/2020     Donald Reddy (:  [birth year])OQ a 22 y.o. female, here for evaluation of the following medical concerns:   Chief Complaint   Patient presents with    Wrist Pain     LT wrist     Abdominal Pain    Discuss Labs       Yossi Si is here today to follow-up for chronic abdominal pain, review test results, and discussed her continued left wrist pain following her emergency room visit. She finds the pain is improving, still has pain at night  Currently she is not experiencing N/V/D, no bloody stools. She has to use the bathroom within 15 minutes of eating, comes out loose. Pain is improved after a BM. She is still having pain in the upper abdomen  She is working on improving her diet, drinks only water. Avoiding spicy foods and greasy foods, has lost about 10 lobs since her last visit intentionally. Patient was seen  for concern of left wrist pain after a fall. X-rays were completed, no fracture found. The injury did occur as she fell on an outstretched hand 1 week prior to coming to the emergency room. She states the wrist throbbs at night time, waking up crying at night at times. She was sent home from the emergency room with a wrist brace. The patient states she wears at night, but not during the day as it limits her ability to empty trash cans and perform other housekeeping duties. She has not tried ice or heat, she is taking Motrin or Tylenol for the pain.     She also completed initial sleep apnea testing in . .    Review of Systems   Constitutional: Negative for appetite change, chills, fever and unexpected weight change. Early satiety, after about 3 bites   HENT: Negative for trouble swallowing. Respiratory: Positive for shortness of breath (at night when lying down). Negative for choking and chest tightness. Cardiovascular: Negative for chest pain and leg swelling. Gastrointestinal: Positive for abdominal pain and diarrhea. Negative for abdominal distention, anal bleeding, blood in stool, constipation, nausea, rectal pain and vomiting. Endocrine: Negative for polyuria. Genitourinary: Positive for dysuria. Negative for difficulty urinating, frequency, hematuria, menstrual problem, pelvic pain and vaginal discharge. Musculoskeletal: Positive for arthralgias and back pain. Negative for gait problem and joint swelling. Skin: Negative for rash and wound. Neurological: Positive for headaches. Prior to Visit Medications    Medication Sig Taking?  Authorizing Provider   mometasone-formoterol (DULERA) 200-5 MCG/ACT inhaler Inhale 2 puffs into the lungs every 12 hours Yes MARY Quintana CNP   acetaminophen (APAP EXTRA STRENGTH) 500 MG tablet Take 2 tablets by mouth every 6 hours as needed for Pain Yes Michael Camarillo, DO   ibuprofen (ADVIL;MOTRIN) 600 MG tablet Take 1 tablet by mouth every 6 hours as needed for Pain Yes Michael Camarillo, DO   pantoprazole (PROTONIX) 20 MG tablet Take 1 tablet by mouth every morning (before breakfast) Yes MARY Quintana CNP   albuterol sulfate HFA (VENTOLIN HFA) 108 (90 Base) MCG/ACT inhaler Inhale 2 puffs into the lungs every 6 hours as needed for Wheezing Yes James Lee MD        Social History     Tobacco Use    Smoking status: Former Smoker     Types: Cigarettes     Start date: 2016     Last attempt to quit: 2020     Years since quittin.2    Smokeless tobacco: Never Used    Tobacco comment:  visitors smoke outside   Substance Use Topics    Alcohol use: No     Alcohol/week: 0.0 standard drinks        Vitals:    08/10/20 0859   BP: 119/85   Site: Right Upper Arm   Position: Sitting   Cuff Size: Large Adult   Pulse: 65   Temp: 97.2 °F (36.2 °C)   SpO2: 98%   Weight: 180 lb (81.6 kg)     Estimated body mass index is 36.36 kg/m² as calculated from the following:    Height as of 8/6/20: 4' 11\" (1.499 m). Weight as of this encounter: 180 lb (81.6 kg). DIAGNOSTIC FINDINGS:  CBC:  Lab Results   Component Value Date    WBC 6.1 05/27/2020    HGB 12.2 05/27/2020     05/27/2020     10/11/2011       BMP:    Lab Results   Component Value Date     05/27/2020    K 4.0 05/27/2020     05/27/2020    CO2 22 05/27/2020    BUN 20 05/27/2020    CREATININE 0.62 05/27/2020    GLUCOSE 103 05/27/2020    GLUCOSE 78 09/22/2011       HEMOGLOBIN A1C:   Lab Results   Component Value Date    LABA1C 5.7 02/28/2018       FASTING LIPID PANEL:  Lab Results   Component Value Date    CHOL 209 (H) 09/23/2016    HDL 45 09/23/2016    TRIG 51 09/23/2016       Physical Exam  Vitals signs and nursing note reviewed. Constitutional:       General: She is not in acute distress. Appearance: Normal appearance. She is well-developed. HENT:      Head: Normocephalic and atraumatic. Eyes:      General: No scleral icterus. Pupils: Pupils are equal, round, and reactive to light. Neck:      Musculoskeletal: Normal range of motion and neck supple. Cardiovascular:      Rate and Rhythm: Normal rate and regular rhythm. Pulmonary:      Effort: Pulmonary effort is normal.      Breath sounds: Normal breath sounds. Abdominal:      General: Abdomen is protuberant. Bowel sounds are normal. There is no distension. Palpations: Abdomen is soft. There is no mass. Tenderness: There is abdominal tenderness in the right upper quadrant, right lower quadrant and periumbilical area.  There is no right CVA tenderness, left CVA tenderness, guarding or rebound. Skin:     General: Skin is warm and dry. Neurological:      General: No focal deficit present. Mental Status: She is alert and oriented to person, place, and time. Coordination: Coordination normal.   Psychiatric:         Mood and Affect: Mood normal.         Behavior: Behavior normal. Behavior is cooperative. Thought Content: Thought content normal.         Judgment: Judgment normal.         ASSESSMENT     Diagnosis Orders   1. Chronic diarrhea  [facility] [address]   2. Early satiety  [facility] [address]   3. Muscle strain of left wrist, subsequent encounter     4. Chronic obstructive pulmonary disease, unspecified COPD type ([facility] [address]            PLAN:  Orders Placed This Encounter   Medications    mometasone-formoterol (DULERA) 200-5 MCG/ACT inhaler     Sig: Inhale 2 puffs into the lungs every 12 hours     Dispense:  1 Inhaler     Refill:  5         1. At previous visit patient was started on a PPI for GERD. Vaginal swab was positive for BV, patient was treated accordingly. CT abdomen was completed, no concerning or acute findings found. H. pylori stool study was negative. Given persistent abdominal pain and early satiety, will refer to GI for further evaluation and possible EGD. 2. I encouraged continued dietary changes. Limit carbonated beverages, fatty greasy foods. 3. Parents shreey Haley, pulmonology did resend [place] inhaler but patient states it was the wrong pharmacy. Resent to [facility]. She admits to orthopnea and persistent shortness of breath. Will review PSG results once they are available  4. I advised the patient to continue bracing the wrist throughout the day in light of her wrist strain. Apply ice for 10 minutes roughly 2-3 times a day as well. Follow-up in 2 to 3 weeks of wrist pain not improving.     FOLLOW UP AND INSTRUCTIONS:  Return in about 3 months (around 11/10/2020), or if symptoms worsen or fail to improve, for copd. · Kalen Hernández received counseling on the following healthy behaviors:nutrition and medication adherence    · Discussed use, benefit, and side effects of prescribed medications. Barriers to  medication compliance addressed. All patient questions answered. Pt  verbalized understanding of all instructions given. · Patient given educational materials - see patient instructions      · Patient advised to contact scheduling offices for any referrals or imaging orders  placed today if they have not been contacted in 48 hours. Return in about 3 months (around 11/10/2020), or if symptoms worsen or fail to improve, for copd. An electronic signature was used to authenticate this note. --MARY Rosenbaum CNP on 8/10/2020 at 10:54 AM  Visit Information    Have you changed or started any medications since your last visit including any over-the-counter medicines, vitamins, or herbal medicines? no   Are you having any side effects from any of your medications? -  no  Have you stopped taking any of your medications? Is so, why? -  no    Have you seen any other physician or provider since your last visit? No  Have you had any other diagnostic tests since your last visit? Yes - Records Requested  Have you been seen in the emergency room and/or had an admission to a hospital since we last saw you? Yes - Records Requested  Have you had your routine dental cleaning in the past 6 months? no    Have you activated your [employer] account? If not, what are your barriers?  Yes     Patient Care Team:  MARY Rosenbaum CNP as PCP - General (Family Medicine)  MARY Rosenbaum CNP as PCP - [facility] Empaneled Provider  Angelic Valiente MD as Consulting Physician (Pulmonology)  MARY Arellano CNP as Nurse Practitioner (Nurse Practitioner)    Medical History Review  Past Medical, Family, and Social History reviewed and does not

## 2020-08-14 LAB — STATUS: NORMAL

## 2020-08-17 ENCOUNTER — OFFICE VISIT (OUTPATIENT)
Dept: GASTROENTEROLOGY | Age: 20
End: 2020-08-17
Payer: COMMERCIAL

## 2020-08-17 ENCOUNTER — HOSPITAL ENCOUNTER (OUTPATIENT)
Age: 20
Setting detail: SPECIMEN
Discharge: HOME | End: 2020-08-17
Payer: COMMERCIAL

## 2020-08-17 VITALS — HEIGHT: 59 IN | RESPIRATION RATE: 14 BRPM | BODY MASS INDEX: 37.5 KG/M2 | WEIGHT: 186 LBS | TEMPERATURE: 97.2 F

## 2020-08-17 PROCEDURE — G8427 DOCREV CUR MEDS BY ELIG CLIN: HCPCS | Performed by: INTERNAL MEDICINE

## 2020-08-17 PROCEDURE — G8417 CALC BMI ABV UP PARAM F/U: HCPCS | Performed by: INTERNAL MEDICINE

## 2020-08-17 PROCEDURE — 86671 FUNGUS NES ANTIBODY: CPT

## 2020-08-17 PROCEDURE — 36415 COLL VENOUS BLD VENIPUNCTURE: CPT

## 2020-08-17 PROCEDURE — 1036F TOBACCO NON-USER: CPT | Performed by: INTERNAL MEDICINE

## 2020-08-17 PROCEDURE — 86256 FLUORESCENT ANTIBODY TITER: CPT

## 2020-08-17 PROCEDURE — 82784 ASSAY IGA/IGD/IGG/IGM EACH: CPT

## 2020-08-17 PROCEDURE — 86255 FLUORESCENT ANTIBODY SCREEN: CPT

## 2020-08-17 PROCEDURE — 83516 IMMUNOASSAY NONANTIBODY: CPT

## 2020-08-17 PROCEDURE — 99204 OFFICE O/P NEW MOD 45 MIN: CPT | Performed by: INTERNAL MEDICINE

## 2020-08-17 RX ORDER — DICYCLOMINE HYDROCHLORIDE 10 MG/1
10 CAPSULE ORAL 4 TIMES DAILY
Qty: 120 CAPSULE | Refills: 3 | Status: ON HOLD | OUTPATIENT
Start: 2020-08-17 | End: 2020-10-08 | Stop reason: ALTCHOICE

## 2020-08-17 RX ORDER — POLYETHYLENE GLYCOL 3350 17 G/17G
POWDER, FOR SOLUTION ORAL
Qty: 238 G | Refills: 0 | Status: SHIPPED | OUTPATIENT
Start: 2020-08-17 | End: 2020-09-10

## 2020-08-17 ASSESSMENT — ENCOUNTER SYMPTOMS
RECTAL PAIN: 0
COUGH: 0
TROUBLE SWALLOWING: 0
DIARRHEA: 1
CONSTIPATION: 1
BLOOD IN STOOL: 0
WHEEZING: 1
NAUSEA: 0
ABDOMINAL PAIN: 1
VOMITING: 0
ANAL BLEEDING: 0
CHOKING: 0
ABDOMINAL DISTENTION: 0

## 2020-08-17 NOTE — PROGRESS NOTES
Reason for Referral:   Conception Geena, APRN - CNP  [address]  [facility], [address] [place] [address]    Chief Complaint   Patient presents with    Diarrhea     Patient is new, referred for diarrhea and abd pain. She states this has been going on for about 2 months. She has about twice daily. HISTORY OF PRESENT ILLNESS: Anette Fernandez is a 22 y.o. female , referred for evaluation of* abd pain, diarrhea   Young lady is here for the first time  Had multiple symptoms including diffuse abdominal pain and cramps  She had diarrhea right after she eats  Denied any blood or black stool  Denied any hematemesis  Denied any significant heartburn odynophagia or dysphagia or any other upper GI symptoms  Very limited with her symptoms  Had an ultrasound of the gallbladder had CAT scan of the abdomen which both were negative for GI pathology. She had some stool studies which are not resulted yet  Her lipase liver enzymes were normal  Review of system otherwise unremarkable      Past Medical,Family, and Social History reviewed and does contribute to the patient presentingcondition. Patient's PMH/PSH,SH,PSYCH Hx, MEDs, ALLERGIES, and ROS were all reviewed and updated in the appropriate sections.     PAST MEDICAL HISTORY:  Past Medical History:   Diagnosis Date    Acute bronchitis     unspecified organism    Allergic     Allergic rhinitis     Allergy     Asthma     Severe persistent asthma, unspecified whether complicated    Chronic vasomotor rhinitis     GERD (gastroesophageal reflux disease)     Headache(784.0) 3/1/2012    Morbid obesity with BMI of 40.0-44.9, adult ([facility] [address] 12/8/2016    NELSON (obstructive sleep apnea)     Pure hypercholesterolemia 12/8/2016    Severe persistent asthma     Unspecified sleep apnea     Vision disturbance 1/31/2014    wears glassses       Past Surgical History:   Procedure Laterality Date    ADENOIDECTOMY      OTHER SURGICAL HISTORY  08/28/14    Nexplanon placement    TONSILLECTOMY         CURRENT MEDICATIONS:    Current Outpatient Medications:     mometasone-formoterol (DULERA) 200-5 MCG/ACT inhaler, Inhale 2 puffs into the lungs every 12 hours, Disp: 1 Inhaler, Rfl: 5    acetaminophen (APAP EXTRA STRENGTH) 500 MG tablet, Take 2 tablets by mouth every 6 hours as needed for Pain, Disp: 30 tablet, Rfl: 0    ibuprofen (ADVIL;MOTRIN) 600 MG tablet, Take 1 tablet by mouth every 6 hours as needed for Pain, Disp: 20 tablet, Rfl: 0    pantoprazole (PROTONIX) 20 MG tablet, Take 1 tablet by mouth every morning (before breakfast), Disp: 30 tablet, Rfl: 0    albuterol sulfate HFA (VENTOLIN HFA) 108 (90 Base) MCG/ACT inhaler, Inhale 2 puffs into the lungs every 6 hours as needed for Wheezing, Disp: 1 Inhaler, Rfl: 5    ALLERGIES:   Allergies   Allergen Reactions    Banana     Peanut-Containing Drug Products     Food Rash     Peanuts,walnuts,cashews,pecans apples cherries       FAMILY HISTORY:       Problem Relation Age of Onset    Asthma Mother     Lupus Mother     High Blood Pressure Father     Diabetes Father     Diabetes Maternal Grandfather     High Blood Pressure Maternal Grandfather     Heart Disease Paternal Grandmother     Diabetes Paternal Grandmother     High Blood Pressure Paternal Grandfather     Heart Disease Paternal Uncle     Heart Disease Paternal Aunt     Arthritis Maternal Aunt     Cancer Maternal Aunt     Birth Defects Neg Hx     Depression Neg Hx     Early Death Neg Hx     Hearing Loss Neg Hx     High Cholesterol Neg Hx     Kidney Disease Neg Hx     Learning Disabilities Neg Hx     Mental Illness Neg Hx     Mental Retardation Neg Hx     Miscarriages / Stillbirths Neg Hx     Stroke Neg Hx     Substance Abuse Neg Hx     Vision Loss Neg Hx     Other Neg Hx     Ovarian Cancer Neg Hx     Breast Cancer Neg Hx          SOCIAL HISTORY:   Social History     Socioeconomic History    Marital status: Single     Spouse name: Not on file    Number of children: Not on file    Years of education: Not on file    Highest education level: Not on file   Occupational History    Not on file   Social Needs    Financial resource strain: Not on file    Food insecurity     Worry: Not on file     Inability: Not on file    Transportation needs     Medical: Not on file     Non-medical: Not on file   Tobacco Use    Smoking status: Former Smoker     Types: Cigarettes     Start date: 2016     Last attempt to quit: 2020     Years since quittin.2    Smokeless tobacco: Never Used    Tobacco comment:  visitors smoke outside   Substance and Sexual Activity    Alcohol use: No     Alcohol/week: 0.0 standard drinks    Drug use: No    Sexual activity: Yes   Lifestyle    Physical activity     Days per week: Not on file     Minutes per session: Not on file    Stress: Not on file   Relationships    Social connections     Talks on phone: Not on file     Gets together: Not on file     Attends [employer] service: Not on file     Active member of club or organization: Not on file     Attends meetings of clubs or organizations: Not on file     Relationship status: Not on file    Intimate partner violence     Fear of current or ex partner: Not on file     Emotionally abused: Not on file     Physically abused: Not on file     Forced sexual activity: Not on file   Other Topics Concern    Not on file   Social History Narrative    Not on file       REVIEW OF SYSTEMS: A 12-point review of systemswas obtained and pertinent positives and negatives were enumerated above in the history of present illness. All other reviewed systems / symptoms were negative. Review of Systems   Constitutional: Positive for fatigue. Negative for appetite change and unexpected weight change. HENT: Negative for trouble swallowing. Respiratory: Positive for wheezing. Negative for cough and choking. Cardiovascular: Positive for chest pain. Negative for palpitations and leg swelling. Gastrointestinal: Positive for abdominal pain, constipation and diarrhea. Negative for abdominal distention, anal bleeding, blood in stool, nausea, rectal pain and vomiting. Genitourinary: Negative for difficulty urinating. Allergic/Immunologic: Negative for environmental allergies and food allergies. Neurological: Negative for dizziness, weakness, light-headedness, numbness and headaches. Hematological: Does not bruise/bleed easily. Psychiatric/Behavioral: Negative for sleep disturbance. The patient is not nervous/anxious. LABORATORY DATA: Reviewed  Lab Results   Component Value Date    WBC 6.1 05/27/2020    HGB 12.2 05/27/2020    HCT 40.6 05/27/2020    MCV 79.0 (L) 05/27/2020     05/27/2020     05/27/2020    K 4.0 05/27/2020     05/27/2020    CO2 22 05/27/2020    BUN 20 05/27/2020    CREATININE 0.62 05/27/2020    LABPROT 7.5 10/27/2012    LABALBU 4.4 11/13/2019    BILITOT 0.20 (L) 11/13/2019    ALKPHOS 76 11/13/2019    AST 32 (H) 11/13/2019    ALT 21 11/13/2019    INR 0.9 10/05/2012         Lab Results   Component Value Date    RBC 5.14 (H) 05/27/2020    HGB 12.2 05/27/2020    MCV 79.0 (L) 05/27/2020    MCH 23.7 (L) 05/27/2020    MCHC 30.0 05/27/2020    RDW 14.5 (H) 05/27/2020    MPV 11.9 05/27/2020    BASOPCT 0 05/27/2020    LYMPHSABS 2.06 05/27/2020    MONOSABS 0.37 05/27/2020    NEUTROABS 3.46 05/27/2020    EOSABS 0.21 05/27/2020    BASOSABS <0.03 05/27/2020         DIAGNOSTIC TESTING:     Xr Wrist Left (min 3 Views)    Result Date: 8/6/2020  EXAMINATION: THREE XRAY VIEWS OF THE LEFT HAND; 4 XRAY VIEWS OF THE LEFT WRIST 8/6/2020 12:49 am COMPARISON: None.  HISTORY: ORDERING SYSTEM PROVIDED HISTORY: Fall, pain, scaphoid view please TECHNOLOGIST PROVIDED HISTORY: Fall, pain, scaphoid view please Reason for Exam: fall, pain Acuity: Acute Type of Exam: Initial; ORDERING SYSTEM PROVIDED HISTORY: Fall, pain TECHNOLOGIST PROVIDED HISTORY: Fall, pain Reason for Exam: fall, pain Acuity: Acute Type of Exam: Initial FINDINGS: No acute fracture or dislocation of the left hand. Joint spaces are preserved. No acute fracture or dislocation of the left wrist.  Joint spaces are preserved. No acute abnormality. Xr Hand Left (min 3 Views)    Result Date: 8/6/2020  EXAMINATION: THREE XRAY VIEWS OF THE LEFT HAND; 4 XRAY VIEWS OF THE LEFT WRIST 8/6/2020 12:49 am COMPARISON: None. HISTORY: ORDERING SYSTEM PROVIDED HISTORY: Fall, pain, scaphoid view please TECHNOLOGIST PROVIDED HISTORY: Fall, pain, scaphoid view please Reason for Exam: fall, pain Acuity: Acute Type of Exam: Initial; ORDERING SYSTEM PROVIDED HISTORY: Fall, pain TECHNOLOGIST PROVIDED HISTORY: Fall, pain Reason for Exam: fall, pain Acuity: Acute Type of Exam: Initial FINDINGS: No acute fracture or dislocation of the left hand. Joint spaces are preserved. No acute fracture or dislocation of the left wrist.  Joint spaces are preserved. No acute abnormality. Ct Abdomen Pelvis W Iv Contrast Additional Contrast? Oral    Result Date: 8/4/2020  EXAMINATION: CT OF THE ABDOMEN AND PELVIS WITH CONTRAST 8/4/2020 2:16 pm TECHNIQUE: CT of the abdomen and pelvis was performed with the administration of intravenous contrast. Multiplanar reformatted images are provided for review. Dose modulation, iterative reconstruction, and/or weight based adjustment of the mA/kV was utilized to reduce the radiation dose to as low as reasonably achievable. COMPARISON: 02/04/2019 HISTORY: ORDERING SYSTEM PROVIDED HISTORY: Generalized abdominal pain TECHNOLOGIST PROVIDED HISTORY: 1 month with abd pain, RUQ US done FINDINGS: Lower Chest: Normal heart size. Lung bases clear. Organs: The liver, spleen, pancreas, adrenal glands kidneys and gallbladder appear unremarkable. GI/Bowel: No evidence of bowel obstruction or inflammation. Normal appendix. Pelvis: Bladder and uterus appear unremarkable.   Right ovarian 2.1 cm cyst for which no follow-up imaging is recommended per guidelines below. Peritoneum/Retroperitoneum: Normal caliber abdominal aorta. No suspicious lymphadenopathy. No ascites or free air. Bones/Soft Tissues: No significant osseous abnormality. Negative CT examination of the abdomen and pelvis with no evidence of acute process. RECOMMENDATIONS: 2.1 cm benign appearing ovarian cyst. No follow-up imaging is recommended. Reference: J Am Anish Radiol 2013;10:675-681        Temp 97.2 °F (36.2 °C)   Resp 14   Ht 4' 11\" (1.499 m)   Wt 186 lb (84.4 kg)   BMI 37.57 kg/m²     PHYSICAL EXAMINATION: Vital signs reviewed per the nursing documentation. Body mass index is 37.57 kg/m². Physical Exam  Vitals signs and nursing note reviewed. Constitutional:       General: She is not in acute distress. Appearance: She is well-developed. She is not diaphoretic. HENT:      Head: Normocephalic. Mouth/Throat:      Pharynx: No oropharyngeal exudate. Eyes:      General: No scleral icterus. Pupils: Pupils are equal, round, and reactive to light. Neck:      Musculoskeletal: Normal range of motion and neck supple. Thyroid: No thyromegaly. Vascular: No JVD. Trachea: No tracheal deviation. Cardiovascular:      Rate and Rhythm: Normal rate and regular rhythm. Heart sounds: Normal heart sounds. No murmur. Pulmonary:      Effort: Pulmonary effort is normal. No respiratory distress. Breath sounds: Normal breath sounds. No wheezing. Abdominal:      General: Bowel sounds are normal. There is no distension. Palpations: Abdomen is soft. Tenderness: There is no abdominal tenderness. There is no guarding or rebound. Comments: No ascites   Musculoskeletal: Normal range of motion. Skin:     General: Skin is warm. Coloration: Skin is not pale. Findings: No erythema or rash. Comments: She is not diaphoretic   Neurological:      Mental Status: She is alert and oriented to person, place, and time. Deep Tendon Reflexes: Reflexes are normal and symmetric. Psychiatric:         Behavior: Behavior normal.         Thought Content: Thought content normal.         Judgment: Judgment normal.         IMPRESSION: Ms. Kp Ayoub is a 22 y.o. female with      Diagnosis Orders   1. Abdominal pain, generalized  EGD    COLONOSCOPY W/ OR W/O BIOPSY   2. Diarrhea, unspecified type  COLONOSCOPY W/ OR W/O BIOPSY    Celiac Disease Panel    IBD SEROLOGY 7     Will rule out IBD will rule out celiac  Patient might have IBS  Meanwhile I will start her on some Bentyl  We will proceed with the above testing to rule out IBD mainly      Diet/life style/natural hx /complication of the dx were all explained in details   Past medical, past surgical, social history, psychiatric history, medications or allergies, all reviewed and  updated    Spent 56 minutes providing patient education and counseling. Thank you for allowing me to participate in the care of Ms. Reddy. For any further questions please do not hesitate to contact me. I have reviewed and agree with the MA/RN ROS. Note is dictated utilizing voice recognition software. Unfortunately this leads to occasional typographical errors. Please contact our office if you have any questions.     Aisha Stroud MD  [facility]  O: #[phone]

## 2020-08-18 LAB
GLIADIN DEAMINIDATED PEPTIDE AB IGA: 0.7 U/ML
GLIADIN DEAMINIDATED PEPTIDE AB IGG: 6.1 U/ML
IGA: 190 MG/DL (ref 70–400)
TISSUE TRANSGLUTAMINASE IGA: 0.5 U/ML

## 2020-08-24 ENCOUNTER — TELEPHONE (OUTPATIENT)
Dept: GASTROENTEROLOGY | Age: 20
End: 2020-08-24

## 2020-08-24 ENCOUNTER — APPOINTMENT (OUTPATIENT)
Dept: GENERAL RADIOLOGY | Age: 20
End: 2020-08-24
Payer: COMMERCIAL

## 2020-08-24 ENCOUNTER — HOSPITAL ENCOUNTER (OUTPATIENT)
Age: 20
Setting detail: OBSERVATION
Discharge: HOME | End: 2020-08-25
Attending: EMERGENCY MEDICINE | Admitting: EMERGENCY MEDICINE
Payer: COMMERCIAL

## 2020-08-24 PROBLEM — R10.9 INTRACTABLE ABDOMINAL PAIN: Status: ACTIVE | Noted: 2020-08-24

## 2020-08-24 LAB
-: ABNORMAL
ABSOLUTE EOS #: 0.05 K/UL (ref 0–0.44)
ABSOLUTE IMMATURE GRANULOCYTE: <0.03 K/UL (ref 0–0.3)
ABSOLUTE LYMPH #: 2.38 K/UL (ref 1.1–3.7)
ABSOLUTE MONO #: 0.33 K/UL (ref 0.1–1.4)
ALBUMIN SERPL-MCNC: 3.6 G/DL (ref 3.5–5.2)
ALBUMIN/GLOBULIN RATIO: 1.4 (ref 1–2.5)
ALP BLD-CCNC: 50 U/L (ref 35–104)
ALT SERPL-CCNC: 17 U/L (ref 5–33)
AMORPHOUS: ABNORMAL
ANION GAP SERPL CALCULATED.3IONS-SCNC: 9 MMOL/L (ref 9–17)
AST SERPL-CCNC: 27 U/L
BACTERIA: ABNORMAL
BASOPHILS # BLD: 0 % (ref 0–2)
BASOPHILS ABSOLUTE: <0.03 K/UL (ref 0–0.2)
BILIRUB SERPL-MCNC: 0.18 MG/DL (ref 0.3–1.2)
BILIRUBIN URINE: NEGATIVE
BUN BLDV-MCNC: 13 MG/DL (ref 6–20)
BUN/CREAT BLD: ABNORMAL (ref 9–20)
CALCIUM SERPL-MCNC: 9.2 MG/DL (ref 8.6–10.4)
CASTS UA: ABNORMAL /LPF (ref 0–8)
CHLORIDE BLD-SCNC: 106 MMOL/L (ref 98–107)
CO2: 24 MMOL/L (ref 20–31)
COLOR: YELLOW
CREAT SERPL-MCNC: 0.53 MG/DL (ref 0.5–0.9)
CRYSTALS, UA: ABNORMAL /HPF
DIFFERENTIAL TYPE: ABNORMAL
EOSINOPHILS RELATIVE PERCENT: 1 % (ref 1–4)
EPITHELIAL CELLS UA: ABNORMAL /HPF (ref 0–5)
GFR AFRICAN AMERICAN: >60 ML/MIN
GFR NON-AFRICAN AMERICAN: >60 ML/MIN
GFR SERPL CREATININE-BSD FRML MDRD: ABNORMAL ML/MIN/{1.73_M2}
GFR SERPL CREATININE-BSD FRML MDRD: ABNORMAL ML/MIN/{1.73_M2}
GLUCOSE BLD-MCNC: 87 MG/DL (ref 70–99)
GLUCOSE URINE: NEGATIVE
HCG QUALITATIVE: NEGATIVE
HCT VFR BLD CALC: 38.2 % (ref 36.3–47.1)
HEMOGLOBIN: 11.1 G/DL (ref 11.9–15.1)
IMMATURE GRANULOCYTES: 0 %
KETONES, URINE: NEGATIVE
LEUKOCYTE ESTERASE, URINE: NEGATIVE
LIPASE: 32 U/L (ref 13–60)
LYMPHOCYTES # BLD: 52 % (ref 25–45)
MCH RBC QN AUTO: 23 PG (ref 25.2–33.5)
MCHC RBC AUTO-ENTMCNC: 29.1 G/DL (ref 28.4–34.8)
MCV RBC AUTO: 79.1 FL (ref 82.6–102.9)
MONOCYTES # BLD: 7 % (ref 2–8)
MUCUS: ABNORMAL
NITRITE, URINE: NEGATIVE
NRBC AUTOMATED: 0 PER 100 WBC
OTHER OBSERVATIONS UA: ABNORMAL
PDW BLD-RTO: 14.2 % (ref 11.8–14.4)
PH UA: 7 (ref 5–8)
PLATELET # BLD: 223 K/UL (ref 138–453)
PLATELET ESTIMATE: ABNORMAL
PMV BLD AUTO: 12.6 FL (ref 8.1–13.5)
POTASSIUM SERPL-SCNC: 4 MMOL/L (ref 3.7–5.3)
PROTEIN UA: NEGATIVE
RBC # BLD: 4.83 M/UL (ref 3.95–5.11)
RBC # BLD: ABNORMAL 10*6/UL
RBC UA: ABNORMAL /HPF (ref 0–4)
RENAL EPITHELIAL, UA: ABNORMAL /HPF
SEG NEUTROPHILS: 40 % (ref 34–64)
SEGMENTED NEUTROPHILS ABSOLUTE COUNT: 1.87 K/UL (ref 1.5–8.1)
SEND OUT REPORT: NORMAL
SODIUM BLD-SCNC: 139 MMOL/L (ref 135–144)
SPECIFIC GRAVITY UA: 1.02 (ref 1–1.03)
TEST NAME: NORMAL
TOTAL PROTEIN: 6.2 G/DL (ref 6.4–8.3)
TRICHOMONAS: ABNORMAL
TURBIDITY: ABNORMAL
URINE HGB: NEGATIVE
UROBILINOGEN, URINE: NORMAL
WBC # BLD: 4.7 K/UL (ref 4.5–13.5)
WBC # BLD: ABNORMAL 10*3/UL
WBC UA: ABNORMAL /HPF (ref 0–5)
YEAST: ABNORMAL

## 2020-08-24 PROCEDURE — 6360000002 HC RX W HCPCS: Performed by: STUDENT IN AN ORGANIZED HEALTH CARE EDUCATION/TRAINING PROGRAM

## 2020-08-24 PROCEDURE — 96374 THER/PROPH/DIAG INJ IV PUSH: CPT

## 2020-08-24 PROCEDURE — 2580000003 HC RX 258: Performed by: STUDENT IN AN ORGANIZED HEALTH CARE EDUCATION/TRAINING PROGRAM

## 2020-08-24 PROCEDURE — G0378 HOSPITAL OBSERVATION PER HR: HCPCS

## 2020-08-24 PROCEDURE — 84703 CHORIONIC GONADOTROPIN ASSAY: CPT

## 2020-08-24 PROCEDURE — 71046 X-RAY EXAM CHEST 2 VIEWS: CPT

## 2020-08-24 PROCEDURE — 83690 ASSAY OF LIPASE: CPT

## 2020-08-24 PROCEDURE — 80053 COMPREHEN METABOLIC PANEL: CPT

## 2020-08-24 PROCEDURE — 81001 URINALYSIS AUTO W/SCOPE: CPT

## 2020-08-24 PROCEDURE — 96372 THER/PROPH/DIAG INJ SC/IM: CPT

## 2020-08-24 PROCEDURE — 99285 EMERGENCY DEPT VISIT HI MDM: CPT

## 2020-08-24 PROCEDURE — 85025 COMPLETE CBC W/AUTO DIFF WBC: CPT

## 2020-08-24 RX ORDER — ONDANSETRON 2 MG/ML
4 INJECTION INTRAMUSCULAR; INTRAVENOUS EVERY 8 HOURS PRN
Status: DISCONTINUED | OUTPATIENT
Start: 2020-08-24 | End: 2020-08-25 | Stop reason: HOSPADM

## 2020-08-24 RX ORDER — KETOROLAC TROMETHAMINE 30 MG/ML
30 INJECTION, SOLUTION INTRAMUSCULAR; INTRAVENOUS EVERY 6 HOURS PRN
Status: DISCONTINUED | OUTPATIENT
Start: 2020-08-24 | End: 2020-08-25 | Stop reason: HOSPADM

## 2020-08-24 RX ORDER — ALBUTEROL SULFATE 90 UG/1
2 AEROSOL, METERED RESPIRATORY (INHALATION) EVERY 6 HOURS PRN
Status: DISCONTINUED | OUTPATIENT
Start: 2020-08-24 | End: 2020-08-25 | Stop reason: HOSPADM

## 2020-08-24 RX ORDER — DICYCLOMINE HYDROCHLORIDE 10 MG/1
10 CAPSULE ORAL 4 TIMES DAILY
Status: DISCONTINUED | OUTPATIENT
Start: 2020-08-24 | End: 2020-08-25 | Stop reason: HOSPADM

## 2020-08-24 RX ORDER — PANTOPRAZOLE SODIUM 20 MG/1
20 TABLET, DELAYED RELEASE ORAL
Status: DISCONTINUED | OUTPATIENT
Start: 2020-08-25 | End: 2020-08-25 | Stop reason: HOSPADM

## 2020-08-24 RX ORDER — ACETAMINOPHEN 325 MG/1
650 TABLET ORAL EVERY 4 HOURS PRN
Status: DISCONTINUED | OUTPATIENT
Start: 2020-08-24 | End: 2020-08-25 | Stop reason: HOSPADM

## 2020-08-24 RX ORDER — 0.9 % SODIUM CHLORIDE 0.9 %
1000 INTRAVENOUS SOLUTION INTRAVENOUS ONCE
Status: COMPLETED | OUTPATIENT
Start: 2020-08-24 | End: 2020-08-24

## 2020-08-24 RX ORDER — DICYCLOMINE HYDROCHLORIDE 10 MG/ML
20 INJECTION INTRAMUSCULAR ONCE
Status: COMPLETED | OUTPATIENT
Start: 2020-08-24 | End: 2020-08-24

## 2020-08-24 RX ORDER — SODIUM CHLORIDE 0.9 % (FLUSH) 0.9 %
10 SYRINGE (ML) INJECTION EVERY 12 HOURS SCHEDULED
Status: DISCONTINUED | OUTPATIENT
Start: 2020-08-24 | End: 2020-08-25 | Stop reason: HOSPADM

## 2020-08-24 RX ORDER — SODIUM CHLORIDE 0.9 % (FLUSH) 0.9 %
10 SYRINGE (ML) INJECTION PRN
Status: DISCONTINUED | OUTPATIENT
Start: 2020-08-24 | End: 2020-08-25 | Stop reason: HOSPADM

## 2020-08-24 RX ORDER — KETOROLAC TROMETHAMINE 30 MG/ML
30 INJECTION, SOLUTION INTRAMUSCULAR; INTRAVENOUS ONCE
Status: COMPLETED | OUTPATIENT
Start: 2020-08-24 | End: 2020-08-24

## 2020-08-24 RX ADMIN — SODIUM CHLORIDE 1000 ML: 9 INJECTION, SOLUTION INTRAVENOUS at 17:29

## 2020-08-24 RX ADMIN — KETOROLAC TROMETHAMINE 30 MG: 30 INJECTION, SOLUTION INTRAMUSCULAR at 18:55

## 2020-08-24 RX ADMIN — DICYCLOMINE HYDROCHLORIDE 20 MG: 10 INJECTION INTRAMUSCULAR at 17:29

## 2020-08-24 ASSESSMENT — PAIN DESCRIPTION - FREQUENCY: FREQUENCY: CONTINUOUS

## 2020-08-24 ASSESSMENT — PAIN DESCRIPTION - PROGRESSION: CLINICAL_PROGRESSION: NOT CHANGED

## 2020-08-24 ASSESSMENT — ENCOUNTER SYMPTOMS
NAUSEA: 0
BACK PAIN: 0
SHORTNESS OF BREATH: 0
CONSTIPATION: 0
CHEST TIGHTNESS: 0
RHINORRHEA: 0
BLOOD IN STOOL: 0
VOMITING: 0
EYE PAIN: 0
COUGH: 0
ABDOMINAL PAIN: 1
DIARRHEA: 0
SORE THROAT: 0

## 2020-08-24 ASSESSMENT — PAIN DESCRIPTION - ORIENTATION
ORIENTATION: LOWER
ORIENTATION: MID

## 2020-08-24 ASSESSMENT — PAIN DESCRIPTION - DESCRIPTORS: DESCRIPTORS: ACHING

## 2020-08-24 ASSESSMENT — PAIN SCALES - GENERAL
PAINLEVEL_OUTOF10: 10
PAINLEVEL_OUTOF10: 10
PAINLEVEL_OUTOF10: 7

## 2020-08-24 ASSESSMENT — PAIN DESCRIPTION - PAIN TYPE
TYPE: ACUTE PAIN
TYPE: ACUTE PAIN

## 2020-08-24 ASSESSMENT — PAIN DESCRIPTION - LOCATION
LOCATION: ABDOMEN
LOCATION: ABDOMEN

## 2020-08-24 ASSESSMENT — PAIN - FUNCTIONAL ASSESSMENT: PAIN_FUNCTIONAL_ASSESSMENT: ACTIVITIES ARE NOT PREVENTED

## 2020-08-24 ASSESSMENT — PAIN DESCRIPTION - ONSET: ONSET: ON-GOING

## 2020-08-24 NOTE — TELEPHONE ENCOUNTER
Patient called wanting to let the nurse know that this past Friday and Saturday when she coughed, she noticed there was blood coming up. Patient denies any blood as of yesterday and today, but wanted to make the nurse aware because she scheduled to have her procedure done on 9/9 with Dr. Keiko Cardozo. She also stated that she had to call of work today because of her abdominal pain.

## 2020-08-24 NOTE — LETTER
Anna Galvan was seen and treated in our emergency department on 8/24/2020. She may return to work on 08/26/20    If you have any questions or concerns, please don't hesitate to call.       Evan Padilla MD  Emergency Medicine PGY-1

## 2020-08-24 NOTE — ED NOTES
Pt to ED via triage a/o x3 with c/o abdominal pain. Pt stated she has been having pain for months. Pt denies any nausea and emesis. Pt stated she has had episodes of diarrhea. Pt stated she is scheduled for a colonoscopy on Sept 9th. Pt stated she has hx of asthma and uses an inhaler as needed. Vitals completed in triage. Call light in reach   Will continue to monitor.       Eda Guadalupe RN  08/24/20 [id number]

## 2020-08-24 NOTE — ED PROVIDER NOTES
[facility]  ED  Emergency Department Encounter  Emergency Medicine Resident     Pt Name: Marzella Habermann  MRN: [id number]  [place] 2000  Date of evaluation: 8/24/20  PCP:  MARY Cabral 1100       Chief Complaint   Patient presents with    Abdominal Pain     abdominal pain worsening over the weekend. Pt scheduled for scope and colonoscopy on 9/9       HISTORY OFPRESENT ILLNESS  (Location/Symptom, Timing/Onset, Context/Setting, Quality, Duration, Modifying Factors,Severity.)      Marzella Habermann is a 22 y. o.yo female who presents with several months of generalized abdominal pain/cramping which acutely worsened over the weekend. She says the pain is worse in her lower abdomen. She denies any provoking or relieving factors. She endorses some intermittent diarrhea and vomiting, which is nonbloody in nature. The diarrhea is relieved with Imodium. She reports the pain keeps her from eating, but the pain does not get worse with eating. She denies dysuria, hematuria, abnormal vaginal discharge, fevers or chills. Of note, this morning the patient \"coughed up blood. \"  Upon further discussion, she said that she cleared her throat and the sputum was blood-tinged, she did not have bloody emesis. This is happened twice over the weekend. She denies chest pain, leg swelling, difficulty breathing. She has been following with a gastroenterologist and has a colonoscopy and endoscopy scheduled for September 9. She has been previously worked up for similar abdominal pain with negative CT abdomen/pelvis.     PAST MEDICAL / SURGICAL / SOCIAL / FAMILY HISTORY      has a past medical history of Acute bronchitis, Allergic, Allergic rhinitis, Allergy, Asthma, Chronic vasomotor rhinitis, GERD (gastroesophageal reflux disease), Headache(784.0), Morbid obesity with BMI of 40.0-44.9, adult ([facility] [address] NELSON (obstructive sleep apnea), Pure hypercholesterolemia, Severe dose 8/17/20 8/24/20 Yes Hetal Aleman MD   mometasone-formoterol (DULERA) 200-5 MCG/ACT inhaler Inhale 2 puffs into the lungs every 12 hours 8/10/20  Yes MARY Boyd CNP   acetaminophen (APAP EXTRA STRENGTH) 500 MG tablet Take 2 tablets by mouth every 6 hours as needed for Pain 8/6/20  Yes Jimmie Garcia, DO   ibuprofen (ADVIL;MOTRIN) 600 MG tablet Take 1 tablet by mouth every 6 hours as needed for Pain 8/6/20  Yes Jimmiemaryam Garcia, DO   pantoprazole (PROTONIX) 20 MG tablet Take 1 tablet by mouth every morning (before breakfast) 7/27/20 8/26/20 Yes MARY Boyd CNP   albuterol sulfate HFA (VENTOLIN HFA) 108 (90 Base) MCG/ACT inhaler Inhale 2 puffs into the lungs every 6 hours as needed for Wheezing 7/2/20  Yes Sharri Prader, MD   magnesium citrate solution Take 296 mLs by mouth once for 1 dose 2/16/17 8/17/21 Yes MARY Sykes CNP       REVIEW OFSYSTEMS    (2-9 systems for level 4, 10 or more for level 5)      Review of Systems   Constitutional: Negative for appetite change, chills, fatigue and fever. HENT: Negative for congestion, rhinorrhea and sore throat. Eyes: Negative for pain and visual disturbance. Respiratory: Negative for cough, chest tightness and shortness of breath. Cardiovascular: Negative for chest pain, palpitations and leg swelling. Gastrointestinal: Positive for abdominal pain. Negative for blood in stool, constipation, diarrhea, nausea and vomiting. Endocrine: Negative for polyuria. Genitourinary: Negative for dysuria, flank pain, hematuria, menstrual problem, pelvic pain, vaginal bleeding and vaginal discharge. Musculoskeletal: Negative for back pain and neck pain. Skin: Negative for rash. Neurological: Negative for dizziness, light-headedness and headaches. Psychiatric/Behavioral: Negative for dysphoric mood and suicidal ideas.        PHYSICAL EXAM   (up to 7 for level 4, 8 or more forlevel 5)      INITIAL VITALS:   Vitals:    08/24/20 2145   BP: 121/88   Pulse: 64   Resp: 16   Temp: 98.1 °F (36.7 °C)   SpO2: 100%    /88   Pulse 64   Temp 98.1 °F (36.7 °C) (Oral)   Resp 16   Ht 4' 11\" (1.499 m)   Wt 180 lb (81.6 kg)   LMP 08/14/2020 (Approximate)   SpO2 100%   BMI 36.36 kg/m²       Physical Exam  Vitals signs and nursing note reviewed. Constitutional:       Appearance: Normal appearance. She is well-developed. She is obese. HENT:      Head: Normocephalic and atraumatic. Mouth/Throat:      Mouth: Mucous membranes are moist.      Pharynx: Oropharynx is clear. No oropharyngeal exudate or posterior oropharyngeal erythema. Eyes:      Extraocular Movements: Extraocular movements intact. Conjunctiva/sclera: Conjunctivae normal.      Pupils: Pupils are equal, round, and reactive to light. Neck:      Musculoskeletal: Normal range of motion and neck supple. Cardiovascular:      Rate and Rhythm: Normal rate and regular rhythm. Pulses: Normal pulses. Heart sounds: Normal heart sounds. Pulmonary:      Effort: Pulmonary effort is normal.      Breath sounds: Examination of the left-upper field reveals wheezing. Wheezing present. Abdominal:      General: Abdomen is protuberant. Bowel sounds are normal. There is no distension. Palpations: Abdomen is soft. Tenderness: There is abdominal tenderness in the right lower quadrant. There is no right CVA tenderness, left CVA tenderness, guarding or rebound. Negative signs include Farrar's sign. Musculoskeletal: Normal range of motion. Skin:     General: Skin is warm. Capillary Refill: Capillary refill takes less than 2 seconds. Neurological:      General: No focal deficit present. Mental Status: She is alert and oriented to person, place, and time.    Psychiatric:         Mood and Affect: Mood normal.         Behavior: Behavior normal.         DIFFERENTIAL  DIAGNOSIS     Initial MDM/Plan: 22 y.o. female who presents with acute worsening of several months of generalized abdominal pain. She endorses some nausea/vomiting as well as diarrhea, which has improved with Imodium. Denies fever, chills, dysuria, flank pain, hematuria, vaginal discharge or bleeding. She has been worked up for this abdominal pain through the emergency department as well as with a GI doctor, has had a CT abdomen pelvis which was negative. Has endoscopy/colonoscopy scheduled for early September. Additionally, she endorses 2 episodes of blood-tinged sputum upon coughing. This is not bloody emesis, she distinctly said it is from coughing. She does have asthma and reports requiring her inhaler more frequently in the heat, but denies chest pain, no dyspnea, denies leg swelling. Vital signs reviewed, within normal limits. Physical examination was notable for diffuse abdominal tenderness to palpation, slightly worse in the right lower quadrant than anywhere else. Will obtain basic abdominal labs-urinalysis, hCG, lipase, CMP, CBC. Bentyl for pain, liter of normal saline. Given unremarkable imaging in the past, will not obtain more abdominal imaging, however will order a chest x-ray due to blood-specked sputum. Given duration of symptoms, anticipate possible admission to observation unit for evaluation by GI to expedite colonoscopy/endoscopy. DIAGNOSTIC RESULTS / EMERGENCYDEPARTMENT COURSE / MDM     LABS:  Labs Reviewed   CBC WITH AUTO DIFFERENTIAL - Abnormal; Notable for the following components:       Result Value    Hemoglobin 11.1 (*)     MCV 79.1 (*)     MCH 23.0 (*)     Lymphocytes 52 (*)     All other components within normal limits   COMPREHENSIVE METABOLIC PANEL W/ REFLEX TO MG FOR LOW K - Abnormal; Notable for the following components:     Total Bilirubin 0.18 (*)     Total Protein 6.2 (*)     All other components within normal limits   URINALYSIS WITH MICROSCOPIC - Abnormal; Notable for the following components:    Turbidity UA CLOUDY (*)     Bacteria, UA FEW (*) All other components within normal limits   LIPASE   HCG, SERUM, QUALITATIVE         RADIOLOGY:  Xr Chest (2 Vw)    Result Date: 8/24/2020  EXAMINATION: TWO XRAY VIEWS OF THE CHEST 8/24/2020 5:51 pm COMPARISON: 04/21/2020 HISTORY: ORDERING SYSTEM PROVIDED HISTORY: blood in sputum; r/o pna TECHNOLOGIST PROVIDED HISTORY: blood in sputum; r/o pna Acuity: Unknown Type of Exam: Unknown FINDINGS: The cardiac silhouette is unremarkable. The costophrenic angles are sharp. There is no pneumothorax. Lungs are clear. The trachea is midline. The bones are grossly intact. No acute cardiopulmonary process. EMERGENCY DEPARTMENT COURSE:  ED Course as of Aug 24 2214   Mon Aug 24, 2020   1718 19yoF with acute worsening of several months of generalized abdominal pain, nausea, vomiting, diarrhea. Denies urinary symptoms, denies vaginal bleeding or vaginal discharge. Denies fevers, chills. No dyspnea, leg swelling, chest pain, fevers, chills. Did have 2 episodes of blood-specked sputum in the past few days, but again no other signs or symptoms of PE. Vital signs reviewed, all within normal limits. Physical exam was notable for minor wheezes in the left upper lung fields as well as diffuse abdominal tenderness, worse in the right lower quadrant. Plan is abdominal labs, chest x-ray rule out pneumonia given blood suspect sputum. Patient is already had work-up for this abdominal pain, including CT abdomen pelvis and is seeing a gastroenterologist with planned endoscopy/colonoscopy on September 9. We will consider observation admission to expedite GI scope    [JT]   1820 All labs within normal limits, chest x-ray unremarkable. [JT]   2211 GI to evaluate patient in obs setting, possible scope tomorrow. Admitted to obs    [JT]      ED Course User Index  [JT] Dena Carrillo MD          PROCEDURES:  None    CONSULTS:  IP CONSULT TO GI      FINAL IMPRESSION      1.  Generalized abdominal pain

## 2020-08-24 NOTE — ED PROVIDER NOTES
[address] [facility]     Emergency Department     Faculty Attestation    I performed a history and physical examination of the patient and discussed management with the resident. I reviewed the residents note and agree with the documented findings and plan of care. Any areas of disagreement are noted on the chart. I was personally present for the key portions of any procedures. I have documented in the chart those procedures where I was not present during the key portions. I have reviewed the emergency nurses triage note. I agree with the chief complaint, past medical history, past surgical history, allergies, medications, social and family history as documented unless otherwise noted below. For Physician Assistant/ Nurse Practitioner cases/documentation I have personally evaluated this patient and have completed at least one if not all key elements of the E/M (history, physical exam, and MDM). Additional findings are as noted. I have personally seen and evaluated the patient. I find the patient's history and physical exam are consistent with the NP/PA documentation. I agree with the care provided, treatment rendered, disposition and follow-up plan. 73-year-old female with history of several months of abdominal pain, being worked up by GI with negative CTA imaging in the past, scheduled for EGD and colonoscopy on 9/9 presenting with severe worsening of pain. Also concerned because she has been coughing up little flecks of blood. She denies any lower extremity swelling or chest pain. She does feel like she has a asthma exacerbation with the heat recently. She has had diarrhea, no vomiting but has felt nauseous.     Exam:  General: Laying on the bed, awake, alert and in no acute distress  CV: normal rate and regular rhythm  Lungs: Breathing comfortably on room air with no tachypnea, hypoxia, or increased work of breathing and Faint wheezing in the upper fields bilaterally  Abdomen: Soft, mildly tender in the lower abdomen, no point tenderness      Plan:  Blood work  Pain control  Chest x-ray to rule out pneumonia  Low suspicion for PE based on lack of risk factors  If not improving, plan to admit to observation for GI evaluation        Tina Bernal MD   Attending Emergency  Physician             Tina Bernal MD  08/24/20 [id number]

## 2020-08-24 NOTE — CARE COORDINATION
Case Management Initial Discharge Plan  Donald Reddy,             Met with:patient to discuss discharge plans. Information verified: address, contacts, phone number, , insurance Yes    Emergency Contact/Next of Kin name & number: Ori Harley @ [phone]    PCP: Tanvi Shaw, MARY - CNP  Date of last visit:  Other month     Insurance Provider: Mignon Fearing     Discharge Planning    Living Arrangements:  Alone   Support Systems:  Family Members, Friends/Neighbors    Home has multiple stories  none stairs to climb to get into front door, elevator stairs to climb to reach second floor  Location of bedroom/bathroom in home main     Patient able to perform ADL's:Independent    Current Services (outpatient & in home) none  DME equipment: none  DME provider: na    Receiving oral anticoagulation therapy? No    If indicated:   Physician managing anticoagulation treatment: na  Where does patient obtain lab work for ATC treatment? na      Potential Assistance Needed:  N/A    Patient agreeable to home care: No  [place] of choice provided:  n/a    Prior SNF/Rehab Placement and Facility: none  Agreeable to SNF/Rehab: No  [place] of choice provided: n/a     Evaluation: n/a    Expected Discharge date:       Patient expects to be discharged to:  home  Follow Up Appointment: Best Day/ Time:      Transportation provider: family   Transportation arrangements needed for discharge: No    Readmission Risk              Risk of Unplanned Readmission:        0             Does patient have a readmission risk score greater than 14?: No  If yes, follow-up appointment must be made within 7 days of discharge.      Goals of Care:  Get stomach to stop hurting       Discharge Plan: DC to home independently; has established pcp care, family to [employer]          Electronically signed by Zen Chau RN on 20 at 7:00 PM EDT

## 2020-08-25 VITALS
DIASTOLIC BLOOD PRESSURE: 67 MMHG | WEIGHT: 180 LBS | OXYGEN SATURATION: 99 % | BODY MASS INDEX: 36.29 KG/M2 | RESPIRATION RATE: 18 BRPM | TEMPERATURE: 98.2 F | HEART RATE: 62 BPM | SYSTOLIC BLOOD PRESSURE: 115 MMHG | HEIGHT: 59 IN

## 2020-08-25 PROCEDURE — 99244 OFF/OP CNSLTJ NEW/EST MOD 40: CPT | Performed by: INTERNAL MEDICINE

## 2020-08-25 PROCEDURE — 96376 TX/PRO/DX INJ SAME DRUG ADON: CPT

## 2020-08-25 PROCEDURE — 2580000003 HC RX 258: Performed by: STUDENT IN AN ORGANIZED HEALTH CARE EDUCATION/TRAINING PROGRAM

## 2020-08-25 PROCEDURE — 88350 IMFLUOR EA ADDL 1ANTB STN PX: CPT

## 2020-08-25 PROCEDURE — G0378 HOSPITAL OBSERVATION PER HR: HCPCS

## 2020-08-25 PROCEDURE — 6360000002 HC RX W HCPCS: Performed by: STUDENT IN AN ORGANIZED HEALTH CARE EDUCATION/TRAINING PROGRAM

## 2020-08-25 PROCEDURE — 6370000000 HC RX 637 (ALT 250 FOR IP): Performed by: STUDENT IN AN ORGANIZED HEALTH CARE EDUCATION/TRAINING PROGRAM

## 2020-08-25 PROCEDURE — 6370000000 HC RX 637 (ALT 250 FOR IP): Performed by: INTERNAL MEDICINE

## 2020-08-25 PROCEDURE — 83520 IMMUNOASSAY QUANT NOS NONAB: CPT

## 2020-08-25 PROCEDURE — 86140 C-REACTIVE PROTEIN: CPT

## 2020-08-25 PROCEDURE — 96375 TX/PRO/DX INJ NEW DRUG ADDON: CPT

## 2020-08-25 PROCEDURE — 88346 IMFLUOR 1ST 1ANTB STAIN PX: CPT

## 2020-08-25 PROCEDURE — 36415 COLL VENOUS BLD VENIPUNCTURE: CPT

## 2020-08-25 PROCEDURE — 82397 CHEMILUMINESCENT ASSAY: CPT

## 2020-08-25 PROCEDURE — 81479 UNLISTED MOLECULAR PATHOLOGY: CPT

## 2020-08-25 RX ORDER — POLYETHYLENE GLYCOL 3350 17 G/17G
17 POWDER, FOR SOLUTION ORAL DAILY
Status: DISCONTINUED | OUTPATIENT
Start: 2020-08-25 | End: 2020-08-25 | Stop reason: HOSPADM

## 2020-08-25 RX ORDER — BISACODYL 10 MG
10 SUPPOSITORY, RECTAL RECTAL ONCE
Status: COMPLETED | OUTPATIENT
Start: 2020-08-25 | End: 2020-08-25

## 2020-08-25 RX ADMIN — Medication 10 ML: at 08:56

## 2020-08-25 RX ADMIN — Medication 10 ML: at 10:22

## 2020-08-25 RX ADMIN — Medication 10 ML: at 01:53

## 2020-08-25 RX ADMIN — POLYETHYLENE GLYCOL 3350 17 G: 17 POWDER, FOR SOLUTION ORAL at 10:22

## 2020-08-25 RX ADMIN — ONDANSETRON 4 MG: 2 INJECTION INTRAMUSCULAR; INTRAVENOUS at 10:22

## 2020-08-25 RX ADMIN — KETOROLAC TROMETHAMINE 30 MG: 30 INJECTION, SOLUTION INTRAMUSCULAR; INTRAVENOUS at 01:48

## 2020-08-25 RX ADMIN — BISACODYL 10 MG: 10 SUPPOSITORY RECTAL at 10:22

## 2020-08-25 RX ADMIN — PANTOPRAZOLE SODIUM 20 MG: 20 TABLET, DELAYED RELEASE ORAL at 08:56

## 2020-08-25 RX ADMIN — DICYCLOMINE HYDROCHLORIDE 10 MG: 10 CAPSULE ORAL at 08:56

## 2020-08-25 ASSESSMENT — PAIN SCALES - GENERAL: PAINLEVEL_OUTOF10: 7

## 2020-08-25 NOTE — PROGRESS NOTES
[facility]  CDU / OBSERVATION eNCOUnter  Attending NOte       I performed a history and physical examination of the patient and discussed management with the resident. I reviewed the residents note and agree with the documented findings and plan of care. Any areas of disagreement are noted on the chart. I was personally present for the key portions of any procedures. I have documented in the chart those procedures where I was not present during the key portions. I have reviewed the nurses notes. I agree with the chief complaint, past medical history, past surgical history, allergies, medications, social and family history as documented unless otherwise noted below. The Family history, social history, and ROS are effectively unchanged since admission unless noted elsewhere in the chart. 64-year-old female admitted for intractable abdominal pain. The patient has an EGD and colonoscopy scheduled for September 9. She is feeling better today. She has ordered her lunch. She was evaluated by gastroenterology, and their opinion was that she should keep her appointment for the EGD and colonoscopy as scheduled. Patient is agreeable. She is requesting a work note for the day off work, and we will provide her with this.     Girma Cooper MD  Attending Emergency  Physician

## 2020-08-25 NOTE — DISCHARGE SUMMARY
CDU Discharge Summary        Patient:  Mehrdad Christensen  YOB: 2000    MRN: [id number]   Acct: [de-identified]    Primary Care Physician: MARY Pereira CNP    Admit date:  8/24/2020  4:54 PM  Discharge date: 08/25/20     Discharge Diagnoses:     Acute diffuse abdominal pain with diarrhea and constipation due to possible IBS  Improved with symptomatic management    Follow-up:  Call today/tomorrow for a follow up appointment with MARY Pereira CNP , or return to the Emergency Room with worsening symptoms    Stressed to patient the importance of following up with primary care doctor for further workup/management of symptoms. Pt verbalizes understanding and agrees with plan.     Discharge Medications:  Changes to medications         Nash Reddy[facility]   Home Medication Instructions AQX:[id number]    Printed on:08/25/20 1123   Medication Information                      acetaminophen (APAP EXTRA STRENGTH) 500 MG tablet  Take 2 tablets by mouth every 6 hours as needed for Pain             albuterol sulfate HFA (VENTOLIN HFA) 108 (90 Base) MCG/ACT inhaler  Inhale 2 puffs into the lungs every 6 hours as needed for Wheezing             bisacodyl (DULCOLAX) 5 MG EC tablet  TAKE 4 TABS AS DIRECTED BY PHYSICIAN OFFICE             dicyclomine (BENTYL) 10 MG capsule  Take 1 capsule by mouth 4 times daily             ibuprofen (ADVIL;MOTRIN) 600 MG tablet  Take 1 tablet by mouth every 6 hours as needed for Pain             magnesium citrate solution  Take 296 mLs by mouth once for 1 dose             magnesium citrate solution  Take 296 mLs by mouth once for 1 dose             mometasone-formoterol (DULERA) 200-5 MCG/ACT inhaler  Inhale 2 puffs into the lungs every 12 hours             pantoprazole (PROTONIX) 20 MG tablet  Take 1 tablet by mouth every morning (before breakfast)             polyethylene glycol (GLYCOLAX) 17 GM/SCOOP powder  Use as directed by following your patient instructions given by office.                  Diet:  DIET GENERAL; , Advance as tolerated     Activity:  As tolerated    Consultants: IP CONSULT TO GI    Procedures:  Not indicated     Diagnostic Test:   Results for orders placed or performed during the hospital encounter of 08/24/20   CBC Auto Differential   Result Value Ref Range    WBC 4.7 4.5 - 13.5 k/uL    RBC 4.83 3.95 - 5.11 m/uL    Hemoglobin 11.1 (L) 11.9 - 15.1 g/dL    Hematocrit 38.2 36.3 - 47.1 %    MCV 79.1 (L) 82.6 - 102.9 fL    MCH 23.0 (L) 25.2 - 33.5 pg    MCHC 29.1 28.4 - 34.8 g/dL    RDW 14.2 11.8 - 14.4 %    Platelets 266 169 - 867 k/uL    MPV 12.6 8.1 - 13.5 fL    NRBC Automated 0.0 0.0 per 100 WBC    Differential Type NOT REPORTED     Seg Neutrophils 40 34 - 64 %    Lymphocytes 52 (H) 25 - 45 %    Monocytes 7 2 - 8 %    Eosinophils % 1 1 - 4 %    Basophils 0 0 - 2 %    Immature Granulocytes 0 0 %    Segs Absolute 1.87 1.50 - 8.10 k/uL    Absolute Lymph # 2.38 1.10 - 3.70 k/uL    Absolute Mono # 0.33 0.10 - 1.40 k/uL    Absolute Eos # 0.05 0.00 - 0.44 k/uL    Basophils Absolute <0.03 0.00 - 0.20 k/uL    Absolute Immature Granulocyte <0.03 0.00 - 0.30 k/uL    WBC Morphology NOT REPORTED     RBC Morphology MICROCYTOSIS PRESENT     Platelet Estimate NOT REPORTED    Comprehensive Metabolic Panel w/ Reflex to MG   Result Value Ref Range    Glucose 87 70 - 99 mg/dL    BUN 13 6 - 20 mg/dL    CREATININE 0.53 0.50 - 0.90 mg/dL    Bun/Cre Ratio NOT REPORTED 9 - 20    Calcium 9.2 8.6 - 10.4 mg/dL    Sodium 139 135 - 144 mmol/L    Potassium 4.0 3.7 - 5.3 mmol/L    Chloride 106 98 - 107 mmol/L    CO2 24 20 - 31 mmol/L    Anion Gap 9 9 - 17 mmol/L    Alkaline Phosphatase 50 35 - 104 U/L    ALT 17 5 - 33 U/L    AST 27 <32 U/L    Total Bilirubin 0.18 (L) 0.3 - 1.2 mg/dL    Total Protein 6.2 (L) 6.4 - 8.3 g/dL    Alb 3.6 3.5 - 5.2 g/dL    Albumin/Globulin Ratio 1.4 1.0 - 2.5    GFR Non-African American >60 >60 mL/min    GFR African American >60 >60 mL/min    GFR Comment          GFR Staging NOT REPORTED    Lipase   Result Value Ref Range    Lipase 32 13 - 60 U/L   Urinalysis with Microscopic   Result Value Ref Range    Color, UA YELLOW YELLOW    Turbidity UA CLOUDY (A) CLEAR    Glucose, Ur NEGATIVE NEGATIVE    Bilirubin Urine NEGATIVE NEGATIVE    Ketones, Urine NEGATIVE NEGATIVE    Specific Gravity, UA 1.023 1.005 - 1.030    Urine Hgb NEGATIVE NEGATIVE    pH, UA 7.0 5.0 - 8.0    Protein, UA NEGATIVE NEGATIVE    Urobilinogen, Urine Normal Normal    Nitrite, Urine NEGATIVE NEGATIVE    Leukocyte Esterase, Urine NEGATIVE NEGATIVE    -          WBC, UA 5 TO 10 0 - 5 /HPF    RBC, UA None 0 - 4 /HPF    Casts UA  0 - 8 /LPF     2 TO 5 HYALINE Reference range defined for non-centrifuged specimen. Crystals, UA NOT REPORTED None /HPF    Epithelial Cells UA 5 TO 10 0 - 5 /HPF    Renal Epithelial, UA NOT REPORTED 0 /HPF    Bacteria, UA FEW (A) None    Mucus, UA NOT REPORTED None    Trichomonas, UA NOT REPORTED None    Amorphous, UA NOT REPORTED None    Other Observations UA NOT REPORTED NOT REQ. Yeast, UA NOT REPORTED None   HCG Qualitative, Serum   Result Value Ref Range    hCG Qual NEGATIVE NEGATIVE     Xr Chest (2 Vw)    Result Date: 8/24/2020  EXAMINATION: TWO XRAY VIEWS OF THE CHEST 8/24/2020 5:51 pm COMPARISON: 04/21/2020 HISTORY: ORDERING SYSTEM PROVIDED HISTORY: blood in sputum; r/o pna TECHNOLOGIST PROVIDED HISTORY: blood in sputum; r/o pna Acuity: Unknown Type of Exam: Unknown FINDINGS: The cardiac silhouette is unremarkable. The costophrenic angles are sharp. There is no pneumothorax. Lungs are clear. The trachea is midline. The bones are grossly intact. No acute cardiopulmonary process. Xr Wrist Left (min 3 Views)    Result Date: 8/6/2020  EXAMINATION: THREE XRAY VIEWS OF THE LEFT HAND; 4 XRAY VIEWS OF THE LEFT WRIST 8/6/2020 12:49 am COMPARISON: None.  HISTORY: ORDERING SYSTEM PROVIDED HISTORY: Fall, pain, scaphoid view please TECHNOLOGIST PROVIDED HISTORY: Fall, pain, scaphoid view please Reason for Exam: fall, pain Acuity: Acute Type of Exam: Initial; ORDERING SYSTEM PROVIDED HISTORY: Fall, pain TECHNOLOGIST PROVIDED HISTORY: Fall, pain Reason for Exam: fall, pain Acuity: Acute Type of Exam: Initial FINDINGS: No acute fracture or dislocation of the left hand. Joint spaces are preserved. No acute fracture or dislocation of the left wrist.  Joint spaces are preserved. No acute abnormality. Xr Hand Left (min 3 Views)    Result Date: 8/6/2020  EXAMINATION: THREE XRAY VIEWS OF THE LEFT HAND; 4 XRAY VIEWS OF THE LEFT WRIST 8/6/2020 12:49 am COMPARISON: None. HISTORY: ORDERING SYSTEM PROVIDED HISTORY: Fall, pain, scaphoid view please TECHNOLOGIST PROVIDED HISTORY: Fall, pain, scaphoid view please Reason for Exam: fall, pain Acuity: Acute Type of Exam: Initial; ORDERING SYSTEM PROVIDED HISTORY: Fall, pain TECHNOLOGIST PROVIDED HISTORY: Fall, pain Reason for Exam: fall, pain Acuity: Acute Type of Exam: Initial FINDINGS: No acute fracture or dislocation of the left hand. Joint spaces are preserved. No acute fracture or dislocation of the left wrist.  Joint spaces are preserved. No acute abnormality. Ct Abdomen Pelvis W Iv Contrast Additional Contrast? Oral    Result Date: 8/4/2020  EXAMINATION: CT OF THE ABDOMEN AND PELVIS WITH CONTRAST 8/4/2020 2:16 pm TECHNIQUE: CT of the abdomen and pelvis was performed with the administration of intravenous contrast. Multiplanar reformatted images are provided for review. Dose modulation, iterative reconstruction, and/or weight based adjustment of the mA/kV was utilized to reduce the radiation dose to as low as reasonably achievable. COMPARISON: 02/04/2019 HISTORY: ORDERING SYSTEM PROVIDED HISTORY: Generalized abdominal pain TECHNOLOGIST PROVIDED HISTORY: 1 month with abd pain, RUQ US done FINDINGS: Lower Chest: Normal heart size. Lung bases clear. Organs:  The liver, spleen, pancreas, adrenal glands kidneys and gallbladder appear unremarkable. GI/Bowel: No evidence of bowel obstruction or inflammation. Normal appendix. Pelvis: Bladder and uterus appear unremarkable. Right ovarian 2.1 cm cyst for which no follow-up imaging is recommended per guidelines below. Peritoneum/Retroperitoneum: Normal caliber abdominal aorta. No suspicious lymphadenopathy. No ascites or free air. Bones/Soft Tissues: No significant osseous abnormality. Negative CT examination of the abdomen and pelvis with no evidence of acute process. RECOMMENDATIONS: 2.1 cm benign appearing ovarian cyst. No follow-up imaging is recommended. Reference: J Am Anish Radiol 2013;10:[id number]           Physical Exam:    General appearance - NAD, AOx 3   Lungs -CTAB, no R/R/R  Heart - RRR, no M/R/G  Abdomen - Soft, NT/ND  Neurological:  MAEx4, No focal motor deficit, sensory loss  Extremities - Cap refil <2 sec in all ext., no edema  Skin -warm, dry      Hospital Course:  Clinical course has improved, labs and imaging reviewed. Clayton Larose originally presented to the hospital on 8/24/2020  4:54 PM. with acute diffuse abdominal pain, associated with diarrhea and constipation, work-up in the ED was unremarkable. At that time it was determined that She required further observation and GI consult. She was admitted and labs and imaging were followed daily. Imaging results as above. She is medically stable to be discharged. Disposition: Home    Patient stated that they will not drive themselves home from the hospital if they have gotten pain killers/ narcotics earlier that day and that they will arrange for transportation on their own or work with the  for a ride. Patient counseled NOT to drive while under the influence of narcotics/ pain killers. Condition: Good    Patient stable and ready for discharge home. I have discussed plan of care with patient and they are in understanding. They were instructed to read discharge paperwork.

## 2020-08-25 NOTE — CONSULTS
[facility]    GASTROENTEROLOGY CONSULT    Patient:   Read Rosalind Reddy   :    2000   Facility:   [facility]   Date:    2020  Admission Dx: Intractable abdominal pain [R10.9]  Requesting physician: Evelyne Reyes MD  Reason for consult:  Several months of abdominal pain, acutely worsened over weekend. Patient of Dr Katie Green, has EGD/colonoscopy scheduled for   CC : Abdominal pain and coughing up blood    SUBJECTIVE     HISTORY OF PRESENT ILLNESS  This is a 22 y.o. AA  female who was admitted 2020 with Intractable abdominal pain [R10.9]. We have been asked to see the patient in consultation by Evelyne Reyes MD for Several months of abdominal pain, acutely worsened over weekend. Patient of Dr Katie Green, has EGD/colonoscopy scheduled for 57    22-year-old female past medical history as described below presented to the hospital with reports of abdominal pain, and coughing up specs of blood. Patient reports she has been experiencing diffuse abdominal pain for the past 2 months. Pain described as sharp and constant. Aggravated by food and worse at night. Improved with heating pad and Bentyl. She has associated early satiety and 10 pound weight loss over the past month. No nausea or or vomiting. Patient reports irregular bowel habits that vary between diarrhea, normal every day stool and constipation. Patient currently experienced constipation with last stool 5 days ago. Patient denies any melena or hematochezia. No family history of IBD. She is following with Dr. Katie Green with ongoing work-up. Current work-up including imaging and serology have been nondiagnostic. Plan is for EGD and colonoscopy in early September. Patient reports she was started on Bentyl and PPI with improved symptoms. Patient reports her asthma has not been well controlled and she has been having some upper respiratory symptoms as well. Yesterday she was coughing up some blood and notified the GI office. Office recommended hospital evaluation. Patient denies any hematemesis. On physical exam patient denies any abdominal pain with palpation. Patient requesting diet    OBJECTIVE:     PAST MEDICAL/SURGICAL HISTORY  Past Medical History:   Diagnosis Date    Acute bronchitis     unspecified organism    Allergic     Allergic rhinitis     Allergy     Asthma     Severe persistent asthma, unspecified whether complicated    Chronic vasomotor rhinitis     GERD (gastroesophageal reflux disease)     Headache(784.0) 3/1/2012    Morbid obesity with BMI of 40.0-44.9, adult ([facility] [address] 12/8/2016    NELSON (obstructive sleep apnea)     Pure hypercholesterolemia 12/8/2016    Severe persistent asthma     Unspecified sleep apnea     Vision disturbance 1/31/2014    wears glassses     Past Surgical History:   Procedure Laterality Date    ADENOIDECTOMY      OTHER SURGICAL HISTORY  08/28/14    Nexplanon placement    TONSILLECTOMY         ALLERGIES:  Allergies   Allergen Reactions    Banana     Peanut-Containing Drug Products     Food Rash     Peanuts,walnuts,cashews,pecans apples cherries       HOME MEDICATIONS:  Prior to Admission medications    Medication Sig Start Date End Date Taking? Authorizing Provider   dicyclomine (BENTYL) 10 MG capsule Take 1 capsule by mouth 4 times daily 8/17/20  Yes Doug Jessica MD   polyethylene glycol (GLYCOLAX) 17 GM/SCOOP powder Use as directed by following your patient instructions given by office.  8/17/20 9/16/20 Yes Kalyn Cruz MD   bisacodyl (DULCOLAX) 5 MG EC tablet TAKE 4 TABS AS DIRECTED BY PHYSICIAN OFFICE 8/17/20  Yes Kalyn Cruz MD   magnesium citrate solution Take 296 mLs by mouth once for 1 dose 8/17/20 8/24/20 Yes Doug Jesisca MD   mometasone-formoterol (DULERA) 200-5 MCG/ACT inhaler Inhale 2 puffs into the lungs every 12 hours 8/10/20  Yes MARY Palacios - CNP   acetaminophen (APAP EXTRA STRENGTH) 500 MG tablet Take 2 tablets by mouth every 6 hours as needed for Pain 8/6/20  Yes Sruthi Deal, DO   ibuprofen (ADVIL;MOTRIN) 600 MG tablet Take 1 tablet by mouth every 6 hours as needed for Pain 8/6/20  Yes Sruthi Deal, DO   pantoprazole (PROTONIX) 20 MG tablet Take 1 tablet by mouth every morning (before breakfast) 7/27/20 8/26/20 Yes MARY Chatterjee CNP   albuterol sulfate HFA (VENTOLIN HFA) 108 (90 Base) MCG/ACT inhaler Inhale 2 puffs into the lungs every 6 hours as needed for Wheezing 7/2/20  Yes Verenice Nguyen MD   magnesium citrate solution Take 296 mLs by mouth once for 1 dose 2/16/17 8/17/21 Yes MARY Sykes CNP       CURRENT MEDICATIONS:  Scheduled Meds:   dicyclomine  10 mg Oral 4x Daily    pantoprazole  20 mg Oral QAM AC    mometasone-formoterol  2 puff Inhalation Q12H    sodium chloride flush  10 mL Intravenous 2 times per day    enoxaparin  40 mg Subcutaneous Daily     Continuous Infusions:  PRN Meds:albuterol sulfate HFA, sodium chloride flush, acetaminophen, ondansetron, ketorolac    SOCIAL HISTORY:     Tobacco:   reports that she quit smoking about 3 months ago. Her smoking use included cigarettes. She started smoking about 4 years ago. She has never used smokeless tobacco.  Alcohol:   reports no history of alcohol use. Illicit drugs:  reports no history of drug use.     FAMILY HISTORY:     Family History   Problem Relation Age of Onset    Asthma Mother     Lupus Mother     High Blood Pressure Father     Diabetes Father     Diabetes Maternal Grandfather     High Blood Pressure Maternal Grandfather     Heart Disease Paternal Grandmother     Diabetes Paternal Grandmother     High Blood Pressure Paternal Grandfather     Heart Disease Paternal Uncle     Heart Disease Paternal Aunt     Arthritis Maternal Aunt     Cancer Maternal Aunt     Birth Defects Neg Hx     Depression Neg Hx     Early Death Neg Hx     Hearing Loss Neg Hx     High Cholesterol Neg Hx     Kidney Disease Neg Hx     Learning Disabilities Neg Hx     Mental Illness Neg Hx     Mental Retardation Neg Hx     Miscarriages / Stillbirths Neg Hx     Stroke Neg Hx     Substance Abuse Neg Hx     Vision Loss Neg Hx     Other Neg Hx     Ovarian Cancer Neg Hx     Breast Cancer Neg Hx        REVIEW OF SYSTEMS:    Constitutional: No fever, no chills, no lethargy, no weakness. HEENT:  No headache, otalgia, itchy eyes, nasal discharge or sore throat. Cardiac:  No chest pain, dyspnea, orthopnea or PND. Chest:   + cough, phlegm or wheezing. + Hemoptysis  Abdomen:  + abdominal pain, no nausea or vomiting., + constipation  Neuro:  No focal weakness, abnormal movements or seizure like activity. Skin:   No rashes, no itching. :   No hematuria, no pyuria, no dysuria, no flank pain. Extremities:  No swelling or joint pains. ROS was otherwise negative except as mentioned in the [address] PHYSICAL EXAM:    /88   Pulse 64   Temp 98.1 °F (36.7 °C) (Oral)   Resp 16   Ht 4' 11\" (1.499 m)   Wt 180 lb (81.6 kg)   LMP 08/14/2020 (Approximate)   SpO2 100%   BMI 36.36 kg/m²     GENERAL:   Well developed, Well nourished, No apparent distress  HEAD:   Normocephalic, Atraumatic  EENT:   EOMI, Sclera not icteric, Oropharynx moist   NECK:   Supple, Trachea midline  LUNGS:  Wheezes Bilaterally  HEART:  RRR, No murmur  ABDOMEN:   Soft, obese, Nontender, Nondistended, BS WNL  EXT:   No clubbing. No cyanosis. No edema. SKIN:   No rashes. No jaundice. No stigmata of liver disease.     MUSC/SKEL:   Adequate muscle bulk for patient's age, No significant synovitis, No deformities  NEURO:  A&O x Three, CN II- XII grossly intact      LABS AND IMAGING:     CBC  Recent Labs     08/24/20  1744   WBC 4.7   HGB 11.1*   HCT 38.2   MCV 79.1*   MCH 23.0*   MCHC 29.1          IMMATURE PLTs  No results found for: PLTFLUORE    BMP  Recent Labs     08/24/20  1744      K 4.0      CO2 24   BUN 13 CREATININE 0.53   GLUCOSE 87   CALCIUM 9.2       LFTS  Recent Labs     08/24/20  1744   ALKPHOS 50   ALT 17   AST 27   PROT 6.2*   BILITOT 0.18*   LABALBU 3.6       AMYLASE/LIPASE/AMMONIA  Recent Labs     08/24/20  1744   LIPASE 32       PT/INR  No results for input(s): PROTIME, INR in the last 72 hours. ANEMIA STUDIES  No results for input(s): IRON, LABIRON, TIBC, UIBC, FERRITIN, [id number], FOLATE, OCCULTBLD in the last 72 hours. IMAGING  Xr Chest (2 Vw)    Result Date: 8/24/2020  EXAMINATION: TWO XRAY VIEWS OF THE CHEST 8/24/2020 5:51 pm COMPARISON: 04/21/2020 HISTORY: ORDERING SYSTEM PROVIDED HISTORY: blood in sputum; r/o pna TECHNOLOGIST PROVIDED HISTORY: blood in sputum; r/o pna Acuity: Unknown Type of Exam: Unknown FINDINGS: The cardiac silhouette is unremarkable. The costophrenic angles are sharp. There is no pneumothorax. Lungs are clear. The trachea is midline. The bones are grossly intact. No acute cardiopulmonary process. Ct Abdomen Pelvis W Iv Contrast Additional Contrast? Oral    Result Date: 8/4/2020  EXAMINATION: CT OF THE ABDOMEN AND PELVIS WITH CONTRAST 8/4/2020 2:16 pm TECHNIQUE: CT of the abdomen and pelvis was performed with the administration of intravenous contrast. Multiplanar reformatted images are provided for review. Dose modulation, iterative reconstruction, and/or weight based adjustment of the mA/kV was utilized to reduce the radiation dose to as low as reasonably achievable. COMPARISON: 02/04/2019 HISTORY: ORDERING SYSTEM PROVIDED HISTORY: Generalized abdominal pain TECHNOLOGIST PROVIDED HISTORY: 1 month with abd pain, RUQ US done FINDINGS: Lower Chest: Normal heart size. Lung bases clear. Organs: The liver, spleen, pancreas, adrenal glands kidneys and gallbladder appear unremarkable. GI/Bowel: No evidence of bowel obstruction or inflammation. Normal appendix. Pelvis: Bladder and uterus appear unremarkable.   Right ovarian 2.1 cm cyst for which no follow-up imaging

## 2020-08-25 NOTE — H&P
[facility]  CDU / OBSERVATION eNCOUnter  Resident Note     Pt Name: Jennifer Diaz  MRN: [id number]  [place] 2000  Date of evaluation: 8/25/20  Patient's PCP is :  MARY Boyd - M[place]    CHIEF COMPLAINT       Chief Complaint   Patient presents with    Abdominal Pain     abdominal pain worsening over the weekend. Pt scheduled for scope and colonoscopy on 9/9         HISTORY OF PRESENT ILLNESS    Jennifer Diaz is a 22 y.o. female who presents with acute exacerbation of chronic generalized abdominal pain/cramping, associated with nausea and vomiting. Patient also complaining of blood-tinged sputum when coughing, but denies shortness of breath or chest pain, or leg swelling. Patient is scheduled for endoscopy and colonoscopy for September 9, previously worked up for similar abdominal symptoms with a negative CT Abdo pelvis. Location/Symptom: Diffuse generalized abdomen  Timing/Onset: 2 days ago worsening  Provocation: Unknown  Quality: Cramping  Radiation: None  Severity: Unknown  Timing/Duration: Constant  Modifying Factors: Unknown    REVIEW OF SYSTEMS       General ROS - No fevers, No malaise   Ophthalmic ROS - No discharge, No changes in vision  ENT ROS -  No sore throat, No rhinorrhea,   Respiratory ROS - no shortness of breath, no cough, no  wheezing  Cardiovascular ROS - No chest pain, no dyspnea on exertion  Gastrointestinal ROS - No abdominal pain, no nausea or vomiting, no change in bowel habits, no black or bloody stools  Genito-Urinary ROS - No dysuria, trouble voiding, or hematuria  Musculoskeletal ROS - No myalgias, No arthalgias  Neurological ROS - No headache, no dizziness/lightheadedness, No focal weakness, no loss of sensation  Dermatological ROS - No lesions, No rash     (PQRS) Advance directives on face sheet per hospital policy.  No change unless specifically mentioned in chart    PAST MEDICAL HISTORY    has a past medical history of Acute bronchitis, Allergic, Allergic rhinitis, Allergy, Asthma, Chronic vasomotor rhinitis, GERD (gastroesophageal reflux disease), Headache(784.0), Morbid obesity with BMI of 40.0-44.9, adult ([address] NELSON (obstructive sleep apnea), Pure hypercholesterolemia, Severe persistent asthma, Unspecified sleep apnea, and Vision disturbance. I have reviewed the past medical history with the patient and it is pertinent to this complaint. SURGICAL HISTORY      has a past surgical history that includes Adenoidectomy; Tonsillectomy; and other surgical history (14). I have reviewed and agree with Surgical History entered and it is pertinent to this complaint. CURRENT MEDICATIONS     albuterol sulfate  (90 Base) MCG/ACT inhaler 2 puff, Q6H PRN  dicyclomine (BENTYL) capsule 10 mg, 4x Daily  pantoprazole (PROTONIX) tablet 20 mg, QAM AC  mometasone-formoterol (DULERA) 200-5 MCG/ACT inhaler 2 puff, Q12H  sodium chloride flush 0.9 % injection 10 mL, 2 times per day  sodium chloride flush 0.9 % injection 10 mL, PRN  acetaminophen (TYLENOL) tablet 650 mg, Q4H PRN  enoxaparin (LOVENOX) injection 40 mg, Daily  ondansetron (ZOFRAN) injection 4 mg, Q8H PRN  ketorolac (TORADOL) injection 30 mg, Q6H PRN        All medication charted and reviewed. ALLERGIES     is allergic to banana; peanut-containing drug products; and food. FAMILY HISTORY     She indicated that her mother is alive. She indicated that her father is alive. She indicated that her sister is alive. She indicated that her maternal grandmother is alive. She indicated that her maternal grandfather is alive. She indicated that her paternal grandmother is . She indicated that her paternal grandfather is alive. She indicated that the status of her maternal aunt is unknown. She indicated that the status of her paternal aunt is unknown. She indicated that the status of her paternal uncle is unknown.  She indicated that the status of her neg hx is unknown.     family history includes Arthritis in her maternal aunt; Asthma in her mother; Cancer in her maternal aunt; Diabetes in her father, maternal grandfather, and paternal grandmother; Heart Disease in her paternal aunt, paternal grandmother, and paternal uncle; High Blood Pressure in her father, maternal grandfather, and paternal grandfather; Lupus in her mother. The patient denies any pertinent family history. I have reviewed and agree with the family history entered. I have reviewed the Family History and it is not significant to the case    SOCIAL HISTORY      reports that she quit smoking about 3 months ago. Her smoking use included cigarettes. She started smoking about 4 years ago. She has never used smokeless tobacco. She reports that she does not drink alcohol or use drugs. I have reviewed and agree with all Social.  There are no concerns for substance abuse/use. PHYSICAL EXAM     INITIAL VITALS:  height is 4' 11\" (1.499 m) and weight is 180 lb (81.6 kg). Her oral temperature is 98.1 °F (36.7 °C). Her blood pressure is 121/88 and her pulse is 64. Her respiration is 16 and oxygen saturation is 100%.       CONSTITUTIONAL: AOx4, no apparent distress, appears stated age    HEAD: normocephalic, atraumatic   EYES: PERRLA, EOMI    ENT: moist mucous membranes, uvula midline   NECK: supple, symmetric   BACK: symmetric   LUNGS: clear to auscultation bilaterally   CARDIOVASCULAR: regular rate and rhythm, no murmurs, rubs or gallops   ABDOMEN: soft, non-tender, non-distended with normal active bowel sounds   NEUROLOGIC:  MAEx4, no focal sensory or motor deficits   MUSCULOSKELETAL: no clubbing, cyanosis or edema   SKIN: no rash or wounds       DIFFERENTIAL DIAGNOSIS/MDM:     Acute pancreatitis, acute cholecystitis, acute appendicitis, celiac disease, Crohn's/UC    DIAGNOSTIC RESULTS     EKG: All EKG's are interpreted by the Observation Physician who either signs or Co-signs this chart in the absence of a cardiologist.    EKG Interpretation    None    RADIOLOGY:   I directly visualized the following  images and reviewed the radiologist interpretations:    Xr Chest (2 Vw)    Result Date: 8/24/2020  EXAMINATION: TWO XRAY VIEWS OF THE CHEST 8/24/2020 5:51 pm COMPARISON: 04/21/2020 HISTORY: ORDERING SYSTEM PROVIDED HISTORY: blood in sputum; r/o pna TECHNOLOGIST PROVIDED HISTORY: blood in sputum; r/o pna Acuity: Unknown Type of Exam: Unknown FINDINGS: The cardiac silhouette is unremarkable. The costophrenic angles are sharp. There is no pneumothorax. Lungs are clear. The trachea is midline. The bones are grossly intact. No acute cardiopulmonary process. LABS:  I have reviewed and interpreted all available lab results. Labs Reviewed   CBC WITH AUTO DIFFERENTIAL - Abnormal; Notable for the following components:       Result Value    Hemoglobin 11.1 (*)     MCV 79.1 (*)     MCH 23.0 (*)     Lymphocytes 52 (*)     All other components within normal limits   COMPREHENSIVE METABOLIC PANEL W/ REFLEX TO MG FOR LOW K - Abnormal; Notable for the following components: Total Bilirubin 0.18 (*)     Total Protein 6.2 (*)     All other components within normal limits   URINALYSIS WITH MICROSCOPIC - Abnormal; Notable for the following components:    Turbidity UA CLOUDY (*)     Bacteria, UA FEW (*)     All other components within normal limits   LIPASE   HCG, SERUM, QUALITATIVE         CDU IMPRESSION / Robet Oppenheim is a 22 y.o. female who presents with acute worsening of generalized abdominal pain for the last several months, associated with nausea and vomiting. GI work-up in the ED was unremarkable, patient has GI appointment on September 9 for EGD and colonoscopy, will expedite this.     · Awaiting GI consult, likely endoscopy today  · Patient not on bowel prep, will consider colonoscopy as per GI if necessary  · Continue home medications and pain control  · Monitor vitals, labs, and imaging  · DISPO: pending consults and clinical improvement    CONSULTS:    IP CONSULT TO GI    PROCEDURES:  Not indicated       PATIENT REFERRED TO:    Kaushal Brown, APRN - CNP  [address]            --  Jeffry Savage MD   Emergency Medicine Resident     This dictation was generated by voice recognition computer software. Although all attempts are made to edit the dictation for accuracy, there may be errors in the transcription that are not intended.

## 2020-09-03 LAB — IBD PANEL: NORMAL

## 2020-09-08 ENCOUNTER — HOSPITAL ENCOUNTER (EMERGENCY)
Age: 20
Discharge: HOME | End: 2020-09-08
Attending: EMERGENCY MEDICINE
Payer: COMMERCIAL

## 2020-09-08 ENCOUNTER — TELEPHONE (OUTPATIENT)
Dept: GASTROENTEROLOGY | Age: 20
End: 2020-09-08

## 2020-09-08 VITALS
OXYGEN SATURATION: 100 % | TEMPERATURE: 98.3 F | HEART RATE: 80 BPM | DIASTOLIC BLOOD PRESSURE: 90 MMHG | RESPIRATION RATE: 16 BRPM | SYSTOLIC BLOOD PRESSURE: 143 MMHG

## 2020-09-08 LAB
-: NORMAL
ABSOLUTE EOS #: <0.03 K/UL (ref 0–0.44)
ABSOLUTE IMMATURE GRANULOCYTE: <0.03 K/UL (ref 0–0.3)
ABSOLUTE LYMPH #: 1.11 K/UL (ref 1.1–3.7)
ABSOLUTE MONO #: 0.3 K/UL (ref 0.1–1.2)
ALBUMIN SERPL-MCNC: 3.9 G/DL (ref 3.5–5.2)
ALBUMIN/GLOBULIN RATIO: 1.1 (ref 1–2.5)
ALP BLD-CCNC: 71 U/L (ref 35–104)
ALT SERPL-CCNC: 26 U/L (ref 5–33)
AMORPHOUS: NORMAL
ANION GAP SERPL CALCULATED.3IONS-SCNC: 11 MMOL/L (ref 9–17)
AST SERPL-CCNC: 34 U/L
BACTERIA: NORMAL
BASOPHILS # BLD: 1 % (ref 0–2)
BASOPHILS ABSOLUTE: <0.03 K/UL (ref 0–0.2)
BILIRUB SERPL-MCNC: 0.25 MG/DL (ref 0.3–1.2)
BILIRUBIN URINE: NEGATIVE
BUN BLDV-MCNC: 13 MG/DL (ref 6–20)
BUN/CREAT BLD: ABNORMAL (ref 9–20)
CALCIUM SERPL-MCNC: 9.3 MG/DL (ref 8.6–10.4)
CASTS UA: NORMAL /LPF (ref 0–8)
CHLORIDE BLD-SCNC: 107 MMOL/L (ref 98–107)
CO2: 20 MMOL/L (ref 20–31)
COLOR: YELLOW
COMMENT UA: ABNORMAL
CREAT SERPL-MCNC: 0.63 MG/DL (ref 0.5–0.9)
CRYSTALS, UA: NORMAL /HPF
DIFFERENTIAL TYPE: ABNORMAL
EOSINOPHILS RELATIVE PERCENT: 0 % (ref 1–4)
EPITHELIAL CELLS UA: NORMAL /HPF (ref 0–5)
GFR AFRICAN AMERICAN: >60 ML/MIN
GFR NON-AFRICAN AMERICAN: >60 ML/MIN
GFR SERPL CREATININE-BSD FRML MDRD: ABNORMAL ML/MIN/{1.73_M2}
GFR SERPL CREATININE-BSD FRML MDRD: ABNORMAL ML/MIN/{1.73_M2}
GLUCOSE BLD-MCNC: 102 MG/DL (ref 70–99)
GLUCOSE URINE: NEGATIVE
HCG QUANTITATIVE: <1 IU/L
HCT VFR BLD CALC: 39.6 % (ref 36.3–47.1)
HEMOGLOBIN: 11.9 G/DL (ref 11.9–15.1)
IMMATURE GRANULOCYTES: 0 %
KETONES, URINE: NEGATIVE
LEUKOCYTE ESTERASE, URINE: NEGATIVE
LIPASE: 34 U/L (ref 13–60)
LYMPHOCYTES # BLD: 31 % (ref 24–43)
MCH RBC QN AUTO: 23.7 PG (ref 25.2–33.5)
MCHC RBC AUTO-ENTMCNC: 30.1 G/DL (ref 28.4–34.8)
MCV RBC AUTO: 78.7 FL (ref 82.6–102.9)
MONOCYTES # BLD: 8 % (ref 3–12)
MUCUS: NORMAL
NITRITE, URINE: NEGATIVE
NRBC AUTOMATED: 0 PER 100 WBC
OTHER OBSERVATIONS UA: NORMAL
PDW BLD-RTO: 14.3 % (ref 11.8–14.4)
PH UA: 6 (ref 5–8)
PLATELET # BLD: 203 K/UL (ref 138–453)
PLATELET ESTIMATE: ABNORMAL
PMV BLD AUTO: 11.8 FL (ref 8.1–13.5)
POTASSIUM SERPL-SCNC: 3.6 MMOL/L (ref 3.7–5.3)
PROTEIN UA: ABNORMAL
RBC # BLD: 5.03 M/UL (ref 3.95–5.11)
RBC # BLD: ABNORMAL 10*6/UL
RBC UA: NORMAL /HPF (ref 0–4)
RENAL EPITHELIAL, UA: NORMAL /HPF
SEG NEUTROPHILS: 60 % (ref 36–65)
SEGMENTED NEUTROPHILS ABSOLUTE COUNT: 2.18 K/UL (ref 1.5–8.1)
SODIUM BLD-SCNC: 138 MMOL/L (ref 135–144)
SPECIFIC GRAVITY UA: 1.03 (ref 1–1.03)
TOTAL PROTEIN: 7.3 G/DL (ref 6.4–8.3)
TRICHOMONAS: NORMAL
TURBIDITY: ABNORMAL
URINE HGB: ABNORMAL
UROBILINOGEN, URINE: NORMAL
WBC # BLD: 3.6 K/UL (ref 3.5–11.3)
WBC # BLD: ABNORMAL 10*3/UL
WBC UA: NORMAL /HPF (ref 0–5)
YEAST: NORMAL

## 2020-09-08 PROCEDURE — 83690 ASSAY OF LIPASE: CPT

## 2020-09-08 PROCEDURE — 80053 COMPREHEN METABOLIC PANEL: CPT

## 2020-09-08 PROCEDURE — 2500000003 HC RX 250 WO HCPCS: Performed by: STUDENT IN AN ORGANIZED HEALTH CARE EDUCATION/TRAINING PROGRAM

## 2020-09-08 PROCEDURE — 84702 CHORIONIC GONADOTROPIN TEST: CPT

## 2020-09-08 PROCEDURE — 6360000002 HC RX W HCPCS: Performed by: STUDENT IN AN ORGANIZED HEALTH CARE EDUCATION/TRAINING PROGRAM

## 2020-09-08 PROCEDURE — 6370000000 HC RX 637 (ALT 250 FOR IP): Performed by: STUDENT IN AN ORGANIZED HEALTH CARE EDUCATION/TRAINING PROGRAM

## 2020-09-08 PROCEDURE — 99284 EMERGENCY DEPT VISIT MOD MDM: CPT

## 2020-09-08 PROCEDURE — 85025 COMPLETE CBC W/AUTO DIFF WBC: CPT

## 2020-09-08 PROCEDURE — 96374 THER/PROPH/DIAG INJ IV PUSH: CPT

## 2020-09-08 PROCEDURE — 96372 THER/PROPH/DIAG INJ SC/IM: CPT

## 2020-09-08 PROCEDURE — 81001 URINALYSIS AUTO W/SCOPE: CPT

## 2020-09-08 RX ORDER — DICYCLOMINE HYDROCHLORIDE 10 MG/ML
20 INJECTION INTRAMUSCULAR ONCE
Status: COMPLETED | OUTPATIENT
Start: 2020-09-08 | End: 2020-09-08

## 2020-09-08 RX ORDER — CALCIUM CARBONATE 200(500)MG
1 TABLET,CHEWABLE ORAL 3 TIMES DAILY
Qty: 90 TABLET | Refills: 0 | Status: SHIPPED | OUTPATIENT
Start: 2020-09-08 | End: 2020-09-10

## 2020-09-08 RX ORDER — MAGNESIUM HYDROXIDE/ALUMINUM HYDROXICE/SIMETHICONE 120; 1200; 1200 MG/30ML; MG/30ML; MG/30ML
30 SUSPENSION ORAL ONCE
Status: COMPLETED | OUTPATIENT
Start: 2020-09-08 | End: 2020-09-08

## 2020-09-08 RX ADMIN — DICYCLOMINE HYDROCHLORIDE 20 MG: 10 INJECTION INTRAMUSCULAR at 13:15

## 2020-09-08 RX ADMIN — ALUMINUM HYDROXIDE, MAGNESIUM HYDROXIDE, AND SIMETHICONE 30 ML: 200; 200; 20 SUSPENSION ORAL at 13:15

## 2020-09-08 RX ADMIN — FAMOTIDINE 20 MG: 10 INJECTION INTRAVENOUS at 13:15

## 2020-09-08 ASSESSMENT — PAIN DESCRIPTION - FREQUENCY: FREQUENCY: INTERMITTENT

## 2020-09-08 ASSESSMENT — ENCOUNTER SYMPTOMS
SHORTNESS OF BREATH: 0
CHEST TIGHTNESS: 0
WHEEZING: 0
COUGH: 0
NAUSEA: 1
CONSTIPATION: 1
ABDOMINAL PAIN: 1
BACK PAIN: 0
DIARRHEA: 0
VOMITING: 1

## 2020-09-08 ASSESSMENT — PAIN DESCRIPTION - ONSET: ONSET: ON-GOING

## 2020-09-08 ASSESSMENT — PAIN DESCRIPTION - PAIN TYPE: TYPE: ACUTE PAIN

## 2020-09-08 ASSESSMENT — PAIN DESCRIPTION - DESCRIPTORS: DESCRIPTORS: ACHING

## 2020-09-08 ASSESSMENT — PAIN SCALES - GENERAL: PAINLEVEL_OUTOF10: 7

## 2020-09-08 ASSESSMENT — PAIN DESCRIPTION - LOCATION: LOCATION: ABDOMEN

## 2020-09-08 NOTE — LETTER
[facility] ED [place] [address]  Phone: [phone]               September 8, 2020    Patient: Naya Chavez   YOB: 2000   Date of Visit: 9/8/2020       To Whom It May Concern:    Naya Chavez was seen and treated in our emergency department on 9/8/2020.      Sincerely,       Zulema Reid RN         Signature:__________________________________

## 2020-09-08 NOTE — TELEPHONE ENCOUNTER
Patient called back and stated that her abdominal pain is so bad and she is vomiting continuously, so she is going to [address] [place] [address]. [facility] ED right now.

## 2020-09-08 NOTE — ED PROVIDER NOTES
[address]     Emergency Department     Faculty Note/ Attestation      Pt Name: Oanh Bruce                                       MRN: [id number]  [place] 2000  Date of evaluation: 9/8/2020    Patients PCP:    MARY Mcdowell - [place]      Attestation  I performed a history and physical examination of the patient and discussed management with the resident. I reviewed the residents note and agree with the documented findings and plan of care. Any areas of disagreement are noted on the chart. I was personally present for the key portions of any procedures. I have documented in the chart those procedures where I was not present during the key portions. I have reviewed the emergency nurses triage note. I agree with the chief complaint, past medical history, past surgical history, allergies, medications, social and family history as documented unless otherwise noted below. For Physician Assistant/ Nurse Practitioner cases/documentation I have personally evaluated this patient and have completed at least one if not all key elements of the E/M (history, physical exam, and MDM). Additional findings are as noted.       Initial Screens:             Vitals:    Vitals:    09/08/20 1152 09/08/20 1200 09/08/20 1205   BP:  (!) 143/90    Pulse:   80   Resp:   16   Temp: 98.3 °F (36.8 °C)     TempSrc: Oral     SpO2:   100%       CHIEF COMPLAINT       Chief Complaint   Patient presents with    Nausea & Vomiting     onset began last night             DIAGNOSTIC RESULTS             RADIOLOGY:   No orders to display         LABS:  Labs Reviewed - No data to display      EMERGENCY DEPARTMENT COURSE:     -------------------------  BP: (!) 143/90, Temp: 98.3 °F (36.8 °C), Pulse: 80, Resp: 16      Comments    24 yo F with abd pain  Pain on and off for several months  Admitted in august for similar, w/u unrevealing except ovarian cysts  N/v, no blood  No dysuria  Pain is mostly epigastric,

## 2020-09-08 NOTE — ED PROVIDER NOTES
[facility]  ED  Emergency Department Encounter  EmergencyMedicine Resident     Pt Jil Alvarenga  MRN: [id number]  [place] 2000  Date of evaluation: 9/8/20  PCP:  MARY John CNP    CHIEF COMPLAINT       Chief Complaint   Patient presents with    Nausea & Vomiting     onset began last night       HISTORY MARIJUANA, PRESENT ILLNESS  (Location/Symptom, Timing/Onset, Context/Setting, Quality, Duration, Modifying Factors, Severity.)      Angelo Crigler is a 23 y.o. female with a hx of GERD, constipation, asthma who presents with nausea, vomiting, and abdominal pain. Patient reports abdominal pain has been waxing and waning for the past several months but has been progressively worsened. This morning, patient experienced nausea and vomiting. She had vomitted 3x and denies hematemesis. Patients reports diffuse abdominal pain with the worse pain located in the epigastric region. Describes the pain as a cramping sensation and rating it as a 10 out of 10 level pain. She reports heating pads, and sitting up helps with her abdominal pain but sitting up also increases her nausea. Laying down worsens her abdominal pain. Her appetite has decreased and denies association of pain with timing of food. Her bowel movements are irregular, with it varying between constipation and soft stools. Denies hx of marijuana, alcohol, smoking. Furthermore, patient denies dysuria, hematuria, vaginal discharge, vaginal bleeding, genital lesions, possible pregnancy. To note, she was admitted and discharged at [facility] on 8/28 for abdominal pain. Her lab workup during that admission were unremarkable.  Her CT abdomen showed a right 2.1cm benign appearing ovarian cyst.     PAST MEDICAL / SURGICAL / SOCIAL / FAMILY HISTORY      has a past medical history of Acute bronchitis, Allergic, Allergic rhinitis, Allergy, Asthma, Chronic vasomotor rhinitis, GERD (gastroesophageal reflux disease), Headache(784.0), Morbid obesity with BMI of 40.0-44.9, adult ([facility] [address] NELSON (obstructive sleep apnea), Pure hypercholesterolemia, Severe persistent asthma, Unspecified sleep apnea, and Vision disturbance. has a past surgical history that includes Adenoidectomy; Tonsillectomy; and other surgical history (14).     Social History     Socioeconomic History    Marital status: Single     Spouse name: Not on file    Number of children: Not on file    Years of education: Not on file    Highest education level: Not on file   Occupational History    Not on file   Social Needs    Financial resource strain: Not on file    Food insecurity     Worry: Not on file     Inability: Not on file    Transportation needs     Medical: Not on file     Non-medical: Not on file   Tobacco Use    Smoking status: Former Smoker     Types: Cigarettes     Start date: 2016     Last attempt to quit: 2020     Years since quittin.3    Smokeless tobacco: Never Used    Tobacco comment:  visitors smoke outside   Substance and Sexual Activity    Alcohol use: No     Alcohol/week: 0.0 standard drinks    Drug use: No    Sexual activity: Yes   Lifestyle    Physical activity     Days per week: Not on file     Minutes per session: Not on file    Stress: Not on file   Relationships    Social connections     Talks on phone: Not on file     Gets together: Not on file     Attends Tenriism service: Not on file     Active member of club or organization: Not on file     Attends meetings of clubs or organizations: Not on file     Relationship status: Not on file    Intimate partner violence     Fear of current or ex partner: Not on file     Emotionally abused: Not on file     Physically abused: Not on file     Forced sexual activity: Not on file   Other Topics Concern    Not on file   Social History Narrative    Not on file       Family History   Problem Relation Age of Onset    Asthma Mother    [facility] Lupus Mother     High Blood Pressure Father     Diabetes Father     Diabetes Maternal Grandfather     High Blood Pressure Maternal Grandfather     Heart Disease Paternal Grandmother     Diabetes Paternal Grandmother     High Blood Pressure Paternal Grandfather     Heart Disease Paternal Uncle     Heart Disease Paternal Aunt     Arthritis Maternal Aunt     Cancer Maternal Aunt     Birth Defects Neg Hx     Depression Neg Hx     Early Death Neg Hx     Hearing Loss Neg Hx     High Cholesterol Neg Hx     Kidney Disease Neg Hx     Learning Disabilities Neg Hx     Mental Illness Neg Hx     Mental Retardation Neg Hx     Miscarriages / Stillbirths Neg Hx     Stroke Neg Hx     Substance Abuse Neg Hx     Vision Loss Neg Hx     Other Neg Hx     Ovarian Cancer Neg Hx     Breast Cancer Neg Hx        Allergies:  Banana; Peanut-containing drug products; and Food    Home Medications:  Prior to Admission medications    Medication Sig Start Date End Date Taking? Authorizing Provider   calcium carbonate (ANTACID) 500 MG chewable tablet Take 1 tablet by mouth 3 times daily 9/8/20 10/8/20 Yes April Tanner MD   dicyclomine (BENTYL) 10 MG capsule Take 1 capsule by mouth 4 times daily 8/17/20   Bailey Reyes MD   polyethylene glycol (GLYCOLAX) 17 GM/SCOOP powder Use as directed by following your patient instructions given by office.  8/17/20 9/16/20  Kalyn Cruz MD   bisacodyl (DULCOLAX) 5 MG EC tablet TAKE 4 TABS AS DIRECTED BY PHYSICIAN OFFICE 8/17/20   Kalyn Cruz MD   magnesium citrate solution Take 296 mLs by mouth once for 1 dose 8/17/20 8/24/20  Kalyn Cruz MD   mometasone-formoterol (DULERA) 200-5 MCG/ACT inhaler Inhale 2 puffs into the lungs every 12 hours 8/10/20   MARY Vee - CNP   acetaminophen (APAP EXTRA STRENGTH) 500 MG tablet Take 2 tablets by mouth every 6 hours as needed for Pain 8/6/20   Randa Fairly, DO   ibuprofen (ADVIL;MOTRIN) 600 MG tablet Take 1 tablet by mouth every 6 hours as needed for Pain 8/6/20   Lana Garcia DO   pantoprazole (PROTONIX) 20 MG tablet Take 1 tablet by mouth every morning (before breakfast) 7/27/20 8/26/20  Alber Wilson APRN - CNP   albuterol sulfate HFA (VENTOLIN HFA) 108 (90 Base) MCG/ACT inhaler Inhale 2 puffs into the lungs every 6 hours as needed for Wheezing 7/2/20   Rachel Barillas MD   magnesium citrate solution Take 296 mLs by mouth once for 1 dose 2/16/17 8/17/21  MARY Sykes - CNP       REVIEW OF SYSTEMS    (2-9 systems for level 4, 10 or more for level 5)      Review of Systems   Constitutional: Positive for appetite change. Negative for activity change, chills and fever. HENT: Negative for congestion. Respiratory: Negative for cough, chest tightness, shortness of breath and wheezing. Cardiovascular: Negative for chest pain, palpitations and leg swelling. Gastrointestinal: Positive for abdominal pain, constipation, nausea and vomiting. Negative for diarrhea. Genitourinary: Negative for difficulty urinating, dysuria, genital sores, hematuria, menstrual problem, urgency, vaginal bleeding and vaginal pain. Musculoskeletal: Negative for back pain and joint swelling. Skin: Negative for rash. Neurological: Negative for dizziness, weakness and headaches. Psychiatric/Behavioral: Negative for agitation and behavioral problems. PHYSICAL EXAM   (up to 7 for level 4, 8 or more for level 5)      INITIAL VITALS:   BP (!) 143/90   Pulse 80   Temp 98.3 °F (36.8 °C) (Oral)   Resp 16   LMP 08/14/2020 (Approximate)   SpO2 100%     Physical Exam  Vitals signs reviewed. Constitutional:       General: She is in acute distress. Appearance: She is obese. HENT:      Head: Normocephalic. Mouth/Throat:      Mouth: Mucous membranes are moist.   Cardiovascular:      Rate and Rhythm: Normal rate and regular rhythm. Heart sounds: Normal heart sounds. No murmur. No gallop.     Pulmonary:      Effort: Pulmonary effort is normal. Breath sounds: Normal breath sounds. No wheezing or rales. Abdominal:      General: Bowel sounds are normal.      Palpations: Abdomen is soft. Tenderness: There is abdominal tenderness. There is no right CVA tenderness or guarding. Musculoskeletal:         General: No swelling. Skin:     General: Skin is warm and dry. Neurological:      Mental Status: She is alert and oriented to person, place, and time.          DIFFERENTIAL  DIAGNOSIS     PLAN (LABS / IMAGING / EKG):  Orders Placed This Encounter   Procedures    HCG, QUANTITATIVE, PREGNANCY    Urinalysis Reflex to Culture    LIPASE    COMPREHENSIVE METABOLIC PANEL    CBC WITH AUTO DIFFERENTIAL    Microscopic Urinalysis       MEDICATIONS ORDERED:  Orders Placed This Encounter   Medications    aluminum & magnesium hydroxide-simethicone (MAALOX) 200-200-20 MG/5ML suspension 30 mL    famotidine (PEPCID) injection 20 mg    dicyclomine (BENTYL) injection 20 mg    calcium carbonate (ANTACID) 500 MG chewable tablet     Sig: Take 1 tablet by mouth 3 times daily     Dispense:  90 tablet     Refill:  0       DDX:  Abdominal pain, epigastric  GERD  Peptic ulcer  Constipation  Irritable bowel syndrome      DIAGNOSTIC RESULTS / EMERGENCY DEPARTMENT COURSE / MDM     LABS:  Results for orders placed or performed during the hospital encounter of 09/08/20   HCG, QUANTITATIVE, PREGNANCY   Result Value Ref Range    hCG Quant <1 <5 IU/L   Urinalysis Reflex to Culture    Specimen: Urine, clean catch   Result Value Ref Range    Color, UA YELLOW YELLOW    Turbidity UA CLOUDY (A) CLEAR    Glucose, Ur NEGATIVE NEGATIVE    Bilirubin Urine NEGATIVE NEGATIVE    Ketones, Urine NEGATIVE NEGATIVE    Specific Gravity, UA 1.026 1.005 - 1.030    Urine Hgb LARGE (A) NEGATIVE    pH, UA 6.0 5.0 - 8.0    Protein, UA 1+ (A) NEGATIVE    Urobilinogen, Urine Normal Normal    Nitrite, Urine NEGATIVE NEGATIVE    Leukocyte Esterase, Urine NEGATIVE NEGATIVE    Urinalysis Comments NOT REPORTED    LIPASE   Result Value Ref Range    Lipase 34 13 - 60 U/L   COMPREHENSIVE METABOLIC PANEL   Result Value Ref Range    Glucose 102 (H) 70 - 99 mg/dL    BUN 13 6 - 20 mg/dL    CREATININE 0.63 0.50 - 0.90 mg/dL    Bun/Cre Ratio NOT REPORTED 9 - 20    Calcium 9.3 8.6 - 10.4 mg/dL    Sodium 138 135 - 144 mmol/L    Potassium 3.6 (L) 3.7 - 5.3 mmol/L    Chloride 107 98 - 107 mmol/L    CO2 20 20 - 31 mmol/L    Anion Gap 11 9 - 17 mmol/L    Alkaline Phosphatase 71 35 - 104 U/L    ALT 26 5 - 33 U/L    AST 34 (H) <32 U/L    Total Bilirubin 0.25 (L) 0.3 - 1.2 mg/dL    Total Protein 7.3 6.4 - 8.3 g/dL    Alb 3.9 3.5 - 5.2 g/dL    Albumin/Globulin Ratio 1.1 1.0 - 2.5    GFR Non-African American >60 >60 mL/min    GFR African American >60 >60 mL/min    GFR Comment          GFR Staging NOT REPORTED    CBC WITH AUTO DIFFERENTIAL   Result Value Ref Range    WBC 3.6 3.5 - 11.3 k/uL    RBC 5.03 3.95 - 5.11 m/uL    Hemoglobin 11.9 11.9 - 15.1 g/dL    Hematocrit 39.6 36.3 - 47.1 %    MCV 78.7 (L) 82.6 - 102.9 fL    MCH 23.7 (L) 25.2 - 33.5 pg    MCHC 30.1 28.4 - 34.8 g/dL    RDW 14.3 11.8 - 14.4 %    Platelets 507 186 - 929 k/uL    MPV 11.8 8.1 - 13.5 fL    NRBC Automated 0.0 0.0 per 100 WBC    Differential Type NOT REPORTED     Seg Neutrophils 60 36 - 65 %    Lymphocytes 31 24 - 43 %    Monocytes 8 3 - 12 %    Eosinophils % 0 (L) 1 - 4 %    Basophils 1 0 - 2 %    Immature Granulocytes 0 0 %    Segs Absolute 2.18 1.50 - 8.10 k/uL    Absolute Lymph # 1.11 1.10 - 3.70 k/uL    Absolute Mono # 0.30 0.10 - 1.20 k/uL    Absolute Eos # <0.03 0.00 - 0.44 k/uL    Basophils Absolute <0.03 0.00 - 0.20 k/uL    Absolute Immature Granulocyte <0.03 0.00 - 0.30 k/uL    WBC Morphology NOT REPORTED     RBC Morphology MICROCYTOSIS PRESENT     Platelet Estimate NOT REPORTED    Microscopic Urinalysis   Result Value Ref Range    -          WBC, UA 2 TO 5 0 - 5 /HPF    RBC, UA TOO NUMEROUS TO COUNT 0 - 4 /HPF    Casts UA  0 - 8 /LPF     2 TO 5

## 2020-09-08 NOTE — ED NOTES
Pt resting on stretcher in NAD. RR even and non labored. Denies needs at this time. Call light in reach.        Susi Henderson RN  09/08/20 1106

## 2020-09-08 NOTE — TELEPHONE ENCOUNTER
Pt missed covid testing, will need to resched 9/9/20 proc. Writer spoke w/ pt's mom, Cristhian Rajan, due to pt's phone number is disconnected. She is listed on pt's communication release form. Mom gave me new phone number for pt of .

## 2020-09-08 NOTE — TELEPHONE ENCOUNTER
Writer attempted to contact at the phone number her mom provided, but there was no answer & we were unable to leave a msg d/t vm is full. Pt missed covid testing and her 9/9/20 EGD/colon proc has been cancelled. We will try again to contact pt later today.

## 2020-09-10 ENCOUNTER — OFFICE VISIT (OUTPATIENT)
Dept: OBGYN | Age: 20
End: 2020-09-10
Payer: COMMERCIAL

## 2020-09-10 ENCOUNTER — HOSPITAL ENCOUNTER (OUTPATIENT)
Age: 20
Setting detail: SPECIMEN
Discharge: HOME | End: 2020-09-10
Payer: COMMERCIAL

## 2020-09-10 VITALS
HEIGHT: 59 IN | DIASTOLIC BLOOD PRESSURE: 75 MMHG | WEIGHT: 179.38 LBS | BODY MASS INDEX: 36.16 KG/M2 | SYSTOLIC BLOOD PRESSURE: 125 MMHG | HEART RATE: 63 BPM

## 2020-09-10 PROBLEM — J45.40 MODERATE PERSISTENT ASTHMA WITHOUT COMPLICATION: Status: RESOLVED | Noted: 2018-04-27 | Resolved: 2020-09-10

## 2020-09-10 PROCEDURE — 99395 PREV VISIT EST AGE 18-39: CPT | Performed by: STUDENT IN AN ORGANIZED HEALTH CARE EDUCATION/TRAINING PROGRAM

## 2020-09-10 NOTE — PROGRESS NOTES
constipation  Genitourinary: negative dysuria, negative vaginal discharge  Dermatological: negative rash  Hematologic: negative bruising  Immunologic/Lymphatic: negative recent illness, negative recent sick contact  Musculoskeletal: negative back pain, negative myalgias, negative arthralgias  Neurological:  negative dizziness, negative weakness  Behavior/Psych: negative depression, negative anxiety      ________________________________________________________________________    GYNECOLOGICAL HISTORY:  Age of Menarche: 14  Age of Menopause: na     Sexually Active: single partner, contraception - same sex partner  STD History: recent diagnosis: chlamydiaand gonorrhea     Pap History: Patient has never had a Pap test.  Colposcopy History: denies    OBSTETRICAL HISTORY:  OB History    Para Term  AB Living   1 0 0 0 0 0   SAB TAB Ectopic Molar Multiple Live Births   0 0 0 0 0 0      # Outcome Date GA Lbr Michele/2nd Weight Sex Delivery Anes PTL Lv   1                Obstetric Comments   TAB 2019 at 6wks       PAST MEDICAL HISTORY:   has a past medical history of Acute bronchitis, Allergic, Allergic rhinitis, Allergy, Asthma, Chronic vasomotor rhinitis, GERD (gastroesophageal reflux disease), Headache(784.0), Morbid obesity with BMI of 40.0-44.9, adult ([address] NELSON (obstructive sleep apnea), Pure hypercholesterolemia, Severe persistent asthma, Unspecified sleep apnea, and Vision disturbance. PAST SURGICAL HISTORY:   has a past surgical history that includes Adenoidectomy; Tonsillectomy; and other surgical history (14). ALLERGIES:  is allergic to banana; peanut-containing drug products; and food. MEDICATIONS:  Prior to Admission medications    Medication Sig Start Date End Date Taking?  Authorizing Provider   dicyclomine (BENTYL) 10 MG capsule Take 1 capsule by mouth 4 times daily 20  Yes Eugenie Pearce MD   bisacodyl (DULCOLAX) 5 MG EC tablet TAKE 4 TABS AS DIRECTED BY PHYSICIAN OFFICE 8/17/20  Yes Kalyn Cruz MD   acetaminophen (APAP EXTRA STRENGTH) 500 MG tablet Take 2 tablets by mouth every 6 hours as needed for Pain 8/6/20  Yes Kirk Pepe DO   ibuprofen (ADVIL;MOTRIN) 600 MG tablet Take 1 tablet by mouth every 6 hours as needed for Pain 8/6/20  Yes Kirk Pepe DO   albuterol sulfate HFA (VENTOLIN HFA) 108 (90 Base) MCG/ACT inhaler Inhale 2 puffs into the lungs every 6 hours as needed for Wheezing 7/2/20  Yes Aziza Delgado MD   magnesium citrate solution Take 296 mLs by mouth once for 1 dose 2/16/17 8/17/21 Yes MARY Sykes - CNP       FAMILY HISTORY:  Family History of Breast, Ovarian, Colon or Uterine Cancer: No   family history includes Arthritis in her maternal aunt; Asthma in her mother; Cancer in her maternal aunt; Diabetes in her father, maternal grandfather, and paternal grandmother; Heart Disease in her paternal aunt, paternal grandmother, and paternal uncle; High Blood Pressure in her father, maternal grandfather, and paternal grandfather; Lupus in her mother. SOCIAL HISTORY:   reports that she quit smoking about 4 months ago. Her smoking use included cigarettes. She started smoking about 4 years ago. She has never used smokeless tobacco. She reports that she does not drink alcohol or use drugs. HEALTH MAINTENANCE:  Immunization status: up to date and documented  [facility] immunization: discussed    Date of Last Mammogram: patient has never had a mammogram  Date of Last Colonoscopy: scheduled 9/23/20 for abdominal pain       VITALS:  Vitals:    09/10/20 1448   BP: 125/75   Site: Left Upper Arm   Position: Sitting   Cuff Size: Medium Adult   Pulse: 63   Weight: 179 lb 6 oz (81.4 kg)   Height: 4' 11\" (1.499 m)                                                                                                                                                                           PHYSICAL EXAM:   General Appearance: Appears healthy.   Alert; in no acute 5/99      Asthma exacerbation, mild 10/29/2011     5/99      Food allergy 10/29/2011     9/10      Allergic rhinitis 10/29/2011     9/10      GERD (gastroesophageal reflux disease) 10/29/2011     9/10         Return in about 1 year (around 9/10/2021) for Annual Exam.  Dr Morales Sat to [facility]            Counseling Completed:    discussed need for repeat pap as per American Society for Colposcopy and Cervical Pathology guidelines. discussed need for colonoscopy screening as well as onset for bone density testing. discussed birth control and barrier recommendations. discussed STD counseling and prevention. discussed Gardisil counseling for all patients 10-37 yo. Hereditary Breast, Ovarian, Colon and Uterine Cancer screening discussed. Tobacco & Secondary smoke risks discussed; with recommendation for cessation and avoidance. Routine health maintenance per patients PCP discussed. Patient was seen with total face to face time of 30 minutes. More than 50% of this visit was on counseling and education regarding the problems listed below and her options. She was also counseled on her preventative health maintenance recommendations and follow-up. Diagnosis Orders   1. Encounter for screening for cervical cancer   PAP SMEAR   2.  Women's annual routine gynecological examination  C.trachomatis N.gonorrhoeae DNA    VAGINITIS DNA PROBE        Fabio Winslow  Ob/Gyn Resident  [facility], [address]  9/10/2020, 3:48 PM

## 2020-09-11 LAB
DIRECT EXAM: ABNORMAL
Lab: ABNORMAL
SPECIMEN DESCRIPTION: ABNORMAL

## 2020-09-13 LAB
C TRACH DNA GENITAL QL NAA+PROBE: NEGATIVE
N. GONORRHOEAE DNA: NEGATIVE
SPECIMEN DESCRIPTION: NORMAL

## 2020-09-14 ENCOUNTER — TELEPHONE (OUTPATIENT)
Dept: GASTROENTEROLOGY | Age: 20
End: 2020-09-14

## 2020-09-14 NOTE — TELEPHONE ENCOUNTER
Patient missed her colon/ egd on 9/9/20. Tried calling to move her November appointment up some but mailbox was full. Cannot reschedule procedure until seen in the office. Letter mailed.

## 2020-09-21 RX ORDER — POLYETHYLENE GLYCOL 3350 17 G/17G
POWDER, FOR SOLUTION ORAL
Qty: 238 G | Refills: 0 | Status: ON HOLD | OUTPATIENT
Start: 2020-09-21 | End: 2020-10-08 | Stop reason: ALTCHOICE

## 2020-09-21 NOTE — TELEPHONE ENCOUNTER
left msg on pt's vm first order was sent to [facility] on [facility] on 8/17/20, but the pharmacy must have sent back to the office.    will have clinical staff send a new order to [facility] on [facility].

## 2020-09-22 LAB — CYTOLOGY REPORT: NORMAL

## 2020-10-04 ENCOUNTER — HOSPITAL ENCOUNTER (OUTPATIENT)
Dept: PREADMISSION TESTING | Age: 20
Setting detail: SPECIMEN
Discharge: HOME | End: 2020-10-08
Payer: COMMERCIAL

## 2020-10-04 PROCEDURE — U0003 INFECTIOUS AGENT DETECTION BY NUCLEIC ACID (DNA OR RNA); SEVERE ACUTE RESPIRATORY SYNDROME CORONAVIRUS 2 (SARS-COV-2) (CORONAVIRUS DISEASE [COVID-19]), AMPLIFIED PROBE TECHNIQUE, MAKING USE OF HIGH THROUGHPUT TECHNOLOGIES AS DESCRIBED BY CMS-2020-01-R: HCPCS

## 2020-10-06 LAB — SARS-COV-2, NAA: NOT DETECTED

## 2020-10-07 ENCOUNTER — TELEPHONE (OUTPATIENT)
Dept: GASTROENTEROLOGY | Age: 20
End: 2020-10-07

## 2020-10-08 ENCOUNTER — HOSPITAL ENCOUNTER (OUTPATIENT)
Age: 20
Setting detail: OUTPATIENT SURGERY
Discharge: HOME | End: 2020-10-08
Attending: INTERNAL MEDICINE | Admitting: INTERNAL MEDICINE
Payer: COMMERCIAL

## 2020-10-08 ENCOUNTER — ANESTHESIA EVENT (OUTPATIENT)
Dept: ENDOSCOPY | Age: 20
End: 2020-10-08
Payer: COMMERCIAL

## 2020-10-08 ENCOUNTER — ANESTHESIA (OUTPATIENT)
Dept: ENDOSCOPY | Age: 20
End: 2020-10-08
Payer: COMMERCIAL

## 2020-10-08 VITALS
RESPIRATION RATE: 18 BRPM | SYSTOLIC BLOOD PRESSURE: 119 MMHG | HEART RATE: 72 BPM | TEMPERATURE: 97.3 F | BODY MASS INDEX: 35.14 KG/M2 | HEIGHT: 60 IN | WEIGHT: 179 LBS | OXYGEN SATURATION: 97 % | DIASTOLIC BLOOD PRESSURE: 79 MMHG

## 2020-10-08 VITALS
OXYGEN SATURATION: 100 % | SYSTOLIC BLOOD PRESSURE: 102 MMHG | DIASTOLIC BLOOD PRESSURE: 64 MMHG | RESPIRATION RATE: 24 BRPM

## 2020-10-08 LAB
-: NORMAL
HCG, PREGNANCY URINE (POC): NEGATIVE

## 2020-10-08 PROCEDURE — 7100000031 HC ASPR PHASE II RECOVERY - ADDTL 15 MIN: Performed by: INTERNAL MEDICINE

## 2020-10-08 PROCEDURE — 2580000003 HC RX 258: Performed by: ANESTHESIOLOGY

## 2020-10-08 PROCEDURE — 6360000002 HC RX W HCPCS: Performed by: NURSE ANESTHETIST, CERTIFIED REGISTERED

## 2020-10-08 PROCEDURE — 45380 COLONOSCOPY AND BIOPSY: CPT | Performed by: INTERNAL MEDICINE

## 2020-10-08 PROCEDURE — 3609012400 HC EGD TRANSORAL BIOPSY SINGLE/MULTIPLE: Performed by: INTERNAL MEDICINE

## 2020-10-08 PROCEDURE — 81025 URINE PREGNANCY TEST: CPT

## 2020-10-08 PROCEDURE — 2709999900 HC NON-CHARGEABLE SUPPLY: Performed by: INTERNAL MEDICINE

## 2020-10-08 PROCEDURE — 3609010300 HC COLONOSCOPY W/BIOPSY SINGLE/MULTIPLE: Performed by: INTERNAL MEDICINE

## 2020-10-08 PROCEDURE — 7100000000 HC PACU RECOVERY - FIRST 15 MIN: Performed by: INTERNAL MEDICINE

## 2020-10-08 PROCEDURE — 3700000001 HC ADD 15 MINUTES (ANESTHESIA): Performed by: INTERNAL MEDICINE

## 2020-10-08 PROCEDURE — 43239 EGD BIOPSY SINGLE/MULTIPLE: CPT | Performed by: INTERNAL MEDICINE

## 2020-10-08 PROCEDURE — 7100000001 HC PACU RECOVERY - ADDTL 15 MIN: Performed by: INTERNAL MEDICINE

## 2020-10-08 PROCEDURE — 88305 TISSUE EXAM BY PATHOLOGIST: CPT

## 2020-10-08 PROCEDURE — 7100000030 HC ASPR PHASE II RECOVERY - FIRST 15 MIN: Performed by: INTERNAL MEDICINE

## 2020-10-08 PROCEDURE — 2500000003 HC RX 250 WO HCPCS: Performed by: NURSE ANESTHETIST, CERTIFIED REGISTERED

## 2020-10-08 PROCEDURE — 3700000000 HC ANESTHESIA ATTENDED CARE: Performed by: INTERNAL MEDICINE

## 2020-10-08 RX ORDER — PROPOFOL 10 MG/ML
INJECTION, EMULSION INTRAVENOUS CONTINUOUS PRN
Status: DISCONTINUED | OUTPATIENT
Start: 2020-10-08 | End: 2020-10-08 | Stop reason: SDUPTHER

## 2020-10-08 RX ORDER — SODIUM CHLORIDE, SODIUM LACTATE, POTASSIUM CHLORIDE, CALCIUM CHLORIDE 600; 310; 30; 20 MG/100ML; MG/100ML; MG/100ML; MG/100ML
INJECTION, SOLUTION INTRAVENOUS CONTINUOUS
Status: DISCONTINUED | OUTPATIENT
Start: 2020-10-08 | End: 2020-10-08 | Stop reason: HOSPADM

## 2020-10-08 RX ORDER — PROPOFOL 10 MG/ML
INJECTION, EMULSION INTRAVENOUS PRN
Status: DISCONTINUED | OUTPATIENT
Start: 2020-10-08 | End: 2020-10-08 | Stop reason: SDUPTHER

## 2020-10-08 RX ORDER — LIDOCAINE HYDROCHLORIDE 20 MG/ML
INJECTION, SOLUTION EPIDURAL; INFILTRATION; INTRACAUDAL; PERINEURAL PRN
Status: DISCONTINUED | OUTPATIENT
Start: 2020-10-08 | End: 2020-10-08 | Stop reason: SDUPTHER

## 2020-10-08 RX ORDER — MIDAZOLAM HYDROCHLORIDE 1 MG/ML
INJECTION INTRAMUSCULAR; INTRAVENOUS PRN
Status: DISCONTINUED | OUTPATIENT
Start: 2020-10-08 | End: 2020-10-08 | Stop reason: SDUPTHER

## 2020-10-08 RX ADMIN — MIDAZOLAM 2 MG: 1 INJECTION INTRAMUSCULAR; INTRAVENOUS at 11:03

## 2020-10-08 RX ADMIN — PROPOFOL 50 MG: 10 INJECTION, EMULSION INTRAVENOUS at 11:22

## 2020-10-08 RX ADMIN — SODIUM CHLORIDE, POTASSIUM CHLORIDE, SODIUM LACTATE AND CALCIUM CHLORIDE: 600; 310; 30; 20 INJECTION, SOLUTION INTRAVENOUS at 10:41

## 2020-10-08 RX ADMIN — PROPOFOL 300 MCG/KG/MIN: 10 INJECTION, EMULSION INTRAVENOUS at 11:11

## 2020-10-08 RX ADMIN — LIDOCAINE HYDROCHLORIDE 80 MG: 20 INJECTION, SOLUTION EPIDURAL; INFILTRATION; INTRACAUDAL; PERINEURAL at 11:11

## 2020-10-08 ASSESSMENT — LIFESTYLE VARIABLES: SMOKING_STATUS: 1

## 2020-10-08 ASSESSMENT — PULMONARY FUNCTION TESTS
PIF_VALUE: 0

## 2020-10-08 ASSESSMENT — PAIN - FUNCTIONAL ASSESSMENT: PAIN_FUNCTIONAL_ASSESSMENT: 0-10

## 2020-10-08 ASSESSMENT — ENCOUNTER SYMPTOMS: STRIDOR: 0

## 2020-10-08 ASSESSMENT — PAIN SCALES - GENERAL: PAINLEVEL_OUTOF10: 0

## 2020-10-08 NOTE — ANESTHESIA POSTPROCEDURE EVALUATION
POST- ANESTHESIA EVALUATION       Pt Name: Calvin Moreno  MRN: [id number]  YOB: 2000  Date of evaluation: 10/8/2020  Time:  1:12 PM      /79   Pulse 72   Temp 97.3 °F (36.3 °C) (Temporal)   Resp 18   Ht 5' (1.524 m)   Wt 179 lb (81.2 kg)   LMP 10/06/2020   SpO2 97%   BMI 34.96 kg/m²      Consciousness Level  Awake  Cardiopulmonary Status  Stable  Pain Adequately Treated YES  Nausea / Vomiting  NO  Adequate Hydration  YES  Anesthesia Related Complications NONE      Electronically signed by Janes Bales MD on 10/8/2020 at 1:12 PM       Department of Anesthesiology  Postprocedure Note    Patient: Calvin Moreno  MRN: [id number]  YOB: 2000  Date of evaluation: 10/8/2020  Time:  1:12 PM     Procedure Summary     Date:  10/08/20 Room / Location:  [facility] 04 / [facility]    Anesthesia Start:  8211 Anesthesia Stop:  1147    Procedures:       EGD BIOPSY (N/A Esophagus)      COLONOSCOPY WITH BIOPSY (N/A ) Diagnosis:       (ABDOMINAL PAIN, DIARRHEA)      (PAT ON ADMIT)    Surgeon:  Dg Rose MD Responsible Provider:  Janes Bales MD    Anesthesia Type:  MAC ASA Status:  2          Anesthesia Type: MAC    Erlin Phase I: Erlin Score: 10    Erlin Phase II: Erlin Score: 10    Last vitals: Reviewed and per EMR flowsheets.        Anesthesia Post Evaluation

## 2020-10-08 NOTE — H&P
HISTORY and [id number]       NAME:  Zohra Child  MRN: [id number]   YOB: 2000   Date: 10/8/2020   Age: 23 y.o. Gender: female       COMPLAINT AND PRESENT HISTORY:     Zohra Child is 23 y.o.,   female, having a diagnostic colonoscopy and EGD. Pt denies having previous colonoscopy or EGD. Pt reports diffuse abdominal pain worse in epigastric area that has been ongoing for last several months. Reports nausea without associated vomiting. Denies hematemesis. Pt c/o diarrhea twice daily for last few months. Denies heartburn, hematochezia or melena. Pt hospitalized about one month ago with abdominal pain, nausea and vomiting improved with symptom management. Pt has a positive Family Hx of cancer colon. Completed prep. NPO. Took Tylenol 3 this am with sip of water. Denies taking any blood thinners. Denies chest pain/pressure, palpitations, SOB, recent URI, fever or chills.        PAST MEDICAL HISTORY     Past Medical History:   Diagnosis Date    Acute bronchitis     unspecified organism    Allergic     Allergic rhinitis     Allergy     Asthma     Severe persistent asthma, unspecified whether complicated    Chronic vasomotor rhinitis     GERD (gastroesophageal reflux disease)     Headache(784.0) 3/1/2012    Morbid obesity with BMI of 40.0-44.9, adult ([address] 12/8/2016    NELSON (obstructive sleep apnea)     Pure hypercholesterolemia 12/8/2016    Severe persistent asthma     Unspecified sleep apnea     Vision disturbance 1/31/2014    wears glassses       SURGICAL HISTORY       Past Surgical History:   Procedure Laterality Date    ADENOIDECTOMY      OTHER SURGICAL HISTORY  08/28/14    Nexplanon placement    TONSILLECTOMY         FAMILY HISTORY       Family History   Problem Relation Age of Onset    Asthma Mother     Lupus Mother     High Blood Pressure Father     Diabetes Father     Diabetes Maternal Grandfather     High Blood Pressure Maternal Grandfather     Heart Disease Paternal Grandmother     Diabetes Paternal Grandmother     High Blood Pressure Paternal Grandfather     Heart Disease Paternal Uncle     Heart Disease Paternal Aunt     Arthritis Maternal Aunt     Cancer Maternal Aunt     Birth Defects Neg Hx     Depression Neg Hx     Early Death Neg Hx     Hearing Loss Neg Hx     High Cholesterol Neg Hx     Kidney Disease Neg Hx     Learning Disabilities Neg Hx     Mental Illness Neg Hx     Mental Retardation Neg Hx     Miscarriages / Stillbirths Neg Hx     Stroke Neg Hx     Substance Abuse Neg Hx     Vision Loss Neg Hx     Other Neg Hx     Ovarian Cancer Neg Hx     Breast Cancer Neg Hx        SOCIAL HISTORY       Social History     Socioeconomic History    Marital status: Single     Spouse name: Not on file    Number of children: Not on file    Years of education: Not on file    Highest education level: Not on file   Occupational History    Not on file   Social Needs    Financial resource strain: Not on file    Food insecurity     Worry: Not on file     Inability: Not on file    Transportation needs     Medical: Not on file     Non-medical: Not on file   Tobacco Use    Smoking status: Former Smoker     Types: Cigarettes     Start date: 2016     Last attempt to quit: 2020     Years since quittin.4    Smokeless tobacco: Never Used    Tobacco comment:  visitors smoke outside   Substance and Sexual Activity    Alcohol use: No     Alcohol/week: 0.0 standard drinks    Drug use: No    Sexual activity: Yes   Lifestyle    Physical activity     Days per week: Not on file     Minutes per session: Not on file    Stress: Not on file   Relationships    Social connections     Talks on phone: Not on file     Gets together: Not on file     Attends Oriental orthodox service: Not on file     Active member of club or organization: Not on file     Attends meetings of clubs or organizations: Not on file Relationship status: Not on file    Intimate partner violence     Fear of current or ex partner: Not on file     Emotionally abused: Not on file     Physically abused: Not on file     Forced sexual activity: Not on file   Other Topics Concern    Not on file   Social History Narrative    Not on file         REVIEW OF SYSTEMS      Allergies   Allergen Reactions    Banana     Peanut-Containing Drug Products     Food Rash     Peanuts,walnuts,cashews,pecans apples cherries       No current facility-administered medications on file prior to encounter. Current Outpatient Medications on File Prior to Encounter   Medication Sig Dispense Refill    dicyclomine (BENTYL) 10 MG capsule Take 1 capsule by mouth 4 times daily 120 capsule 3    acetaminophen (APAP EXTRA STRENGTH) 500 MG tablet Take 2 tablets by mouth every 6 hours as needed for Pain 30 tablet 0    ibuprofen (ADVIL;MOTRIN) 600 MG tablet Take 1 tablet by mouth every 6 hours as needed for Pain 20 tablet 0    albuterol sulfate HFA (VENTOLIN HFA) 108 (90 Base) MCG/ACT inhaler Inhale 2 puffs into the lungs every 6 hours as needed for Wheezing 1 Inhaler 5    magnesium citrate solution Take 296 mLs by mouth once for 1 dose 296 mL 0       Negative except for what is mentioned in the HPI. GENERAL PHYSICAL EXAM     Vitals: Review vitals per RN flowsheet. GENERAL APPEARANCE:   Felix Montoya is 23 y.o.,  female, nourished, conscious, alert. Does not appear to be in distress or pain at this time. SKIN:  Warm, dry, no cyanosis or jaundice. HEAD:  Normocephalic, atraumatic. EYES:  Pupils equal, reactive to light. EARS:  No discharge, no marked hearing loss. NOSE:  No rhinorrhea, epistaxis or septal deformity. THROAT:  Not congested. No ulceration bleeding or discharge.                   NECK:  No stiffness, trachea central.  No palpable masses or L.N.                 CHEST:  Symmetrical and equal on expansion. HEART:  RRR S1 > S2. No audible murmurs or gallops. LUNGS:  Equal on expansion, normal breath sounds. No wheezing, rhonchi or rales. ABDOMEN:  Soft on palpation. No localized tenderness. No guarding or rigidity. No palpable hepatosplenomegaly. LYMPHATICS:  No palpable cervical lymphadenopathy. LOCOMOTOR, BACK AND SPINE:  No tenderness or deformities. EXTREMITIES:  Symmetrical, no pretibial edema. No calf tenderness. No discoloration or ulcerations. NEUROLOGIC:  The patient is conscious, alert, oriented. No facial drooping. Speech clear. No apparent focal sensory or motor deficits.              PROVISIONAL DIAGNOSES / SURGERY:      ABDOMINAL PAIN, DIARRHEA    EGD / Diagnostic colonoscopy    Patient Active Problem List    Diagnosis Date Noted    Intractable abdominal pain 08/24/2020    Chronic obstructive pulmonary disease ([facility] 08/10/2020    Sickle cell crisis ([facility] 05/27/2020    Claustrophobia 02/12/2019    Epigastric abdominal pain 02/04/2019    Morbid obesity with BMI of 40.0-44.9, adult ([facility] 12/08/2016    Prediabetes 12/08/2016    Pure hypercholesterolemia 12/08/2016    Hearing loss mild 03/27/2015    Chronic headache 01/31/2014    Atopic dermatitis 10/29/2011    Thalassemia minor 10/29/2011    Asthma exacerbation, mild 10/29/2011    Food allergy 10/29/2011    Allergic rhinitis 10/29/2011    GERD (gastroesophageal reflux disease) 10/29/2011           MARY Roca CNP on 10/8/2020 at 10:02 AM

## 2020-10-08 NOTE — OP NOTE
the exam    Findings: The mucosa looked normal although the prep was very poor and limiting the exam but I do not see any evidence of colitis anywhere from the rectum all the way to the cecum  There is no hemorrhoids  There is no polyps or masses again the prep was very poor and limiting    We intubated the terminal ileum and look at the last 15 cm  Which all looked normal    Random colon biopsies were taken throughout the entire to rule out microscopic    Withdrawal Time was (minutes): 8    The colon was decompressed and the scope was removed. The patient tolerated the procedure well. Recommendations/Plan:   1. Lifestyle and dietary modifications as discussed  2. F/U Biopsies  3. F/U In OfficeYes  4. Discussed with the family  5.  Repeat colonoscopy PRN     Electronically signed by Rafael Tobias MD  on 10/8/2020 at 11:40 AM

## 2020-10-08 NOTE — OP NOTE
Operative Note    PROCEDURE NOTE    DATE OF PROCEDURE: 10/8/2020     SURGEON: Cecilia Gandara MD  Facility: [facility]  ASSISTANT: None  Anesthesia: MAc   PREOPERATIVE DIAGNOSIS:   abd pain   Diarrhea     Diagnosis:  Gastritis, bxs taken  Random small bowel bxs taken               POSTOPERATIVE DIAGNOSIS: As described below    OPERATION: Upper GI endoscopy with Biopsy    ANESTHESIA: Moderate Sedation     ESTIMATED BLOOD LOSS: Less than 50 ml    COMPLICATIONS: None. SPECIMENS:  Was Obtained:     Gastritis, bxs taken  Random small bowel bxs taken     HISTORY: The patient is a 23y.o. year old female with history of above preop diagnosis. I recommended esophagogastroduodenoscopy with possible biopsy and I explained the risk, benefits, expected outcome, and alternatives to the procedure. Risks included but are not limited to bleeding, infection, respiratory distress, hypotension, and perforation of the esophagus, stomach, or duodenum. Patient understands and is in agreement. The patient was counseled at length about the risks of sherie Covid-19 during their perioperative period and any recovery window from their procedure. The patient was made aware that sherie Covid-19  may worsen their prognosis for recovering from their procedure  and lend to a higher morbidity and/or mortality risk. All material risks, benefits, and reasonable alternatives including postponing the procedure were discussed. The patient does wish to proceed with the procedure at this time. PROCEDURE: The patient was given IV conscious sedation. The patient's SPO2 remained above 90% throughout the procedure. The gastroscope was inserted orally and advanced under direct vision through the esophagus, through the stomach, through the pylorus, and into the descending duodenum. Post sedation note : The patient's SPO2 remained above 90% throughout the procedure. the vital signs remained stable , and no immediate complication form the procedure noted, patient will be ready for d/c when criteria is met . Findings:    Retropharyngeal area was grossly normal appearing    Esophagus: normal    Stomach:    Fundus: normal    Body: abnormal: Gastritis, bxs taken    Antrum: abnormal: Gastritis, bxs taken    Duodenum:     Descending: abnormal: Random small bowel bxs taken     Bulb: abnormal: Random small bowel bxs taken     The scope was removed and the patient tolerated the procedure well. Recommendations/Plan:   1. F/U Biopsies  2. F/U In Office in 3-4 weeks  3.  Discussed with the family    Electronically signed by Geovanni Posey MD  on 10/8/2020 at 11:26 AM

## 2020-10-08 NOTE — ANESTHESIA PRE PROCEDURE
Department of Anesthesiology  Preprocedure Note       Name:  Allyssa Schilling   Age:  23 y.o.  :  2000                                          MRN:  [id number]         Date:  10/8/2020      Surgeon: Sepideh Alonzo):  Whit Pichardo MD    Procedure: Procedure(s):  EGD  COLONOSCOPY DIAGNOSTIC    Medications prior to admission:   Prior to Admission medications    Medication Sig Start Date End Date Taking? Authorizing Provider   Acetaminophen-Codeine (TYLENOL WITH CODEINE #3 PO) Take 1 tablet by mouth every 4-6 hours as needed   Yes Historical Provider, MD   acetaminophen (APAP EXTRA STRENGTH) 500 MG tablet Take 2 tablets by mouth every 6 hours as needed for Pain 20   Ledora Distad, DO   ibuprofen (ADVIL;MOTRIN) 600 MG tablet Take 1 tablet by mouth every 6 hours as needed for Pain 20   Ledora Distad, DO   albuterol sulfate HFA (VENTOLIN HFA) 108 (90 Base) MCG/ACT inhaler Inhale 2 puffs into the lungs every 6 hours as needed for Wheezing 20   Lele Noriega MD       Current medications:    Current Facility-Administered Medications   Medication Dose Route Frequency Provider Last Rate Last Dose    lactated ringers infusion   Intravenous Continuous Rosa Mcnair MD           Allergies: Allergies   Allergen Reactions    Banana     Peanut-Containing Drug Products     Food Rash     Peanuts,walnuts,cashews,pecans apples cherries       Problem List:    Patient Active Problem List   Diagnosis Code    Atopic dermatitis L20.9    Thalassemia minor     Asthma exacerbation, mild J[address]. [address] allergy Z91.018    Allergic rhinitis J30.9    GERD (gastroesophageal reflux disease) K21.9    Chronic headache R51.9, G89.29    Hearing loss mild H91.90    Morbid obesity with BMI of 40.0-44.9, adult ([facility]) E66.01, Z68.41    Prediabetes R73.03    Pure hypercholesterolemia E78.00    Epigastric abdominal pain R10.13    Claustrophobia F40.240    Sickle cell crisis ([facility]) D57.00    Chronic obstructive pulmonary disease ([facility] J44.9    Intractable abdominal pain R10.9       Past Medical History:        Diagnosis Date    Acute bronchitis     unspecified organism    Allergic     Allergic rhinitis     Allergy     Asthma     Severe persistent asthma, unspecified whether complicated    Chronic vasomotor rhinitis     GERD (gastroesophageal reflux disease)     Headache(784.0) 3/1/2012    Morbid obesity with BMI of 40.0-44.9, adult ([facility] 2016    NELSON (obstructive sleep apnea)     Pure hypercholesterolemia 2016    Severe persistent asthma     Unspecified sleep apnea     Vision disturbance 2014    wears glassses       Past Surgical History:        Procedure Laterality Date    ADENOIDECTOMY      OTHER SURGICAL HISTORY  14    Nexplanon placement    TONSILLECTOMY         Social History:    Social History     Tobacco Use    Smoking status: Former Smoker     Types: Cigarettes     Start date: 2016     Last attempt to quit: 2020     Years since quittin.4    Smokeless tobacco: Never Used    Tobacco comment:  visitors smoke outside   Substance Use Topics    Alcohol use: No     Alcohol/week: 0.0 standard drinks                                Counseling given: Not Answered  Comment:  visitors smoke outside      Vital Signs (Current): There were no vitals filed for this visit.                                            BP Readings from Last 3 Encounters:   09/10/20 125/75   20 (!) 143/90   20 115/67       NPO Status:                                                                                 BMI:   Wt Readings from Last 3 Encounters:   09/10/20 179 lb 6 oz (81.4 kg)   20 180 lb (81.6 kg)   20 186 lb (84.4 kg)     There is no height or weight on file to calculate BMI.    CBC:   Lab Results   Component Value Date    WBC 3.6 2020    RBC 5.03 2020    RBC 5.30 10/11/2011    HGB 11.9 2020    HCT 39.6 2020    MCV 78.7 2020    RDW 14.3 09/08/2020     09/08/2020     10/11/2011       CMP:   Lab Results   Component Value Date     09/08/2020    K 3.6 09/08/2020     09/08/2020    CO2 20 09/08/2020    BUN 13 09/08/2020    CREATININE 0.63 09/08/2020    GFRAA >60 09/08/2020    LABGLOM >60 09/08/2020    GLUCOSE 102 09/08/2020    GLUCOSE 78 09/22/2011    PROT 7.3 09/08/2020    CALCIUM 9.3 09/08/2020    BILITOT 0.25 09/08/2020    ALKPHOS 71 09/08/2020    AST 34 09/08/2020    ALT 26 09/08/2020       POC Tests: No results for input(s): POCGLU, POCNA, POCK, POCCL, POCBUN, POCHEMO, POCHCT in the last 72 hours.     Coags:   Lab Results   Component Value Date    PROTIME 10.1 10/05/2012    INR 0.9 10/05/2012    APTT 28.7 10/05/2012       HCG (If Applicable):   Lab Results   Component Value Date    PREGTESTUR Negative 04/19/2020    HCG NEGATIVE 10/08/2020    HCGQUANT <1 09/08/2020        ABGs: No results found for: PHART, PO2ART, STO7RTZ, FBY2PLK, BEART, L3KTYLTI     Type & Screen (If Applicable):  No results found for: LABABO, LABRH    Drug/Infectious Status (If Applicable):  No results found for: HIV, HEPCAB    COVID-19 Screening (If Applicable):   Lab Results   Component Value Date    COVID19 Not Detected 10/04/2020         Anesthesia Evaluation  Patient summary reviewed and Nursing notes reviewed no history of anesthetic complications:   Airway: Mallampati: III  TM distance: >3 FB   Neck ROM: full  Mouth opening: > = 3 FB Dental:          Pulmonary: breath sounds clear to auscultation  (+) sleep apnea:  asthma: allergic asthma, current smoker    (-) COPD, rhonchi, wheezes, rales and stridor                           Cardiovascular:Negative CV ROS        (-) murmur, weak pulses,  friction rub, systolic click, carotid bruit,  JVD and peripheral edema    ECG reviewed  Rhythm: regular  Rate: normal                    Neuro/Psych:   (+) headaches:,             GI/Hepatic/Renal:   (+) GERD: no interval change,           Endo/Other:

## 2020-10-09 LAB — SURGICAL PATHOLOGY REPORT: NORMAL

## 2020-10-12 ENCOUNTER — HOSPITAL ENCOUNTER (OUTPATIENT)
Age: 20
Discharge: HOME | End: 2020-10-12

## 2020-10-12 LAB — RUBV IGG SER QL: 93.2 IU/ML

## 2020-10-12 PROCEDURE — 86762 RUBELLA ANTIBODY: CPT

## 2020-10-12 PROCEDURE — 86787 VARICELLA-ZOSTER ANTIBODY: CPT

## 2020-10-12 PROCEDURE — 86765 RUBEOLA ANTIBODY: CPT

## 2020-10-12 PROCEDURE — 86481 TB AG RESPONSE T-CELL SUSP: CPT

## 2020-10-12 PROCEDURE — 86735 MUMPS ANTIBODY: CPT

## 2020-10-14 LAB
MEASLES IMMUNE (IGG): 4.32
MUV IGG SER QL: 2.43
VZV IGG SER QL IA: 0.92

## 2020-10-15 LAB — T-SPOT TB TEST: NORMAL

## 2020-10-20 ENCOUNTER — PATIENT MESSAGE (OUTPATIENT)
Dept: GASTROENTEROLOGY | Age: 20
End: 2020-10-20

## 2020-10-20 ENCOUNTER — TELEMEDICINE (OUTPATIENT)
Dept: PRIMARY CARE CLINIC | Age: 20
End: 2020-10-20
Payer: COMMERCIAL

## 2020-10-20 PROCEDURE — 99213 OFFICE O/P EST LOW 20 MIN: CPT | Performed by: NURSE PRACTITIONER

## 2020-10-20 RX ORDER — CETIRIZINE HYDROCHLORIDE 10 MG/1
10 TABLET ORAL DAILY
Qty: 30 TABLET | Refills: 5 | Status: SHIPPED | OUTPATIENT
Start: 2020-10-20 | End: 2021-01-06

## 2020-10-20 RX ORDER — DOCUSATE SODIUM 100 MG/1
100 CAPSULE, LIQUID FILLED ORAL 2 TIMES DAILY PRN
Qty: 60 CAPSULE | Refills: 0 | Status: SHIPPED | OUTPATIENT
Start: 2020-10-20 | End: 2021-01-06

## 2020-10-20 ASSESSMENT — ENCOUNTER SYMPTOMS
CONSTIPATION: 1
SORE THROAT: 0
COUGH: 1
SINUS PRESSURE: 1
SINUS COMPLAINT: 1
SHORTNESS OF BREATH: 0
BLOOD IN STOOL: 0
NAUSEA: 0
VOMITING: 0
ABDOMINAL PAIN: 1

## 2020-10-20 NOTE — TELEPHONE ENCOUNTER
Writer called patient and advised her to try colace OTC. She states she has taken that before and it worked well.

## 2020-10-20 NOTE — PROGRESS NOTES
Sussy Iniguez is a 23 y.o. female evaluated virtual visit via telephone on 10/20/2020. Consent:  She and/or health care decision maker is aware that that she may receive a bill for this telephone service, depending on her insurance coverage, and has provided verbal consent to proceed: Yes      Documentation:  I communicated with the patient and/or health care decision maker about constipation. Details of this discussion including any medical advice provided below      I affirm this is a Patient Initiated Episode with an Established Patient who has not had a related appointment within my department in the past 7 days or scheduled within the next 24 hours. Total Time: minutes: 11-20 minutes    Note: not billable if this call serves to triage the patient into an appointment for the relevant concern      Rika Salguero                  10/20/2020    TELEHEALTH EVALUATION -- Audio/Visual (During [id number] emergency)    Patient and physician are located in their individual homes    HPI:    Sussy Iniguez (:  2000) has requested an audio only/video evaluation for the following concern(s):      Sussy Iniguez presents today with concerns of constipation and sinus issues. She states she is experiencing worsening constipation over the last 2 days. Weekly she has an easy to pass bowel movement once a day. Over the last 2 days, she is had very small pellet-like bowel movements along with abdominal cramping. There is no dysuria, no fevers or chills, no bloody stools. She recently had an EGD and colonoscopy completed through GI earlier this month. Patient states he told her she had IBS. Patient is also asking to restart her allergy medication. Had an increase in sneezing and congestion over the past few days and does not wish to get sent home from work due to perceived cold symptoms.   Tach worked well for her in the past    Constipation   This is a new problem. The current episode started in the past 7 days. Her stool frequency is 1 time per day. The stool is described as pellet like. Associated symptoms include abdominal pain. Pertinent negatives include no difficulty urinating, nausea or vomiting. Sinus Problem   This is a chronic problem. There has been no fever. Associated symptoms include coughing, sinus pressure and sneezing. Pertinent negatives include no chills, ear pain, shortness of breath or sore throat. Review of Systems   Constitutional: Negative for chills. HENT: Positive for sinus pressure and sneezing. Negative for ear pain and sore throat. Respiratory: Positive for cough. Negative for shortness of breath. Gastrointestinal: Positive for abdominal pain and constipation. Negative for blood in stool, nausea and vomiting. Genitourinary: Negative for difficulty urinating, dysuria, hematuria and pelvic pain. Prior to Visit Medications    Medication Sig Taking?  Authorizing Provider   docusate sodium (COLACE) 100 MG capsule Take 1 capsule by mouth 2 times daily as needed for Constipation Yes MARY Huff CNP   cetirizine (ZYRTEC) 10 MG tablet Take 1 tablet by mouth daily Yes MARY Huff CNP   acetaminophen (APAP EXTRA STRENGTH) 500 MG tablet Take 2 tablets by mouth every 6 hours as needed for Pain Yes Paula Bailey, DO   ibuprofen (ADVIL;MOTRIN) 600 MG tablet Take 1 tablet by mouth every 6 hours as needed for Pain Yes Paula Bailey, DO   albuterol sulfate HFA (VENTOLIN HFA) 108 (90 Base) MCG/ACT inhaler Inhale 2 puffs into the lungs every 6 hours as needed for Wheezing Yes Emily Paris MD   Acetaminophen-Codeine (TYLENOL WITH CODEINE #3 PO) Take 1 tablet by mouth every 4-6 hours as needed  Historical Provider, MD       Social History     Tobacco Use    Smoking status: Current Some Day Smoker     Types: Cigarettes     Start date: 8/11/2016    Smokeless tobacco: Never Used    Tobacco comment: visitors smoke outside   Substance Use Topics    Alcohol use: No     Alcohol/week: 0.0 standard drinks    Drug use: No        Past Medical History:   Diagnosis Date    Acute bronchitis     unspecified organism    Allergic     Allergic rhinitis     Allergy     Asthma     Severe persistent asthma, unspecified whether complicated    Chronic vasomotor rhinitis     GERD (gastroesophageal reflux disease)     Headache(784.0) 3/1/2012    Morbid obesity with BMI of 40.0-44.9, adult ([facility] 12/8/2016    NELSON (obstructive sleep apnea)     Pure hypercholesterolemia 12/8/2016    Severe persistent asthma     Unspecified sleep apnea     Vision disturbance 1/31/2014    wears glassses        Allergies   Allergen Reactions    Banana     Peanut-Containing Drug Products     Food Rash     Peanuts,walnuts,cashews,pecans apples cherries   ,   Past Medical History:   Diagnosis Date    Acute bronchitis     unspecified organism    Allergic     Allergic rhinitis     Allergy     Asthma     Severe persistent asthma, unspecified whether complicated    Chronic vasomotor rhinitis     GERD (gastroesophageal reflux disease)     Headache(784.0) 3/1/2012    Morbid obesity with BMI of 40.0-44.9, adult ([facility] 12/8/2016    NELSON (obstructive sleep apnea)     Pure hypercholesterolemia 12/8/2016    Severe persistent asthma     Unspecified sleep apnea     Vision disturbance 1/31/2014    wears glassses   ,   Past Surgical History:   Procedure Laterality Date    ADENOIDECTOMY      COLONOSCOPY N/A 10/8/2020    COLONOSCOPY WITH BIOPSY performed by Serenity Anderson MD at [address] [place] [address]  08/28/2014    Nexplanon placement,  pt has had this removed as of 10-8-202    TONSILLECTOMY      UPPER GASTROINTESTINAL ENDOSCOPY N/A 10/8/2020    EGD BIOPSY performed by Serenity Anderson MD at [facility]   ,   Social History     Tobacco Use    Smoking status: Current Some Day Smoker     Types: Cigarettes     Start date: 8/11/2016   Shannan Paul Smokeless tobacco: Never Used    Tobacco comment:  visitors smoke outside   Substance Use Topics    Alcohol use: No     Alcohol/week: 0.0 standard drinks    Drug use: No         RECORD REVIEW: Previous medical records were reviewed at today's visit. PHYSICAL EXAMINATION:    Vital Signs: (As obtained by patient/caregiver at home)    Patient-Reported Vitals 7/2/2020   Patient-Reported Weight 180lb   Patient-Reported Height 4 11   Patient-Reported Temperature 98.6              RECORD REVIEW: Previous medical records were reviewed at today's visit. The past family, medical and social histories were reviewed and unchanged with the exceptions of what is mentioned in this note. Due to this being a TeleHealth encounter, evaluation of the following organ systems is limited: Vitals/Constitutional/EENT/Resp/CV/GI//MS/Neuro/Skin/Heme-Lymph-Imm. ASSESSMENT/PLAN:  Kassandra Patel was seen today for abdominal pain and headache. Diagnoses and all orders for this visit:    Chronic idiopathic constipation  -     docusate sodium (COLACE) 100 MG capsule; Take 1 capsule by mouth 2 times daily as needed for Constipation    Seasonal allergies  -     cetirizine (ZYRTEC) 10 MG tablet; Take 1 tablet by mouth daily    -Restart cetirizine, patient advised to take daily for the next 3 to 4 weeks to cover seasonal allergies.  -No current complaints of diarrhea. Encourage patient to incorporate high-fiber diet and drink half her body weight in ounces of water a day. May use stool softener as needed for constipation    No follow-ups on file. An  electronic signature was used to authenticate this note.     --MARY Huff - CNP on 10/20/2020 at 2:12 PM    This note is created with the assistance of a speech-recognition program. While intending to generate a document that actually reflects the content of the visit, the document can still have some mistakes which may not have been identified and corrected by editing. 9    Pursuant to the emergency declaration under the [facility], [facility] waiver authority and the [facility] and [employer] Act, this Virtual  Visit was conducted, with patient's consent, to reduce the patient's risk of exposure to COVID-19 and provide continuity of care for an established patient. Services were provided through a video synchronous discussion virtually to substitute for in-person clinic visit.

## 2020-10-20 NOTE — PROGRESS NOTES
Visit Information    Have you changed or started any medications since your last visit including any over-the-counter medicines, vitamins, or herbal medicines? no   Are you having any side effects from any of your medications? -  no  Have you stopped taking any of your medications? Is so, why? -  no    Have you seen any other physician or provider since your last visit? No  Have you had any other diagnostic tests since your last visit? No  Have you been seen in the emergency room and/or had an admission to a hospital since we last saw you? No  Have you had your routine dental cleaning in the past 6 months? no    Have you activated your [employer] account? If not, what are your barriers?  Yes     Patient Care Team:  MARY Oviedo CNP as PCP - General (Family Medicine)  MARY Oviedo CNP as PCP [facility]  Orly Moreno MD as Consulting Physician (Pulmonology)  MARY Ocampo CNP as Nurse Practitioner (Nurse Practitioner)  Leonarda Paul MD as Consulting Physician (Gastroenterology)    Medical History Review  Past Medical, Family, and Social History reviewed and does not contribute to the patient presenting condition    Health Maintenance   Topic Date Due    Pneumococcal 0-64 years Vaccine (1 of 3 - PCV13) 09/05/2004    A1C test (Diabetic or Prediabetic)  02/28/2019    Flu vaccine (1) 09/01/2020    Chlamydia screen  09/10/2021    DTaP/Tdap/Td vaccine (7 - Td) 11/23/2021    Cervical cancer screen  09/10/2023    Hepatitis A vaccine  Completed    Hepatitis B vaccine  Completed    Hib vaccine  Completed    HPV vaccine  Completed    Varicella vaccine  Completed    Meningococcal (ACWY) vaccine  Completed    HIV screen  Completed

## 2020-10-22 ENCOUNTER — HOSPITAL ENCOUNTER (EMERGENCY)
Age: 20
Discharge: HOME | End: 2020-10-23
Attending: EMERGENCY MEDICINE
Payer: COMMERCIAL

## 2020-10-22 ENCOUNTER — TELEPHONE (OUTPATIENT)
Dept: PULMONOLOGY | Age: 20
End: 2020-10-22

## 2020-10-22 VITALS
TEMPERATURE: 98.1 F | HEART RATE: 87 BPM | WEIGHT: 180 LBS | OXYGEN SATURATION: 99 % | HEIGHT: 59 IN | RESPIRATION RATE: 17 BRPM | BODY MASS INDEX: 36.29 KG/M2 | SYSTOLIC BLOOD PRESSURE: 156 MMHG | DIASTOLIC BLOOD PRESSURE: 96 MMHG

## 2020-10-22 PROCEDURE — 6370000000 HC RX 637 (ALT 250 FOR IP): Performed by: STUDENT IN AN ORGANIZED HEALTH CARE EDUCATION/TRAINING PROGRAM

## 2020-10-22 PROCEDURE — 99282 EMERGENCY DEPT VISIT SF MDM: CPT

## 2020-10-22 RX ORDER — LIDOCAINE HYDROCHLORIDE 20 MG/ML
15 SOLUTION OROPHARYNGEAL ONCE
Status: COMPLETED | OUTPATIENT
Start: 2020-10-22 | End: 2020-10-22

## 2020-10-22 RX ADMIN — LIDOCAINE HYDROCHLORIDE 15 ML: 20 SOLUTION ORAL; TOPICAL at 23:30

## 2020-10-22 ASSESSMENT — PAIN SCALES - GENERAL: PAINLEVEL_OUTOF10: 10

## 2020-10-22 NOTE — TELEPHONE ENCOUNTER
Patient called, states she needs a new cord for her nebulizer, I put the order in for a cord or replace machine.  Please sign it and i'll fax it to [facility]

## 2020-10-23 ASSESSMENT — ENCOUNTER SYMPTOMS
TROUBLE SWALLOWING: 0
VOMITING: 0
ABDOMINAL PAIN: 0
VOICE CHANGE: 0
FACIAL SWELLING: 0
SHORTNESS OF BREATH: 0
SORE THROAT: 0
COUGH: 0
NAUSEA: 0

## 2020-10-23 NOTE — ED NOTES
Report received from [facility].   This caregiver met patient, no new change in status       Christopher Vang RN  10/23/20 0004

## 2020-10-23 NOTE — ED PROVIDER NOTES
on file   Tobacco Use    Smoking status: Current Some Day Smoker     Types: Cigarettes     Start date: 8/11/2016    Smokeless tobacco: Never Used    Tobacco comment:  visitors smoke outside   Substance and Sexual Activity    Alcohol use: No     Alcohol/week: 0.0 standard drinks    Drug use: No    Sexual activity: Yes   Lifestyle    Physical activity     Days per week: Not on file     Minutes per session: Not on file    Stress: Not on file   Relationships    Social connections     Talks on phone: Not on file     Gets together: Not on file     Attends Synagogue service: Not on file     Active member of club or organization: Not on file     Attends meetings of clubs or organizations: Not on file     Relationship status: Not on file    Intimate partner violence     Fear of current or ex partner: Not on file     Emotionally abused: Not on file     Physically abused: Not on file     Forced sexual activity: Not on file   Other Topics Concern    Not on file   Social History Narrative    Not on file       Family History   Problem Relation Age of Onset    Asthma Mother     Lupus Mother     High Blood Pressure Father     Diabetes Father     Diabetes Maternal Grandfather     High Blood Pressure Maternal Grandfather     Heart Disease Paternal Grandmother     Diabetes Paternal Grandmother     High Blood Pressure Paternal Grandfather     Heart Disease Paternal Uncle     Heart Disease Paternal Aunt     Arthritis Maternal Aunt     Cancer Maternal Aunt     Birth Defects Neg Hx     Depression Neg Hx     Early Death Neg Hx     Hearing Loss Neg Hx     High Cholesterol Neg Hx     Kidney Disease Neg Hx     Learning Disabilities Neg Hx     Mental Illness Neg Hx     Mental Retardation Neg Hx     Miscarriages / Stillbirths Neg Hx     Stroke Neg Hx     Substance Abuse Neg Hx     Vision Loss Neg Hx     Other Neg Hx     Ovarian Cancer Neg Hx     Breast Cancer Neg Hx         Allergies:  Banana; 99%   Weight:  180 lb (81.6 kg)   Height:  4' 11\" (1.499 m)           Physical Exam  Vitals signs and nursing note reviewed. Constitutional:       General: She is not in acute distress. Appearance: Normal appearance. She is not ill-appearing, toxic-appearing or diaphoretic. HENT:      Mouth/Throat:      Mouth: Mucous membranes are moist.      Pharynx: Oropharynx is clear. No oropharyngeal exudate or posterior oropharyngeal erythema. Comments: There are multiple small ulcerations underneath the tongue consistent with canker sores. No submandibular tenderness, swelling, thickening or hardening. No lymphadenopathy or neck tenderness. No drooling. Neck:      Musculoskeletal: Normal range of motion and neck supple. No neck rigidity or muscular tenderness. Cardiovascular:      Rate and Rhythm: Normal rate and regular rhythm. Pulmonary:      Effort: Pulmonary effort is normal. No respiratory distress. Breath sounds: Normal breath sounds. No stridor. Lymphadenopathy:      Cervical: No cervical adenopathy. Neurological:      Mental Status: She is alert. DIFFERENTIAL  DIAGNOSIS     PLAN (LABS / IMAGING / EKG):  No orders of the defined types were placed in this encounter. MEDICATIONS ORDERED:  Orders Placed This Encounter   Medications    lidocaine viscous hcl (XYLOCAINE) 2 % solution 15 mL    Magic Mouthwash (MIRACLE MOUTHWASH)     Sig: Swish and spit 5 mLs 4 times daily as needed for Irritation Components: 50/50 liquid Benadryl and Maalox     Dispense:  240 mL     Refill:  0       Initial MDM/Plan/ED course: 23 y.o. female who presents with sores under her tongue. On exam vitals normal patient is no acute distress. Physical exam reveals small ulcerations of the tongue consistent with canker sores. Patient has been in a long-term relationship and denies any concern for herpes.   No evidence of further infection, Alek's angina, lymphadenopathy, deep space infection, dental infection. Patient treated with viscous lidocaine in the ER and given Magic mouthwash for home. Patient given strict return precautions if her symptoms become worse. DIAGNOSTIC RESULTS / EMERGENCY DEPARTMENT COURSE / MDM     LABS:  Labs Reviewed - No data to display      RADIOLOGY:  No results found. EKG      All EKG's are interpreted by the [facility] Physician who either signs or Co-signs this chart in the absence of a cardiologist.      PROCEDURES:  None    CONSULTS:  None    CRITICAL CARE:  Please see attending note    FINAL IMPRESSION      1.  Canker sore          DISPOSITION / PLAN     DISPOSITION Decision To Discharge 10/22/2020 10:55:53 PM      PATIENT REFERRED TO:  Clive Doll, MARY - CNP  [address]  [phone]    Schedule an appointment as soon as possible for a visit in 1 week  Follow up    [facility]  [address] [facility] [address] [phone]  Go to   As needed, If symptoms worsen      DISCHARGE MEDICATIONS:  Discharge Medication List as of 10/22/2020 10:59 PM      START taking these medications    Details   Magic Mouthwash (MIRACLE MOUTHWASH) Swish and spit 5 mLs 4 times daily as needed for Irritation Components: 50/50 liquid Benadryl and Maalox, Disp-240 mL,R-0Print             Alek Morrison DO  Emergency Medicine Resident    (Please note that portions of this note were completed with a voice recognition program.Efforts were made to edit the dictations but occasionally words are mis-transcribed.)       Orlando Hargrove DO  Resident  10/23/20 0100

## 2020-10-23 NOTE — ED PROVIDER NOTES
DCed    [facility].   Attending Emergency Physician          Heber Lopez DO  10/22/20 3277

## 2020-10-23 NOTE — ED NOTES
Patient complains of oral pain x four days following oral sex with her significant other. Patient states that it is progressively getting worse.  Patient denies SOB and chest pain       Deysi Gallego RN  10/22/20 1189

## 2020-11-05 ENCOUNTER — OFFICE VISIT (OUTPATIENT)
Dept: PULMONOLOGY | Age: 20
End: 2020-11-05
Payer: COMMERCIAL

## 2020-11-05 VITALS
HEART RATE: 68 BPM | TEMPERATURE: 97.3 F | SYSTOLIC BLOOD PRESSURE: 125 MMHG | RESPIRATION RATE: 18 BRPM | WEIGHT: 187 LBS | BODY MASS INDEX: 37.7 KG/M2 | OXYGEN SATURATION: 99 % | DIASTOLIC BLOOD PRESSURE: 87 MMHG | HEIGHT: 59 IN

## 2020-11-05 PROCEDURE — G8427 DOCREV CUR MEDS BY ELIG CLIN: HCPCS | Performed by: INTERNAL MEDICINE

## 2020-11-05 PROCEDURE — G8417 CALC BMI ABV UP PARAM F/U: HCPCS | Performed by: INTERNAL MEDICINE

## 2020-11-05 PROCEDURE — 99214 OFFICE O/P EST MOD 30 MIN: CPT | Performed by: INTERNAL MEDICINE

## 2020-11-05 PROCEDURE — G8482 FLU IMMUNIZE ORDER/ADMIN: HCPCS | Performed by: INTERNAL MEDICINE

## 2020-11-05 PROCEDURE — 90670 PCV13 VACCINE IM: CPT | Performed by: INTERNAL MEDICINE

## 2020-11-05 PROCEDURE — 4004F PT TOBACCO SCREEN RCVD TLK: CPT | Performed by: INTERNAL MEDICINE

## 2020-11-05 RX ORDER — MONTELUKAST SODIUM 10 MG/1
10 TABLET ORAL DAILY
Qty: 30 TABLET | Refills: 3 | Status: SHIPPED | OUTPATIENT
Start: 2020-11-05 | End: 2021-01-06

## 2020-11-05 RX ORDER — PREDNISONE 10 MG/1
TABLET ORAL
Qty: 30 TABLET | Refills: 1 | Status: SHIPPED | OUTPATIENT
Start: 2020-11-05 | End: 2021-01-06

## 2020-11-05 RX ORDER — TIOTROPIUM BROMIDE INHALATION SPRAY 1.56 UG/1
2 SPRAY, METERED RESPIRATORY (INHALATION) DAILY
Qty: 1 INHALER | Refills: 11 | Status: SHIPPED | OUTPATIENT
Start: 2020-11-05 | End: 2021-01-06

## 2020-11-05 ASSESSMENT — SLEEP AND FATIGUE QUESTIONNAIRES
ESS TOTAL SCORE: 6
HOW LIKELY ARE YOU TO NOD OFF OR FALL ASLEEP WHILE SITTING AND READING: 0
HOW LIKELY ARE YOU TO NOD OFF OR FALL ASLEEP WHILE WATCHING TV: 1
HOW LIKELY ARE YOU TO NOD OFF OR FALL ASLEEP WHILE SITTING QUIETLY AFTER LUNCH WITHOUT ALCOHOL: 0
HOW LIKELY ARE YOU TO NOD OFF OR FALL ASLEEP WHILE SITTING AND TALKING TO SOMEONE: 0
HOW LIKELY ARE YOU TO NOD OFF OR FALL ASLEEP IN A CAR, WHILE STOPPED FOR A FEW MINUTES IN TRAFFIC: 0
HOW LIKELY ARE YOU TO NOD OFF OR FALL ASLEEP WHEN YOU ARE A PASSENGER IN A CAR FOR AN HOUR WITHOUT A BREAK: 2
HOW LIKELY ARE YOU TO NOD OFF OR FALL ASLEEP WHILE LYING DOWN TO REST IN THE AFTERNOON WHEN CIRCUMSTANCES PERMIT: 1
HOW LIKELY ARE YOU TO NOD OFF OR FALL ASLEEP WHILE SITTING INACTIVE IN A PUBLIC PLACE: 2

## 2020-11-11 NOTE — PROGRESS NOTES
PULMONARY OP  PROGRESS NOTE      Patient:  Roselyn Tesfaye  YOB: 2000    MRN: [id number]     Acct:        Pt seen and Chart reviewed. Ms. Roselyn Tesfaye is here in followup for   1. Moderate persistent asthma with acute exacerbation    2. Moderate persistent asthma without complication    3. Post-nasal drip    4. Obesity due to excess calories, unspecified obesity severity    5. Gastroesophageal reflux disease, unspecified whether esophagitis present    6. Snoring          Pt has not been hospitalizedor been to er since last visit. Last visit was a virtual visit  Has been using meds as recommended.   Has some shortness of breath and wheezing  Breo gave her headache and hence she switched to [facility]  She is tolerating Dulera  Has intermittent wheezing  Not much cough or sputum production  No nasal discharge of significance  No acid reflux symptoms currently  She is making an effort to lose weight  She had a polysomnogram since last visit that did not show any sleep apnea  Patient continues to smoke      Subjective:  Review of Systems    Review of Systems -   General ROS: Completed and except as mentioned above were negative   Psychological ROS:  Completed and except as mentioned above were negative  Ophthalmic ROS:  Completed and except as mentioned above were negative  ENT ROS:  Completed and except as mentioned above were negative  Allergy and Immunology ROS:  Completed and except as mentioned above werenegative  Hematological and Lymphatic ROS:  Completed and except as mentioned above were negative  Endocrine ROS: Completed and except as mentioned above were negative  Respiratory ROS:  Completed and except asmentioned above were negative  Cardiovascular ROS:  Completed and except as mentioned above were negative  Gastrointestinal ROS: Completed and except as mentioned above were negative  Genito-Urinary ROS:  Completedand except as mentioned above were negative  Musculoskeletal ROS: weights: Wt Readings from Last 3 Encounters:   11/05/20 187 lb (84.8 kg)   10/22/20 180 lb (81.6 kg)   10/08/20 179 lb (81.2 kg)     Body mass index is 37.77 kg/m². Physical Examination:   PHYSICAL EXAMINATION:  Vitals:    11/05/20 1350   BP: 125/87   Pulse: 68   Resp: 18   Temp: 97.3 °F (36.3 °C)   TempSrc: Temporal   SpO2: 99%   Weight: 187 lb (84.8 kg)   Height: 4' 11\" (1.499 m)       Physical Exam    Constitutional:This is a well developed, well nourished, 35-39.9 - Obesity Grade II 23y.o. year old female who is alert, oriented, cooperative andin no apparent distress. Head:normocephalic and atraumatic. EENT:   KATIE. No conjunctival injections. Septum was midline, mucosa was without erythema, exudates or cobblestoning. No thrush was noted. Mallampati III (soft palate, base of uvula visible)  Neck: Supple without thyromegaly. No elevated JVP. Trachea was midline. Respiratory: Chest was symmetrical without dullness to percussion. Breath sounds bilaterally were clear to auscultation. There were no wheezes, rhonchi or rales. There isno intercostal retraction or use of accessory muscles. No egophony noted. Cardiovascular: Regular without murmur, clicks, gallops or rubs. Abdomen: Slightly rounded and soft without organomegaly. No rebound tenderness, rigidity or guarding wasappreciated. Lymphatic: Nolymphadenopathy. Musculoskeletal:Normal curvature of the spine. No gross muscle weakness. Extremities:  No lower extremity edema, ulcerations, tenderness, varicosities or erythema. Muscle size, tone andstrength are normal.  No involuntary movements are noted. Skin:  Warm and dry. Good color, turgor and pigmentation. No lesions or scars. Neurological/Psychiatric: The patient's general behavior, level of consciousness, thought content and emotional status is normal.       Labs:  Chest x-ray was done on August 24, 2020 and it did not show any acute process          Assessment:    1.  Moderate persistent asthma with acute exacerbation    2. Moderate persistent asthma without complication    3. Post-nasal drip    4. Obesity due to excess calories, unspecified obesity severity    5. Gastroesophageal reflux disease, unspecified whether esophagitis present    6. Snoring          PLAN:    Refills were provided-prednisone taper, Singulair, Spiriva and nebulizer tubing  Patient was recommended to have prednisone and an antibiotic available for use during an exacerbation  Educated and clarified the medication use. Discusseduse, benefit, and side effects of prescribed medications. Barriers to medication compliance addressed. Donald Riddledomingo received counseling on the following healthy behaviors: nutrition, exercise and medication adherence  Recommend flu vaccination in the fall annually. Patient had flu vaccination for the season  Recommendations given regarding pneumococcal vaccinations. Prevnar 13 vaccination given at this visit  Patient is up-to-date with vaccinations from pulmonary perspective. Maintain an active lifestyle. Recommend smoking cessation. Devan Maier was instructed on smoking cessation for greater than 10 minutes. I discussed with this patient today therisks and benefits of various tobacco cessation strategies  Patient was educated on how to use the respiratory medications. All the questions that the patient has had were answered to her satisfaction. Home O2 evaluation was done. Supplemental oxygen was not needed. Chest x-ray was reviewed  After reviewing the patient's smoking history and his age patient does not meet the criteria for lung cancer screening. Pt is not to drive if sleepy. We'll see the patient back in 6 months or earlier if needed. Patient will call us if she is sick, so she can be seen sooner. Thank youfor having us involved in the care of your patient. Please call us if you have any questions or concerns.         Brigida James MD 11/11/2020, 9:03 AM

## 2020-11-12 NOTE — PATIENT INSTRUCTIONS
11/12/20 mailed DME order. Pt called office, said she still needs supplies. Requested we mail her order.    LS

## 2020-11-16 ENCOUNTER — TELEPHONE (OUTPATIENT)
Dept: GASTROENTEROLOGY | Age: 20
End: 2020-11-16

## 2020-11-16 NOTE — TELEPHONE ENCOUNTER
Pt called c/o on/off stomach pain and constipation x 2 weeks. Pt has an appt on 11/23 with Dr Tanner Stahl. Pt is requesting advice on what to do until appt?

## 2020-11-17 RX ORDER — BISACODYL 5 MG
5 TABLET, DELAYED RELEASE (ENTERIC COATED) ORAL DAILY PRN
Qty: 30 TABLET | Refills: 1 | Status: SHIPPED | OUTPATIENT
Start: 2020-11-17 | End: 2021-01-06

## 2020-11-17 NOTE — TELEPHONE ENCOUNTER
Writer prepared and signed script per Dr Yola Queen. Patient is called and notified. Pharmacy is verified.

## 2020-11-20 NOTE — TELEPHONE ENCOUNTER
Called and spoke with pt regarding changing OV to VV with Dr Omaira Erazo on Monday 11/23/20 due to Covid concerns. Informed pt they will receive call between 945 am and 1045 am.  Disclaimer read and pt voices understanding.

## 2020-11-25 NOTE — TELEPHONE ENCOUNTER
Patient called back to reschedule appointment but there are no more openings for the year. Will put in recall letter to mail out to patient's home for her to call us in a month to schedule an appointment. Did update patient's phone number, as the previous number was disconnected. Patient states that the bisacodyl helps her constipation but every time she takes it for about 20 minutes her stomach cramps up. She said that without the medication she wouldn't be able to make a BM. Informed the patient that the provider did put a refill on the medication when he prescribed it to his pharmacy earlier this month.

## 2020-12-11 ENCOUNTER — TELEPHONE (OUTPATIENT)
Dept: PULMONOLOGY | Age: 20
End: 2020-12-11

## 2021-01-06 ENCOUNTER — OFFICE VISIT (OUTPATIENT)
Dept: PODIATRY | Age: 21
End: 2021-01-06
Payer: COMMERCIAL

## 2021-01-06 VITALS
TEMPERATURE: 98 F | DIASTOLIC BLOOD PRESSURE: 78 MMHG | BODY MASS INDEX: 37.77 KG/M2 | SYSTOLIC BLOOD PRESSURE: 111 MMHG | WEIGHT: 187 LBS | HEART RATE: 75 BPM

## 2021-01-06 DIAGNOSIS — M77.42 METATARSALGIA OF BOTH FEET: Primary | ICD-10-CM

## 2021-01-06 DIAGNOSIS — M21.6X2 EQUINUS DEFORMITY OF BOTH FEET: ICD-10-CM

## 2021-01-06 DIAGNOSIS — M76.822 POSTERIOR TIBIAL TENDON DYSFUNCTION (PTTD) OF BOTH LOWER EXTREMITIES: ICD-10-CM

## 2021-01-06 DIAGNOSIS — B35.3 TINEA PEDIS OF BOTH FEET: ICD-10-CM

## 2021-01-06 DIAGNOSIS — M77.41 METATARSALGIA OF BOTH FEET: Primary | ICD-10-CM

## 2021-01-06 DIAGNOSIS — M76.821 POSTERIOR TIBIAL TENDON DYSFUNCTION (PTTD) OF BOTH LOWER EXTREMITIES: ICD-10-CM

## 2021-01-06 DIAGNOSIS — M21.6X1 EQUINUS DEFORMITY OF BOTH FEET: ICD-10-CM

## 2021-01-06 PROCEDURE — G8417 CALC BMI ABV UP PARAM F/U: HCPCS | Performed by: STUDENT IN AN ORGANIZED HEALTH CARE EDUCATION/TRAINING PROGRAM

## 2021-01-06 PROCEDURE — G8427 DOCREV CUR MEDS BY ELIG CLIN: HCPCS | Performed by: STUDENT IN AN ORGANIZED HEALTH CARE EDUCATION/TRAINING PROGRAM

## 2021-01-06 PROCEDURE — 99202 OFFICE O/P NEW SF 15 MIN: CPT | Performed by: STUDENT IN AN ORGANIZED HEALTH CARE EDUCATION/TRAINING PROGRAM

## 2021-01-06 PROCEDURE — 99204 OFFICE O/P NEW MOD 45 MIN: CPT | Performed by: STUDENT IN AN ORGANIZED HEALTH CARE EDUCATION/TRAINING PROGRAM

## 2021-01-06 PROCEDURE — 1036F TOBACCO NON-USER: CPT | Performed by: STUDENT IN AN ORGANIZED HEALTH CARE EDUCATION/TRAINING PROGRAM

## 2021-01-06 PROCEDURE — G8482 FLU IMMUNIZE ORDER/ADMIN: HCPCS | Performed by: STUDENT IN AN ORGANIZED HEALTH CARE EDUCATION/TRAINING PROGRAM

## 2021-01-06 NOTE — PATIENT INSTRUCTIONS
Please obtain powerstep inserts for shoes    Patient Education        Achilles Tendon: Exercises  Introduction  Here are some examples of exercises for you to try. The exercises may be suggested for a condition or for rehabilitation. Start each exercise slowly. Ease off the exercises if you start to have pain. You will be told when to start these exercises and which ones will work best for you. Toe stretch  Toe stretch   1. Sit in a chair, and extend your affected leg so that your heel is on the floor. 2. With your hand, reach down and pull your big toe up and back. Pull toward your ankle and away from the floor. 3. Hold the position for at least 15 to 30 seconds. 4. Repeat 2 to 4 times a session, several times a day. Calf-plantar fascia stretch   1. Sit with your legs extended and knees straight. 2. Place a towel around your foot just under the toes. 3. Hold each end of the towel in each hand, with your hands above your knees. 4. Pull back with the towel so that your foot stretches toward you. 5. Hold the position for at least 15 to 30 seconds. 6. Repeat 2 to 4 times a session, up to 5 sessions a day. Floor stretch   1. Stand about 2 feet from a wall, and place your hands on the wall at about shoulder height. Or you can stand behind a chair, placing your hands on the back of it for balance. 2. Step back with the leg you want to stretch. Keep the leg straight, and press your heel into the floor with your toe turned slightly in.  3. Lean forward, and bend your other leg slightly. Feel the stretch in the Achilles tendon of your back leg. Hold for at least 15 to 30 seconds. 4. Repeat 2 to 4 times a session, up to 5 sessions a day. Stair stretch   1. Stand with the balls of both feet on the edge of a step or curb (or a medium-sized phone book). With at least one hand, hold onto something solid for balance, such as a banister or handrail. 2. Keeping your affected leg straight, slowly let that heel hang down off of the step or curb until you feel a stretch in the back of your calf and/or Achilles area. Some of your weight should still be on the other leg. 3. Hold this position for at least 15 to 30 seconds. 4. Repeat 2 to 4 times a session, up to 5 times a day or whenever your Achilles tendon starts to feel tight. This stretch can also be done with your knee slightly bent. Strength exercise   1. This exercise will get you started on building strength after an Achilles tendon injury. Your doctor or physical therapist can help you move on to more challenging exercises as you heal and get stronger. 2. Stand on a step with your heel off the edge of the step. Hold on to a handrail or wall for balance. 3. Push up on your toes, then slowly count to 10 as you lower yourself back down until your heel is below the step. If it hurts to push up on your toes, try putting most of your weight on your other foot as you push up, or try using your arms to help you. If you can't do this exercise without causing pain, stop the exercise and talk to your doctor. 4. Repeat the exercise 8 to 12 times, half with the knee straight and half with the knee bent. Follow-up care is a key part of your treatment and safety. Be sure to make and go to all appointments, and call your doctor if you are having problems. It's also a good idea to know your test results and keep a list of the medicines you take. Where can you learn more? Go to https://dipti.[facility]. org and sign in to your [employer] account. Enter I990 in the [employer] box to learn more about \"Achilles Tendon: Exercises. \"     If you do not have an account, please click on the \"Sign Up Now\" link. Current as of: March 2, 2020               Content Version: 12.6  © 9091-7456 [employer]. Care instructions adapted under license by [facility] ([facility]). If you have questions about a medical condition or this instruction, always ask your healthcare professional. [address] any warranty or liability for your use of this information.

## 2021-01-06 NOTE — PROGRESS NOTES
Patient instructed to remove shoes and socks and instructed to sit in exam chair. Current PCP is MARY Horton CNP and date of last visit was 10/20/20. Do you have a follow up visit scheduled?   No  If yes, the date is n/a

## 2021-01-06 NOTE — PROGRESS NOTES
[address] [address] [address] [facility],  [address]  Tel: [phone]   Fax: [phone]    Subjective     CC: bilateral foot pain    HPI:  Glen Grissom is a 23y.o. year old female who presents to clinic today for right foot pain. Notes sharp localized pain at the balls of her feet after walking extended periods of time. 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  No new falls or trauma. No changes in activity level. Has not taken anything for pain. Presents with nonsupportive shoes. Denies any smoking history. Primary care physician is MARY Barton CNP.    ROS:    Constitutional: Denies nausea, vomiting, fever, chills. Neurologic: Denies numbness, tingling, and burning in the feet. Vascular: Denies symptoms of lower extremity claudication. Skin: Denies open wounds. Otherwise negative except as noted in the HPI.      PMH:  Past Medical History:   Diagnosis Date    Acute bronchitis     unspecified organism    Allergic     Allergic rhinitis     Allergy     Asthma     Severe persistent asthma, unspecified whether complicated    Chronic vasomotor rhinitis     GERD (gastroesophageal reflux disease)     Headache(784.0) 3/1/2012    Morbid obesity with BMI of 40.0-44.9, adult ([facility] [address] 12/8/2016    NELSON (obstructive sleep apnea)     Pure hypercholesterolemia 12/8/2016    Severe persistent asthma     Unspecified sleep apnea     Vision disturbance 1/31/2014    wears glassses       Surgical History:   Past Surgical History:   Procedure Laterality Date    ADENOIDECTOMY      COLONOSCOPY N/A 10/8/2020    COLONOSCOPY WITH BIOPSY performed by Karla Anders MD at [address] [place] [address]  08/28/2014    Nexplanon placement,  pt has had this removed as of 10-8-202    TONSILLECTOMY      UPPER GASTROINTESTINAL ENDOSCOPY N/A 10/8/2020    EGD BIOPSY performed by Karla Anders MD at [address] [place] [address] History: Social History     Tobacco Use    Smoking status: Never Smoker    Smokeless tobacco: Never Used   Substance Use Topics    Alcohol use: No     Alcohol/week: 0.0 standard drinks    Drug use: No       Medications:  Prior to Admission medications    Medication Sig Start Date End Date Taking? Authorizing Provider   Ketoconazole 2 % GEL Apply 1 Tube topically 2 times daily 1/6/21  Yes Tomas Sanchez LAURENCESARIKA   mometasone-formoterol [facility]) 100-5 MCG/ACT inhaler Inhale 2 puffs into the lungs 2 times daily 12/29/20  Yes Nadege Chiang MD   albuterol sulfate HFA (VENTOLIN HFA) 108 (90 Base) MCG/ACT inhaler Inhale 2 puffs into the lungs every 6 hours as needed for Wheezing 7/2/20  Yes Nadege Chiang MD       Objective     Vitals:    01/06/21 1339   BP: 111/78   Pulse: 75   Temp: 98 °F (36.7 °C)       Lab Results   Component Value Date    LABA1C 5.7 02/28/2018       Physical Exam:  General:  Alert and oriented x3. In no acute distress. Lower Extremity Physical Exam:    Vascular: DP and PT pulses are palpable, Bilateral. CFT <3 seconds to all digits, Bilateral.  No edema, Bilateral.  Hair growth is present to the level of the digits, Bilateral.     Neuro: Saph/sural/SP/DP/plantar sensation intact to light touch. Musculoskeletal: EHL/FHL/GS/TA gross motor intact. Tenderness to palpation of subfifth metatarsal head bilaterally. Negative Tinel's. Abducted stance bilateral.  Flexible flatfoot deformity bilateral.  No pain on palpation to PTT insertion/course. Full aROM STJ/AJ bilateral with no pain. Able to perform double/single heel raise with inversion of rearfoot with no pain. Dermatologic: no hpks, no open lesions, Bilateral.  Skin is dry peeling and scaling subfifth metatarsal heads bilateral.  No erythema, increase in warmth, fluctuance, drainage, ecchymoses. Interdigital maceration present fourth interspace, Bilateral.  Nails 1-10 are trimmed, intact.       Biomechanical Exam: Right foot: 1st MPJ: 35L 45U  Right foot: 1st Ray: 6D 5P       Right foot: Ankle DF knee extended: -3  Right foot: Ankle DF knee flexed: 2    Left foot: 1st MPJ: 32L 40U  Left foot: 1st Ray: 6D 4P  Left foot: Ankle DF knee extended: -4  Left foot: Ankle DF knee flexed: 3    Gait: early heel lift, abductory twist    Imaging: None    Assessment   Donald Reddy is a 23 y.o. female with     Diagnosis Orders   1. Metatarsalgia of both feet  XR FOOT RIGHT (MIN 3 VIEWS)    XR FOOT LEFT (MIN 3 VIEWS)   2. Tinea pedis of both feet  Ketoconazole 2 % GEL   3. Equinus deformity of both feet     4.  Posterior tibial tendon dysfunction (PTTD) of both lower extremities          Plan   · Patient examined and evaluated  · Diagnosis and treatment options discussed in detail  · Educated patient on Achilles stretching  · Educated patient on supportive shoes and to obtain  · Recommended power step inserts  · Bilateral foot XR ordered to assess foot structure  · Reviewed medical history and recommended anti-inflammatories for pain as needed  · Patient to RTC in 3 month(s) or sooner if problems arise  · Please, call the office with any questions or concerns     Orders Placed This Encounter   Medications    Ketoconazole 2 % GEL     Sig: Apply 1 Tube topically 2 times daily     Dispense:  1 Tube     Refill:  3     Orders Placed This Encounter   Procedures    XR FOOT RIGHT (MIN 3 VIEWS)     Standing Status:   Future     Standing Expiration Date:   1/6/2022     Order Specific Question:   Reason for exam:     Answer:   weight bearing, r/o stress fx, 5th met head pain    XR FOOT LEFT (MIN 3 VIEWS)     Standing Status:   Future     Standing Expiration Date:   1/6/2022     Order Specific Question:   Reason for exam:     Answer:   weight bearing, r/o stress fx, 5th met head pain       Jl Bullard DPM   Podiatric Medicine & Surgery   1/6/2021 at 2:03 PM

## 2021-01-07 DIAGNOSIS — M77.41 METATARSALGIA OF BOTH FEET: ICD-10-CM

## 2021-01-07 DIAGNOSIS — M77.42 METATARSALGIA OF BOTH FEET: ICD-10-CM

## 2021-01-07 DIAGNOSIS — B35.3 TINEA PEDIS OF BOTH FEET: Primary | ICD-10-CM

## 2021-01-07 RX ORDER — KETOCONAZOLE 20 MG/G
CREAM TOPICAL
Qty: 30 G | Refills: 3 | Status: SHIPPED | OUTPATIENT
Start: 2021-01-07

## 2021-01-07 NOTE — PROGRESS NOTES
Prior auth was sent for gel, dx gel and reordered cream for pt because it is covered under insurance with no prior auth.    k to send per dr. Jozef Nieto.

## 2021-01-08 ENCOUNTER — HOSPITAL ENCOUNTER (OUTPATIENT)
Dept: GENERAL RADIOLOGY | Age: 21
Discharge: HOME | End: 2021-01-10
Payer: COMMERCIAL

## 2021-01-08 ENCOUNTER — HOSPITAL ENCOUNTER (OUTPATIENT)
Age: 21
Discharge: HOME | End: 2021-01-10
Payer: COMMERCIAL

## 2021-01-08 DIAGNOSIS — M77.41 METATARSALGIA OF BOTH FEET: ICD-10-CM

## 2021-01-08 DIAGNOSIS — M77.42 METATARSALGIA OF BOTH FEET: ICD-10-CM

## 2021-01-08 PROCEDURE — 73630 X-RAY EXAM OF FOOT: CPT

## 2021-02-05 ENCOUNTER — NURSE TRIAGE (OUTPATIENT)
Dept: OTHER | Facility: CLINIC | Age: 21
End: 2021-02-05

## 2021-02-11 ENCOUNTER — HOSPITAL ENCOUNTER (EMERGENCY)
Age: 21
Discharge: HOME | End: 2021-02-12
Attending: EMERGENCY MEDICINE
Payer: COMMERCIAL

## 2021-02-11 ENCOUNTER — APPOINTMENT (OUTPATIENT)
Dept: ULTRASOUND IMAGING | Age: 21
End: 2021-02-11
Payer: COMMERCIAL

## 2021-02-11 ENCOUNTER — APPOINTMENT (OUTPATIENT)
Dept: GENERAL RADIOLOGY | Age: 21
End: 2021-02-11
Payer: COMMERCIAL

## 2021-02-11 VITALS
HEIGHT: 59 IN | HEART RATE: 75 BPM | BODY MASS INDEX: 36.29 KG/M2 | SYSTOLIC BLOOD PRESSURE: 118 MMHG | OXYGEN SATURATION: 100 % | DIASTOLIC BLOOD PRESSURE: 76 MMHG | RESPIRATION RATE: 14 BRPM | WEIGHT: 180 LBS | TEMPERATURE: 98.6 F

## 2021-02-11 DIAGNOSIS — J45.21 MILD INTERMITTENT ASTHMA WITH EXACERBATION: ICD-10-CM

## 2021-02-11 DIAGNOSIS — R10.30 LOWER ABDOMINAL PAIN: Primary | ICD-10-CM

## 2021-02-11 LAB
ABSOLUTE EOS #: 0.05 K/UL (ref 0–0.44)
ABSOLUTE IMMATURE GRANULOCYTE: <0.03 K/UL (ref 0–0.3)
ABSOLUTE LYMPH #: 2.14 K/UL (ref 1.1–3.7)
ABSOLUTE MONO #: 0.26 K/UL (ref 0.1–1.2)
ALBUMIN SERPL-MCNC: 4 G/DL (ref 3.5–5.2)
ALBUMIN/GLOBULIN RATIO: 1.3 (ref 1–2.5)
ALP BLD-CCNC: 65 U/L (ref 35–104)
ALT SERPL-CCNC: 16 U/L (ref 5–33)
ANION GAP SERPL CALCULATED.3IONS-SCNC: 7 MMOL/L (ref 9–17)
AST SERPL-CCNC: 21 U/L
BASOPHILS # BLD: 0 % (ref 0–2)
BASOPHILS ABSOLUTE: <0.03 K/UL (ref 0–0.2)
BILIRUB SERPL-MCNC: <0.1 MG/DL (ref 0.3–1.2)
BUN BLDV-MCNC: 15 MG/DL (ref 6–20)
BUN/CREAT BLD: ABNORMAL (ref 9–20)
CALCIUM SERPL-MCNC: 9.5 MG/DL (ref 8.6–10.4)
CHLORIDE BLD-SCNC: 109 MMOL/L (ref 98–107)
CO2: 26 MMOL/L (ref 20–31)
CREAT SERPL-MCNC: 0.61 MG/DL (ref 0.5–0.9)
DIFFERENTIAL TYPE: ABNORMAL
EOSINOPHILS RELATIVE PERCENT: 1 % (ref 1–4)
GFR AFRICAN AMERICAN: >60 ML/MIN
GFR NON-AFRICAN AMERICAN: >60 ML/MIN
GFR SERPL CREATININE-BSD FRML MDRD: ABNORMAL ML/MIN/{1.73_M2}
GFR SERPL CREATININE-BSD FRML MDRD: ABNORMAL ML/MIN/{1.73_M2}
GLUCOSE BLD-MCNC: 96 MG/DL (ref 70–99)
HCG QUALITATIVE: NEGATIVE
HCT VFR BLD CALC: 41.8 % (ref 36.3–47.1)
HEMOGLOBIN: 12.3 G/DL (ref 11.9–15.1)
IMMATURE GRANULOCYTES: 0 %
LIPASE: 33 U/L (ref 13–60)
LYMPHOCYTES # BLD: 54 % (ref 24–43)
MCH RBC QN AUTO: 22.9 PG (ref 25.2–33.5)
MCHC RBC AUTO-ENTMCNC: 29.4 G/DL (ref 28.4–34.8)
MCV RBC AUTO: 78 FL (ref 82.6–102.9)
MONOCYTES # BLD: 7 % (ref 3–12)
NRBC AUTOMATED: 0 PER 100 WBC
PDW BLD-RTO: 14.2 % (ref 11.8–14.4)
PLATELET # BLD: ABNORMAL K/UL (ref 138–453)
PLATELET ESTIMATE: ABNORMAL
PLATELET, FLUORESCENCE: NORMAL K/UL (ref 138–453)
PLATELET, IMMATURE FRACTION: 4 % (ref 1.1–10.3)
PMV BLD AUTO: ABNORMAL FL (ref 8.1–13.5)
POTASSIUM SERPL-SCNC: 4.1 MMOL/L (ref 3.7–5.3)
RBC # BLD: 5.36 M/UL (ref 3.95–5.11)
RBC # BLD: ABNORMAL 10*6/UL
SEG NEUTROPHILS: 37 % (ref 36–65)
SEGMENTED NEUTROPHILS ABSOLUTE COUNT: 1.48 K/UL (ref 1.5–8.1)
SODIUM BLD-SCNC: 142 MMOL/L (ref 135–144)
TOTAL PROTEIN: 7.2 G/DL (ref 6.4–8.3)
WBC # BLD: 4 K/UL (ref 3.5–11.3)
WBC # BLD: ABNORMAL 10*3/UL

## 2021-02-11 PROCEDURE — 6370000000 HC RX 637 (ALT 250 FOR IP): Performed by: EMERGENCY MEDICINE

## 2021-02-11 PROCEDURE — 94761 N-INVAS EAR/PLS OXIMETRY MLT: CPT

## 2021-02-11 PROCEDURE — 99284 EMERGENCY DEPT VISIT MOD MDM: CPT

## 2021-02-11 PROCEDURE — 71045 X-RAY EXAM CHEST 1 VIEW: CPT

## 2021-02-11 PROCEDURE — 83690 ASSAY OF LIPASE: CPT

## 2021-02-11 PROCEDURE — 94640 AIRWAY INHALATION TREATMENT: CPT

## 2021-02-11 PROCEDURE — 6360000002 HC RX W HCPCS: Performed by: EMERGENCY MEDICINE

## 2021-02-11 PROCEDURE — 85025 COMPLETE CBC W/AUTO DIFF WBC: CPT

## 2021-02-11 PROCEDURE — 80053 COMPREHEN METABOLIC PANEL: CPT

## 2021-02-11 PROCEDURE — 93976 VASCULAR STUDY: CPT

## 2021-02-11 PROCEDURE — 96375 TX/PRO/DX INJ NEW DRUG ADDON: CPT

## 2021-02-11 PROCEDURE — 76830 TRANSVAGINAL US NON-OB: CPT

## 2021-02-11 PROCEDURE — 85055 RETICULATED PLATELET ASSAY: CPT

## 2021-02-11 PROCEDURE — 84703 CHORIONIC GONADOTROPIN ASSAY: CPT

## 2021-02-11 PROCEDURE — 96374 THER/PROPH/DIAG INJ IV PUSH: CPT

## 2021-02-11 RX ORDER — IPRATROPIUM BROMIDE AND ALBUTEROL SULFATE 2.5; .5 MG/3ML; MG/3ML
1 SOLUTION RESPIRATORY (INHALATION) ONCE
Status: COMPLETED | OUTPATIENT
Start: 2021-02-11 | End: 2021-02-11

## 2021-02-11 RX ORDER — METHYLPREDNISOLONE SODIUM SUCCINATE 125 MG/2ML
125 INJECTION, POWDER, LYOPHILIZED, FOR SOLUTION INTRAMUSCULAR; INTRAVENOUS ONCE
Status: COMPLETED | OUTPATIENT
Start: 2021-02-11 | End: 2021-02-11

## 2021-02-11 RX ORDER — FENTANYL CITRATE 50 UG/ML
50 INJECTION, SOLUTION INTRAMUSCULAR; INTRAVENOUS ONCE
Status: COMPLETED | OUTPATIENT
Start: 2021-02-11 | End: 2021-02-11

## 2021-02-11 RX ORDER — FENTANYL CITRATE 50 UG/ML
50 INJECTION, SOLUTION INTRAMUSCULAR; INTRAVENOUS ONCE
Status: COMPLETED | OUTPATIENT
Start: 2021-02-11 | End: 2021-02-12

## 2021-02-11 RX ADMIN — METHYLPREDNISOLONE SODIUM SUCCINATE 125 MG: 125 INJECTION, POWDER, FOR SOLUTION INTRAMUSCULAR; INTRAVENOUS at 21:58

## 2021-02-11 RX ADMIN — FENTANYL CITRATE 50 MCG: 50 INJECTION, SOLUTION INTRAMUSCULAR; INTRAVENOUS at 21:58

## 2021-02-11 RX ADMIN — IPRATROPIUM BROMIDE AND ALBUTEROL SULFATE 1 AMPULE: .5; 3 SOLUTION RESPIRATORY (INHALATION) at 23:26

## 2021-02-11 ASSESSMENT — ENCOUNTER SYMPTOMS
SORE THROAT: 0
CONSTIPATION: 0
SHORTNESS OF BREATH: 1
NAUSEA: 0
VOMITING: 0
CHEST TIGHTNESS: 1
ABDOMINAL PAIN: 1
COUGH: 1
DIARRHEA: 0

## 2021-02-12 ENCOUNTER — APPOINTMENT (OUTPATIENT)
Dept: CT IMAGING | Age: 21
End: 2021-02-12
Payer: COMMERCIAL

## 2021-02-12 PROCEDURE — 6360000004 HC RX CONTRAST MEDICATION: Performed by: EMERGENCY MEDICINE

## 2021-02-12 PROCEDURE — 6360000002 HC RX W HCPCS: Performed by: EMERGENCY MEDICINE

## 2021-02-12 PROCEDURE — 74177 CT ABD & PELVIS W/CONTRAST: CPT

## 2021-02-12 PROCEDURE — 96376 TX/PRO/DX INJ SAME DRUG ADON: CPT

## 2021-02-12 RX ORDER — PREDNISONE 20 MG/1
20 TABLET ORAL 2 TIMES DAILY
Qty: 10 TABLET | Refills: 0 | Status: SHIPPED | OUTPATIENT
Start: 2021-02-12 | End: 2021-03-03 | Stop reason: SDUPTHER

## 2021-02-12 RX ADMIN — FENTANYL CITRATE 50 MCG: 50 INJECTION, SOLUTION INTRAMUSCULAR; INTRAVENOUS at 00:18

## 2021-02-12 RX ADMIN — IOPAMIDOL 75 ML: 755 INJECTION, SOLUTION INTRAVENOUS at 02:59

## 2021-02-12 ASSESSMENT — PAIN SCALES - GENERAL: PAINLEVEL_OUTOF10: 7

## 2021-02-12 NOTE — ED PROVIDER NOTES
Faculty Sign-Out Attestation  Handoff taken on the following patient from prior Attending Physician: Sosa Thompson    I was available and discussed any additional care issues that arose and coordinated the management plans with the resident(s) caring for the patient during my duty period. Any areas of disagreement with residents documentation of care or procedures are noted on the chart. I was personally present for the key portions of any/all procedures during my duty period. I have documented in the chart those procedures where I was not present during the key portions.     r abdomen pain, tvus -, ct abdomen pending,   Treating asthma will need prednisone,   If ct - >> dc    Viral Blanco,   Attending Physician     Viral Blanco, DO  02/12/21 0054  Ct abdomen -, giving script for prednisone // asthma  & discharging per plan     Viral Blanco,   02/12/21 [place], DO  02/12/21 1495

## 2021-02-12 NOTE — PROGRESS NOTES
I have signed up for this patient in error. I have not seen, evaluated, or had any involvement in the patients care.

## 2021-02-12 NOTE — ED PROVIDER NOTES
[address] [facility]  Emergency Department  Emergency Medicine Resident Sign-out     Care of Prabha Byrd was assumed from Dr. Justen Rivera and is being seen for Abdominal Pain and Shortness of Breath (hx of asthma )  . The patient's initial evaluation and plan have been discussed with the prior provider who initially evaluated the patient.      EMERGENCY DEPARTMENT COURSE / MEDICAL DECISION MAKING:       MEDICATIONS GIVEN:  Orders Placed This Encounter   Medications    fentaNYL (SUBLIMAZE) injection 50 mcg    methylPREDNISolone sodium (SOLU-MEDROL) injection 125 mg    fentaNYL (SUBLIMAZE) injection 50 mcg    ipratropium-albuterol (DUONEB) nebulizer solution 1 ampule    iopamidol (ISOVUE-370) 76 % injection 75 mL    predniSONE (DELTASONE) 20 MG tablet     Sig: Take 1 tablet by mouth 2 times daily for 5 days     Dispense:  10 tablet     Refill:  0       LABS / RADIOLOGY:     Labs Reviewed   CBC WITH AUTO DIFFERENTIAL - Abnormal; Notable for the following components:       Result Value    RBC 5.36 (*)     MCV 78.0 (*)     MCH 22.9 (*)     Lymphocytes 54 (*)     Segs Absolute 1.48 (*)     All other components within normal limits   COMPREHENSIVE METABOLIC PANEL W/ REFLEX TO MG FOR LOW K - Abnormal; Notable for the following components:    Chloride 109 (*)     Anion Gap 7 (*)     Total Bilirubin <0.10 (*)     All other components within normal limits   LIPASE   HCG, SERUM, QUALITATIVE   IMMATURE PLATELET FRACTION   URINE RT REFLEX TO CULTURE       Us Non Ob Transvaginal    Result Date: 2/11/2021 EXAMINATION: PELVIC ULTRASOUND; ULTRASOUND OF THE SCROTUM/TESTICLES WITH COLOR DOPPLER FLOW EVALUATION 2/11/2021 TECHNIQUE: Transvaginal and transabdominal pelvic ultrasound was performed. Color Doppler evaluation was performed. COMPARISON: None HISTORY: ORDERING SYSTEM PROVIDED HISTORY: Severe L adnexal pain with radiation into buttock, r/o torsion TECHNOLOGIST PROVIDED HISTORY: Severe L adnexal pain with radiation into buttock, r/o torsion FINDINGS: Measurements: Uterus:  7.9 x 3.5 x 2.4 cm Endometrial stripe:  4 mm Right Ovary:  3.0 x 1.5 x 1.4 cm Left Ovary:  3.6 x 2.3 x 2.4 cm Ultrasound Findings: Uterus: Uterus demonstrates normal myometrial echotexture. Endometrial stripe: Endometrial stripe is within normal limits. Right Ovary: Right ovary is within normal limits. There is normal arterial and venous doppler flow. Left Ovary:  Left ovary is within normal limits. There is normal arterial and venous doppler flow. Free Fluid: No evidence of free fluid. The exam was limited due to patient discomfort. The patient was intolerant of the transvaginal exam and as such only limited images were obtained transvaginally. No abnormality was identified. The exam is considered adequate to exclude ovarian torsion. RECENT VITALS:     Temp: 98.6 °F (37 °C),  Pulse: 75, Resp: 14, BP: 118/76, SpO2: 100 %      This patient is a 23 y.o. Female with abdominal pain, torsion r/u done, decline pelvic, f/u ob, CT ad--if neg discharge      ED Course as of Feb 12 0556   Thu Feb 11, 2021   2207 TVUS at bedside.    [TS]   2318 Transvaginal ultrasound did not demonstrate evidence of ovarian torsion. Discussed with patient pelvic examination and patient has elected to defer examination to OB/GYN or at least to hold off until she follows up due to significant pain experienced during the ultrasound.     [TS]   Fri Feb 12, 2021   0308 Discharge with prednisone    [PS]      ED Course User Index  [PS] Javid Blanco MD [TS] Valeriy Reyes MD       OUTSTANDING TASKS / RECOMMENDATIONS:    1. CT ab     FINAL IMPRESSION:     1. Lower abdominal pain    2.  Mild intermittent asthma with exacerbation        DISPOSITION:         DISPOSITION:  [x]  Discharge   []  Transfer -    []  Admission -     []  Against Medical Advice   []  Eloped   FOLLOW-UP: MARY Mackay - CNP  [address] [address]  [phone]    In 3 days      [facility]  [address] [place] [address]  [phone]    As needed, If symptoms worsen     DISCHARGE MEDICATIONS: Discharge Medication List as of 2/12/2021  3:22 AM             Bin Cordero MD  Emergency Medicine Resident  [address] [facility]     Bin Cordero MD  Resident  02/12/21 7533       Bin Cordero MD  Resident  02/12/21 5485

## 2021-02-12 NOTE — ED PROVIDER NOTES
[facility] ED  Emergency Department Encounter  EmergencyMedicine Resident     Pt Jose Mjeremiah Ortega  MRN: [id number]  [place] 2000  Date of evaluation: 2/11/21  PCP:  MARY Blankenship CNP    CHIEF COMPLAINT       Chief Complaint   Patient presents with    Abdominal Pain    Shortness of Breath     hx of asthma        HISTORY OF PRESENT ILLNESS  (Location/Symptom, Timing/Onset, Context/Setting, Quality, Duration, Modifying Factors, Severity.)      Liliana Sutton is a 23 y.o. female who presents to the emergency department with a 2-day history of shortness of breath and wheezing and a 1 day history of sharp lower abdominal pain with radiation into the left buttock. Patient states that shortness of breath feels similar to her prior episodes of asthma exacerbations and left lower quadrant abdominal pain with radiation in the left buttock just happened today and has been constant since this morning with no relieving factors, exacerbated by moving around and palpation. Patient has never had pain like this before per her report. Denies fever, chills, chest pain, abdominal pain anywhere else, nausea or vomiting, or problems with bowel movements. No pain with bowel movements but the patient does have some pain with urination although she does not describe it as burning. No vaginal bleeding or discharge. No concern for STDs. Per medical student Lea Waters who interviewed the patient under my supervision:    Liliana Sutton is a 23 y.o. female with history of asthma who presents to the Emergency Department with complaints of abdominal pain and dyspnea.      Patient notes a 4 day history of persistent shortness of breath and wheezing despite using her Albuterol inhaler.  She denies chest pain, productive cough, palpitations, leg swelling.     She also reports suprapubic abdominal pain since this morning that she describes as sharp with radiation to her buttock and groin. She reports her abdominal pain is worse with walking. She took Tylenol this morning which did not alleviate her symptoms. She endorses a similar history in the past, but states that today is \"worse. \" She notes that her pain is a 10 out of 10 and admits to being unable to get out of bed this morning and go to work due to her symptoms. She is an employee of [employer]. She went to the urgent care center on [facility] this morning where she was told to come to the ED for a CT scan. Of note, she was given a UA at the urgent care center which she states came back \"normal.\"  She endorses that she is sexually active and has no concerns for pregnancy due to being in a same sex relationship. She had a BM this morning which she describes as soft and non-bloody.      Of note, she had a colonoscopy in the October 2020 due to persistent constipation and place on unknown medications. She reports since then she has had normal bowel movements. She denies N/V, vaginal discharge, vaginal bleeding, changes in bowel habits, headache, dysuria, hematuria, hematochezia, malaise, recent weight loss or weight gain, urinary frequency/urgency. \"    PAST MEDICAL / SURGICAL / SOCIAL / FAMILY HISTORY      has a past medical history of Acute bronchitis, Allergic, Allergic rhinitis, Allergy, Asthma, Chronic vasomotor rhinitis, GERD (gastroesophageal reflux disease), Headache(784.0), Morbid obesity with BMI of 40.0-44.9, adult ([facility] [address] NELSON (obstructive sleep apnea), Pure hypercholesterolemia, Severe persistent asthma, Unspecified sleep apnea, and Vision disturbance. has a past surgical history that includes Adenoidectomy; Tonsillectomy; other surgical history (08/28/2014); Upper gastrointestinal endoscopy (N/A, 10/8/2020); and Colonoscopy (N/A, 10/8/2020).       Social History Socioeconomic History    Marital status: Single     Spouse name: Not on file    Number of children: Not on file    Years of education: Not on file    Highest education level: Not on file   Occupational History    Not on file   Social Needs    Financial resource strain: Not on file    Food insecurity     Worry: Not on file     Inability: Not on file    Transportation needs     Medical: Not on file     Non-medical: Not on file   Tobacco Use    Smoking status: Never Smoker    Smokeless tobacco: Never Used   Substance and Sexual Activity    Alcohol use: No     Alcohol/week: 0.0 standard drinks    Drug use: No    Sexual activity: Yes   Lifestyle    Physical activity     Days per week: Not on file     Minutes per session: Not on file    Stress: Not on file   Relationships    Social connections     Talks on phone: Not on file     Gets together: Not on file     Attends [employer] service: Not on file     Active member of club or organization: Not on file     Attends meetings of clubs or organizations: Not on file     Relationship status: Not on file    Intimate partner violence     Fear of current or ex partner: Not on file     Emotionally abused: Not on file     Physically abused: Not on file     Forced sexual activity: Not on file   Other Topics Concern    Not on file   Social History Narrative    Not on file       Family History   Problem Relation Age of Onset    Asthma Mother     Lupus Mother     High Blood Pressure Father     Diabetes Father     Diabetes Maternal Grandfather     High Blood Pressure Maternal Grandfather     Heart Disease Paternal Grandmother     Diabetes Paternal Grandmother     High Blood Pressure Paternal Grandfather     Heart Disease Paternal Uncle     Heart Disease Paternal Aunt     Arthritis Maternal Aunt     Cancer Maternal Aunt     Birth Defects Neg Hx     Depression Neg Hx     Early Death Neg Hx     Hearing Loss Neg Hx     High Cholesterol Neg Hx  Kidney Disease Neg Hx     Learning Disabilities Neg Hx     Mental Illness Neg Hx     Mental Retardation Neg Hx     Miscarriages / Stillbirths Neg Hx     Stroke Neg Hx     Substance Abuse Neg Hx     Vision Loss Neg Hx     Other Neg Hx     Ovarian Cancer Neg Hx     Breast Cancer Neg Hx        Allergies:  Banana, Peanut-containing drug products, and Food    Home Medications:  Prior to Admission medications    Medication Sig Start Date End Date Taking? Authorizing Provider   ketoconazole (NIZORAL) 2 % cream Apply topically bid 1/7/21   Darcy Grief, DPM   mometasone-formoterol (DULERA) 100-5 MCG/ACT inhaler Inhale 2 puffs into the lungs 2 times daily 12/29/20   Nupur Montes MD   albuterol sulfate HFA (VENTOLIN HFA) 108 (90 Base) MCG/ACT inhaler Inhale 2 puffs into the lungs every 6 hours as needed for Wheezing 7/2/20   Nupur Montes MD       REVIEW OF SYSTEMS    (2-9 systems for level 4, 10 or more for level 5)      Review of Systems   Constitutional: Negative for chills and fever. HENT: Negative for ear pain, hearing loss and sore throat. Eyes: Negative for visual disturbance. Respiratory: Positive for cough, chest tightness and shortness of breath. Cardiovascular: Negative for chest pain. Gastrointestinal: Positive for abdominal pain (Left lower quadrant radiating into the left buttock). Negative for constipation, diarrhea, nausea and vomiting. Genitourinary: Positive for dysuria and pelvic pain. Negative for difficulty urinating, vaginal bleeding and vaginal discharge. Musculoskeletal: Negative for arthralgias and myalgias. Neurological: Negative for weakness and numbness. Psychiatric/Behavioral: Negative for agitation and confusion.        PHYSICAL EXAM   (up to 7 for level 4, 8 or more for level 5)      INITIAL VITALS:   /76   Pulse 75   Temp 98.6 °F (37 °C) (Skin)   Resp 14   Ht 4' 11\" (1.499 m)   Wt 180 lb (81.6 kg)   SpO2 100%   BMI 36.36 kg/m² Physical Exam  Vitals signs and nursing note reviewed. Constitutional:       General: She is not in acute distress. Appearance: Normal appearance. She is well-developed. She is not ill-appearing or diaphoretic. HENT:      Head: Normocephalic and atraumatic. Right Ear: External ear normal.      Left Ear: External ear normal.      Nose: Nose normal.      Mouth/Throat:      Mouth: Mucous membranes are moist.   Eyes:      Extraocular Movements: Extraocular movements intact. Conjunctiva/sclera: Conjunctivae normal.   Neck:      Musculoskeletal: Normal range of motion and neck supple. Trachea: No tracheal deviation. Cardiovascular:      Rate and Rhythm: Normal rate and regular rhythm. Heart sounds: Normal heart sounds. No murmur. No friction rub. No gallop. Pulmonary:      Effort: Pulmonary effort is normal. No respiratory distress. Breath sounds: Normal breath sounds. No wheezing, rhonchi or rales. Abdominal:      General: Abdomen is flat. There is no distension. Palpations: There is no mass. Tenderness: There is abdominal tenderness (Suprapubic and left lower quadrant). There is no right CVA tenderness, left CVA tenderness, guarding or rebound. Comments: Negative rebound tenderness, negative Rovsing sign, negative psoas sign   Musculoskeletal: Normal range of motion. General: No swelling, deformity or signs of injury. Skin:     General: Skin is warm and dry. Capillary Refill: Capillary refill takes less than 2 seconds. Coloration: Skin is not jaundiced. Findings: No bruising or lesion. Neurological:      General: No focal deficit present. Mental Status: She is alert and oriented to person, place, and time. Mental status is at baseline. Motor: No abnormal muscle tone.          DIFFERENTIAL  DIAGNOSIS     PLAN (LABS / IMAGING / EKG):  Orders Placed This Encounter   Procedures    XR CHEST PORTABLE    US NON OB TRANSVAGINAL  US DUP ABD PEL RETRO SCROT LIMITED    CT ABDOMEN PELVIS W IV CONTRAST Additional Contrast? None    CBC Auto Differential    Comprehensive Metabolic Panel w/ Reflex to MG    Lipase    Urinalysis Reflex to Culture    HCG Qualitative, Serum    Immature Platelet Fraction    Respiratory care evaluation only       MEDICATIONS ORDERED:  Orders Placed This Encounter   Medications    fentaNYL (SUBLIMAZE) injection 50 mcg    methylPREDNISolone sodium (SOLU-MEDROL) injection 125 mg    fentaNYL (SUBLIMAZE) injection 50 mcg    ipratropium-albuterol (DUONEB) nebulizer solution 1 ampule    iopamidol (ISOVUE-370) 76 % injection 75 mL       DDX: Susan Zhang is a 23 y.o. female who presents to the emergency department with abdominal pain.  Differential diagnosis includes ovarian torsion, ovarian cyst rupture, ectopic pregnancy, endometriosis, cystitis, intra-abdominal infection, vaginal infection    DIAGNOSTIC RESULTS / EMERGENCY DEPARTMENT COURSE / MDM   LAB RESULTS:  Results for orders placed or performed during the hospital encounter of 02/11/21   CBC Auto Differential   Result Value Ref Range    WBC 4.0 3.5 - 11.3 k/uL    RBC 5.36 (H) 3.95 - 5.11 m/uL    Hemoglobin 12.3 11.9 - 15.1 g/dL    Hematocrit 41.8 36.3 - 47.1 %    MCV 78.0 (L) 82.6 - 102.9 fL    MCH 22.9 (L) 25.2 - 33.5 pg    MCHC 29.4 28.4 - 34.8 g/dL    RDW 14.2 11.8 - 14.4 %    Platelets See Reflexed IPF Result 138 - 453 k/uL    MPV NOT REPORTED 8.1 - 13.5 fL    NRBC Automated 0.0 0.0 per 100 WBC    Differential Type NOT REPORTED     WBC Morphology NOT REPORTED     RBC Morphology MICROCYTOSIS PRESENT     Platelet Estimate NOT REPORTED     Seg Neutrophils 37 36 - 65 %    Lymphocytes 54 (H) 24 - 43 %    Monocytes 7 3 - 12 %    Eosinophils % 1 1 - 4 %    Basophils 0 0 - 2 %    Immature Granulocytes 0 0 %    Segs Absolute 1.48 (L) 1.50 - 8.10 k/uL    Absolute Lymph # 2.14 1.10 - 3.70 k/uL    Absolute Mono # 0.26 0.10 - 1.20 k/uL Absolute Eos # 0.05 0.00 - 0.44 k/uL    Basophils Absolute <0.03 0.00 - 0.20 k/uL    Absolute Immature Granulocyte <0.03 0.00 - 0.30 k/uL   Comprehensive Metabolic Panel w/ Reflex to MG   Result Value Ref Range    Glucose 96 70 - 99 mg/dL    BUN 15 6 - 20 mg/dL    CREATININE 0.61 0.50 - 0.90 mg/dL    Bun/Cre Ratio NOT REPORTED 9 - 20    Calcium 9.5 8.6 - 10.4 mg/dL    Sodium 142 135 - 144 mmol/L    Potassium 4.1 3.7 - 5.3 mmol/L    Chloride 109 (H) 98 - 107 mmol/L    CO2 26 20 - 31 mmol/L    Anion Gap 7 (L) 9 - 17 mmol/L    Alkaline Phosphatase 65 35 - 104 U/L    ALT 16 5 - 33 U/L    AST 21 <32 U/L    Total Bilirubin <0.10 (L) 0.3 - 1.2 mg/dL    Total Protein 7.2 6.4 - 8.3 g/dL    Albumin 4.0 3.5 - 5.2 g/dL    Albumin/Globulin Ratio 1.3 1.0 - 2.5    GFR Non-African American >60 >60 mL/min    GFR African American >60 >60 mL/min    GFR Comment          GFR Staging NOT REPORTED    Lipase   Result Value Ref Range    Lipase 33 13 - 60 U/L   HCG Qualitative, Serum   Result Value Ref Range    hCG Qual NEGATIVE NEGATIVE   Immature Platelet Fraction   Result Value Ref Range    Platelet, Immature Fraction 4.0 1.1 - 10.3 %    Platelet, Fluorescence Platelet clumps present, count appears increased. 138 - 453 k/uL       IMPRESSION: Chepe Enciso is a 23 y.o. female who presents to the emergency department with shortness of breath and lower abdominal pain. On examination she is afebrile, vital signs unremarkable and examination demonstrates an uncomfortable appearing female of stated age with left lower quadrant tenderness to palpation, some wheezing on auscultation. Will have RT evaluate patient, administer breathing treatments, will administer steroids, transvaginal ultrasound for evaluation of left-sided pain concerning for ovarian torsion.     RADIOLOGY:  Xr Chest Portable    Result Date: 2/11/2021 EXAMINATION: ONE XRAY VIEW OF THE CHEST 2/11/2021 9:19 pm COMPARISON: Chest 08/24/2020 HISTORY: ORDERING SYSTEM PROVIDED HISTORY: Short of breath Reason for Exam: Hx asthma FINDINGS: The cardiomediastinal and hilar silhouettes appear unremarkable. The lungs appear clear. No pleural effusion evident. No pneumothorax is seen. No acute osseous abnormality is identified. No radiographic evidence of acute cardiopulmonary disease. EKG  None    All EKG's are interpreted by the Emergency Department Physician who either signs or co-signs this chart in the absence of a cardiologist.    EMERGENCY DEPARTMENT COURSE:  ED Course as of Feb 12 0310   Thu Feb 11, 2021   2207 TVUS at bedside.    [TS]   2318 Transvaginal ultrasound did not demonstrate evidence of ovarian torsion. Discussed with patient pelvic examination and patient has elected to defer examination to OB/GYN or at least to hold off until she follows up due to significant pain experienced during the ultrasound. [TS]   Fri Feb 12, 2021   0308 Discharge with prednisone    [PS]      ED Course User Index  [PS] Timothy Stuart MD  [TS] Javid Jenkins MD       PROCEDURES:  None    CONSULTS:  None    CRITICAL CARE:  Please see attending note. FINAL IMPRESSION      1. Lower abdominal pain    2. Mild intermittent asthma with exacerbation        DISPOSITION / PLAN     DISPOSITION Discharge - Pending Orders Complete 02/12/2021 03:03:17 AM      PATIENT REFERRED TO:  No follow-up provider specified.     DISCHARGE MEDICATIONS:  New Prescriptions    No medications on file       aJvid Jenkins MD  Emergency Medicine Resident This patient was evaluated in the Emergency Department for symptoms described in the history of present illness. He/she was evaluated in the context of the global COVID-19 pandemic, which necessitated consideration that the patient might be at risk for infection with the SARS-CoV-2 virus that causes COVID-19. Institutional protocols and algorithms that pertain to the evaluation of patients at risk for COVID-19 are in a state of rapid change based on information released by regulatory bodies including the CDC and federal and state organizations. These policies and algorithms were followed during the patient's care in the ED.     (Please note that portions of thisnote were completed with a voice recognition program.  Efforts were made to edit the dictations but occasionally words are mis-transcribed.)        Stefanie Covarrubias MD  Resident  02/12/21 4121

## 2021-02-12 NOTE — ED PROVIDER NOTES
[facility]  Emergency Department  Faculty Attestation     I performed a history and physical examination of the patient and discussed management with the resident. I reviewed the residents note and agree with the documented findings and plan of care. Any areas of disagreement are noted on the chart. I was personally present for the key portions of any procedures. I have documented in the chart those procedures where I was not present during the key portions. I have reviewed the emergency nurses triage note. I agree with the chief complaint, past medical history, past surgical history, allergies, medications, social and family history as documented unless otherwise noted below. For Physician Assistant/ Nurse Practitioner cases/documentation I have personally evaluated this patient and have completed at least one if not all key elements of the E/M (history, physical exam, and MDM). Additional findings are as noted. Primary Care Physician:  MARY Ojeda - CNP    Screenings:  [unfilled]    CHIEF COMPLAINT       Chief Complaint   Patient presents with    Abdominal Pain    Shortness of Breath     hx of asthma        RECENT VITALS:   Temp: 98.6 °F (37 °C),  Pulse: 75, Resp: 14, BP: 118/76    LABS:  Labs Reviewed   HCG, SERUM, QUALITATIVE   CBC WITH AUTO DIFFERENTIAL   COMPREHENSIVE METABOLIC PANEL W/ REFLEX TO MG FOR LOW K   LIPASE   URINE RT REFLEX TO CULTURE       Radiology  XR CHEST PORTABLE   Final Result   No radiographic evidence of acute cardiopulmonary disease.          US NON OB TRANSVAGINAL    (Results Pending)       Attending Physician Additional  Notes Patient has severe right pelvic pain with radiation to her right buttocks. Is worse with any kind of movement. She is unable to get out of bed. She went to an urgent care center at 7 PM and was sent here for evaluation. She also has shortness of breath from her asthma. She has persistent wheezing. There is mild cough but no sputum or hemoptysis. There is anorexia and nausea but no vomiting. No UTI symptoms. No prior abdominal surgery. No history of cystic disease. She is not sexually active with males. On exam she is in severe pain, pain score 10/10, vital signs are normal.  There is right pelvic tenderness with guarding. There is no McBurney's point tenderness. Negative Rovsing sign. Abdomen is otherwise soft and without distention. Lungs are diminished with mild diffuse wheezing. No accessory muscle use or retractions. Impression is pelvic pain concern for ovarian torsion versus ovarian cyst disease, less likely appendicitis or stone. Also mild asthma exacerbation. Plan is IV access, labs, CT imaging of the abdomen, steroids, aerosols, analgesics, antiemetics if needed, anticipate OB/GYN consultation. Kendra Paredes.  Kenneth Bansal MD, [facility]  Attending Emergency  Physician               Mj Mixon MD  02/11/21 0487

## 2021-02-13 ENCOUNTER — CARE COORDINATION (OUTPATIENT)
Dept: CARE COORDINATION | Age: 21
End: 2021-02-13

## 2021-02-13 NOTE — CARE COORDINATION
Outreach for ED follow up/ COVID risk protocol. Female answered the phone, CM asked to speak with patient, she hung up and disconnected the call      ED visit was not COVID related.  Episode resolved

## 2021-02-22 ENCOUNTER — TELEPHONE (OUTPATIENT)
Dept: GASTROENTEROLOGY | Age: 21
End: 2021-02-22

## 2021-02-22 NOTE — TELEPHONE ENCOUNTER
Patient was a no show 2/22/21. Tried calling to reschedule and no answer. This is the second no show. Mailing no show letter.     11/23/20 no show  2/22/21 no show

## 2021-03-03 ENCOUNTER — HOSPITAL ENCOUNTER (OUTPATIENT)
Age: 21
Setting detail: SPECIMEN
Discharge: HOME | End: 2021-03-03
Payer: COMMERCIAL

## 2021-03-03 ENCOUNTER — OFFICE VISIT (OUTPATIENT)
Dept: PRIMARY CARE CLINIC | Age: 21
End: 2021-03-03
Payer: COMMERCIAL

## 2021-03-03 ENCOUNTER — TELEPHONE (OUTPATIENT)
Dept: OBGYN | Age: 21
End: 2021-03-03

## 2021-03-03 ENCOUNTER — NURSE TRIAGE (OUTPATIENT)
Dept: OTHER | Facility: CLINIC | Age: 21
End: 2021-03-03

## 2021-03-03 VITALS
WEIGHT: 192 LBS | SYSTOLIC BLOOD PRESSURE: 114 MMHG | OXYGEN SATURATION: 98 % | DIASTOLIC BLOOD PRESSURE: 68 MMHG | TEMPERATURE: 97 F | HEART RATE: 76 BPM | BODY MASS INDEX: 38.78 KG/M2

## 2021-03-03 DIAGNOSIS — Z20.822 SUSPECTED COVID-19 VIRUS INFECTION: ICD-10-CM

## 2021-03-03 DIAGNOSIS — J45.41 MODERATE PERSISTENT ASTHMA WITH ACUTE EXACERBATION: ICD-10-CM

## 2021-03-03 DIAGNOSIS — K59.04 CHRONIC IDIOPATHIC CONSTIPATION: ICD-10-CM

## 2021-03-03 DIAGNOSIS — N92.0 MENORRHAGIA WITH REGULAR CYCLE: Primary | ICD-10-CM

## 2021-03-03 DIAGNOSIS — R68.89 COLD INTOLERANCE: ICD-10-CM

## 2021-03-03 DIAGNOSIS — J06.9 VIRAL URI WITH COUGH: ICD-10-CM

## 2021-03-03 DIAGNOSIS — R73.03 PREDIABETES: ICD-10-CM

## 2021-03-03 LAB — HBA1C MFR BLD: 5.6 %

## 2021-03-03 PROCEDURE — 1036F TOBACCO NON-USER: CPT | Performed by: NURSE PRACTITIONER

## 2021-03-03 PROCEDURE — 99213 OFFICE O/P EST LOW 20 MIN: CPT | Performed by: NURSE PRACTITIONER

## 2021-03-03 PROCEDURE — 83036 HEMOGLOBIN GLYCOSYLATED A1C: CPT | Performed by: NURSE PRACTITIONER

## 2021-03-03 PROCEDURE — G8417 CALC BMI ABV UP PARAM F/U: HCPCS | Performed by: NURSE PRACTITIONER

## 2021-03-03 PROCEDURE — G8427 DOCREV CUR MEDS BY ELIG CLIN: HCPCS | Performed by: NURSE PRACTITIONER

## 2021-03-03 PROCEDURE — G8482 FLU IMMUNIZE ORDER/ADMIN: HCPCS | Performed by: NURSE PRACTITIONER

## 2021-03-03 RX ORDER — PREDNISONE 20 MG/1
20 TABLET ORAL DAILY
Qty: 5 TABLET | Refills: 0 | Status: SHIPPED | OUTPATIENT
Start: 2021-03-03 | End: 2021-03-08

## 2021-03-03 RX ORDER — BISACODYL 5 MG
5 TABLET, DELAYED RELEASE (ENTERIC COATED) ORAL DAILY PRN
Qty: 30 TABLET | Refills: 1 | Status: SHIPPED | OUTPATIENT
Start: 2021-03-03

## 2021-03-03 ASSESSMENT — ENCOUNTER SYMPTOMS
RHINORRHEA: 0
VOMITING: 0
HEMOPTYSIS: 0
DIARRHEA: 0
SHORTNESS OF BREATH: 0
SORE THROAT: 0
EYE ITCHING: 0
TROUBLE SWALLOWING: 0
NAUSEA: 0
SPUTUM PRODUCTION: 0
COUGH: 1
ABDOMINAL PAIN: 1
FREQUENT THROAT CLEARING: 1
EYE DISCHARGE: 0
SINUS PAIN: 0
WHEEZING: 1
HOARSE VOICE: 1
ABDOMINAL DISTENTION: 1
EYE REDNESS: 0
CONSTIPATION: 1

## 2021-03-03 ASSESSMENT — PATIENT HEALTH QUESTIONNAIRE - PHQ9
SUM OF ALL RESPONSES TO PHQ9 QUESTIONS 1 & 2: 0
1. LITTLE INTEREST OR PLEASURE IN DOING THINGS: 0

## 2021-03-03 NOTE — PROGRESS NOTES
[facility]  [id number] [facility] [address]  Dept: [phone]  Dept Fax: [phone]    Patient Care Team:  MARY Marie CNP as PCP - General (Family Medicine)  MARY Marie CNP as PCP - [facility] Empaneled Provider  Deepali Mehta MD as Consulting Physician (Pulmonology)  Lory Rinne, APRN - CNP as Nurse Practitioner (Nurse Practitioner)  Aimee Gomez MD as Consulting Physician (Gastroenterology)  Bernardo Mooney DPM as Surgeon (Podiatry)    3/3/2021     Donald PECK Barry (:  26)RT a 23 y.o. female, here for evaluation of the following medical concerns:   Chief Complaint   Patient presents with   Stefan Taylorjody     get cold very quick and is having headaches for 2 days     Menstrual Problem     Longer than usual periods, with abdominal pain     Asthma     having trouble breathing        Harmony Hatch is here today for a follow-up on his multiple medical complaints. She is asking for refill of stool softener Dr. Ramya Dinh had provided for her. She states she has bloated and cramping, the medication was useful for her chronic constipation. Asthma been getting worse over the month. Noticing more wheezing and shortness of breath. She is compliant with her Dulera twice daily and using her rescue inhaler. + cough, frontal HA the last 3 days. No fevers or chills. No bloody sputum. She is also concerned about feelings of fatigue, feeling cold all the time, and heavy periods. She feels she bleeds heavily. No clots during menses. Her last cycle ended yesterday and was 3 days longer than her typical.  Start of last menstrual period was . The patient tells me her cycles come monthly, roughly every 30 days. She complains of painful cramping during those times.     Asthma predniSONE (DELTASONE) 20 MG tablet Take 1 tablet by mouth daily for 5 days Yes Tracee Luna APRN - CNP   ketoconazole (NIZORAL) 2 % cream Apply topically bid Yes Glenis Lynch DPM   albuterol sulfate HFA (VENTOLIN HFA) 108 (90 Base) MCG/ACT inhaler Inhale 2 puffs into the lungs every 6 hours as needed for Wheezing Yes Carolyn Galvez MD        Social History     Tobacco Use    Smoking status: Never Smoker    Smokeless tobacco: Never Used   Substance Use Topics    Alcohol use: No     Alcohol/week: 0.0 standard drinks        Vitals:    03/03/21 1105   BP: 114/68   Site: Left Upper Arm   Position: Sitting   Cuff Size: Large Adult   Pulse: 76   Temp: 97 °F (36.1 °C)   SpO2: 98%   Weight: 192 lb (87.1 kg)     Estimated body mass index is 38.78 kg/m² as calculated from the following:    Height as of 2/11/21: 4' 11\" (1.499 m). Weight as of this encounter: 192 lb (87.1 kg). DIAGNOSTIC FINDINGS:  CBC:  Lab Results   Component Value Date    WBC 4.0 02/11/2021    HGB 12.3 02/11/2021    PLT See Reflexed IPF Result 02/11/2021     10/11/2011       BMP:    Lab Results   Component Value Date     02/11/2021    K 4.1 02/11/2021     02/11/2021    CO2 26 02/11/2021    BUN 15 02/11/2021    CREATININE 0.61 02/11/2021    GLUCOSE 96 02/11/2021    GLUCOSE 78 09/22/2011       HEMOGLOBIN A1C:   Lab Results   Component Value Date    LABA1C 5.7 02/28/2018       FASTING LIPID PANEL:  Lab Results   Component Value Date    CHOL 209 (H) 09/23/2016    HDL 45 09/23/2016    TRIG 51 09/23/2016       Physical Exam  Vitals signs and nursing note reviewed. Constitutional:       General: She is not in acute distress. Appearance: She is well-developed. She is obese. She is ill-appearing. She is not toxic-appearing. HENT:      Head: Normocephalic. Eyes:      General: No scleral icterus. Pupils: Pupils are equal, round, and reactive to light.    Neck: 1. Worsening asthma, patient did make follow-up with pulmonology. With wheezing likely caused by URI, start 5-day course of prednisone. Did increase Dulera dosing today. Follow-up in 4 weeks. 2. Possible Covid symptoms with headache, cough and fatigue. Covid swab ordered and patient advised to contact employee health line. 3. Labs to evaluate menorrhagia, fatigue, and cold intolerance. 4. Patient encouraged to utilize ibuprofen 2 to 3 days prior to the start of next cycle, 400 mg 3 times daily to help minimize cramping    FOLLOW UP AND INSTRUCTIONS:  Return for Asthma. · Jmi Frankie received counseling on the following healthy behaviors:nutrition and medication adherence    · Discussed use, benefit, and side effects of prescribed medications. Barriers to  medication compliance addressed. All patient questions answered. Pt  verbalized understanding of all instructions given. · Patient given educational materials - see patient instructions      · Patient advised to contact scheduling offices for any referrals or imaging orders  placed today if they have not been contacted in 48 hours. Return for Asthma. An electronic signature was used to authenticate this note.     --MARY Herbert - CNP on 3/3/2021 at 11:57 AM

## 2021-03-03 NOTE — TELEPHONE ENCOUNTER
Patient called Harshil Monaco at [facility])  with red flag complaint. Brief description of triage: vaginal bleeding and abdominal pain; seen in ER two weeks ago; stopped bleeding yesterday; \"bled for an extra three days\"    Triage indicates for patient to be seen today in the office. Care advice provided, patient verbalizes understanding; denies any other questions or concerns; instructed to call back for any new or worsening symptoms. Writer provided warm transfer to Ingrid at [facility] for appointment scheduling. Attention Provider: Thank you for allowing me to participate in the care of your patient. The patient was connected to triage in response to information provided to the ECC. Please do not respond through this encounter as the response is not directed to a shared pool. Reason for Disposition   MODERATE OR MILD pain that comes and goes (cramps) lasts > 24 hours    Answer Assessment - Initial Assessment Questions  1. LOCATION: \"Where does it hurt? \"       Lower part of abdomen    2. RADIATION: \"Does the pain shoot anywhere else? \" (e.g., chest, back)      No    3. ONSET: \"When did the pain begin? \" (e.g., minutes, hours or days ago)       Two weeks ago    4. SUDDEN: \"Gradual or sudden onset? \"      Gradual    5. PATTERN \"Does the pain come and go, or is it constant? \"     - If constant: \"Is it getting better, staying the same, or worsening? \"       (Note: Constant means the pain never goes away completely; most serious pain is constant and it progresses)      - If intermittent: \"How long does it last?\" \"Do you have pain now? \"      (Note: Intermittent means the pain goes away completely between bouts)      Comes and goes    6. SEVERITY: \"How bad is the pain? \"  (e.g., Scale 1-10; mild, moderate, or severe)    - MILD (1-3): doesn't interfere with normal activities, abdomen soft and not tender to touch     - MODERATE (4-7): interferes with normal activities or awakens from sleep, tender to touch     - SEVERE (8-10): excruciating pain, doubled over, unable to do any normal activities       7/10    7. RECURRENT SYMPTOM: \"Have you ever had this type of abdominal pain before? \" If so, ask: \"When was the last time? \" and \"What happened that time? \"       Has a history of cramps with period, \"feels like I be on my period\"    8. CAUSE: \"What do you think is causing the abdominal pain? \"      Not sure    9. RELIEVING/AGGRAVATING FACTORS: \"What makes it better or worse? \" (e.g., movement, antacids, bowel movement)      \"Heating pad calms it down a little bit\"    10. OTHER SYMPTOMS: \"Has there been any vomiting, diarrhea, constipation, or urine problems? \"        \"bled for an extra three days\" with this period    11. PREGNANCY: \"Is there any chance you are pregnant? \" \"When was your last menstrual period? \"        No, just finished period three days ago    Protocols used: ABDOMINAL PAIN - [facility]

## 2021-03-03 NOTE — LETTER
[facility]  [address] [address]  Phone: [phone]  Fax: [phone] [facility], APRN - CNP        March 3, 2021     Patient: Alonso Sanabria   YOB: 2000   Date of Visit: 3/3/2021       To Whom It May Concern: It is my medical opinion that Alonso Sanabria should remain out of work until [employer] received. If you have any questions or concerns, please don't hesitate to call.     Sincerely,        MARY Green - CNP

## 2021-03-04 DIAGNOSIS — Z20.822 SUSPECTED COVID-19 VIRUS INFECTION: ICD-10-CM

## 2021-03-05 LAB
SARS-COV-2: NORMAL
SARS-COV-2: NOT DETECTED
SOURCE: NORMAL

## 2021-03-16 ENCOUNTER — HOSPITAL ENCOUNTER (OUTPATIENT)
Age: 21
Discharge: HOME | End: 2021-03-16
Payer: COMMERCIAL

## 2021-03-16 DIAGNOSIS — R68.89 COLD INTOLERANCE: ICD-10-CM

## 2021-03-16 DIAGNOSIS — N92.0 MENORRHAGIA WITH REGULAR CYCLE: ICD-10-CM

## 2021-03-16 LAB
ABSOLUTE EOS #: 0.08 K/UL (ref 0–0.44)
ABSOLUTE IMMATURE GRANULOCYTE: <0.03 K/UL (ref 0–0.3)
ABSOLUTE LYMPH #: 2 K/UL (ref 1.1–3.7)
ABSOLUTE MONO #: 0.42 K/UL (ref 0.1–1.2)
ABSOLUTE RETIC #: 0.09 M/UL (ref 0.03–0.08)
BASOPHILS # BLD: 0 % (ref 0–2)
BASOPHILS ABSOLUTE: <0.03 K/UL (ref 0–0.2)
DIFFERENTIAL TYPE: ABNORMAL
EOSINOPHILS RELATIVE PERCENT: 2 % (ref 1–4)
FERRITIN: 57 UG/L (ref 13–150)
HCT VFR BLD CALC: 42.3 % (ref 36.3–47.1)
HEMOGLOBIN: 12.8 G/DL (ref 11.9–15.1)
IMMATURE GRANULOCYTES: 0 %
IMMATURE RETIC FRACT: 8.3 % (ref 2.7–18.3)
IRON SATURATION: 31 % (ref 20–55)
IRON: 106 UG/DL (ref 37–145)
LYMPHOCYTES # BLD: 38 % (ref 24–43)
MCH RBC QN AUTO: 23.1 PG (ref 25.2–33.5)
MCHC RBC AUTO-ENTMCNC: 30.3 G/DL (ref 28.4–34.8)
MCV RBC AUTO: 76.2 FL (ref 82.6–102.9)
MONOCYTES # BLD: 8 % (ref 3–12)
NRBC AUTOMATED: 0 PER 100 WBC
PDW BLD-RTO: 14.6 % (ref 11.8–14.4)
PLATELET # BLD: 251 K/UL (ref 138–453)
PLATELET ESTIMATE: ABNORMAL
PMV BLD AUTO: 11.8 FL (ref 8.1–13.5)
RBC # BLD: 5.55 M/UL (ref 3.95–5.11)
RBC # BLD: ABNORMAL 10*6/UL
RETIC %: 1.6 % (ref 0.5–1.9)
RETIC HEMOGLOBIN: 26.6 PG (ref 28.2–35.7)
SEG NEUTROPHILS: 52 % (ref 36–65)
SEGMENTED NEUTROPHILS ABSOLUTE COUNT: 2.75 K/UL (ref 1.5–8.1)
TOTAL IRON BINDING CAPACITY: 339 UG/DL (ref 250–450)
TSH SERPL DL<=0.05 MIU/L-ACNC: 1.24 MIU/L (ref 0.3–5)
UNSATURATED IRON BINDING CAPACITY: 233 UG/DL (ref 112–347)
WBC # BLD: 5.3 K/UL (ref 3.5–11.3)
WBC # BLD: ABNORMAL 10*3/UL

## 2021-03-16 PROCEDURE — 83540 ASSAY OF IRON: CPT

## 2021-03-16 PROCEDURE — 85025 COMPLETE CBC W/AUTO DIFF WBC: CPT

## 2021-03-16 PROCEDURE — 85045 AUTOMATED RETICULOCYTE COUNT: CPT

## 2021-03-16 PROCEDURE — 84443 ASSAY THYROID STIM HORMONE: CPT

## 2021-03-16 PROCEDURE — 83550 IRON BINDING TEST: CPT

## 2021-03-16 PROCEDURE — 36415 COLL VENOUS BLD VENIPUNCTURE: CPT

## 2021-03-16 PROCEDURE — 82728 ASSAY OF FERRITIN: CPT

## 2021-03-18 ENCOUNTER — TELEPHONE (OUTPATIENT)
Dept: ADMINISTRATIVE | Age: 21
End: 2021-03-18

## 2021-03-22 NOTE — PROGRESS NOTES
GI CLINIC FOLLOW UP    INTERVAL HISTORY:   Peña Paulette, APRN - CNP  [address]  [facility],  [address] [place] [address]    Chief Complaint   Patient presents with    Abdominal Pain     Patient is f/u on EGD and colonoscopy. She states she still has abd pain at times    Constipation     Patient sattes she has constipation at times, especially when having menstral.           HISTORY OF PRESENT ILLNESS: Jose Steel is a 23 y.o. female , referred for evaluation of*abd pain, diarrhea     Here for f/u    egd/colon done    negative colon   egd: gastritis,bxs SB negative   On bentyl  Patient does have 1 week of constipation 1 week without when she is constipated she does not have a bowel movement for 2 to 3 days sometimes denied any bleeding denied any black stool denied any fever or chills or any weight loss  Liver enzymes are all normal  Hemoglobin and white blood count are all normal      DATE OF PROCEDURE: 10/8/2020      SURGEON: Eliana Garner MD  Facility: [facility]  ASSISTANT: None  Anesthesia: MAc   PREOPERATIVE DIAGNOSIS:   abd pain   Diarrhea      Diagnosis:  Gastritis, bxs taken  Random small bowel bxs taken                     POSTOPERATIVE DIAGNOSIS: As described below     OPERATION: Upper GI endoscopy with Biopsy     ANESTHESIA: Moderate Sedation      ESTIMATED BLOOD LOSS: Less than 50 ml     COMPLICATIONS: None.      SPECIMENS:  Was Obtained:      Gastritis, bxs taken  Random small bowel bxs taken      HISTORY: The patient is a 23y.o. year old female with history of above preop diagnosis. I recommended esophagogastroduodenoscopy with possible biopsy and I explained the risk, benefits, expected outcome, and alternatives to the procedure. Risks included but are not limited to bleeding, infection, respiratory distress, hypotension, and perforation of the esophagus, stomach, or duodenum.   Patient understands and is in agreement.        The patient was counseled at length about the risks of sherie Covid-19 during their perioperative period and any recovery window from their procedure.  The patient was made aware that sherie Covid-19  may worsen their prognosis for recovering from their procedure  and lend to a higher morbidity and/or mortality risk.  All material risks, benefits, and reasonable alternatives including postponing the procedure were discussed. The patient does wish to proceed with the procedure at this time.           PROCEDURE: The patient was given IV conscious sedation. The patient's SPO2 remained above 90% throughout the procedure. The gastroscope was inserted orally and advanced under direct vision through the esophagus, through the stomach, through the pylorus, and into the descending duodenum.       Post sedation note : The patient's SPO2 remained above 90% throughout the procedure. the vital signs remained stable , and no immediate complication form the procedure noted, patient will be ready for d/c when criteria is met .        Findings:     Retropharyngeal area was grossly normal appearing     Esophagus: normal     Stomach:    Fundus: normal    Body: abnormal: Gastritis, bxs taken    Antrum: abnormal: Gastritis, bxs taken     Duodenum:     Descending: abnormal: Random small bowel bxs taken     Bulb: abnormal: Random small bowel bxs taken      The scope was removed and the patient tolerated the procedure well.      Recommendations/Plan:   1. F/U Biopsies  2. F/U In Office in 3-4 weeks  3. Discussed with the family     Electronically signed by Pau Montoya MD  on 10/8/2020 at 11:26 AM   DATE OF PROCEDURE: 10/8/2020     SURGEON: Pau Montoya MD  Facility :  [facility]  ASSISTANT: None  Anesthesia: MAc   PREOPERATIVE DIAGNOSIS:   Diarrhea  Abdominal pain     POSTOPERATIVE DIAGNOSIS: as described below     OPERATION: Total colonoscopy      ANESTHESIA: Moderate Sedation     ESTIMATED BLOOD LOSS: less than 50      COMPLICATIONS: None.      SPECIMENS:  Was Obtained: Random colon biopsies     HISTORY: The patient is a 23y.o. year old female with history of above preop diagnosis. I recommended colonoscopy with possible biopsy or polypectomy and I explained the risk, benefits, expected outcome, and alternatives to the procedure. Risks included but are not limited to bleeding, infection, respiratory distress, hypotension, and perforation of the colon and possibility of missing a lesion. The patient understands and is in agreement.         The patient was counseled at length about the risks of sherie Covid-19 during their perioperative period and any recovery window from their procedure.  The patient was made aware that sherie Covid-19  may worsen their prognosis for recovering from their procedure  and lend to a higher morbidity and/or mortality risk.  All material risks, benefits, and reasonable alternatives including postponing the procedure were discussed. The patient does wish to proceed with the procedure at this time.        PROCEDURE: The patient was given IV conscious sedation. The patient's SPO2 remained above 90% throughout the procedure.      The colonoscope was inserted per rectum and advanced under direct vision to the cecum without difficulty.       Post sedation note : The patient's SPO2 remained above 90% throughout the procedure. the vital signs remained stable , and no immediate complication form the procedure noted, patient will be ready for d/c when criteria is met .           The prep was poor.   Solid stool staining the entire colon limiting the exam     Findings:        The mucosa looked normal although the prep was very poor and limiting the exam but I do not see any evidence of colitis anywhere from the rectum all the way to the cecum  There is no hemorrhoids  There is no polyps or masses again the prep was very poor and limiting     We intubated the terminal ileum and look at the last 15 cm  Which all looked normal     Random colon biopsies were taken throughout the entire to rule out microscopic     Withdrawal Time was (minutes): 8     The colon was decompressed and the scope was removed. The patient tolerated the procedure well.      Recommendations/Plan:   1. Lifestyle and dietary modifications as discussed  2. F/U Biopsies  3. F/U In OfficeYes  4. Discussed with the family  5. Repeat colonoscopy PRN      Electronically signed by Karla Anders MD  on 10/8/2020 at 11:40 AM    Final Diagnosis   Part A. Duodenum, biopsy:  - Small intestinal mucosa with no   pathologic changes. Part B. Stomach, biopsy:  - Antral type mucosa with mild reactive   gastropathy. Part C. Colon, random biopsy:  - Benign colonic mucosa with a   histologically unremarkable lymphoid aggregate and no pathologic       changes. Domitila Pennington M.D.   **Electronically Signed Out**         vvg/10/9/2020       Past Medical,Family, and Social History reviewed and does contribute to the patient presentingcondition. Patient's PMH/PSH,SH,PSYCH Hx, MEDs, ALLERGIES, and ROS were all reviewed and updated in the appropriate sections.     PAST MEDICAL HISTORY:  Past Medical History:   Diagnosis Date    Acute bronchitis     unspecified organism    Allergic     Allergic rhinitis     Allergy     Asthma     Severe persistent asthma, unspecified whether complicated    Chronic vasomotor rhinitis     GERD (gastroesophageal reflux disease)     Headache(784.0) 3/1/2012    Morbid obesity with BMI of 40.0-44.9, adult ([facility] [address] 12/8/2016    NELSON (obstructive sleep apnea)     Pure hypercholesterolemia 12/8/2016    Severe persistent asthma     Unspecified sleep apnea     Vision disturbance 1/31/2014    wears glassses       Past Surgical History:   Procedure Laterality Date    ADENOIDECTOMY      COLONOSCOPY N/A 10/8/2020    COLONOSCOPY WITH BIOPSY performed by Karla Anders MD at [address] [place] [address] Drive  08/28/2014    Nexplanon placement,  pt has had this removed as of 10-8-202    TONSILLECTOMY      UPPER GASTROINTESTINAL ENDOSCOPY N/A 10/8/2020    EGD BIOPSY performed by Loyd Baumann MD at [address]:    Current Outpatient Medications:     lubiprostone (AMITIZA) 8 MCG CAPS capsule, Take 1 capsule by mouth daily, Disp: 30 capsule, Rfl: 3    bisacodyl (BISACODYL) 5 MG EC tablet, Take 1 tablet by mouth daily as needed for Constipation, Disp: 30 tablet, Rfl: 1    mometasone-formoterol (DULERA) 200-5 MCG/ACT inhaler, Inhale 2 puffs into the lungs every 12 hours, Disp: 1 Inhaler, Rfl: 1    ketoconazole (NIZORAL) 2 % cream, Apply topically bid, Disp: 30 g, Rfl: 3    albuterol sulfate HFA (VENTOLIN HFA) 108 (90 Base) MCG/ACT inhaler, Inhale 2 puffs into the lungs every 6 hours as needed for Wheezing, Disp: 1 Inhaler, Rfl: 5    ALLERGIES:   Allergies   Allergen Reactions    Banana     Peanut-Containing Drug Products     Food Rash     Peanuts,walnuts,cashews,pecans apples cherries       FAMILY HISTORY:       Problem Relation Age of Onset    Asthma Mother    Verduzco Lupus Mother     High Blood Pressure Father     Diabetes Father     Diabetes Maternal Grandfather     High Blood Pressure Maternal Grandfather     Heart Disease Paternal Grandmother     Diabetes Paternal Grandmother     High Blood Pressure Paternal Grandfather     Heart Disease Paternal Uncle     Heart Disease Paternal Aunt     Arthritis Maternal Aunt     Cancer Maternal Aunt     Birth Defects Neg Hx     Depression Neg Hx     Early Death Neg Hx     Hearing Loss Neg Hx     High Cholesterol Neg Hx     Kidney Disease Neg Hx     Learning Disabilities Neg Hx     Mental Illness Neg Hx     Mental Retardation Neg Hx     Miscarriages / Stillbirths Neg Hx     Stroke Neg Hx     Substance Abuse Neg Hx     Vision Loss Neg Hx     Other Neg Hx     Ovarian Cancer Neg Hx     Breast Cancer Neg Hx          SOCIAL HISTORY:   Social History     Socioeconomic History    Marital status: Single bleeding, blood in stool, diarrhea, nausea and rectal pain. Genitourinary: Negative for difficulty urinating. Allergic/Immunologic: Negative for environmental allergies and food allergies. Neurological: Negative for dizziness, weakness, light-headedness, numbness and headaches. Hematological: Does not bruise/bleed easily. Psychiatric/Behavioral: Negative for sleep disturbance. The patient is not nervous/anxious. LABORATORY DATA: Reviewed  Lab Results   Component Value Date    WBC 5.3 03/16/2021    HGB 12.8 03/16/2021    HCT 42.3 03/16/2021    MCV 76.2 (L) 03/16/2021     03/16/2021     02/11/2021    K 4.1 02/11/2021     (H) 02/11/2021    CO2 26 02/11/2021    BUN 15 02/11/2021    CREATININE 0.61 02/11/2021    LABPROT 7.5 10/27/2012    LABALBU 4.0 02/11/2021    BILITOT <0.10 (L) 02/11/2021    ALKPHOS 65 02/11/2021    AST 21 02/11/2021    ALT 16 02/11/2021    INR 0.9 10/05/2012         Lab Results   Component Value Date    RBC 5.55 (H) 03/16/2021    HGB 12.8 03/16/2021    MCV 76.2 (L) 03/16/2021    MCH 23.1 (L) 03/16/2021    MCHC 30.3 03/16/2021    RDW 14.6 (H) 03/16/2021    MPV 11.8 03/16/2021    BASOPCT 0 03/16/2021    LYMPHSABS 2.00 03/16/2021    MONOSABS 0.42 03/16/2021    NEUTROABS 2.75 03/16/2021    EOSABS 0.08 03/16/2021    BASOSABS <0.03 03/16/2021         DIAGNOSTIC TESTING:     No results found. PHYSICAL EXAMINATION: Vital signs reviewed per the nursing documentation. /77   Pulse 105   Temp 97.1 °F (36.2 °C)   Resp 16   Ht 4' 11\" (1.499 m)   Wt 188 lb (85.3 kg)   BMI 37.97 kg/m²   Body mass index is 37.97 kg/m². Physical Exam  Vitals signs and nursing note reviewed. Constitutional:       General: She is not in acute distress. Appearance: She is well-developed. She is not diaphoretic. HENT:      Head: Normocephalic. Mouth/Throat:      Pharynx: No oropharyngeal exudate. Eyes:      General: No scleral icterus.      Pupils: Pupils are equal, round, and reactive to light. Neck:      Musculoskeletal: Normal range of motion and neck supple. Thyroid: No thyromegaly. Vascular: No JVD. Trachea: No tracheal deviation. Cardiovascular:      Rate and Rhythm: Normal rate and regular rhythm. Heart sounds: Normal heart sounds. No murmur. Pulmonary:      Effort: Pulmonary effort is normal. No respiratory distress. Breath sounds: Normal breath sounds. No wheezing. Abdominal:      General: Bowel sounds are normal. There is no distension. Palpations: Abdomen is soft. Tenderness: There is no abdominal tenderness. There is no guarding or rebound. Comments: No ascites   Musculoskeletal: Normal range of motion. Skin:     General: Skin is warm. Coloration: Skin is not pale. Findings: No erythema or rash. Comments: She is not diaphoretic   Neurological:      Mental Status: She is alert and oriented to person, place, and time. Deep Tendon Reflexes: Reflexes are normal and symmetric. Psychiatric:         Behavior: Behavior normal.         Thought Content: Thought content normal.         Judgment: Judgment normal.           IMPRESSION: Ms. Alejandra Carr is a 23 y.o. female with      Diagnosis Orders   1. Abdominal pain, generalized     2. Diarrhea, unspecified type     3. Irritable bowel syndrome, unspecified type       Will start Amitiza because of the constipation and abdominal pain we will treat her as IBS and see how she does especially with the negative studies so far    Diet/life style/natural hx /complication of the dx were all explained in details   Past medical, past surgical, social history, psychiatric history, medications or allergies, all reviewed and  updated    Thank you for allowing me to participate in the care of Ms. Reddy. For any further questions please do not hesitate to contact me. I have reviewed and agree with the ROS entered by the MA/RN.      Note is dictated

## 2021-03-23 ENCOUNTER — OFFICE VISIT (OUTPATIENT)
Dept: GASTROENTEROLOGY | Age: 21
End: 2021-03-23
Payer: COMMERCIAL

## 2021-03-23 VITALS
RESPIRATION RATE: 16 BRPM | TEMPERATURE: 97.1 F | BODY MASS INDEX: 37.9 KG/M2 | HEIGHT: 59 IN | WEIGHT: 188 LBS | DIASTOLIC BLOOD PRESSURE: 77 MMHG | SYSTOLIC BLOOD PRESSURE: 118 MMHG | HEART RATE: 105 BPM

## 2021-03-23 DIAGNOSIS — R19.7 DIARRHEA, UNSPECIFIED TYPE: ICD-10-CM

## 2021-03-23 DIAGNOSIS — K58.9 IRRITABLE BOWEL SYNDROME, UNSPECIFIED TYPE: ICD-10-CM

## 2021-03-23 DIAGNOSIS — R10.84 ABDOMINAL PAIN, GENERALIZED: Primary | ICD-10-CM

## 2021-03-23 PROCEDURE — G8427 DOCREV CUR MEDS BY ELIG CLIN: HCPCS | Performed by: INTERNAL MEDICINE

## 2021-03-23 PROCEDURE — 99214 OFFICE O/P EST MOD 30 MIN: CPT | Performed by: INTERNAL MEDICINE

## 2021-03-23 PROCEDURE — G8417 CALC BMI ABV UP PARAM F/U: HCPCS | Performed by: INTERNAL MEDICINE

## 2021-03-23 PROCEDURE — G8482 FLU IMMUNIZE ORDER/ADMIN: HCPCS | Performed by: INTERNAL MEDICINE

## 2021-03-23 PROCEDURE — 1036F TOBACCO NON-USER: CPT | Performed by: INTERNAL MEDICINE

## 2021-03-23 RX ORDER — LUBIPROSTONE 8 UG/1
8 CAPSULE, GELATIN COATED ORAL DAILY
Qty: 30 CAPSULE | Refills: 3 | Status: SHIPPED | OUTPATIENT
Start: 2021-03-23 | End: 2021-03-30

## 2021-03-23 ASSESSMENT — ENCOUNTER SYMPTOMS
TROUBLE SWALLOWING: 0
COUGH: 0
VOMITING: 1
WHEEZING: 1
ABDOMINAL DISTENTION: 0
ANAL BLEEDING: 0
CHOKING: 0
BLOOD IN STOOL: 0
ABDOMINAL PAIN: 1
RECTAL PAIN: 0
NAUSEA: 0
CONSTIPATION: 1
DIARRHEA: 0

## 2021-03-30 ENCOUNTER — TELEPHONE (OUTPATIENT)
Dept: GASTROENTEROLOGY | Age: 21
End: 2021-03-30

## 2021-03-30 RX ORDER — LACTULOSE 10 G/15ML
10 SOLUTION ORAL NIGHTLY PRN
Qty: 450 ML | Refills: 1 | Status: SHIPPED | OUTPATIENT
Start: 2021-03-30 | End: 2022-06-06

## 2021-03-31 ENCOUNTER — TELEPHONE (OUTPATIENT)
Dept: PRIMARY CARE CLINIC | Age: 21
End: 2021-03-31

## 2021-04-08 ENCOUNTER — TELEMEDICINE (OUTPATIENT)
Dept: PRIMARY CARE CLINIC | Age: 21
End: 2021-04-08
Payer: COMMERCIAL

## 2021-04-08 DIAGNOSIS — Z20.822 SUSPECTED COVID-19 VIRUS INFECTION: Primary | ICD-10-CM

## 2021-04-08 DIAGNOSIS — R19.7 DIARRHEA OF PRESUMED INFECTIOUS ORIGIN: ICD-10-CM

## 2021-04-08 PROCEDURE — 99442 PR PHYS/QHP TELEPHONE EVALUATION 11-20 MIN: CPT | Performed by: NURSE PRACTITIONER

## 2021-04-08 ASSESSMENT — ENCOUNTER SYMPTOMS
ABDOMINAL PAIN: 1
CHEST TIGHTNESS: 0
WHEEZING: 0
BLOOD IN STOOL: 0
NAUSEA: 1
SHORTNESS OF BREATH: 0
DIARRHEA: 1
VOMITING: 1

## 2021-04-08 NOTE — LETTER
[facility]  [address] [place] [address]  Phone: [phone]  Fax: [address], MARY - CNP        April 8, 2021     Patient: René Burk   YOB: 2000   Date of Visit: 4/8/2021       To Whom It May Concern: It is my medical opinion that René Burk should remain out of work until [place] infection results are back. To be excused starting 4/7/21    If you have any questions or concerns, please don't hesitate to call.     Sincerely,        MARY Dumont CNP

## 2021-04-08 NOTE — PROGRESS NOTES
[facility]  [address] [facility] [address]  Dept: [phone]  Dept Fax: [phone]    Patient Care Team:  MARY Singleton CNP as PCP - General (Family Medicine)  MARY Singleton CNP as PCP - [facility]  Jody Mccurdy MD as Consulting Physician (Pulmonology)  MARY Samaniego CNP as Nurse Practitioner (Nurse Practitioner)  Lili Fiore MD as Consulting Physician (Gastroenterology)  Greta Bobo DPM as Surgeon (Podiatry)    Gina Nelson is a 23 y.o. female evaluated via telephone on 4/8/2021. Consent:  She and/or health care decision maker is aware that that she may receive a bill for this telephone service, depending on her insurance coverage, and has provided verbal consent to proceed: NA - Consent obtained within past 12 months      Documentation:  I communicated with the patient and/or health care decision maker about diarrhea. Details of this discussion including any medical advice provided: Gina Nelson        I affirm this is a Patient Initiated Episode with a Patient who has not had a related appointment within my department in the past 7 days or scheduled within the next 24 hours. Patient identification was verified at the start of the visit: Yes    Total Time: minutes: 11-20 minutes    The visit was conducted pursuant to the emergency declaration under the [address] [place] [address] authority and the [employer] and [employer] Act. Patient identification was verified, and a caregiver was present when appropriate. The patient was located in a state where the provider was credentialed to provide care.     Note: not billable if this call serves to triage the patient into an appointment for the relevant concern      Gricel Edward       4/8/2021     Donald PECK or imaging orders  placed today if they have not been contacted in 48 hours. No follow-ups on file. An electronic signature was used to authenticate this note.     --MARY Castro - CNP on 4/8/2021 at 3:21 PM

## 2021-04-08 NOTE — PROGRESS NOTES
Visit Information    Have you changed or started any medications since your last visit including any over-the-counter medicines, vitamins, or herbal medicines? no   Are you having any side effects from any of your medications? -  no  Have you stopped taking any of your medications? Is so, why? -  no    Have you seen any other physician or provider since your last visit? Yes  Have you had any other diagnostic tests since your last visit? No  Have you been seen in the emergency room and/or had an admission to a hospital since we last saw you? No  Have you had your routine dental cleaning in the past 6 months? no    Have you activated your [employer] account? If not, what are your barriers?  Yes     Patient Care Team:  MARY Flor CNP as PCP - General (Family Medicine)  MARY Flor CNP [facility]  Nallely Galdamez MD as Consulting Physician (Pulmonology)  MARY Tellez CNP as Nurse Practitioner (Nurse Practitioner)  Federica Chirinos MD as Consulting Physician (Gastroenterology)  Saul Bobo DPM as Surgeon (Podiatry)    Medical History Review  Past Medical, Family, and Social History reviewed and does contribute to the patient presenting condition    Health Maintenance   Topic Date Due    Hepatitis C screen  Never done    COVID-19 Vaccine (1) Never done    Pneumococcal 0-64 years Vaccine (2 of 3 - PPSV23) 12/31/2020    Chlamydia screen  09/10/2021    DTaP/Tdap/Td vaccine (7 - Td) 11/23/2021    A1C test (Diabetic or Prediabetic)  03/03/2022    Cervical cancer screen  09/10/2023    Hepatitis A vaccine  Completed    Hepatitis B vaccine  Completed    Hib vaccine  Completed    HPV vaccine  Completed    Varicella vaccine  Completed    Meningococcal (ACWY) vaccine  Completed    Flu vaccine  Completed    HIV screen  Completed

## 2021-04-09 ENCOUNTER — HOSPITAL ENCOUNTER (OUTPATIENT)
Age: 21
Setting detail: SPECIMEN
Discharge: HOME | End: 2021-04-09
Payer: COMMERCIAL

## 2021-04-09 ENCOUNTER — NURSE ONLY (OUTPATIENT)
Dept: PRIMARY CARE CLINIC | Age: 21
End: 2021-04-09

## 2021-04-09 DIAGNOSIS — Z20.822 ENCOUNTER FOR LABORATORY TESTING FOR COVID-19 VIRUS: Primary | ICD-10-CM

## 2021-04-09 NOTE — PROGRESS NOTES
[place] [address]  Phone: [phone]  Fax: [phone] [address] [facility]    Pt Name: Lori Iyer  MRN: [id number]  [place] 2000  Date of evaluation: 4/9/2021  Provider: Yoli Ríos PA-C     CHIEF COMPLAINT     No chief complaint on file. HISTORY OF PRESENT ILLNESS  (Location/Symptom, Timing/Onset, Context/Setting, Quality, Duration, Modifying Factors, Severity.)   Lori Iyer is a 23 y.o. Black [2] female who presents to the office for evaluation of      HPI    Nursing Notes were reviewed. REVIEW OF SYSTEMS    (2-9 systems for level 4, 10 or more for level 5)     Review of Systems      Except as noted above the remainder of the review of systems was reviewed andnegative. PAST MEDICAL HISTORY   History reviewed. Past Medical History:   Diagnosis Date    Acute bronchitis     unspecified organism    Allergic     Allergic rhinitis     Allergy     Asthma     Severe persistent asthma, unspecified whether complicated    Chronic vasomotor rhinitis     GERD (gastroesophageal reflux disease)     Headache(784.0) 3/1/2012    Morbid obesity with BMI of 40.0-44.9, adult ([facility] [address] 12/8/2016    NELSON (obstructive sleep apnea)     Pure hypercholesterolemia 12/8/2016    Severe persistent asthma     Unspecified sleep apnea     Vision disturbance 1/31/2014    wears glassses         SURGICAL HISTORY     History reviewed.     Past Surgical History:   Procedure Laterality Date    ADENOIDECTOMY      COLONOSCOPY N/A 10/8/2020    COLONOSCOPY WITH BIOPSY performed by Eduardo Saenz MD at [address] [place] [address] Drive  08/28/2014    Nexplanon placement,  pt has had this removed as of 10-8-202    TONSILLECTOMY      UPPER GASTROINTESTINAL ENDOSCOPY N/A 10/8/2020    EGD BIOPSY performed by Eduardo Saenz MD at [address] [place] [address]       Current Outpatient Medications   Medication Sig Dispense Refill    lactulose (CHRONULAC) 10 GM/15ML solution Take 15 mLs by mouth nightly as needed (constipation) (Patient not taking: Reported on 2021) 450 mL 1    bisacodyl (BISACODYL) 5 MG EC tablet Take 1 tablet by mouth daily as needed for Constipation (Patient not taking: Reported on 2021) 30 tablet 1    mometasone-formoterol (DULERA) 200-5 MCG/ACT inhaler Inhale 2 puffs into the lungs every 12 hours 1 Inhaler 1    ketoconazole (NIZORAL) 2 % cream Apply topically bid (Patient not taking: Reported on 2021) 30 g 3    albuterol sulfate HFA (VENTOLIN HFA) 108 (90 Base) MCG/ACT inhaler Inhale 2 puffs into the lungs every 6 hours as needed for Wheezing 1 Inhaler 5     No current facility-administered medications for this visit.           ALLERGIES     Banana, Peanut-containing drug products, and Food    FAMILY HISTORY           Problem Relation Age of Onset    Asthma Mother     Lupus Mother     High Blood Pressure Father     Diabetes Father     Diabetes Maternal Grandfather     High Blood Pressure Maternal Grandfather     Heart Disease Paternal Grandmother     Diabetes Paternal Grandmother     High Blood Pressure Paternal Grandfather     Heart Disease Paternal Uncle     Heart Disease Paternal Aunt     Arthritis Maternal Aunt     Cancer Maternal Aunt     Birth Defects Neg Hx     Depression Neg Hx     Early Death Neg Hx     Hearing Loss Neg Hx     High Cholesterol Neg Hx     Kidney Disease Neg Hx     Learning Disabilities Neg Hx     Mental Illness Neg Hx     Mental Retardation Neg Hx     Miscarriages / Stillbirths Neg Hx     Stroke Neg Hx     Substance Abuse Neg Hx     Vision Loss Neg Hx     Other Neg Hx     Ovarian Cancer Neg Hx     Breast Cancer Neg Hx      Family Status   Relation Name Status    Mother kalyan Alive    Father  Alive    MGF  Alive    PGM      Sister salas Alive    MGM  Alive    PGF  Alive    PUnc  (Not Specified)    PAunt  (Not Specified)    MAunt  (Not Specified)    Neg Hx  (Not Specified)          SOCIAL HISTORY      reports that she has never smoked. She has never used smokeless tobacco. She reports that she does not drink alcohol or use drugs. PHYSICAL EXAM    (up to 7 for level 4, 8 or more for level 5)     There were no vitals filed for this visit. Physical Exam      DIFFERENTIAL DIAGNOSIS:     ***    Josemanuel reviewed the disposition diagnosis with the patient and or their family/guardian. I have answered their questions and given discharge instructions. They voiced understanding of these instructions and did not have anyfurther questions or complaints. PROCEDURES:  No orders of the defined types were placed in this encounter. No results found for this visit on 04/09/21. FINALIMPRESSION            PLAN     No follow-ups on file. DISCHARGEMEDICATIONS:  No orders of the defined types were placed in this encounter. Plan:    Patient instructed to return to the office if symptoms worsen, return, or have any other concerns. Patient understands and is agreeable.          Mathieu Rodas PA-C 4/9/2021 11:29 AM

## 2021-04-10 DIAGNOSIS — Z20.822 SUSPECTED COVID-19 VIRUS INFECTION: ICD-10-CM

## 2021-04-11 LAB
SARS-COV-2: NORMAL
SARS-COV-2: NOT DETECTED
SOURCE: NORMAL

## 2021-04-12 ENCOUNTER — TELEPHONE (OUTPATIENT)
Dept: PRIMARY CARE CLINIC | Age: 21
End: 2021-04-12

## 2021-04-23 ENCOUNTER — NURSE TRIAGE (OUTPATIENT)
Dept: OTHER | Facility: CLINIC | Age: 21
End: 2021-04-23

## 2021-04-23 ENCOUNTER — TELEPHONE (OUTPATIENT)
Dept: PULMONOLOGY | Age: 21
End: 2021-04-23

## 2021-04-23 NOTE — TELEPHONE ENCOUNTER
Patient called to let you know her PCP is having her admitted to [facility] for her Asthma and SOB.  Thanks

## 2021-04-27 ENCOUNTER — TELEMEDICINE (OUTPATIENT)
Dept: PRIMARY CARE CLINIC | Age: 21
End: 2021-04-27
Payer: COMMERCIAL

## 2021-04-27 DIAGNOSIS — J30.2 SEASONAL ALLERGIC RHINITIS, UNSPECIFIED TRIGGER: ICD-10-CM

## 2021-04-27 DIAGNOSIS — H60.502 ACUTE OTITIS EXTERNA OF LEFT EAR, UNSPECIFIED TYPE: Primary | ICD-10-CM

## 2021-04-27 PROCEDURE — 99442 PR PHYS/QHP TELEPHONE EVALUATION 11-20 MIN: CPT | Performed by: NURSE PRACTITIONER

## 2021-04-27 RX ORDER — CETIRIZINE HYDROCHLORIDE 10 MG/1
10 TABLET ORAL DAILY
Qty: 30 TABLET | Refills: 2 | Status: SHIPPED | OUTPATIENT
Start: 2021-04-27 | End: 2021-11-01 | Stop reason: SDUPTHER

## 2021-04-27 RX ORDER — OFLOXACIN 3 MG/ML
5 SOLUTION AURICULAR (OTIC) 2 TIMES DAILY
Qty: 10 ML | Refills: 0 | Status: SHIPPED | OUTPATIENT
Start: 2021-04-27 | End: 2021-05-07

## 2021-04-27 ASSESSMENT — ENCOUNTER SYMPTOMS
DIARRHEA: 0
SORE THROAT: 0
TROUBLE SWALLOWING: 0
SINUS PAIN: 0
WHEEZING: 0
RHINORRHEA: 0
COUGH: 0
SHORTNESS OF BREATH: 1
EYE ITCHING: 1

## 2021-04-27 NOTE — PROGRESS NOTES
[facility]  [id number] [facility] [address]  Dept: [phone]  Dept Fax: [phone]    Patient Care Team:  MARY Padilla CNP as PCP - General (Family Medicine)  MARY Padilla CNP as PCP - [facility]  Michael Brown MD as Consulting Physician (Pulmonology)  MARY Roe CNP as Nurse Practitioner (Nurse Practitioner)  Ren Oakley MD as Consulting Physician (Gastroenterology)  Venessa Lopez DPM as Surgeon (Podiatry)    4/27/2021     Fatuma Beckford is a 23 y.o. female evaluated via telephone on 4/27/2021. Consent:  She and/or health care decision maker is aware that that she may receive a bill for this telephone service, depending on her insurance coverage, and has provided verbal consent to proceed: NA - Consent obtained within past 12 months      Documentation:  I communicated with the patient and/or health care decision maker about allergies, ear drainage. Details of this discussion including any medical advice provided: Fatuma Beckford        I affirm this is a Patient Initiated Episode with a Patient who has not had a related appointment within my department in the past 7 days or scheduled within the next 24 hours. Patient identification was verified at the start of the visit: Yes    Total Time: minutes: 11-20 minutes    The visit was conducted pursuant to the emergency declaration under the [address] [place] [address], [address] [place] [address] authority and the [employer] Act. Patient identification was verified, and a caregiver was present when appropriate. The patient was located in a state where the provider was credentialed to provide care.     Note: not billable if this call serves to triage the patient into an appointment for the relevant concern      Lester Quesada puffs into the lungs every 12 hours Yes MARY Padilla CNP   ketoconazole (NIZORAL) 2 % cream Apply topically bid Yes Venessa Lopez DPM   lactulose (CHRONULAC) 10 GM/15ML solution Take 15 mLs by mouth nightly as needed (constipation)  Patient not taking: Reported on 4/8/2021  Ren Oakley MD   bisacodyl (BISACODYL) 5 MG EC tablet Take 1 tablet by mouth daily as needed for Constipation  Patient not taking: Reported on 4/8/2021  MARY Padilla CNP   albuterol sulfate HFA (VENTOLIN HFA) 108 (90 Base) MCG/ACT inhaler Inhale 2 puffs into the lungs every 6 hours as needed for Wheezing  Patient not taking: Reported on 4/27/2021  Compa Kaur MD        Social History     Tobacco Use    Smoking status: Never Smoker    Smokeless tobacco: Never Used   Substance Use Topics    Alcohol use: No     Alcohol/week: 0.0 standard drinks        There were no vitals filed for this visit. Estimated body mass index is 37.97 kg/m² as calculated from the following:    Height as of 3/23/21: 4' 11\" (1.499 m). Weight as of 3/23/21: 188 lb (85.3 kg). DIAGNOSTIC FINDINGS:  CBC:  Lab Results   Component Value Date    WBC 5.3 03/16/2021    HGB 12.8 03/16/2021     03/16/2021     10/11/2011       BMP:    Lab Results   Component Value Date     02/11/2021    K 4.1 02/11/2021     02/11/2021    CO2 26 02/11/2021    BUN 15 02/11/2021    CREATININE 0.61 02/11/2021    GLUCOSE 96 02/11/2021    GLUCOSE 78 09/22/2011       HEMOGLOBIN A1C:   Lab Results   Component Value Date    LABA1C 5.6 03/03/2021       FASTING LIPID PANEL:  Lab Results   Component Value Date    CHOL 209 (H) 09/23/2016    HDL 45 09/23/2016    TRIG 51 09/23/2016         ASSESSMENT     Diagnosis Orders   1. Acute otitis externa of left ear, unspecified type  ofloxacin (FLOXIN) 0.3 % otic solution   2.  Seasonal allergic rhinitis, unspecified trigger  cetirizine (ZYRTEC) 10 MG tablet          PLAN:  Orders Placed This Encounter

## 2021-04-27 NOTE — PROGRESS NOTES
Visit Information    Have you changed or started any medications since your last visit including any over-the-counter medicines, vitamins, or herbal medicines? no   Are you having any side effects from any of your medications? -  no  Have you stopped taking any of your medications? Is so, why? -  no    Have you seen any other physician or provider since your last visit? No  Have you had any other diagnostic tests since your last visit? No  Have you been seen in the emergency room and/or had an admission to a hospital since we last saw you? No  Have you had your routine dental cleaning in the past 6 months? no    Have you activated your [employer] account? If not, what are your barriers?  Yes     Patient Care Team:  MARY Hamilton CNP as PCP - General (Family Medicine)  MARY Hamilton CNP as PCP - [facility]  Skyla Campo MD as Consulting Physician (Pulmonology)  MARY Eller CNP as Nurse Practitioner (Nurse Practitioner)  Chrissie De La Rosa MD as Consulting Physician (Gastroenterology)  Ruben Marinelli DPM as Surgeon (Podiatry)    Medical History Review  Past Medical, Family, and Social History reviewed and does contribute to the patient presenting condition    Health Maintenance   Topic Date Due    Hepatitis C screen  Never done    COVID-19 Vaccine (1) Never done    Pneumococcal 0-64 years Vaccine (2 of 3 - PPSV23) 12/31/2020    Chlamydia screen  09/10/2021    DTaP/Tdap/Td vaccine (7 - Td) 11/23/2021    A1C test (Diabetic or Prediabetic)  03/03/2022    Cervical cancer screen  09/10/2023    Hepatitis A vaccine  Completed    Hepatitis B vaccine  Completed    Hib vaccine  Completed    HPV vaccine  Completed    Varicella vaccine  Completed    Meningococcal (ACWY) vaccine  Completed    Flu vaccine  Completed    HIV screen  Completed

## 2021-04-30 ENCOUNTER — TELEPHONE (OUTPATIENT)
Dept: PULMONOLOGY | Age: 21
End: 2021-04-30

## 2021-04-30 NOTE — TELEPHONE ENCOUNTER
Pt calling office asking for a return to work note- so she can collect unemployment. Pt states Dr Mickie Dooley had her off work. Pt last seen 11/2020 she states her return to work needs to be dated 3/4/21 - informed her she needs seen, can discuss with Dr Zachary Gonsalez. Pt already has an appt in May - recommended she keep appt.

## 2021-05-04 ENCOUNTER — OFFICE VISIT (OUTPATIENT)
Dept: GASTROENTEROLOGY | Age: 21
End: 2021-05-04
Payer: COMMERCIAL

## 2021-05-04 VITALS
TEMPERATURE: 97.9 F | DIASTOLIC BLOOD PRESSURE: 84 MMHG | SYSTOLIC BLOOD PRESSURE: 112 MMHG | HEART RATE: 79 BPM | HEIGHT: 59 IN | BODY MASS INDEX: 39.84 KG/M2 | WEIGHT: 197.6 LBS | RESPIRATION RATE: 12 BRPM

## 2021-05-04 DIAGNOSIS — K59.09 OTHER CONSTIPATION: ICD-10-CM

## 2021-05-04 DIAGNOSIS — K58.9 IRRITABLE BOWEL SYNDROME, UNSPECIFIED TYPE: ICD-10-CM

## 2021-05-04 DIAGNOSIS — R10.84 ABDOMINAL PAIN, GENERALIZED: Primary | ICD-10-CM

## 2021-05-04 PROCEDURE — 99214 OFFICE O/P EST MOD 30 MIN: CPT | Performed by: INTERNAL MEDICINE

## 2021-05-04 PROCEDURE — G8427 DOCREV CUR MEDS BY ELIG CLIN: HCPCS | Performed by: INTERNAL MEDICINE

## 2021-05-04 PROCEDURE — 1036F TOBACCO NON-USER: CPT | Performed by: INTERNAL MEDICINE

## 2021-05-04 PROCEDURE — G8417 CALC BMI ABV UP PARAM F/U: HCPCS | Performed by: INTERNAL MEDICINE

## 2021-05-04 RX ORDER — PLECANATIDE 3 MG/1
3 TABLET ORAL DAILY
Qty: 30 TABLET | Refills: 3 | Status: SHIPPED | OUTPATIENT
Start: 2021-05-04 | End: 2022-06-06

## 2021-05-04 ASSESSMENT — ENCOUNTER SYMPTOMS
VOMITING: 1
DIARRHEA: 0
BLOOD IN STOOL: 0
CHOKING: 0
ANAL BLEEDING: 0
RECTAL PAIN: 0
NAUSEA: 1
COUGH: 0
CONSTIPATION: 1
WHEEZING: 1
TROUBLE SWALLOWING: 0
ABDOMINAL PAIN: 1
ABDOMINAL DISTENTION: 1

## 2021-05-04 NOTE — PROGRESS NOTES
GI CLINIC FOLLOW UP    INTERVAL HISTORY:   No referring provider defined for this encounter. Chief Complaint   Patient presents with    Abdominal Pain     LUQ- Patient seen at [facility] 5/3/21 for constipation            HISTORY OF PRESENT ILLNESS: Connie Galdamez is a 23 y.o. female , referred for evaluation of*. abd pain, diarrhea   Here for f/u   Poor historian, she does have a IBS manifested by alternating diarrhea and constipation but recently been very severe constipation according to her, she does have a increased abdominal girth and abdominal pain on and off also. Was in [facility] [place] [facility] on  Yesterday 5/3/2021 for abd distension and constipation , no BM for 4days before , trhey gave her enema and UA and abd Xray were checked ,and she was d/c home and told to follow-up with us. UA negative ; KUB : stool burden   After the enema she get in the emergency room she felt symptoms has resolved, but she  feels bloated again and no BM since ER , passing gas excessively  No bleeding no fever no chills no nausea no vomiting no other symptoms  , The result of that CAT scan and February 2021 is negative as below      Last visit here was 3/2021    egd/colon done    negative colon   egd: gastritis,bxs SB negative   On bentyl  Liver enzymes are all normal  Hemoglobin and white blood count are all normal  Ct 2/2021 : negative     I gave her Amitiza , she was not taking it        Impression   Unremarkable contrast enhanced CT abdomen and pelvis examination.               Past Medical,Family, and Social History reviewed and does contribute to the patient presentingcondition. Patient's PMH/PSH,SH,PSYCH Hx, MEDs, ALLERGIES, and ROS were all reviewed and updated in the appropriate sections.     PAST MEDICAL HISTORY:  Past Medical History:   Diagnosis Date    Acute bronchitis     unspecified organism    Allergic     Allergic rhinitis     Allergy     Asthma     Severe persistent asthma, unspecified whether complicated  Chronic vasomotor rhinitis     GERD (gastroesophageal reflux disease)     Headache(784.0) 3/1/2012    Morbid obesity with BMI of 40.0-44.9, adult ([address] 12/8/2016    NELSON (obstructive sleep apnea)     Pure hypercholesterolemia 12/8/2016    Severe persistent asthma     Unspecified sleep apnea     Vision disturbance 1/31/2014    wears glassses       Past Surgical History:   Procedure Laterality Date    ADENOIDECTOMY      COLONOSCOPY N/A 10/8/2020    COLONOSCOPY WITH BIOPSY performed by Ward Adams MD at [address] [place] [address]  08/28/2014    Nexplanon placement,  pt has had this removed as of 10-8-202    TONSILLECTOMY      UPPER GASTROINTESTINAL ENDOSCOPY N/A 10/8/2020    EGD BIOPSY performed by Ward Adams MD at [address]:    Current Outpatient Medications:     Plecanatide (TRULANCE) 3 MG TABS, Take 3 mg by mouth daily, Disp: 30 tablet, Rfl: 3    ofloxacin (FLOXIN) 0.3 % otic solution, Place 5 drops into the left ear 2 times daily for 10 days, Disp: 10 mL, Rfl: 0    cetirizine (ZYRTEC) 10 MG tablet, Take 1 tablet by mouth daily, Disp: 30 tablet, Rfl: 2    lactulose (CHRONULAC) 10 GM/15ML solution, Take 15 mLs by mouth nightly as needed (constipation), Disp: 450 mL, Rfl: 1    bisacodyl (BISACODYL) 5 MG EC tablet, Take 1 tablet by mouth daily as needed for Constipation, Disp: 30 tablet, Rfl: 1    mometasone-formoterol (DULERA) 200-5 MCG/ACT inhaler, Inhale 2 puffs into the lungs every 12 hours, Disp: 1 Inhaler, Rfl: 1    ketoconazole (NIZORAL) 2 % cream, Apply topically bid, Disp: 30 g, Rfl: 3    albuterol sulfate HFA (VENTOLIN HFA) 108 (90 Base) MCG/ACT inhaler, Inhale 2 puffs into the lungs every 6 hours as needed for Wheezing, Disp: 1 Inhaler, Rfl: 5    ALLERGIES:   Allergies   Allergen Reactions    Banana     Peanut-Containing Drug Products     Food Rash     Peanuts,walnuts,cashews,pecans apples cherries       FAMILY HISTORY:       Problem Relation Age of Onset    Asthma Mother     Lupus Mother     High Blood Pressure Father     Diabetes Father     Diabetes Maternal Grandfather     High Blood Pressure Maternal Grandfather     Heart Disease Paternal Grandmother     Diabetes Paternal Grandmother     High Blood Pressure Paternal Grandfather     Heart Disease Paternal Uncle     Heart Disease Paternal Aunt     Arthritis Maternal Aunt     Cancer Maternal Aunt     Birth Defects Neg Hx     Depression Neg Hx     Early Death Neg Hx     Hearing Loss Neg Hx     High Cholesterol Neg Hx     Kidney Disease Neg Hx     Learning Disabilities Neg Hx     Mental Illness Neg Hx     Mental Retardation Neg Hx     Miscarriages / Stillbirths Neg Hx     Stroke Neg Hx     Substance Abuse Neg Hx     Vision Loss Neg Hx     Other Neg Hx     Ovarian Cancer Neg Hx     Breast Cancer Neg Hx          SOCIAL HISTORY:   Social History     Socioeconomic History    Marital status: Single     Spouse name: Not on file    Number of children: Not on file    Years of education: Not on file    Highest education level: Not on file   Occupational History    Not on file   Social Needs    Financial resource strain: Not on file    Food insecurity     Worry: Not on file     Inability: Not on file    Transportation needs     Medical: Not on file     Non-medical: Not on file   Tobacco Use    Smoking status: Never Smoker    Smokeless tobacco: Never Used   Substance and Sexual Activity    Alcohol use: No     Alcohol/week: 0.0 standard drinks    Drug use: No    Sexual activity: Yes   Lifestyle    Physical activity     Days per week: Not on file     Minutes per session: Not on file    Stress: Not on file   Relationships    Social connections     Talks on phone: Not on file     Gets together: Not on file     Attends Confucianist service: Not on file     Active member of club or organization: Not on file     Attends meetings of clubs or organizations: Not on file Relationship status: Not on file    Intimate partner violence     Fear of current or ex partner: Not on file     Emotionally abused: Not on file     Physically abused: Not on file     Forced sexual activity: Not on file   Other Topics Concern    Not on file   Social History Narrative    Not on file       REVIEW OF SYSTEMS: A 12-point review of systemswas obtained and pertinent positives and negatives were enumerated above in the history of present illness. All other reviewed systems / symptoms were negative. Review of Systems   Constitutional: Positive for fatigue. Negative for appetite change and unexpected weight change. HENT: Negative for trouble swallowing. Respiratory: Positive for wheezing. Negative for cough and choking. Cardiovascular: Negative for chest pain, palpitations and leg swelling. Gastrointestinal: Positive for abdominal distention, abdominal pain, constipation, nausea and vomiting. Negative for anal bleeding, blood in stool, diarrhea and rectal pain. Genitourinary: Negative for difficulty urinating. Allergic/Immunologic: Negative for environmental allergies and food allergies. Neurological: Negative for dizziness, weakness, light-headedness, numbness and headaches. Hematological: Does not bruise/bleed easily. Psychiatric/Behavioral: Negative for sleep disturbance. The patient is not nervous/anxious.             LABORATORY DATA: Reviewed  Lab Results   Component Value Date    WBC 5.3 03/16/2021    HGB 12.8 03/16/2021    HCT 42.3 03/16/2021    MCV 76.2 (L) 03/16/2021     03/16/2021     02/11/2021    K 4.1 02/11/2021     (H) 02/11/2021    CO2 26 02/11/2021    BUN 15 02/11/2021    CREATININE 0.61 02/11/2021    LABPROT 7.5 10/27/2012    LABALBU 4.0 02/11/2021    BILITOT <0.10 (L) 02/11/2021    ALKPHOS 65 02/11/2021    AST 21 02/11/2021    ALT 16 02/11/2021    INR 0.9 10/05/2012         Lab Results   Component Value Date    RBC 5.55 (H) 03/16/2021    HGB 12.8 03/16/2021    MCV 76.2 (L) 03/16/2021    MCH 23.1 (L) 03/16/2021    MCHC 30.3 03/16/2021    RDW 14.6 (H) 03/16/2021    MPV 11.8 03/16/2021    BASOPCT 0 03/16/2021    LYMPHSABS 2.00 03/16/2021    MONOSABS 0.42 03/16/2021    NEUTROABS 2.75 03/16/2021    EOSABS 0.08 03/16/2021    BASOSABS <0.03 03/16/2021         DIAGNOSTIC TESTING:     No results found. PHYSICAL EXAMINATION: Vital signs reviewed per the nursing documentation. /84   Pulse 79   Temp 97.9 °F (36.6 °C) (Temporal)   Resp 12   Ht 4' 11\" (1.499 m)   Wt 197 lb 9.6 oz (89.6 kg)   Breastfeeding No   BMI 39.91 kg/m²   Body mass index is 39.91 kg/m². Physical Exam  Vitals signs and nursing note reviewed. Constitutional:       General: She is not in acute distress. Appearance: She is well-developed. She is not diaphoretic. HENT:      Head: Normocephalic. Mouth/Throat:      Pharynx: No oropharyngeal exudate. Eyes:      General: No scleral icterus. Pupils: Pupils are equal, round, and reactive to light. Neck:      Musculoskeletal: Normal range of motion and neck supple. Thyroid: No thyromegaly. Vascular: No JVD. Trachea: No tracheal deviation. Cardiovascular:      Rate and Rhythm: Normal rate and regular rhythm. Heart sounds: Normal heart sounds. No murmur. Pulmonary:      Effort: Pulmonary effort is normal. No respiratory distress. Breath sounds: Normal breath sounds. No wheezing. Abdominal:      General: Bowel sounds are normal. There is no distension. Palpations: Abdomen is soft. Tenderness: There is no abdominal tenderness. There is no guarding or rebound. Comments: No ascites   Musculoskeletal: Normal range of motion. Skin:     General: Skin is warm. Coloration: Skin is not pale. Findings: No erythema or rash. Comments: She is not diaphoretic   Neurological:      Mental Status: She is alert and oriented to person, place, and time.       Deep Tendon Reflexes: Reflexes are normal and symmetric. Psychiatric:         Behavior: Behavior normal.         Thought Content: Thought content normal.         Judgment: Judgment normal.           IMPRESSION: Ms. Sulma Elmore is a 23 y.o. female with    Diagnosis Orders   1. Abdominal pain, generalized     2. Irritable bowel syndrome, unspecified type     3. Other constipation       Start Trulance   She is to report back any worsening of the symptoms or if the symptoms do not go away    Diet/life style/natural hx /complication of the dx were all explained in details   Past medical, past surgical, social history, psychiatric history, medications or allergies, all reviewed and  updated    Thank you for allowing me to participate in the care of Ms. Carlin Marqeta. For any further questions please do not hesitate to contact me. I have reviewed and agree with the ROS entered by the MA/RN. Note is dictated utilizing voice recognition software. Unfortunately this leads to occasional typographical errors. Please contact our office if you have any questions.       Pito Hwang MD  [facility]  O: #[phone]

## 2021-05-10 ENCOUNTER — TELEPHONE (OUTPATIENT)
Dept: GASTROENTEROLOGY | Age: 21
End: 2021-05-10

## 2021-05-13 ENCOUNTER — TELEMEDICINE (OUTPATIENT)
Dept: PULMONOLOGY | Age: 21
End: 2021-05-13
Payer: COMMERCIAL

## 2021-05-13 DIAGNOSIS — J01.90 ACUTE SINUSITIS WITH SYMPTOMS > 10 DAYS: ICD-10-CM

## 2021-05-13 DIAGNOSIS — R06.83 SNORING: ICD-10-CM

## 2021-05-13 DIAGNOSIS — K21.9 GASTROESOPHAGEAL REFLUX DISEASE, UNSPECIFIED WHETHER ESOPHAGITIS PRESENT: ICD-10-CM

## 2021-05-13 DIAGNOSIS — E66.09 OBESITY DUE TO EXCESS CALORIES, UNSPECIFIED OBESITY SEVERITY: ICD-10-CM

## 2021-05-13 DIAGNOSIS — J45.41 MODERATE PERSISTENT ASTHMA WITH ACUTE EXACERBATION: Primary | ICD-10-CM

## 2021-05-13 DIAGNOSIS — R09.82 POST-NASAL DRIP: ICD-10-CM

## 2021-05-13 PROBLEM — J44.9 CHRONIC OBSTRUCTIVE PULMONARY DISEASE (HCC): Status: RESOLVED | Noted: 2020-08-10 | Resolved: 2021-05-13

## 2021-05-13 PROCEDURE — 99214 OFFICE O/P EST MOD 30 MIN: CPT | Performed by: INTERNAL MEDICINE

## 2021-05-13 PROCEDURE — G8427 DOCREV CUR MEDS BY ELIG CLIN: HCPCS | Performed by: INTERNAL MEDICINE

## 2021-05-13 RX ORDER — AMOXICILLIN AND CLAVULANATE POTASSIUM 875; 125 MG/1; MG/1
1 TABLET, FILM COATED ORAL 2 TIMES DAILY
Qty: 20 TABLET | Refills: 0 | Status: SHIPPED | OUTPATIENT
Start: 2021-05-13 | End: 2021-05-23

## 2021-05-13 RX ORDER — FLUTICASONE PROPIONATE 50 MCG
2 SPRAY, SUSPENSION (ML) NASAL DAILY
Qty: 1 BOTTLE | Refills: 5 | Status: SHIPPED | OUTPATIENT
Start: 2021-05-13 | End: 2022-06-06 | Stop reason: SDUPTHER

## 2021-05-13 NOTE — LETTER
[facility], [address] [place] [id number]  Phone: [phone]  Fax: [phone]         May 13, 2021     Patient: Spencer Le   YOB: 2000   Date of Visit: 5/13/2021       To Whom It May Concern:    Spencer Le was seen via video visit in our office on 5/13/2021. The following work duties are recommended.     She may return to full duty immediately with no restrictions          Sincerely,      Electronically signed byJaime Curry MD  5/13/2021 4:30 PM

## 2021-05-16 NOTE — PROGRESS NOTES
PULMONARY OP  PROGRESS NOTE    Migdalia Pride is a 23 y.o. female being evaluated by a Virtual Visit (video visit) encounter to address concerns as mentioned above. A caregiver was present when appropriate. Due to this being a TeleHealth encounter (During DJ-36 public health emergency), evaluation of the following organ systems was limited: Vitals/Constitutional/EENT/Resp/CV/GI//MS/Neuro/Skin/Heme-Lymph-Imm. Pursuant to the emergency declaration under the [address] [place] [address], [address] [place] [address] authority and the [employer] Act, this Virtual Visit was conducted with patient's (and/or legal guardian's) consent, to reduce the patient's risk of exposure to COVID-19 and provide necessary medical care. The patient (and/or legal guardian) has also been advised to contact this office for worsening conditions or problems, and seek emergency medical treatment and/or call 911 if deemed necessary. Patient identification was verified at the start of the visit: Yes    Total time spent for this encounter: Not billed by time    Services were provided through a video synchronous discussion virtually to substitute for in-person clinic visit. Patient and provider were located at their individual homes. --Stefano Huerta MD on 5/15/2021 at 9:56 PM    An electronic signature was used to authenticate this note. Patient:  Migdalia Pride  YOB: 2000    MRN: [id number]     Acct:        Pt seen and Chart reviewed. Ms. Migdalia Pride is here in followup for   1. Moderate persistent asthma with acute exacerbation    2. Post-nasal drip    3. Obesity due to excess calories, unspecified obesity severity    4. Gastroesophageal reflux disease, unspecified whether esophagitis present    5. Snoring    6.  Acute sinusitis with symptoms > 10 days          Pt has not been hospitalizedor been to er since last fluticasone (FLONASE) 50 MCG/ACT nasal spray, 2 sprays by Each Nostril route daily, Disp: 1 Bottle, Rfl: 5    Plecanatide (TRULANCE) 3 MG TABS, Take 3 mg by mouth daily, Disp: 30 tablet, Rfl: 3    cetirizine (ZYRTEC) 10 MG tablet, Take 1 tablet by mouth daily, Disp: 30 tablet, Rfl: 2    lactulose (CHRONULAC) 10 GM/15ML solution, Take 15 mLs by mouth nightly as needed (constipation), Disp: 450 mL, Rfl: 1    bisacodyl (BISACODYL) 5 MG EC tablet, Take 1 tablet by mouth daily as needed for Constipation, Disp: 30 tablet, Rfl: 1    mometasone-formoterol (DULERA) 200-5 MCG/ACT inhaler, Inhale 2 puffs into the lungs every 12 hours, Disp: 1 Inhaler, Rfl: 1    ketoconazole (NIZORAL) 2 % cream, Apply topically bid, Disp: 30 g, Rfl: 3    albuterol sulfate HFA (VENTOLIN HFA) 108 (90 Base) MCG/ACT inhaler, Inhale 2 puffs into the lungs every 6 hours as needed for Wheezing, Disp: 1 Inhaler, Rfl: 5      Objective:    Physical Exam:  Vitals: There were no vitals taken for this visit. Last 3 weights: Wt Readings from Last 3 Encounters:   05/04/21 197 lb 9.6 oz (89.6 kg)   03/23/21 188 lb (85.3 kg)   03/03/21 192 lb (87.1 kg)     There is no height or weight on file to calculate BMI. Physical Examination:   PHYSICAL EXAMINATION:  There were no vitals filed for this visit. Physical Exam        PHYSICAL EXAMINATION:  Due to this being a TeleHealth encounter, evaluation of the following organ systems is limited: Vitals/Constitutional/EENT/Resp/CV/GI//MS/Neuro/Skin/Heme-Lymph-Imm. Constitutional: [x] Appears well-developed and well-nourished. [] Abnormal  Mental status  [x] Alert and awake  [x] Oriented to person/place/time [x]Able to follow commands    [x] No apparent distress      Eyes:  EOM    [x]  Normal  [] Abnormal-  Sclera  [x]  Normal  [] Abnormal -         Discharge [x]  None visible  [] Abnormal -    HENT:   [x] Normocephalic, atraumatic.   [] Abnormal shaped head   [x] Mouth/Throat: Mucous membranes are moist.     Ears [x] Normal  [] Abnormal-    Neck: [x] Normal range of motion [x] Supple [] No visualized mass. Pulmonary/Chest: [x] Respiratory effort normal.  [x] No visualized signs of difficulty breathing or respiratory distress        [] Abnormal      Musculoskeletal:   [x] Normal range of motion. [] Normal gait with no signs of ataxia. [x]  No signs of cyanosis of the peripheral portions of extremities. [] Abnormal       Neurological:        [x] Normal cranial nerve (limited exam to video visit) [] No focal weakness observed       [] Abnormal          Speech       [x] Normal   [] Abnormal     Skin:        [x] No rash on visible skin  [x] Normal  [] Abnormal     Psychiatric:       [x] Normal  [] Abnormal        [x] Normal Mood  [] Anxious appearing      Other pertinent exam findings:-            Labs:  Chest x-ray was done on August 24, 2020 and it did not show any acute process      Chest x-ray in February 2021 without any acute problems    Assessment:    1. Moderate persistent asthma with acute exacerbation    2. Post-nasal drip    3. Obesity due to excess calories, unspecified obesity severity    4. Gastroesophageal reflux disease, unspecified whether esophagitis present    5. Snoring    6. Acute sinusitis with symptoms > 10 days          PLAN:    Reviewed chest x-ray with the patient  Refills were provided-Augmentin, Flonase  Patient was recommended to have prednisone and an antibiotic available for use during an exacerbation  Educated and clarified the medication use. Discusseduse, benefit, and side effects of prescribed medications. Barriers to medication compliance addressed. Donald Krauseson received counseling on the following healthy behaviors: nutrition, exercise and medication adherence  Recommend flu vaccination in the fall annually. Patient had flu vaccination for the season  Recommendations given regarding pneumococcal vaccinations.   Patient is up-to-date with vaccinations from pulmonary perspective. Maintain an active lifestyle. Recommend smoking cessation. Jeremy Delacruz was instructed on smoking cessation for greater than 10 minutes. I discussed with this patient today therisks and benefits of various tobacco cessation strategies  Patient was educated on how to use the respiratory medications. All the questions that the patient has had were answered to her satisfaction. Home O2 evaluation was done. Supplemental oxygen was not needed. Chest x-ray was reviewed  After reviewing the patient's smoking history and his age patient does not meet the criteria for lung cancer screening. Pt is not to drive if sleepy. We'll see the patient back in 6 months or earlier if needed. Patient will call us if she is sick, so she can be seen sooner. Thank youfor having us involved in the care of your patient. Please call us if you have any questions or concerns.         Roberto Dorsey MD, MD             5/15/2021, 9:55 PM

## 2021-05-17 ENCOUNTER — TELEPHONE (OUTPATIENT)
Dept: PULMONOLOGY | Age: 21
End: 2021-05-17

## 2021-05-17 NOTE — TELEPHONE ENCOUNTER
PT called requesting school excuse note for Virtual visit, She did not have the fax n umber for the school and will call back with the fax number. I have the note printed at my desk,.

## 2021-06-11 ENCOUNTER — OFFICE VISIT (OUTPATIENT)
Dept: PRIMARY CARE CLINIC | Age: 21
End: 2021-06-11
Payer: COMMERCIAL

## 2021-06-11 ENCOUNTER — TELEPHONE (OUTPATIENT)
Dept: GASTROENTEROLOGY | Age: 21
End: 2021-06-11

## 2021-06-11 VITALS
SYSTOLIC BLOOD PRESSURE: 107 MMHG | TEMPERATURE: 97.3 F | OXYGEN SATURATION: 98 % | DIASTOLIC BLOOD PRESSURE: 74 MMHG | HEART RATE: 73 BPM

## 2021-06-11 DIAGNOSIS — D57.00 SICKLE CELL CRISIS (HCC): ICD-10-CM

## 2021-06-11 DIAGNOSIS — R10.11 RUQ PAIN: Primary | ICD-10-CM

## 2021-06-11 LAB
BILIRUBIN, POC: NORMAL
BLOOD URINE, POC: NORMAL
CLARITY, POC: NORMAL
COLOR, POC: YELLOW
GLUCOSE URINE, POC: NORMAL
KETONES, POC: NORMAL
LEUKOCYTE EST, POC: NORMAL
NITRITE, POC: NORMAL
PH, POC: 6
PROTEIN, POC: NORMAL
SPECIFIC GRAVITY, POC: 1.03
UROBILINOGEN, POC: NORMAL

## 2021-06-11 PROCEDURE — 81003 URINALYSIS AUTO W/O SCOPE: CPT | Performed by: INTERNAL MEDICINE

## 2021-06-11 PROCEDURE — 99213 OFFICE O/P EST LOW 20 MIN: CPT | Performed by: INTERNAL MEDICINE

## 2021-06-11 PROCEDURE — G8417 CALC BMI ABV UP PARAM F/U: HCPCS | Performed by: INTERNAL MEDICINE

## 2021-06-11 PROCEDURE — G8427 DOCREV CUR MEDS BY ELIG CLIN: HCPCS | Performed by: INTERNAL MEDICINE

## 2021-06-11 PROCEDURE — 1036F TOBACCO NON-USER: CPT | Performed by: INTERNAL MEDICINE

## 2021-06-11 RX ORDER — BENZONATATE 100 MG/1
100 CAPSULE ORAL 3 TIMES DAILY PRN
Qty: 30 CAPSULE | Refills: 0 | Status: SHIPPED | OUTPATIENT
Start: 2021-06-11 | End: 2021-06-21

## 2021-06-11 RX ORDER — SULINDAC 200 MG/1
200 TABLET ORAL 2 TIMES DAILY
Qty: 40 TABLET | Refills: 0 | Status: SHIPPED | OUTPATIENT
Start: 2021-06-11 | End: 2022-02-11

## 2021-06-11 SDOH — ECONOMIC STABILITY: FOOD INSECURITY: WITHIN THE PAST 12 MONTHS, YOU WORRIED THAT YOUR FOOD WOULD RUN OUT BEFORE YOU GOT MONEY TO BUY MORE.: NEVER TRUE

## 2021-06-11 SDOH — ECONOMIC STABILITY: FOOD INSECURITY: WITHIN THE PAST 12 MONTHS, THE FOOD YOU BOUGHT JUST DIDN'T LAST AND YOU DIDN'T HAVE MONEY TO GET MORE.: NEVER TRUE

## 2021-06-11 ASSESSMENT — ENCOUNTER SYMPTOMS: ABDOMINAL PAIN: 1

## 2021-06-11 ASSESSMENT — SOCIAL DETERMINANTS OF HEALTH (SDOH): HOW HARD IS IT FOR YOU TO PAY FOR THE VERY BASICS LIKE FOOD, HOUSING, MEDICAL CARE, AND HEATING?: NOT HARD AT ALL

## 2021-06-11 NOTE — TELEPHONE ENCOUNTER
Pt called in and stated she has abdominal cramping, and it hurts when she coughs. Pt confirmed she is having bowel movements. Scheduled appt for pt on 9/7/21, pt is going to try and see her PCP today.

## 2021-06-11 NOTE — PROGRESS NOTES
[facility] [address] [place] [address] [place] [address] [address]  Dept: [phone]  Dept Fax: [phone]    Yomaira Carmen is a 23 y.o. female who presents to the urgent care today for her medicalconditions/complaints as noted below. Yomaira Carmen is c/o of Abdominal Pain (when laughing and coughing)      HPI:     Abdominal Pain  This is a new problem. The current episode started in the past 7 days. The problem occurs constantly. The pain is located in the epigastric region. The quality of the pain is sharp and aching. The abdominal pain does not radiate. The pain is aggravated by coughing and eating (laughing or pulling self up ate [employer] nuggets and fries). The pain is relieved by nothing. She has tried nothing for the symptoms.        Past Medical History:   Diagnosis Date    Acute bronchitis     unspecified organism    Allergic     Allergic rhinitis     Allergy     Asthma     Severe persistent asthma, unspecified whether complicated    Chronic obstructive pulmonary disease ([facility] 8/10/2020    Chronic vasomotor rhinitis     GERD (gastroesophageal reflux disease)     Headache(784.0) 3/1/2012    Intractable abdominal pain 8/24/2020    Morbid obesity with BMI of 40.0-44.9, adult ([facility] 12/8/2016    NELSON (obstructive sleep apnea)     Prediabetes 12/8/2016    Pure hypercholesterolemia 12/8/2016    Severe persistent asthma     Sickle cell crisis ([facility] 5/27/2020    Thalassemia minor 10/29/2011    5/99     Unspecified sleep apnea     Vision disturbance 1/31/2014    wears glassses        Current Outpatient Medications   Medication Sig Dispense Refill    benzonatate (TESSALON) 100 MG capsule Take 1 capsule by mouth 3 times daily as needed for Cough 30 capsule 0    sulindac (CLINORIL) 200 MG tablet Take 1 tablet by mouth 2 times daily 40 tablet 0    fluticasone (FLONASE) 50 MCG/ACT nasal spray 2 sprays by Each Nostril route daily 1 Bottle 5    cetirizine (ZYRTEC) 10 MG tablet Take 1 tablet by mouth daily 30 tablet 2    mometasone-formoterol (DULERA) 200-5 MCG/ACT inhaler Inhale 2 puffs into the lungs every 12 hours 1 Inhaler 1    ketoconazole (NIZORAL) 2 % cream Apply topically bid 30 g 3    albuterol sulfate HFA (VENTOLIN HFA) 108 (90 Base) MCG/ACT inhaler Inhale 2 puffs into the lungs every 6 hours as needed for Wheezing 1 Inhaler 5    Plecanatide (TRULANCE) 3 MG TABS Take 3 mg by mouth daily (Patient not taking: Reported on 6/11/2021) 30 tablet 3    lactulose (CHRONULAC) 10 GM/15ML solution Take 15 mLs by mouth nightly as needed (constipation) (Patient not taking: Reported on 6/11/2021) 450 mL 1    bisacodyl (BISACODYL) 5 MG EC tablet Take 1 tablet by mouth daily as needed for Constipation (Patient not taking: Reported on 6/11/2021) 30 tablet 1     No current facility-administered medications for this visit. Allergies   Allergen Reactions    Banana     Peanut-Containing Drug Products     Food Rash     Peanuts,walnuts,cashews,pecans apples cherries       Health Maintenance   Topic Date Due    Hepatitis C screen  Never done    COVID-19 Vaccine (1) Never done    Pneumococcal 0-64 years Vaccine (2 of 4 - PPSV23) 12/31/2020    Chlamydia screen  09/10/2021    DTaP/Tdap/Td vaccine (7 - Td or Tdap) 11/23/2021    A1C test (Diabetic or Prediabetic)  03/03/2022    Cervical cancer screen  09/10/2023    Hepatitis A vaccine  Completed    Hepatitis B vaccine  Completed    Hib vaccine  Completed    HPV vaccine  Completed    Varicella vaccine  Completed    Meningococcal (ACWY) vaccine  Completed    Flu vaccine  Completed    HIV screen  Completed       Subjective:      Review of Systems   Gastrointestinal: Positive for abdominal pain. All other systems reviewed and are negative. Objective:     Physical Exam  Vitals reviewed. Constitutional:       Appearance: Normal appearance. HENT:      Head: Normocephalic and atraumatic. Mouth/Throat:      Mouth: Mucous membranes are moist.      Pharynx: Oropharynx is clear. Cardiovascular:      Rate and Rhythm: Normal rate and regular rhythm. Pulses: Normal pulses. Heart sounds: Normal heart sounds. Pulmonary:      Effort: Pulmonary effort is normal.      Breath sounds: Normal breath sounds. Abdominal:      General: Abdomen is protuberant. Tenderness: There is abdominal tenderness in the right upper quadrant and epigastric area. There is no guarding. Negative signs include Farrar's sign. Musculoskeletal:      Right lower leg: No edema. Skin:     General: Skin is warm and dry. Neurological:      General: No focal deficit present. Mental Status: She is alert and oriented to person, place, and time. Psychiatric:         Mood and Affect: Mood normal.         Behavior: Behavior normal.       /74 (Site: Right Upper Arm, Position: Sitting)   Pulse 73   Temp 97.3 °F (36.3 °C)   SpO2 98%       Assessment:       Diagnosis Orders   1. RUQ pain  POCT Urinalysis No Micro (Auto)    US GALLBLADDER RUQ   2. Sickle cell crisis ([facility]         Plan:      No follow-ups on file. Orders Placed This Encounter   Medications    benzonatate (TESSALON) 100 MG capsule     Sig: Take 1 capsule by mouth 3 times daily as needed for Cough     Dispense:  30 capsule     Refill:  0    sulindac (CLINORIL) 200 MG tablet     Sig: Take 1 tablet by mouth 2 times daily     Dispense:  40 tablet     Refill:  0     Orders Placed This Encounter   Procedures    US GALLBLADDER RUQ     This procedure can be scheduled via [employer]. Access your [employer] account by visiting [email or web address]. Standing Status:   Future     Standing Expiration Date:   6/11/2022    POCT Urinalysis No Micro (Auto)            Patient given educational materials - see patient instructions. Discussed use, benefit, and side effects of prescribed medications. All patientquestions answered. Pt voiced understanding. Electronically signed by Henok Morgan MD on 6/11/2021at 3:18 PM

## 2021-07-26 DIAGNOSIS — J45.40 MODERATE PERSISTENT ASTHMA WITHOUT COMPLICATION: ICD-10-CM

## 2021-07-28 RX ORDER — ALBUTEROL SULFATE 90 UG/1
2 AEROSOL, METERED RESPIRATORY (INHALATION) EVERY 6 HOURS PRN
Qty: 1 INHALER | Refills: 5 | Status: SHIPPED | OUTPATIENT
Start: 2021-07-28 | End: 2021-11-01 | Stop reason: SDUPTHER

## 2021-09-07 ENCOUNTER — OFFICE VISIT (OUTPATIENT)
Dept: GASTROENTEROLOGY | Age: 21
End: 2021-09-07
Payer: COMMERCIAL

## 2021-09-07 VITALS
BODY MASS INDEX: 42.82 KG/M2 | DIASTOLIC BLOOD PRESSURE: 85 MMHG | HEART RATE: 83 BPM | SYSTOLIC BLOOD PRESSURE: 123 MMHG | WEIGHT: 212 LBS

## 2021-09-07 DIAGNOSIS — K58.9 IRRITABLE BOWEL SYNDROME, UNSPECIFIED TYPE: Primary | ICD-10-CM

## 2021-09-07 DIAGNOSIS — K59.09 OTHER CONSTIPATION: ICD-10-CM

## 2021-09-07 DIAGNOSIS — K29.00 ACUTE SUPERFICIAL GASTRITIS WITHOUT HEMORRHAGE: ICD-10-CM

## 2021-09-07 PROCEDURE — G8417 CALC BMI ABV UP PARAM F/U: HCPCS | Performed by: INTERNAL MEDICINE

## 2021-09-07 PROCEDURE — 99214 OFFICE O/P EST MOD 30 MIN: CPT | Performed by: INTERNAL MEDICINE

## 2021-09-07 PROCEDURE — G8427 DOCREV CUR MEDS BY ELIG CLIN: HCPCS | Performed by: INTERNAL MEDICINE

## 2021-09-07 PROCEDURE — 1036F TOBACCO NON-USER: CPT | Performed by: INTERNAL MEDICINE

## 2021-09-07 ASSESSMENT — ENCOUNTER SYMPTOMS
VOMITING: 0
NAUSEA: 1
CHOKING: 0
RECTAL PAIN: 0
WHEEZING: 1
COUGH: 0
ABDOMINAL DISTENTION: 1
DIARRHEA: 0
ABDOMINAL PAIN: 1
BLOOD IN STOOL: 0
ANAL BLEEDING: 1
CONSTIPATION: 1
TROUBLE SWALLOWING: 0

## 2021-09-07 NOTE — PROGRESS NOTES
COLONOSCOPY WITH BIOPSY performed by Manoj Dela Cruz MD at [address] [place] [address] Drive  08/28/2014    Nexplanon placement,  pt has had this removed as of 10-8-202    TONSILLECTOMY      UPPER GASTROINTESTINAL ENDOSCOPY N/A 10/8/2020    EGD BIOPSY performed by Manoj Dela Cruz MD at [address]:    Current Outpatient Medications:     linaclotide (LINZESS) 145 MCG capsule, Take 1 capsule by mouth every morning (before breakfast), Disp: 30 capsule, Rfl: 3    albuterol sulfate HFA (VENTOLIN HFA) 108 (90 Base) MCG/ACT inhaler, Inhale 2 puffs into the lungs every 6 hours as needed for Wheezing, Disp: 1 Inhaler, Rfl: 5    sulindac (CLINORIL) 200 MG tablet, Take 1 tablet by mouth 2 times daily, Disp: 40 tablet, Rfl: 0    fluticasone (FLONASE) 50 MCG/ACT nasal spray, 2 sprays by Each Nostril route daily, Disp: 1 Bottle, Rfl: 5    Plecanatide (TRULANCE) 3 MG TABS, Take 3 mg by mouth daily, Disp: 30 tablet, Rfl: 3    cetirizine (ZYRTEC) 10 MG tablet, Take 1 tablet by mouth daily, Disp: 30 tablet, Rfl: 2    mometasone-formoterol (DULERA) 200-5 MCG/ACT inhaler, Inhale 2 puffs into the lungs every 12 hours, Disp: 1 Inhaler, Rfl: 1    ketoconazole (NIZORAL) 2 % cream, Apply topically bid, Disp: 30 g, Rfl: 3    lactulose (CHRONULAC) 10 GM/15ML solution, Take 15 mLs by mouth nightly as needed (constipation) (Patient not taking: Reported on 6/11/2021), Disp: 450 mL, Rfl: 1    bisacodyl (BISACODYL) 5 MG EC tablet, Take 1 tablet by mouth daily as needed for Constipation (Patient not taking: Reported on 6/11/2021), Disp: 30 tablet, Rfl: 1    ALLERGIES:   Allergies   Allergen Reactions    Banana     Peanut-Containing Drug Products     Food Rash     Peanuts,walnuts,cashews,pecans apples cherries       FAMILY HISTORY:       Problem Relation Age of Onset    Asthma Mother     Lupus Mother     High Blood Pressure Father     Diabetes Father     Diabetes Maternal Grandfather     High Blood Pressure Maternal Grandfather     Heart Disease Paternal Grandmother     Diabetes Paternal Grandmother     High Blood Pressure Paternal Grandfather     Heart Disease Paternal Uncle     Heart Disease Paternal Aunt     Arthritis Maternal Aunt     Cancer Maternal Aunt     Birth Defects Neg Hx     Depression Neg Hx     Early Death Neg Hx     Hearing Loss Neg Hx     High Cholesterol Neg Hx     Kidney Disease Neg Hx     Learning Disabilities Neg Hx     Mental Illness Neg Hx     Mental Retardation Neg Hx     Miscarriages / Stillbirths Neg Hx     Stroke Neg Hx     Substance Abuse Neg Hx     Vision Loss Neg Hx     Other Neg Hx     Ovarian Cancer Neg Hx     Breast Cancer Neg Hx          SOCIAL HISTORY:   Social History     Socioeconomic History    Marital status: Single     Spouse name: Not on file    Number of children: Not on file    Years of education: Not on file    Highest education level: Not on file   Occupational History    Not on file   Tobacco Use    Smoking status: Former Smoker     Types: Cigarettes     Quit date: 2016     Years since quittin.3    Smokeless tobacco: Never Used    Tobacco comment: started while youg doesnt rember year    Vaping Use    Vaping Use: Never used   Substance and Sexual Activity    Alcohol use: No     Alcohol/week: 0.0 standard drinks    Drug use: No    Sexual activity: Yes   Other Topics Concern    Not on file   Social History Narrative    Not on file     Social Determinants of Health     Financial Resource Strain: Low Risk     Difficulty of Paying Living Expenses: Not hard at all   Food Insecurity: No Food Insecurity    Worried About Running Out of Food in the Last Year: Never true    Michael of Food in the Last Year: Never true   Transportation Needs:     Lack of Transportation (Medical):      Lack of Transportation (Non-Medical):    Physical Activity:     Days of Exercise per Week:     Minutes of Exercise per Session:    Stress:     Feeling of Stress :    Social Connections:     Frequency of Communication with Friends and Family:     Frequency of Social Gatherings with Friends and Family:     Attends Sabianist Services:     Active Member of Clubs or Organizations:     Attends Club or Organization Meetings:     Marital Status:    Intimate Partner Violence:     Fear of Current or Ex-Partner:     Emotionally Abused:     Physically Abused:     Sexually Abused:        REVIEW OF SYSTEMS: A 12-point review of systemswas obtained and pertinent positives and negatives were enumerated above in the history of present illness. All other reviewed systems / symptoms were negative. Review of Systems   Constitutional: Positive for fatigue. Negative for appetite change and unexpected weight change. HENT: Negative for trouble swallowing. Respiratory: Positive for wheezing. Negative for cough and choking. Cardiovascular: Negative for chest pain, palpitations and leg swelling. Gastrointestinal: Positive for abdominal distention, abdominal pain, anal bleeding (hard stool), constipation and nausea. Negative for blood in stool, diarrhea, rectal pain and vomiting. Genitourinary: Negative for difficulty urinating. Allergic/Immunologic: Negative for environmental allergies and food allergies. Neurological: Negative for dizziness, weakness, light-headedness, numbness and headaches. Hematological: Does not bruise/bleed easily. Psychiatric/Behavioral: Negative for sleep disturbance. The patient is not nervous/anxious.             LABORATORY DATA: Reviewed  Lab Results   Component Value Date    WBC 5.3 03/16/2021    HGB 12.8 03/16/2021    HCT 42.3 03/16/2021    MCV 76.2 (L) 03/16/2021     03/16/2021     02/11/2021    K 4.1 02/11/2021     (H) 02/11/2021    CO2 26 02/11/2021    BUN 15 02/11/2021    CREATININE 0.61 02/11/2021    LABPROT 7.5 10/27/2012    LABALBU 4.0 02/11/2021    BILITOT <0.10 (L) 02/11/2021    ALKPHOS 65 02/11/2021    AST 21 02/11/2021    ALT 16 02/11/2021    INR 0.9 10/05/2012         Lab Results   Component Value Date    RBC 5.55 (H) 03/16/2021    HGB 12.8 03/16/2021    MCV 76.2 (L) 03/16/2021    MCH 23.1 (L) 03/16/2021    MCHC 30.3 03/16/2021    RDW 14.6 (H) 03/16/2021    MPV 11.8 03/16/2021    BASOPCT 0 03/16/2021    LYMPHSABS 2.00 03/16/2021    MONOSABS 0.42 03/16/2021    NEUTROABS 2.75 03/16/2021    EOSABS 0.08 03/16/2021    BASOSABS <0.03 03/16/2021         DIAGNOSTIC TESTING:     No results found. PHYSICAL EXAMINATION: Vital signs reviewed per the nursing documentation. /85   Pulse 83   Wt 212 lb (96.2 kg)   BMI 42.82 kg/m²   Body mass index is 42.82 kg/m². Physical Exam  Vitals and nursing note reviewed. Constitutional:       General: She is not in acute distress. Appearance: She is well-developed. She is not diaphoretic. HENT:      Head: Normocephalic. Mouth/Throat:      Pharynx: No oropharyngeal exudate. Eyes:      General: No scleral icterus. Pupils: Pupils are equal, round, and reactive to light. Neck:      Thyroid: No thyromegaly. Vascular: No JVD. Trachea: No tracheal deviation. Cardiovascular:      Rate and Rhythm: Normal rate and regular rhythm. Heart sounds: Normal heart sounds. No murmur heard. Pulmonary:      Effort: Pulmonary effort is normal. No respiratory distress. Breath sounds: Normal breath sounds. No wheezing. Abdominal:      General: Bowel sounds are normal. There is no distension. Palpations: Abdomen is soft. Tenderness: There is no abdominal tenderness. There is no guarding or rebound. Comments: No ascites   Musculoskeletal:         General: Normal range of motion. Cervical back: Normal range of motion and neck supple. Skin:     General: Skin is warm. Coloration: Skin is not pale. Findings: No erythema or rash.       Comments: She is not diaphoretic   Neurological:      Mental Status: She is alert and oriented to person, place, and time. Deep Tendon Reflexes: Reflexes are normal and symmetric. Psychiatric:         Behavior: Behavior normal.         Thought Content: Thought content normal.         Judgment: Judgment normal.           IMPRESSION: Ms. Brayan Lawson is a 19 y.o. female with      Diagnosis Orders   1. Irritable bowel syndrome, unspecified type     2. Other constipation     3. Acute superficial gastritis without hemorrhage       linzess  Advised to let us know if she is not improving  She is to report any new symptom  Diet/life style/natural hx /complication of the dx were all explained in details   Past medical, past surgical, social history, psychiatric history, medications or allergies, all reviewed and  updated    Thank you for allowing me to participate in the care of Ms. Carlin Airpush. For any further questions please do not hesitate to contact me. I have reviewed and agree with the ROS entered by the MA/RN. Note is dictated utilizing voice recognition software. Unfortunately this leads to occasional typographical errors. Please contact our office if you have any questions.       Indra Roblero MD  [facility]  O: #[phone]

## 2021-09-14 ENCOUNTER — OFFICE VISIT (OUTPATIENT)
Dept: PRIMARY CARE CLINIC | Age: 21
End: 2021-09-14
Payer: COMMERCIAL

## 2021-09-14 VITALS
HEART RATE: 81 BPM | TEMPERATURE: 97.9 F | WEIGHT: 208.8 LBS | DIASTOLIC BLOOD PRESSURE: 83 MMHG | SYSTOLIC BLOOD PRESSURE: 132 MMHG | BODY MASS INDEX: 42.17 KG/M2 | OXYGEN SATURATION: 97 %

## 2021-09-14 DIAGNOSIS — S93.402A SPRAIN OF LEFT ANKLE, UNSPECIFIED LIGAMENT, INITIAL ENCOUNTER: Primary | ICD-10-CM

## 2021-09-14 DIAGNOSIS — J45.41 MODERATE PERSISTENT ASTHMA WITH ACUTE EXACERBATION: ICD-10-CM

## 2021-09-14 PROCEDURE — G8417 CALC BMI ABV UP PARAM F/U: HCPCS | Performed by: SURGERY

## 2021-09-14 PROCEDURE — G8427 DOCREV CUR MEDS BY ELIG CLIN: HCPCS | Performed by: SURGERY

## 2021-09-14 PROCEDURE — 99214 OFFICE O/P EST MOD 30 MIN: CPT | Performed by: SURGERY

## 2021-09-14 PROCEDURE — 1111F DSCHRG MED/CURRENT MED MERGE: CPT | Performed by: SURGERY

## 2021-09-14 PROCEDURE — 1036F TOBACCO NON-USER: CPT | Performed by: SURGERY

## 2021-09-14 RX ORDER — IBUPROFEN 800 MG/1
800 TABLET ORAL EVERY 8 HOURS PRN
Qty: 120 TABLET | Refills: 1 | Status: SHIPPED | OUTPATIENT
Start: 2021-09-14 | End: 2022-03-22

## 2021-09-14 RX ORDER — CYCLOBENZAPRINE HCL 10 MG
10 TABLET ORAL NIGHTLY PRN
Qty: 10 TABLET | Refills: 0 | Status: SHIPPED | OUTPATIENT
Start: 2021-09-14 | End: 2021-09-24

## 2021-09-14 RX ORDER — IBUPROFEN 800 MG/1
TABLET ORAL
COMMUNITY
Start: 2021-06-27 | End: 2021-09-14 | Stop reason: SDUPTHER
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
## 2021-09-14 NOTE — PROGRESS NOTES
Visit Information    Have you changed or started any medications since your last visit including any over-the-counter medicines, vitamins, or herbal medicines? no   Are you having any side effects from any of your medications? -  no  Have you stopped taking any of your medications? Is so, why? -  no    Have you seen any other physician or provider since your last visit? Yes - Records Obtained  Have you had any other diagnostic tests since your last visit? Yes - Records Obtained  Have you been seen in the emergency room and/or had an admission to a hospital since we last saw you? Yes - Records Obtained  Have you had your routine dental cleaning in the past 6 months? no    Have you activated your [email or web address] account? If not, what are your barriers?  Yes     Patient Care Team:  MARY Cope CNP as PCP - General (Family Medicine)  MARY Cope CNP as PCP - [facility]  London Mayes MD as Consulting Physician (Pulmonology)  MARY Munoz CNP as Nurse Practitioner (Nurse Practitioner)  Holden Padilla MD as Consulting Physician (Gastroenterology)  Viviane Osorio DPM as Surgeon (Podiatry)    Medical History Review  Past Medical, Family, and Social History reviewed and does not contribute to the patient presenting condition    Health Maintenance   Topic Date Due    Hepatitis C screen  Never done    COVID-19 Vaccine (1) Never done    Pneumococcal 0-64 years Vaccine (2 of 4 - PPSV23) 12/31/2020    Flu vaccine (1) 09/01/2021    Chlamydia screen  09/10/2021    A1C test (Diabetic or Prediabetic)  03/03/2022    Pap smear  09/10/2023    DTaP/Tdap/Td vaccine (8 - Td or Tdap) 09/10/2031    Hepatitis A vaccine  Completed    Hepatitis B vaccine  Completed    Hib vaccine  Completed    HPV vaccine  Completed    Varicella vaccine  Completed    Meningococcal (ACWY) vaccine  Completed    HIV screen  Completed

## 2021-09-14 NOTE — PATIENT INSTRUCTIONS
Patient Education        Leg Pain: Care Instructions  Your Care Instructions  Many things can cause leg pain. Too much exercise or overuse can cause a muscle cramp (or charley horse). You can get leg cramps from not eating a balanced diet that has enough potassium, calcium, and other minerals. If you do not drink enough fluids or are taking certain medicines, you may develop leg cramps. Other causes of leg pain include injuries, blood flow problems, nerve damage, and twisted and enlarged veins (varicose veins). You can usually ease pain with self-care. Your doctor may recommend that you rest your leg and keep it elevated. Follow-up care is a key part of your treatment and safety. Be sure to make and go to all appointments, and call your doctor if you are having problems. It's also a good idea to know your test results and keep a list of the medicines you take. How can you care for yourself at home? · Take pain medicines exactly as directed. ? If the doctor gave you a prescription medicine for pain, take it as prescribed. ? If you are not taking a prescription pain medicine, ask your doctor if you can take an over-the-counter medicine. · Take any other medicines exactly as prescribed. Call your doctor if you think you are having a problem with your medicine. · Rest your leg while you have pain, and avoid standing for long periods of time. · Prop up your leg at or above the level of your heart when possible. · Make sure you are eating a balanced diet that is rich in calcium, potassium, and magnesium, especially if you are pregnant. · If directed by your doctor, put ice or a cold pack on the area for 10 to 20 minutes at a time. Put a thin cloth between the ice and your skin. · Your leg may be in a splint, a brace, or an elastic bandage, and you may have crutches to help you walk. Follow your doctor's directions about how long to wear supports and how to use the crutches. When should you call for help? Call 911 anytime you think you may need emergency care. For example, call if:    · You have sudden chest pain and shortness of breath, or you cough up blood.     · Your leg is cool or pale or changes color. Call your doctor now or seek immediate medical care if:    · You have increasing or severe pain.     · Your leg suddenly feels weak and you cannot move it.     · You have signs of a blood clot, such as:  ? Pain in your calf, back of the knee, thigh, or groin. ? Redness and swelling in your leg or groin.     · You have signs of infection, such as:  ? Increased pain, swelling, warmth, or redness. ? Red streaks leading from the sore area. ? Pus draining from a place on your leg. ? A fever.     · You cannot bear weight on your leg. Watch closely for changes in your health, and be sure to contact your doctor if:    · You do not get better as expected. Where can you learn more? Go to https://[email or web address]. org and sign in to your [employer] account. Enter X592 in the [employer] box to learn more about \"Leg Pain: Care Instructions. \"     If you do not have an account, please click on the \"Sign Up Now\" link. Current as of: October 19, 2020               Content Version: 12.9  © 3738-1677 [employer]wise, Inc[employer]. Care instructions adapted under license by [facility] ([facility]). If you have questions about a medical condition or this instruction, always ask your healthcare professional. [place] [address] any warranty or liability for your use of this information.

## 2021-09-15 ASSESSMENT — ENCOUNTER SYMPTOMS
COLOR CHANGE: 0
ABDOMINAL PAIN: 0
DIARRHEA: 0
SHORTNESS OF BREATH: 0
COUGH: 0
WHEEZING: 1
NAUSEA: 0
EYE DISCHARGE: 0
CHEST TIGHTNESS: 0

## 2021-10-20 ENCOUNTER — NURSE TRIAGE (OUTPATIENT)
Dept: OTHER | Facility: CLINIC | Age: 21
End: 2021-10-20

## 2021-10-20 NOTE — TELEPHONE ENCOUNTER
Reason for Disposition   Message left on unidentified voice mail. Phone number verified.  SEVERE abdominal pain (e.g., excruciating)    Answer Assessment - Initial Assessment Questions  1. LOCATION: \"Where does it hurt? \"   Lower abdomen     2. RADIATION: \"Does the pain shoot anywhere else? \" (e.g., chest, back)    No    3. ONSET: \"When did the pain begin? \" (e.g., minutes, hours or days ago)   Yesterday     4. SUDDEN: \"Gradual or sudden onset? \"    Gradual    5. PATTERN \"Does the pain come and go, or is it constant? \"     - If constant: \"Is it getting better, staying the same, or worsening? \"       (Note: Constant means the pain never goes away completely; most serious pain is constant and it progresses)      - If intermittent: \"How long does it last?\" \"Do you have pain now? \"      (Note: Intermittent means the pain goes away completely between bouts)    Constant     6. SEVERITY: \"How bad is the pain? \"  (e.g., Scale 1-10; mild, moderate, or severe)    - MILD (1-3): doesn't interfere with normal activities, abdomen soft and not tender to touch     - MODERATE (4-7): interferes with normal activities or awakens from sleep, tender to touch     - SEVERE (8-10): excruciating pain, doubled over, unable to do any normal activities      9    7. RECURRENT SYMPTOM: \"Have you ever had this type of abdominal pain before? \" If so, ask: \"When was the last time? \" and \"What happened that time? \"      No    8. CAUSE: \"What do you think is causing the abdominal pain? \"     Don't know    9. RELIEVING/AGGRAVATING FACTORS: \"What makes it better or worse? \" (e.g., movement, antacids, bowel movement)       Heat makes it better  10. OTHER SYMPTOMS: \"Has there been any vomiting, diarrhea, constipation, or urine problems? \"    Vomited yesterday, constipation     11. PREGNANCY: \"Is there any chance you are pregnant? \" \"When was your last menstrual period? \"      No    Protocols used: NO CONTACT OR DUPLICATE CONTACT CALL-ADULT-[place], [facility]INAL PAIN - [facility]    Received call from [address] [facility]  with [employer]. Caller not on line and had to be called back. Brief description of triage: Please see notes above. Triage indicates for patient to go to ED Now. Care advice provided, patient verbalizes understanding; denies any other questions or concerns; instructed to call back for any new or worsening symptoms. Writer provided warm transfer to  Miguel Ángel in  ricardo of MARY Seymour,. Explained ED refusal.     Attention Provider: Thank you for allowing me to participate in the care of your patient. The patient was connected to triage in response to information provided to the ECC/PSC. Please do not respond through this encounter as the response is not directed to a shared pool.

## 2021-10-26 NOTE — PROGRESS NOTES
Subjective:      Patient ID: Miquel Vigil is a 19 y.o. female. HPI  Patient here as a sick call. Patient was last seen as a telehealth visit in May 2021 per Dr. Sharyle Favorite for her moderate persistent asthma, sinusitis. Patient states that symptoms began on 10/24/2021 complaining of a headache, increasing shortness of breath, and general nonproductive cough, chills, has not measured her fever, generalized muscle aches. Denies any sick contacts. Unfortunately patient is not vaccinated against Covid. Denies loss of sense of smell or sense of taste. Symptoms have not improved over the last week. 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 Discussed with patient that her symptoms are in agreement with symptoms of Covid 19 and with concern for patient non vaccinated status- will have patient tested for Covid. Advised to self isolate at home for 10 days. Medications:   Dulera 200-5 mcg: Albuterol HFA:      PRIOR WORKUP:  PFT:  None on chart    CT Imaging:  None on chart    Sleep Study:  Sleep study 8/6/2020: Patient had a total of 4 apneas (3 obstructive, 1 central) and 12 hypopneas. AHI for the entire night was 4.0. AHI during NREM sleep was 3.4 and AHI during REM was 6.9. Patient had a total of 14 leg movements during the night for a PLM index of 3.5 events per hour with 0 associated arousals. Laboratory Evaluation:   IgG 6/26/2018: 861    Immunizations:   Immunization History   Administered Date(s) Administered    DTaP 1998, 01/27/1999, 03/26/1999, 02/28/2000, 04/05/2005    DTaP vaccine 1998, 01/27/1999, 03/26/1999, 02/28/2000, 04/05/2005    HPV Quadrivalent (Gardasil) 11/23/2011, 01/31/2014, 06/27/2014    Hep B/Hib (Comvax) 1998, 02/28/2000    Hepatitis A 10/11/2013, 06/27/2014    Hepatitis B 1998, 1998, 02/28/2000    Hepatitis B Ped/Adol (Engerix-B, Recombivax HB) 1998 2016    Pure hypercholesterolemia 2016    Severe persistent asthma     Sickle cell crisis ([address] 2020    Thalassemia minor 10/29/2011    5/99     Unspecified sleep apnea     Vision disturbance 2014    wears glassses     Past Surgical History:   Procedure Laterality Date    ADENOIDECTOMY      COLONOSCOPY N/A 10/8/2020    COLONOSCOPY WITH BIOPSY performed by Darrel Chambers MD at [address] [place] [address]  2014    Nexplanon placement,  pt has had this removed as of 10-8-202    TONSILLECTOMY      UPPER GASTROINTESTINAL ENDOSCOPY N/A 10/8/2020    EGD BIOPSY performed by Darrel [facility] MD at [facility]     Family History   Problem Relation Age of Onset    Asthma Mother    Jacque Matt Lupus Mother     High Blood Pressure Father     Diabetes Father     Diabetes Maternal Grandfather     High Blood Pressure Maternal Grandfather     Heart Disease Paternal Grandmother     Diabetes Paternal Grandmother     High Blood Pressure Paternal Grandfather     Heart Disease Paternal Uncle     Heart Disease Paternal Aunt     Arthritis Maternal Aunt     Cancer Maternal Aunt     Birth Defects Neg Hx     Depression Neg Hx     Early Death Neg Hx     Hearing Loss Neg Hx     High Cholesterol Neg Hx     Kidney Disease Neg Hx     Learning Disabilities Neg Hx     Mental Illness Neg Hx     Mental Retardation Neg Hx     Miscarriages / Stillbirths Neg Hx     Stroke Neg Hx     Substance Abuse Neg Hx     Vision Loss Neg Hx     Other Neg Hx     Ovarian Cancer Neg Hx     Breast Cancer Neg Hx        Social History     Socioeconomic History    Marital status: Single     Spouse name: Not on file    Number of children: Not on file    Years of education: Not on file    Highest education level: Not on file   Occupational History    Not on file   Tobacco Use    Smoking status: Former Smoker     Types: Cigarettes     Quit date: 2016     Years since quittin.4    Smokeless tobacco: Never Used    Tobacco comment: started while youg doesnt rember year    Vaping Use    Vaping Use: Never used   Substance and Sexual Activity    Alcohol use: No     Alcohol/week: 0.0 standard drinks    Drug use: No    Sexual activity: Yes   Other Topics Concern    Not on file   Social History Narrative    Not on file     Social Determinants of Health     Financial Resource Strain: Low Risk     Difficulty of Paying Living Expenses: Not hard at all   Food Insecurity: No Food Insecurity    Worried About Running Out of Food in the Last Year: Never true    [address] in the Last Year: Never true   Transportation Needs:     Lack of Transportation (Medical):  Lack of Transportation (Non-Medical):    Physical Activity:     Days of Exercise per Week:     Minutes of Exercise per Session:    Stress:     Feeling of Stress :    Social Connections:     Frequency of Communication with Friends and Family:     Frequency of Social Gatherings with Friends and Family:     Attends Rastafari Services:     Active Member of Clubs or Organizations:     Attends Club or Organization Meetings:     Marital Status:    Intimate Partner Violence:     Fear of Current or Ex-Partner:     Emotionally Abused:     Physically Abused:     Sexually Abused:        Review of Systems   Constitutional: Positive for chills and fatigue. HENT: Positive for sneezing. Respiratory: Positive for cough, shortness of breath and wheezing. Musculoskeletal: Positive for myalgias. Neurological: Positive for headaches.        Objective:       Physical Exam  General appearance - oriented to person, place, and time and overweight  Mental status - alert, oriented to person, place, and time  Eyes - pupils equal and reactive, extraocular eye movements intact  Ears - not examined  Nose - normal and patent, no erythema, discharge or polyps  Mouth - mucous membranes moist, pharynx normal without lesions  Neck - supple, no significant adenopathy  Chest - clear to auscultation, no wheezes, rales or rhonchi, symmetric air entry  Heart -normal rate, regular rhythm, normal S1, S2, no murmurs, rubs, clicks or gallops  Abdomen - soft, nontender, nondistended, no masses or organomegaly  Neuro- alert, oriented, normal speech, no focal findings or movement disorder noted}  Extremities - peripheral pulses normal, no pedal edema, no clubbing or cyanosis  Skin - normal coloration and turgor, no rashes, no suspicious skin lesions noted     Wt Readings from Last 3 Encounters:   11/01/21 216 lb (98 kg)   09/14/21 208 lb 12.8 oz (94.7 kg)   09/07/21 212 lb (96.2 kg)       Results for orders placed or performed in visit on 06/11/21   POCT Urinalysis No Micro (Auto)   Result Value Ref Range    Color, UA yellow     Clarity, UA dark     Glucose, UA POC -     Bilirubin, UA -     Ketones, UA -     Spec Grav, UA 1.030     Blood, UA POC -     pH, UA 6.0     Protein, UA POC -     Urobilinogen, UA -     Leukocytes, UA -     Nitrite, UA -        No results found. Assessment:      1. SOB (shortness of breath)    2. Moderate persistent asthma without complication    3. Obesity due to excess calories, unspecified obesity severity    4. Post-nasal drip    5. Moderate persistent asthma with acute exacerbation    6. Seasonal allergic rhinitis, unspecified trigger    7. Contact with and (suspected) exposure to covid-19          Plan:      1. Patient with Sx concerning for Covid 19 in a non vaccinated person- Covid testing ordered. Self isolation advised. 2. Educated and clarified the medication use. 3. Recommend flu vaccination in the fall annually. Refuses  4. Discussed strategies to protect against Covid 19.   5. Maintain an active lifestyle. 6. Patient's questions were answered to her satisfaction.   7. We'll see the patient back in 1 months with Dr. Juani Liz          Electronically signed by MARY Barba CNP on 11/1/2021 at 10:33 AM

## 2021-11-01 ENCOUNTER — OFFICE VISIT (OUTPATIENT)
Dept: PULMONOLOGY | Age: 21
End: 2021-11-01
Payer: COMMERCIAL

## 2021-11-01 VITALS
RESPIRATION RATE: 18 BRPM | OXYGEN SATURATION: 100 % | DIASTOLIC BLOOD PRESSURE: 75 MMHG | WEIGHT: 216 LBS | HEIGHT: 59 IN | SYSTOLIC BLOOD PRESSURE: 113 MMHG | BODY MASS INDEX: 43.55 KG/M2 | HEART RATE: 62 BPM | TEMPERATURE: 97.5 F

## 2021-11-01 DIAGNOSIS — J45.41 MODERATE PERSISTENT ASTHMA WITH ACUTE EXACERBATION: ICD-10-CM

## 2021-11-01 DIAGNOSIS — J30.2 SEASONAL ALLERGIC RHINITIS, UNSPECIFIED TRIGGER: ICD-10-CM

## 2021-11-01 DIAGNOSIS — Z20.822 CONTACT WITH AND (SUSPECTED) EXPOSURE TO COVID-19: ICD-10-CM

## 2021-11-01 DIAGNOSIS — E66.09 OBESITY DUE TO EXCESS CALORIES, UNSPECIFIED OBESITY SEVERITY: ICD-10-CM

## 2021-11-01 DIAGNOSIS — R09.82 POST-NASAL DRIP: ICD-10-CM

## 2021-11-01 DIAGNOSIS — R06.02 SOB (SHORTNESS OF BREATH): Primary | ICD-10-CM

## 2021-11-01 DIAGNOSIS — J45.40 MODERATE PERSISTENT ASTHMA WITHOUT COMPLICATION: ICD-10-CM

## 2021-11-01 PROCEDURE — 99213 OFFICE O/P EST LOW 20 MIN: CPT | Performed by: NURSE PRACTITIONER

## 2021-11-01 PROCEDURE — 1036F TOBACCO NON-USER: CPT | Performed by: NURSE PRACTITIONER

## 2021-11-01 PROCEDURE — G8417 CALC BMI ABV UP PARAM F/U: HCPCS | Performed by: NURSE PRACTITIONER

## 2021-11-01 PROCEDURE — G8427 DOCREV CUR MEDS BY ELIG CLIN: HCPCS | Performed by: NURSE PRACTITIONER

## 2021-11-01 PROCEDURE — G8484 FLU IMMUNIZE NO ADMIN: HCPCS | Performed by: NURSE PRACTITIONER

## 2021-11-01 RX ORDER — ALBUTEROL SULFATE 90 UG/1
2 AEROSOL, METERED RESPIRATORY (INHALATION) EVERY 6 HOURS PRN
Qty: 1 EACH | Refills: 11 | Status: SHIPPED | OUTPATIENT
Start: 2021-11-01

## 2021-11-01 RX ORDER — CETIRIZINE HYDROCHLORIDE 10 MG/1
10 TABLET ORAL DAILY
Qty: 30 TABLET | Refills: 2 | Status: SHIPPED | OUTPATIENT
Start: 2021-11-01 | End: 2022-06-06

## 2021-11-01 ASSESSMENT — SLEEP AND FATIGUE QUESTIONNAIRES
HOW LIKELY ARE YOU TO NOD OFF OR FALL ASLEEP WHILE LYING DOWN TO REST IN THE AFTERNOON WHEN CIRCUMSTANCES PERMIT: 2
HOW LIKELY ARE YOU TO NOD OFF OR FALL ASLEEP WHILE WATCHING TV: 2
ESS TOTAL SCORE: 10
HOW LIKELY ARE YOU TO NOD OFF OR FALL ASLEEP WHILE SITTING AND READING: 2
HOW LIKELY ARE YOU TO NOD OFF OR FALL ASLEEP WHILE SITTING QUIETLY AFTER LUNCH WITHOUT ALCOHOL: 2
HOW LIKELY ARE YOU TO NOD OFF OR FALL ASLEEP WHILE SITTING INACTIVE IN A PUBLIC PLACE: 1
HOW LIKELY ARE YOU TO NOD OFF OR FALL ASLEEP WHEN YOU ARE A PASSENGER IN A CAR FOR AN HOUR WITHOUT A BREAK: 1
HOW LIKELY ARE YOU TO NOD OFF OR FALL ASLEEP WHILE SITTING AND TALKING TO SOMEONE: 0
HOW LIKELY ARE YOU TO NOD OFF OR FALL ASLEEP IN A CAR, WHILE STOPPED FOR A FEW MINUTES IN TRAFFIC: 0

## 2021-11-01 ASSESSMENT — ENCOUNTER SYMPTOMS
SHORTNESS OF BREATH: 1
WHEEZING: 1
COUGH: 1

## 2021-11-01 NOTE — LETTER
[facility], [address] [address]  Phone: [phone]  Fax: MARY Henson CNP        November 1, 2021     Patient: Beatriz Bolivar   YOB: 2000   Date of Visit: 11/1/2021       To Whom it May Concern:    Jodi Contreras was seen in my clinic on 11/1/2021. She is being evaluated for symptoms of COVID 19- and testing has been ordered. She has been instructed to quarantine for 10 days in alignment with CDC recommendations from date of symptom onset 10/24/2021 and with a negative test. She is to adhere to work policy regarding return to work. If you have any questions or concerns, please don't hesitate to call.     Sincerely,         MARY Springer CNP

## 2021-11-10 ENCOUNTER — TELEPHONE (OUTPATIENT)
Dept: PULMONOLOGY | Age: 21
End: 2021-11-10

## 2021-11-10 NOTE — TELEPHONE ENCOUNTER
----- Message from Chana Coppola sent at 11/9/2021  7:19 AM EST -----  Krissy Givens, labs ordered by GAURANG Angelo Handing on 11/1/21 fell into the overdue results folder. Please contact patient to see if this test was completed outside of a [address] [place] [address] facility. If so please obtain results and if testing has not been completed please advise patient to complete.  Thank you.    ----- Message -----  From: SYSTEM  Sent: 11/9/2021   5:58 AM EST  To: p Respiratory Spec Clinical Staff

## 2021-11-10 NOTE — TELEPHONE ENCOUNTER
Contacted pt in regards to the over due resulted labs that were ordered by provider. Pt states she will not get the lab work done due to the fact she knows she does not have covid or any other issues. Pt was informed that the provider would be notified.  Saida Chacon

## 2021-11-11 ENCOUNTER — NURSE TRIAGE (OUTPATIENT)
Dept: OTHER | Facility: CLINIC | Age: 21
End: 2021-11-11

## 2021-11-11 NOTE — TELEPHONE ENCOUNTER
S[poke with patient and informed her that she can be evaluated in the Walk In for this symptom. Patient stated thae she does not want to be tested for covid due to financial burden and also informed her that is breathing is severe she will be transferred to ER for further evaluation, as she declined to go to ED now.

## 2021-11-11 NOTE — TELEPHONE ENCOUNTER
Reason for Disposition   MODERATE difficulty breathing (e.g., speaks in phrases, SOB even at rest, pulse 100-120) of new onset or worse than normal   Wheezing can be heard across the room    Answer Assessment - Initial Assessment Questions  1. RESPIRATORY STATUS: \"Describe your breathing? \" (e.g., wheezing, shortness of breath, unable to speak, severe coughing)       \" I work at a [address] [employer] and I think that is what got to me\"  - wheezing  -shortness of breath at rest and exertion- \"my asthma is worse- it is hard when I lay down - I feel like I an choking\"    2. ONSET: \"When did this breathing problem begin? \"       Started a \"couple of days ago\"    3. PATTERN \"Does the difficult breathing come and go, or has it been constant since it started? \"       Comes and goes    4. SEVERITY: \"How bad is your breathing? \" (e.g., mild, moderate, severe)     - MILD: No SOB at rest, mild SOB with walking, speaks normally in sentences, can lay down, no retractions, pulse < 100.     - MODERATE: SOB at rest, SOB with minimal exertion and prefers to sit, cannot lie down flat, speaks in phrases, mild retractions, audible wheezing, pulse 100-120.     - SEVERE: Very SOB at rest, speaks in single words, struggling to breathe, sitting hunched forward, retractions, pulse > 120       Moderate- \"can't lay down flat\"    5. RECURRENT SYMPTOM: \"Have you had difficulty breathing before? \" If so, ask: \"When was the last time? \" and \"What happened that time? \"       Yes- \"they made me sit off a couple of days from work and gave me some medicine to clear it up\"    6. CARDIAC HISTORY: \"Do you have any history of heart disease? \" (e.g., heart attack, angina, bypass surgery, angioplasty)       Denies     7. LUNG HISTORY: \"Do you have any history of lung disease? \"  (e.g., pulmonary embolus, asthma, emphysema)      Asthma     8. CAUSE: \"What do you think is causing the breathing problem? \"       \"I think it is the dust and stuff and weather change\"- work related from the [employer]. OTHER SYMPTOMS: \"Do you have any other symptoms? (e.g., dizziness, runny nose, cough, chest pain, fever)      \"real bad headaches- migraines\" - denies chest pain or discomfort   -sneezing     10. PREGNANCY: \"Is there any chance you are pregnant? \" \"When was your last menstrual period? \"        Denies- LMP- October 21, 2021    11. TRAVEL: \"Have you traveled out of the country in the last month? \" (e.g., travel history, exposures)        Denies    Protocols used: BREATHING DIFFICULTY-ADULT-OH    Received call from Codie Stuart  at [facility] with [employer]. Brief description of triage: shortness of breath with rest and exertion with audible  wheezing. Triage indicates for patient to Go to ED. Pt does not want to proceed to ED at this time- wants office appointment. Discussed that symptoms reported could potentially be life threatening and could lead to respiratory distress. Pt wants to proceed with office visit. Called [facility]- spoke with Corby Lopez- will send a message to Maddie Padilla NP regarding ED refusal- pt states she will come in to be seen at the walk in clinic     Care advice provided, patient verbalizes understanding; denies any other questions or concerns; instructed to call back for any new or worsening symptoms. Attention Provider: Thank you for allowing me to participate in the care of your patient. The patient was connected to triage in response to information provided to the ECC/PSC. Please do not respond through this encounter as the response is not directed to a shared pool.

## 2021-11-12 ENCOUNTER — APPOINTMENT (OUTPATIENT)
Dept: GENERAL RADIOLOGY | Age: 21
End: 2021-11-12
Payer: COMMERCIAL

## 2021-11-12 ENCOUNTER — HOSPITAL ENCOUNTER (EMERGENCY)
Age: 21
Discharge: HOME | End: 2021-11-12
Attending: EMERGENCY MEDICINE
Payer: COMMERCIAL

## 2021-11-12 VITALS
HEIGHT: 59 IN | HEART RATE: 81 BPM | SYSTOLIC BLOOD PRESSURE: 132 MMHG | OXYGEN SATURATION: 97 % | BODY MASS INDEX: 43.63 KG/M2 | RESPIRATION RATE: 20 BRPM | TEMPERATURE: 98.1 F | DIASTOLIC BLOOD PRESSURE: 86 MMHG

## 2021-11-12 DIAGNOSIS — J45.21 MILD INTERMITTENT ASTHMA WITH EXACERBATION: Primary | ICD-10-CM

## 2021-11-12 DIAGNOSIS — J06.9 ACUTE UPPER RESPIRATORY INFECTION: ICD-10-CM

## 2021-11-12 LAB
DIRECT EXAM: NORMAL
Lab: NORMAL
SARS-COV-2, RAPID: NOT DETECTED
SPECIMEN DESCRIPTION: NORMAL
SPECIMEN DESCRIPTION: NORMAL

## 2021-11-12 PROCEDURE — 71045 X-RAY EXAM CHEST 1 VIEW: CPT

## 2021-11-12 PROCEDURE — 6360000002 HC RX W HCPCS: Performed by: STUDENT IN AN ORGANIZED HEALTH CARE EDUCATION/TRAINING PROGRAM

## 2021-11-12 PROCEDURE — 87804 INFLUENZA ASSAY W/OPTIC: CPT

## 2021-11-12 PROCEDURE — 87635 SARS-COV-2 COVID-19 AMP PRB: CPT

## 2021-11-12 PROCEDURE — 99283 EMERGENCY DEPT VISIT LOW MDM: CPT

## 2021-11-12 PROCEDURE — 94640 AIRWAY INHALATION TREATMENT: CPT

## 2021-11-12 PROCEDURE — 6370000000 HC RX 637 (ALT 250 FOR IP): Performed by: STUDENT IN AN ORGANIZED HEALTH CARE EDUCATION/TRAINING PROGRAM

## 2021-11-12 RX ORDER — ALBUTEROL SULFATE 2.5 MG/3ML
2.5 SOLUTION RESPIRATORY (INHALATION) EVERY 4 HOURS PRN
Status: DISCONTINUED | OUTPATIENT
Start: 2021-11-12 | End: 2021-11-12 | Stop reason: HOSPADM

## 2021-11-12 RX ORDER — IBUPROFEN 400 MG/1
600 TABLET ORAL ONCE
Status: COMPLETED | OUTPATIENT
Start: 2021-11-12 | End: 2021-11-12

## 2021-11-12 RX ORDER — PREDNISONE 20 MG/1
40 TABLET ORAL ONCE
Status: COMPLETED | OUTPATIENT
Start: 2021-11-12 | End: 2021-11-12

## 2021-11-12 RX ORDER — PREDNISONE 50 MG/1
50 TABLET ORAL DAILY
Qty: 4 TABLET | Refills: 0 | Status: SHIPPED | OUTPATIENT
Start: 2021-11-13 | End: 2021-11-17

## 2021-11-12 RX ORDER — ACETAMINOPHEN 500 MG
1000 TABLET ORAL ONCE
Status: COMPLETED | OUTPATIENT
Start: 2021-11-12 | End: 2021-11-12

## 2021-11-12 RX ADMIN — IBUPROFEN 600 MG: 400 TABLET, FILM COATED ORAL at 13:11

## 2021-11-12 RX ADMIN — ACETAMINOPHEN 1000 MG: 500 TABLET ORAL at 13:11

## 2021-11-12 RX ADMIN — ALBUTEROL SULFATE 2.5 MG: 2.5 SOLUTION RESPIRATORY (INHALATION) at 12:36

## 2021-11-12 RX ADMIN — ALBUTEROL SULFATE 2.5 MG: 2.5 SOLUTION RESPIRATORY (INHALATION) at 13:00

## 2021-11-12 RX ADMIN — PREDNISONE 40 MG: 20 TABLET ORAL at 14:04

## 2021-11-12 ASSESSMENT — PAIN SCALES - GENERAL
PAINLEVEL_OUTOF10: 8
PAINLEVEL_OUTOF10: 8

## 2021-11-12 ASSESSMENT — PAIN DESCRIPTION - LOCATION: LOCATION: HEAD

## 2021-11-12 ASSESSMENT — ENCOUNTER SYMPTOMS
COUGH: 1
WHEEZING: 1
SHORTNESS OF BREATH: 1
VOMITING: 0
DIARRHEA: 0
ABDOMINAL PAIN: 0
BACK PAIN: 0
NAUSEA: 0
SORE THROAT: 0
RHINORRHEA: 1

## 2021-11-12 ASSESSMENT — PAIN DESCRIPTION - PAIN TYPE: TYPE: ACUTE PAIN

## 2021-11-12 ASSESSMENT — PAIN DESCRIPTION - DESCRIPTORS: DESCRIPTORS: POUNDING

## 2021-11-12 ASSESSMENT — PAIN DESCRIPTION - FREQUENCY: FREQUENCY: CONTINUOUS

## 2021-11-12 NOTE — ED PROVIDER NOTES
[facility] ED  Emergency Department Encounter  EmergencyMedicine Resident     Pt Ankur Campo  MRN: [id number]  [place] 2000  Date of evaluation: 11/12/21  PCP:  MARY Romano CNP    CHIEF COMPLAINT       Chief Complaint   Patient presents with    Shortness of Breath    Migraine       HISTORY OF PRESENT ILLNESS  (Location/Symptom, Timing/Onset, Context/Setting, Quality, Duration, Modifying Factors, Severity.)    This patient was evaluated in the Emergency Department for symptoms described in the history of present illness. He/she was evaluated in the context of the global COVID-19 pandemic, which necessitated consideration that the patient might be at risk for infection with the SARS-CoV-2 virus that causes COVID-19. Institutional protocols and algorithms that pertain to the evaluation of patients at risk for COVID-19 are in a state of rapid change based on information released by regulatory bodies including the CDC and federal and state organizations. These policies and algorithms were followed during the patient's care in the ED. Klaudia Hartmann is a 19 y.o. female the past medical history includes asthma presenting to the emergency department today with complaints of wheezing, shortness of breath, body aches. Started 2 days ago progressively worsening. Mild improvement of wheezing with inhalers at home. Notes that she was unable to go to work today due to not feeling well. No recent antibiotic or steroid use is. Dry cough. No known exposure to Covid. Is not vaccinated. No abdominal pain, nausea, vomiting, loss of taste or smell. Patient also notes a headache over the past 24 hours. Moderate in severity. Nonradiating. No modifying factors. Consistent with previous episodes of headache/migraine. Did take ibuprofen 800 yesterday does not take anything today.     PAST MEDICAL / SURGICAL / SOCIAL / FAMILY HISTORY      has a past medical history of Acute bronchitis, Allergic, Allergic rhinitis, Allergy, Asthma, Chronic obstructive pulmonary disease ([facility] Chronic vasomotor rhinitis, GERD (gastroesophageal reflux disease), Headache(784.0), Intractable abdominal pain, Morbid obesity with BMI of 40.0-44.9, adult ([facility] NELSON (obstructive sleep apnea), Prediabetes, Pure hypercholesterolemia, Severe persistent asthma, Sickle cell crisis ([facility] Thalassemia minor, Unspecified sleep apnea, and Vision disturbance. has a past surgical history that includes Adenoidectomy; Tonsillectomy; other surgical history (2014); Upper gastrointestinal endoscopy (N/A, 10/8/2020); and Colonoscopy (N/A, 10/8/2020). Social History     Socioeconomic History    Marital status: Single     Spouse name: Not on file    Number of children: Not on file    Years of education: Not on file    Highest education level: Not on file   Occupational History    Not on file   Tobacco Use    Smoking status: Former Smoker     Types: Cigarettes     Quit date: 2016     Years since quittin.5    Smokeless tobacco: Never Used    Tobacco comment: started while youg doesnt rem year    Vaping Use    Vaping Use: Never used   Substance and Sexual Activity    Alcohol use: No     Alcohol/week: 0.0 standard drinks    Drug use: No    Sexual activity: Yes   Other Topics Concern    Not on file   Social History Narrative    Not on file     Social Determinants of Health     Financial Resource Strain: Low Risk     Difficulty of Paying Living Expenses: Not hard at all   Food Insecurity: No Food Insecurity    Worried About Running Out of Food in the Last Year: Never true    [address] in the Last Year: Never true   Transportation Needs:     Lack of Transportation (Medical): Not on file    Lack of Transportation (Non-Medical):  Not on file   Physical Activity:     Days of Exercise per Week: Not on file    Minutes of Exercise per Session: Not on file   Stress:     Feeling of Stress : Not on file   Social Connections:     Frequency of Communication with Friends and Family: Not on file    Frequency of Social Gatherings with Friends and Family: Not on file    Attends [employer] Services: Not on file    Active Member of Clubs or Organizations: Not on file    Attends Club or Organization Meetings: Not on file    Marital Status: Not on file   Intimate Partner Violence:     Fear of Current or Ex-Partner: Not on file    Emotionally Abused: Not on file    Physically Abused: Not on file    Sexually Abused: Not on file   Housing Stability:     Unable to Pay for Housing in the Last Year: Not on file    Number of Places Lived in the Last Year: Not on file    Unstable Housing in the Last Year: Not on file       Family History   Problem Relation Age of Onset    Asthma Mother     Lupus Mother     High Blood Pressure Father     Diabetes Father     Diabetes Maternal Grandfather     High Blood Pressure Maternal Grandfather     Heart Disease Paternal Grandmother     Diabetes Paternal Grandmother     High Blood Pressure Paternal Grandfather     Heart Disease Paternal Uncle     Heart Disease Paternal Aunt     Arthritis Maternal Aunt     Cancer Maternal Aunt     Birth Defects Neg Hx     Depression Neg Hx     Early Death Neg Hx     Hearing Loss Neg Hx     High Cholesterol Neg Hx     Kidney Disease Neg Hx     Learning Disabilities Neg Hx     Mental Illness Neg Hx     Mental Retardation Neg Hx     Miscarriages / Stillbirths Neg Hx     Stroke Neg Hx     Substance Abuse Neg Hx     Vision Loss Neg Hx     Other Neg Hx     Ovarian Cancer Neg Hx     Breast Cancer Neg Hx        Allergies:  Banana, Peanut-containing drug products, and Food    Home Medications:  Prior to Admission medications    Medication Sig Start Date End Date Taking?  Authorizing Provider   predniSONE (DELTASONE) 50 MG tablet Take 1 tablet by mouth daily for 4 days 11/13/21 11/17/21 Yes Tamiko SHEN Mohan,    albuterol sulfate HFA (VENTOLIN HFA) 108 (90 Base) MCG/ACT inhaler Inhale 2 puffs into the lungs every 6 hours as needed for Wheezing 11/1/21   MARY Casey CNP   mometasone-formoterol [facility]) 200-5 MCG/ACT inhaler Inhale 2 puffs into the lungs every 12 hours 11/1/21   MARY Casey CNP   cetirizine (ZYRTEC) 10 MG tablet Take 1 tablet by mouth daily 11/1/21   MARY Casey CNP   ibuprofen (ADVIL;MOTRIN) 800 MG tablet Take 1 tablet by mouth every 8 hours as needed for Pain 9/14/21   MARY Mdaden NP   linaclotide (LINZESS) 145 MCG capsule Take 1 capsule by mouth every morning (before breakfast) 9/7/21   Kalyn Cruz MD   sulindac (CLINORIL) 200 MG tablet Take 1 tablet by mouth 2 times daily  Patient not taking: Reported on 11/1/2021 6/11/21   Beck Lovett MD   fluticasone [facility]) 50 MCG/ACT nasal spray 2 sprays by Each Nostril route daily  Patient not taking: Reported on 11/1/2021 5/13/21   Hart Leventhal, MD   Plecanatide (TRULANCE) 3 MG TABS Take 3 mg by mouth daily 5/4/21   Kalyn Cruz MD   lactulose (CHRONULAC) 10 GM/15ML solution Take 15 mLs by mouth nightly as needed (constipation) 3/30/21   Kalyn Cruz MD   bisacodyl (BISACODYL) 5 MG EC tablet Take 1 tablet by mouth daily as needed for Constipation 3/3/21   MARY Seymour CNP   ketoconazole (NIZORAL) 2 % cream Apply topically bid 1/7/21   Kushal Crooks DPM       REVIEW OF SYSTEMS    (2-9 systems for level 4, 10 or more for level 5)      Review of Systems   Constitutional: Positive for chills. Negative for fever. HENT: Positive for rhinorrhea. Negative for congestion and sore throat. Eyes: Negative for visual disturbance. Respiratory: Positive for cough, shortness of breath and wheezing. Cardiovascular: Negative for chest pain and leg swelling. Gastrointestinal: Negative for abdominal pain, diarrhea, nausea and vomiting. Genitourinary: Negative for dysuria and hematuria. Musculoskeletal: Negative for back pain, neck pain and neck stiffness. Skin: Negative for rash. Neurological: Positive for headaches. Negative for dizziness, weakness and numbness. PHYSICAL EXAM   (up to 7 for level 4, 8 or more for level 5)      INITIAL VITALS:   /86   Pulse 81   Temp 98.1 °F (36.7 °C)   Resp 20   Ht 4' 11\" (1.499 m)   SpO2 97%   BMI 43.63 kg/m²     Physical Exam  Vitals reviewed. Constitutional:       General: She is not in acute distress. Appearance: Normal appearance. She is obese. She is ill-appearing. HENT:      Head: Normocephalic and atraumatic. Eyes:      Extraocular Movements: Extraocular movements intact. Conjunctiva/sclera: Conjunctivae normal.   Cardiovascular:      Rate and Rhythm: Normal rate and regular rhythm. Pulses: Normal pulses. Pulmonary:      Effort: Pulmonary effort is normal. No respiratory distress. Breath sounds: No stridor. Wheezing (Bilateral inspiratory and expiratory wheezing, moderate) present. No rhonchi or rales. Abdominal:      General: There is no distension. Palpations: Abdomen is soft. Tenderness: There is no abdominal tenderness. There is no guarding or rebound. Musculoskeletal:         General: No swelling or deformity. Normal range of motion. Cervical back: Normal range of motion. No muscular tenderness. Skin:     General: Skin is warm and dry. Findings: No rash. Neurological:      Mental Status: She is alert and oriented to person, place, and time.    Psychiatric:         Behavior: Behavior normal.         DIFFERENTIAL  DIAGNOSIS     PLAN (LABS / IMAGING / EKG):  Orders Placed This Encounter   Procedures    COVID-19, Rapid    RAPID INFLUENZA A/B ANTIGENS    XR CHEST PORTABLE    Respiratory Care Evaluation and Treat       MEDICATIONS ORDERED:  Orders Placed This Encounter   Medications    acetaminophen (TYLENOL) tablet 1,000 mg    ibuprofen (ADVIL;MOTRIN) tablet 600 mg    albuterol (PROVENTIL) nebulizer solution 2.5 mg     Order Specific Question:   Initiate RT Bronchodilator Protocol     Answer:   No    predniSONE (DELTASONE) tablet 40 mg    predniSONE (DELTASONE) 50 MG tablet     Sig: Take 1 tablet by mouth daily for 4 days     Dispense:  4 tablet     Refill:  0         DIAGNOSTIC RESULTS / EMERGENCY DEPARTMENT COURSE / MDM     Results for orders placed or performed during the hospital encounter of 11/12/21   COVID-19, Rapid    Specimen: Nasopharyngeal Swab   Result Value Ref Range    Specimen Description . NASOPHARYNGEAL SWAB     SARS-CoV-2, Rapid Not Detected Not Detected   RAPID INFLUENZA A/B ANTIGENS    Specimen: Nasopharyngeal Swab   Result Value Ref Range    Specimen Description . NASOPHARYNGEAL SWAB     Special Requests NOT REPORTED     Direct Exam       NEGATIVE for Influenza A + B antigens. PCR testing to confirm this result is available upon request.  Specimen will be saved in the laboratory for 7 days. Please call [phone] if PCR testing is indicated. RADIOLOGY:  XR CHEST PORTABLE   Final Result   1. No active pulmonary disease. IMPRESSION/MDM/EMERGENCY DEPARTMENT COURSE:  Patient came to emergency department, HPI and physical exam were conducted. All nursing notes were reviewed. 55-year-old female with history of asthma present emergency department complaints of wheezing, shortness of breath, chills, body aches, headache. Symptoms consistent with URI. No known exposure. Has not been vaccinated for COVID-19.  97% on room air. Wheezing on exam.  Concern for asthma exacerbation secondary to URI. Patient would meet criteria for Covid antibody infusion. Will obtain rapid Covid. Also may meet criteria for treatment for influenza, will obtain rapid treatment. Chest x-ray to evaluate for evidence of lobar pneumonia. Respiratory to evaluate and treat. Tylenol Motrin for headache.   Low suspicion for

## 2021-11-12 NOTE — ED NOTES
RAPID Covid 19 swab taken from right nare, labeled, placed in red dot bag, and handed off to second healthcare worker outside of room for transport to laboratory per hospital policy and procedure. Patient tolerated procedure well.        Sarah Waters RN  11/12/21 [id number]

## 2021-11-12 NOTE — Clinical Note
Hazel Montoya was seen and treated in our emergency department on 11/12/2021. She may return to work on 11/15/2021. Please excuse patient from work due to illness. Illness started 11/10. Was seen evaluated in the emergency department 11/12. Please excuse through 11/14. If you have any questions or concerns, please don't hesitate to call.       Elle Vela, DO

## 2021-11-12 NOTE — ED PROVIDER NOTES
[facility]  ED  eMERGENCY dEPARTMENT eNCOUnter   Attending Attestation     Pt Name: Isi Zambrano  MRN: [id number]  [place] 2000  Date of evaluation: 11/12/21       Isi Zambrano is a 19 y.o. female who presents with Shortness of Breath and Migraine      History: Patient presents with shortness of breath and migraine. Patient has asthma and states that she has not been able to get much improvement with home nebulizer treatments. Exam: Heart rate and rhythm are regular. Lungs are wheezy bilaterally. Abdomen is soft, nontender. Patient is awake alert and acting appropriately. Plan for regular treatments, Covid test, chest x-ray, will treat to improvement and plan for discharge patient turns around. Will consider admission if she does not or if there is other concerning findings. I performed a history and physical examination of the patient and discussed management with the resident. I reviewed the residents note and agree with the documented findings and plan of care. Any areas of disagreement are noted on the chart. I was personally present for the key portions of any procedures. I have documented in the chart those procedures where I was not present during the key portions. I have personally reviewed all images and agree with the resident's interpretation. I have reviewed the emergency nurses triage note. I agree with the chief complaint, past medical history, past surgical history, allergies, medications, social and family history as documented unless otherwise noted below. Documentation of the HPI, Physical Exam and Medical Decision Making performed by medical students or scribes is based on my personal performance of the HPI, PE and MDM. For Phys Assistant/ Nurse Practitioner cases/documentation I have had a face to face evaluation of this patient and have completed at least one if not all key elements of the E/M (history, physical exam, and MDM). Additional findings are as noted. For APC cases I have personally evaluated and examined the patient in conjunction with the APC and agree with the treatment plan and disposition of the patient as recorded by the APC.     Laura Ramey MD  Attending Emergency  Physician       Celestine Valencia MD  11/12/21 1400

## 2021-11-12 NOTE — TELEPHONE ENCOUNTER
Spoke with pt said she is having a hard time breathing, informed pt to go to the er to get treated and to do her covid test

## 2021-12-30 ENCOUNTER — HOSPITAL ENCOUNTER (OUTPATIENT)
Age: 21
Setting detail: SPECIMEN
Discharge: HOME | End: 2021-12-30

## 2021-12-30 ENCOUNTER — OFFICE VISIT (OUTPATIENT)
Dept: PRIMARY CARE CLINIC | Age: 21
End: 2021-12-30
Payer: COMMERCIAL

## 2021-12-30 VITALS
SYSTOLIC BLOOD PRESSURE: 115 MMHG | HEART RATE: 76 BPM | TEMPERATURE: 98.3 F | DIASTOLIC BLOOD PRESSURE: 74 MMHG | OXYGEN SATURATION: 99 %

## 2021-12-30 DIAGNOSIS — R68.89 FLU-LIKE SYMPTOMS: Primary | ICD-10-CM

## 2021-12-30 PROCEDURE — 1036F TOBACCO NON-USER: CPT | Performed by: INTERNAL MEDICINE

## 2021-12-30 PROCEDURE — 99213 OFFICE O/P EST LOW 20 MIN: CPT | Performed by: INTERNAL MEDICINE

## 2021-12-30 PROCEDURE — G8417 CALC BMI ABV UP PARAM F/U: HCPCS | Performed by: INTERNAL MEDICINE

## 2021-12-30 PROCEDURE — G8427 DOCREV CUR MEDS BY ELIG CLIN: HCPCS | Performed by: INTERNAL MEDICINE

## 2021-12-30 PROCEDURE — G8484 FLU IMMUNIZE NO ADMIN: HCPCS | Performed by: INTERNAL MEDICINE

## 2021-12-30 ASSESSMENT — ENCOUNTER SYMPTOMS
FLU SYMPTOMS: 1
DIARRHEA: 1

## 2021-12-30 NOTE — PROGRESS NOTES
[facility] [address] [place] [address] [place] [address] [address]  Dept: [phone]  Dept Fax: [phone]    Anastasia Solitario is a 19 y.o. female who presents to the urgent care today for her medicalconditions/complaints as noted below. Anastasia Solitario is c/o of Diarrhea (symptoms for a few days ), Generalized Body Aches, and Other (loss of apetite)      HPI:     Influenza  This is a new problem. The current episode started in the past 7 days (2 days). The problem occurs constantly. Associated symptoms include anorexia and myalgias. Nothing aggravates the symptoms. She has tried nothing for the symptoms. The treatment provided no relief. Sister dx with COVID. Not vaccinated.     Past Medical History:   Diagnosis Date    Acute bronchitis     unspecified organism    Allergic     Allergic rhinitis     Allergy     Asthma     Severe persistent asthma, unspecified whether complicated    Chronic obstructive pulmonary disease ([facility] 8/10/2020    Chronic vasomotor rhinitis     GERD (gastroesophageal reflux disease)     Headache(784.0) 3/1/2012    Intractable abdominal pain 8/24/2020    Morbid obesity with BMI of 40.0-44.9, adult ([facility] 12/8/2016    NELSON (obstructive sleep apnea)     Prediabetes 12/8/2016    Pure hypercholesterolemia 12/8/2016    Severe persistent asthma     Sickle cell crisis ([facility] 5/27/2020    Thalassemia minor 10/29/2011    5/99     Unspecified sleep apnea     Vision disturbance 1/31/2014    wears glassses        Current Outpatient Medications   Medication Sig Dispense Refill    albuterol sulfate HFA (VENTOLIN HFA) 108 (90 Base) MCG/ACT inhaler Inhale 2 puffs into the lungs every 6 hours as needed for Wheezing 1 each 11    mometasone-formoterol (DULERA) 200-5 MCG/ACT inhaler Inhale 2 puffs into the lungs every 12 hours 1 each 2    cetirizine (ZYRTEC) 10 MG tablet Take 1 tablet by mouth daily 30 tablet 2    ibuprofen (ADVIL;MOTRIN) 800 MG tablet Take 1 tablet by mouth every 8 hours as needed for Pain 120 tablet 1    linaclotide (LINZESS) 145 MCG capsule Take 1 capsule by mouth every morning (before breakfast) 30 capsule 3    Plecanatide (TRULANCE) 3 MG TABS Take 3 mg by mouth daily 30 tablet 3    lactulose (CHRONULAC) 10 GM/15ML solution Take 15 mLs by mouth nightly as needed (constipation) 450 mL 1    bisacodyl (BISACODYL) 5 MG EC tablet Take 1 tablet by mouth daily as needed for Constipation 30 tablet 1    ketoconazole (NIZORAL) 2 % cream Apply topically bid 30 g 3    sulindac (CLINORIL) 200 MG tablet Take 1 tablet by mouth 2 times daily (Patient not taking: Reported on 11/1/2021) 40 tablet 0    fluticasone (FLONASE) 50 MCG/ACT nasal spray 2 sprays by Each Nostril route daily (Patient not taking: Reported on 11/1/2021) 1 Bottle 5     No current facility-administered medications for this visit. Allergies   Allergen Reactions    Banana     Peanut-Containing Drug Products     Food Rash     Peanuts,walnuts,cashews,pecans apples cherries       Health Maintenance   Topic Date Due    Hepatitis C screen  Never done    COVID-19 Vaccine (1) Never done    Pneumococcal 0-64 years Vaccine (2 of 4 - PPSV23) 12/31/2020    Flu vaccine (1) 09/01/2021    Chlamydia screen  09/10/2021    A1C test (Diabetic or Prediabetic)  03/03/2022    Pap smear  09/10/2023    DTaP/Tdap/Td vaccine (8 - Td or Tdap) 09/10/2031    Hepatitis A vaccine  Completed    Hepatitis B vaccine  Completed    Hib vaccine  Completed    HPV vaccine  Completed    Varicella vaccine  Completed    Meningococcal (ACWY) vaccine  Completed    HIV screen  Completed       Subjective:      Review of Systems   Gastrointestinal: Positive for anorexia and diarrhea. Musculoskeletal: Positive for myalgias. All other systems reviewed and are negative. Objective:     Physical Exam  Vitals reviewed. Constitutional:       Appearance: Normal appearance.    HENT:      Head: instructions. Discussed use, benefit, and side effects of prescribed medications. All patientquestions answered. Pt voiced understanding.     Electronically signed by Elham Huang MD on 12/30/2021at 11:10 AM

## 2021-12-31 DIAGNOSIS — R68.89 FLU-LIKE SYMPTOMS: ICD-10-CM

## 2021-12-31 LAB
SARS-COV-2: ABNORMAL
SARS-COV-2: DETECTED
SOURCE: ABNORMAL

## 2022-01-31 ENCOUNTER — OFFICE VISIT (OUTPATIENT)
Dept: PODIATRY | Age: 22
End: 2022-01-31
Payer: COMMERCIAL

## 2022-01-31 VITALS — BODY MASS INDEX: 42.94 KG/M2 | HEIGHT: 59 IN | RESPIRATION RATE: 18 BRPM | WEIGHT: 213 LBS

## 2022-01-31 DIAGNOSIS — M79.605 PAIN IN BOTH LOWER EXTREMITIES: ICD-10-CM

## 2022-01-31 DIAGNOSIS — D23.72 BENIGN NEOPLASM OF SKIN OF LEFT FOOT: ICD-10-CM

## 2022-01-31 DIAGNOSIS — D23.71 BENIGN NEOPLASM OF SKIN OF RIGHT FOOT: Primary | ICD-10-CM

## 2022-01-31 DIAGNOSIS — M79.604 PAIN IN BOTH LOWER EXTREMITIES: ICD-10-CM

## 2022-01-31 PROCEDURE — 99203 OFFICE O/P NEW LOW 30 MIN: CPT | Performed by: PODIATRIST

## 2022-01-31 PROCEDURE — G8484 FLU IMMUNIZE NO ADMIN: HCPCS | Performed by: PODIATRIST

## 2022-01-31 PROCEDURE — 17110 DESTRUCTION B9 LES UP TO 14: CPT | Performed by: PODIATRIST

## 2022-01-31 PROCEDURE — G8417 CALC BMI ABV UP PARAM F/U: HCPCS | Performed by: PODIATRIST

## 2022-01-31 PROCEDURE — G8427 DOCREV CUR MEDS BY ELIG CLIN: HCPCS | Performed by: PODIATRIST

## 2022-01-31 PROCEDURE — 1036F TOBACCO NON-USER: CPT | Performed by: PODIATRIST

## 2022-01-31 RX ORDER — UREA 40 %
CREAM (GRAM) TOPICAL
Qty: 1 EACH | Refills: 2 | Status: SHIPPED | OUTPATIENT
Start: 2022-01-31

## 2022-01-31 NOTE — PROGRESS NOTES
[address] [facility] PODIATRY [facility]  [address] [place] [address]  Dept: [phone]  Dept Fax: [phone]    NEW PATIENT PROGRESS NOTE  Date of patient's visit: 1/31/2022  Patient's Name:  Ankit Pascual YOB: 2000            Patient Care Team:  MARY Yost CNP as PCP - General (Family Medicine)  MARY Yost CNP as PCP - [facility]  Daphne Art MD as Consulting Physician (Pulmonology)  MARY Reyes CNP as Nurse Practitioner (Nurse Practitioner)  Lourdes Lopez MD as Consulting Physician (Gastroenterology)  Luis F Lambert DPM as Surgeon (Podiatry)        Chief Complaint   Patient presents with    New Patient    Benign Neoplasm         HPI:   Ankit Pascual is a 19 y.o. female who presents to the office today complaining of bilateral lesions. Symptoms began 2 month(s) ago. Patient relates pain is present. Pain is rated 5 out of 10 and is described as intermittent, moderate. Treatments prior to today's visit include: none. Currently denies F/C/N/V.  Pt's primary care physician is MARY Yost CNP last seen 12/30/2021     Allergies   Allergen Reactions    Banana     Peanut-Containing Drug Products     Food Rash     Peanuts,walnuts,cashews,pecans apples cherries       Past Medical History:   Diagnosis Date    Acute bronchitis     unspecified organism    Allergic     Allergic rhinitis     Allergy     Asthma     Severe persistent asthma, unspecified whether complicated    Chronic obstructive pulmonary disease ([address] 8/10/2020    Chronic vasomotor rhinitis     GERD (gastroesophageal reflux disease)     Headache(784.0) 3/1/2012    Intractable abdominal pain 8/24/2020    Morbid obesity with BMI of 40.0-44.9, adult ([address] 12/8/2016    NELSON (obstructive sleep apnea)     Prediabetes 12/8/2016    Pure hypercholesterolemia 12/8/2016    Severe persistent asthma     Sickle cell crisis ([facility] 5/27/2020    Thalassemia minor 10/29/2011    5/99     Unspecified sleep apnea     Vision disturbance 1/31/2014    wears glassses       Prior to Admission medications    Medication Sig Start Date End Date Taking?  Authorizing Provider   albuterol sulfate HFA (VENTOLIN HFA) 108 (90 Base) MCG/ACT inhaler Inhale 2 puffs into the lungs every 6 hours as needed for Wheezing 11/1/21  Yes MARY Reed CNP   mometasone-formoterol (DULERA) 200-5 MCG/ACT inhaler Inhale 2 puffs into the lungs every 12 hours 11/1/21  Yes MARY Reed CNP   cetirizine (ZYRTEC) 10 MG tablet Take 1 tablet by mouth daily 11/1/21  Yes MARY Reed CNP   ibuprofen (ADVIL;MOTRIN) 800 MG tablet Take 1 tablet by mouth every 8 hours as needed for Pain 9/14/21  Yes MARY Madden NP   linaclotide (LINZESS) 145 MCG capsule Take 1 capsule by mouth every morning (before breakfast) 9/7/21  Yes Kalyn Cruz MD   fluticasone (FLONASE) 50 MCG/ACT nasal spray 2 sprays by Each Nostril route daily 5/13/21  Yes Jay Sesay MD   Plecanatide (TRULANCE) 3 MG TABS Take 3 mg by mouth daily 5/4/21  Yes Aden Murray MD   lactulose (CHRONULAC) 10 GM/15ML solution Take 15 mLs by mouth nightly as needed (constipation) 3/30/21  Yes Kalyn Cruz MD   bisacodyl (BISACODYL) 5 MG EC tablet Take 1 tablet by mouth daily as needed for Constipation 3/3/21  Yes MARY Whitfield CNP   ketoconazole (NIZORAL) 2 % cream Apply topically bid 1/7/21  Yes Remigio Gill DPM   sulindac (CLINORIL) 200 MG tablet Take 1 tablet by mouth 2 times daily  Patient not taking: Reported on 1/31/2022 6/11/21   Sonam Eddy MD       Past Surgical History:   Procedure Laterality Date    ADENOIDECTOMY      COLONOSCOPY N/A 10/8/2020    COLONOSCOPY WITH BIOPSY performed by Aden Murray MD at [address] [place] [address] Drive  08/28/2014    Nexplanon placement,  pt has had this removed as of 10-8-202    TONSILLECTOMY      UPPER GASTROINTESTINAL ENDOSCOPY N/A 10/8/2020    EGD BIOPSY performed by Jeremy Schwartz MD at [facility]       Family History   Problem Relation Age of Onset    Asthma Mother    Grant Rodriguez Lupus Mother     High Blood Pressure Father     Diabetes Father     Diabetes Maternal Grandfather     High Blood Pressure Maternal Grandfather     Heart Disease Paternal Grandmother     Diabetes Paternal Grandmother     High Blood Pressure Paternal Grandfather     Heart Disease Paternal Uncle     Heart Disease Paternal Aunt     Arthritis Maternal Aunt     Cancer Maternal Aunt     Birth Defects Neg Hx     Depression Neg Hx     Early Death Neg Hx     Hearing Loss Neg Hx     High Cholesterol Neg Hx     Kidney Disease Neg Hx     Learning Disabilities Neg Hx     Mental Illness Neg Hx     Mental Retardation Neg Hx     Miscarriages / Stillbirths Neg Hx     Stroke Neg Hx     Substance Abuse Neg Hx     Vision Loss Neg Hx     Other Neg Hx     Ovarian Cancer Neg Hx     Breast Cancer Neg Hx        Social History     Tobacco Use    Smoking status: Former Smoker     Types: Cigarettes     Quit date: 2016     Years since quittin.7    Smokeless tobacco: Never Used    Tobacco comment: started while youg doesnt rember year    Substance Use Topics    Alcohol use: No     Alcohol/week: 0.0 standard drinks       Review of Systems    Review of Systems:   History obtained from chart review and the patient  General ROS: negative for - chills, fatigue, fever, night sweats or weight gain  Constitutional: Negative for chills, diaphoresis, fatigue, fever and unexpected weight change. Musculoskeletal: Positive for arthralgias, gait problem and joint swelling. Neurological ROS: negative for - behavioral changes, confusion, headaches or seizures. Negative for weakness and numbness. Dermatological ROS: negative for - mole changes, rash  Cardiovascular: Negative for leg swelling.    Gastrointestinal: Negative for constipation, diarrhea, nausea and vomiting. Lower Extremity Physical Examination:   Vitals:   Vitals:    01/31/22 1022   Resp: 18     General: AAO x 3 in NAD. Dermatologic Exam:  Soft tissue lesion to the plantar right and left foot sub 5th MTH with central core and petechiae. Pain on palpation of lesion. Musculoskeletal:     1st MPJ ROM decreased, Bilateral.  Muscle strength 5/5, Bilateral.  Pain present upon palpation of skin lesion, wiliam. Medial longitudinal arch, Bilateral WNL. Ankle ROM WNL,Bilateral.    Dorsally contracted digits absent digits 1-5 Bilateral.     Vascular: DP and PT pulses palpable 2/4, Bilateral.  CFT <3 seconds, Bilateral.  Hair growth present to the level of the digits, Bilateral.  Edema absent, Bilateral.  Varicosities absent, Bilateral. Erythema absent, Bilateral    Neurological: Sensation intact to light touch to level of digits, Bilateral.  Protective sensation intact 10/10 sites via 5.07/10g Ward-Anel Monofilament, Bilateral.  negative Tinel's, Bilateral.  negative Valleix sign, Bilateral.      Integument: Warm, dry, supple, Bilateral.  Open lesion absent, Bilateral.  Interdigital maceration absent to web spaces 1-4, Bilateral.  Nails are normal in length, thickness and color 1-5 bilateral.  Fissures absent, Bilateral.     Asessment: Patient is a 19 y.o. female with:   .   Diagnosis Orders   1. Benign neoplasm of skin of right foot  38153 - [place] DESTRUCTION BENIGN LESIONS UP TO 14   2. Benign neoplasm of skin of left foot  15460 - [place] DESTRUCTION BENIGN LESIONS UP TO 14   3. Pain in both lower extremities  69409 - [place] DESTRUCTION BENIGN LESIONS UP TO 14         Plan: Patient examined and evaluated. Current condition and treatment options discussed in detail. Discussed conservative and surgical options with the patient. The lesions were partially excised via 15 blade and silver nitrate was applied under occlusion.   The patient tolerated the procedure well and without complication. Advised patient to use vasoline to the area after tomorrow to prevent surrounding tissue irritation. Advised pt to wear betterform inserts daily. Avoid walking barefoot. All labs were reviewed and all imagining including the above findings were reviewed PRIOR to the patients arrival and with the patient today. Previous patient encounter was reviewed. Encounters from the patients other medical providers were reviewed and noted. Time was spent educating the patient on proper care of the feet and ankles. All the above diagnosis were addressed at todays visit and all questions were answered. A total of 30 minutes was spent with this patients encounter which included charting after the patients visit    . Verbal and written instructions given to patient. Contact office with any questions/problems/concerns.   RTC in 2month(s).    1/31/2022    Electronically signed by Soheila Cassidy DPM on 1/31/2022 at 10:25 AM  1/31/2022

## 2022-02-11 ENCOUNTER — OFFICE VISIT (OUTPATIENT)
Dept: PRIMARY CARE CLINIC | Age: 22
End: 2022-02-11
Payer: COMMERCIAL

## 2022-02-11 ENCOUNTER — HOSPITAL ENCOUNTER (OUTPATIENT)
Age: 22
Setting detail: SPECIMEN
Discharge: HOME | End: 2022-02-11
Payer: COMMERCIAL

## 2022-02-11 VITALS
DIASTOLIC BLOOD PRESSURE: 82 MMHG | OXYGEN SATURATION: 98 % | BODY MASS INDEX: 43.22 KG/M2 | SYSTOLIC BLOOD PRESSURE: 117 MMHG | TEMPERATURE: 97.2 F | WEIGHT: 214 LBS | HEART RATE: 87 BPM

## 2022-02-11 DIAGNOSIS — M65.4 DE QUERVAIN'S TENOSYNOVITIS, LEFT: ICD-10-CM

## 2022-02-11 DIAGNOSIS — R53.82 CHRONIC FATIGUE: ICD-10-CM

## 2022-02-11 DIAGNOSIS — M25.572 CHRONIC PAIN OF LEFT ANKLE: Primary | ICD-10-CM

## 2022-02-11 DIAGNOSIS — G89.29 CHRONIC PAIN OF LEFT ANKLE: Primary | ICD-10-CM

## 2022-02-11 LAB
ABSOLUTE EOS #: 0.09 K/UL (ref 0–0.44)
ABSOLUTE IMMATURE GRANULOCYTE: <0.03 K/UL (ref 0–0.3)
ABSOLUTE LYMPH #: 2.05 K/UL (ref 1.1–3.7)
ABSOLUTE MONO #: 0.31 K/UL (ref 0.1–1.2)
BASOPHILS # BLD: 0 % (ref 0–2)
BASOPHILS ABSOLUTE: <0.03 K/UL (ref 0–0.2)
DIFFERENTIAL TYPE: ABNORMAL
EOSINOPHILS RELATIVE PERCENT: 2 % (ref 1–4)
HCT VFR BLD CALC: 38.8 % (ref 36.3–47.1)
HEMOGLOBIN: 11.5 G/DL (ref 11.9–15.1)
IMMATURE GRANULOCYTES: 0 %
LYMPHOCYTES # BLD: 41 % (ref 24–43)
MCH RBC QN AUTO: 23.4 PG (ref 25.2–33.5)
MCHC RBC AUTO-ENTMCNC: 29.6 G/DL (ref 28.4–34.8)
MCV RBC AUTO: 78.9 FL (ref 82.6–102.9)
MONOCYTES # BLD: 6 % (ref 3–12)
NRBC AUTOMATED: 0 PER 100 WBC
PDW BLD-RTO: 13.6 % (ref 11.8–14.4)
PLATELET # BLD: 277 K/UL (ref 138–453)
PLATELET ESTIMATE: ABNORMAL
PMV BLD AUTO: 10.9 FL (ref 8.1–13.5)
RBC # BLD: 4.92 M/UL (ref 3.95–5.11)
RBC # BLD: ABNORMAL 10*6/UL
SEG NEUTROPHILS: 51 % (ref 36–65)
SEGMENTED NEUTROPHILS ABSOLUTE COUNT: 2.5 K/UL (ref 1.5–8.1)
TSH SERPL DL<=0.05 MIU/L-ACNC: 1.06 MIU/L (ref 0.3–5)
WBC # BLD: 5 K/UL (ref 3.5–11.3)
WBC # BLD: ABNORMAL 10*3/UL

## 2022-02-11 PROCEDURE — 85025 COMPLETE CBC W/AUTO DIFF WBC: CPT

## 2022-02-11 PROCEDURE — 36415 COLL VENOUS BLD VENIPUNCTURE: CPT

## 2022-02-11 PROCEDURE — 84443 ASSAY THYROID STIM HORMONE: CPT

## 2022-02-11 PROCEDURE — G8417 CALC BMI ABV UP PARAM F/U: HCPCS | Performed by: NURSE PRACTITIONER

## 2022-02-11 PROCEDURE — 1036F TOBACCO NON-USER: CPT | Performed by: NURSE PRACTITIONER

## 2022-02-11 PROCEDURE — 99214 OFFICE O/P EST MOD 30 MIN: CPT | Performed by: NURSE PRACTITIONER

## 2022-02-11 PROCEDURE — G8484 FLU IMMUNIZE NO ADMIN: HCPCS | Performed by: NURSE PRACTITIONER

## 2022-02-11 PROCEDURE — G8427 DOCREV CUR MEDS BY ELIG CLIN: HCPCS | Performed by: NURSE PRACTITIONER

## 2022-02-11 ASSESSMENT — ENCOUNTER SYMPTOMS
SHORTNESS OF BREATH: 0
COUGH: 0
DIARRHEA: 0
EYE DISCHARGE: 0
COLOR CHANGE: 0
ABDOMINAL PAIN: 0
WHEEZING: 1
NAUSEA: 0
CHEST TIGHTNESS: 0

## 2022-02-11 ASSESSMENT — PATIENT HEALTH QUESTIONNAIRE - PHQ9
2. FEELING DOWN, DEPRESSED OR HOPELESS: 0
SUM OF ALL RESPONSES TO PHQ QUESTIONS 1-9: 0
SUM OF ALL RESPONSES TO PHQ QUESTIONS 1-9: 0
1. LITTLE INTEREST OR PLEASURE IN DOING THINGS: 0
SUM OF ALL RESPONSES TO PHQ9 QUESTIONS 1 & 2: 0
SUM OF ALL RESPONSES TO PHQ QUESTIONS 1-9: 0
SUM OF ALL RESPONSES TO PHQ QUESTIONS 1-9: 0
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
## 2022-02-11 NOTE — PROGRESS NOTES
Visit Information    Have you changed or started any medications since your last visit including any over-the-counter medicines, vitamins, or herbal medicines? no   Are you having any side effects from any of your medications? -  no  Have you stopped taking any of your medications? Is so, why? -  no    Have you seen any other physician or provider since your last visit? Yes - Records Obtained  Have you had any other diagnostic tests since your last visit? Yes - Records Obtained  Have you been seen in the emergency room and/or had an admission to a hospital since we last saw you? Yes - Records Obtained  Have you had your routine dental cleaning in the past 6 months? no    Have you activated your [employer] account? If not, what are your barriers?  Yes     Patient Care Team:  MARY Lutz CNP as PCP - General (Family Medicine)  MARY Lutz CNP as PCP - [facility] Provider  Dread Henry MD as Consulting Physician (Pulmonology)  MARY Padgett CNP as Nurse Practitioner (Nurse Practitioner)  Zuri Belle MD as Consulting Physician (Gastroenterology)  Teri Mckinney DPM as Surgeon (Podiatry)    Medical History Review  Past Medical, Family, and Social History reviewed and does not contribute to the patient presenting condition    Health Maintenance   Topic Date Due    Hepatitis C screen  Never done    COVID-19 Vaccine (1) Never done    Pneumococcal 0-64 years Vaccine (2 of 4 - PPSV23) 12/31/2020    Chlamydia screen  09/10/2021    Flu vaccine (1) 02/11/2023 (Originally 9/1/2021)    A1C test (Diabetic or Prediabetic)  03/03/2022    Depression Screen  03/03/2022    Pap smear  09/10/2023    DTaP/Tdap/Td vaccine (8 - Td or Tdap) 09/10/2031    Hepatitis A vaccine  Completed    Hepatitis B vaccine  Completed    Hib vaccine  Completed    HPV vaccine  Completed    Varicella vaccine  Completed    Meningococcal (ACWY) vaccine  Completed    HIV screen  Completed

## 2022-02-14 ENCOUNTER — TELEPHONE (OUTPATIENT)
Dept: PRIMARY CARE CLINIC | Age: 22
End: 2022-02-14

## 2022-02-14 DIAGNOSIS — D50.9 IRON DEFICIENCY ANEMIA, UNSPECIFIED IRON DEFICIENCY ANEMIA TYPE: Primary | ICD-10-CM

## 2022-02-14 DIAGNOSIS — M65.4 DE QUERVAIN'S TENOSYNOVITIS, LEFT: ICD-10-CM

## 2022-02-14 RX ORDER — FERROUS SULFATE 325(65) MG
325 TABLET ORAL
Qty: 30 TABLET | Refills: 5 | Status: SHIPPED | OUTPATIENT
Start: 2022-02-14 | End: 2022-06-06

## 2022-02-14 NOTE — TELEPHONE ENCOUNTER
Please notify the patient that her lab results do show that she is mildly anemic, likely resulting in her symptoms of fatigue. Once daily iron supplement has been sent to her pharmacy. Is best absorbed when taken with vitamin C, such as a glass of orange juice.   Is also important that she not take the medication within 2 hours of dairy products as that will neutralize the iron

## 2022-02-23 ENCOUNTER — HOSPITAL ENCOUNTER (OUTPATIENT)
Dept: PHYSICAL THERAPY | Age: 22
Setting detail: THERAPIES SERIES
Discharge: HOME | End: 2022-02-23

## 2022-02-23 NOTE — FLOWSHEET NOTE
[x] [facility]) - [facility] &  Therapy  [address]    [phone]  F: [phone]   [] [address] [facility] [address]   [address]  P: [phone]  F: [phone]  [] [address] &  Therapy  [address]  P: [phone]  F: [phone] [] [address]  P: [phone]  F: [phone]  [] [address]  [address] [facility] [address]   [place]: [phone]  F: [phone]   [] [facility]  [address] [facility] [address]  [place]: [phone]   F: [phone]     Physical Therapy Cancel/No Show note    Date: 2022  Patient: Mainor Christian  : 2000  MRN: [id number]    Cancels/No Shows to date:     For today's appointment patient:    [x]  Cancelled    [x] Rescheduled appointment    [] No-show     Reason given by patient:    []  Patient ill    []  Conflicting appointment    [] No transportation      [] Conflict with work    [] No reason given    [] Weather related    [] COVID-19    [] Other:      Comments:        [] Next appointment was confirmed    Electronically signed by: Sahil Block, PT

## 2022-03-22 ENCOUNTER — HOSPITAL ENCOUNTER (EMERGENCY)
Age: 22
Discharge: HOME | End: 2022-03-22
Attending: EMERGENCY MEDICINE
Payer: COMMERCIAL

## 2022-03-22 ENCOUNTER — APPOINTMENT (OUTPATIENT)
Dept: GENERAL RADIOLOGY | Age: 22
End: 2022-03-22
Payer: COMMERCIAL

## 2022-03-22 VITALS
DIASTOLIC BLOOD PRESSURE: 97 MMHG | HEIGHT: 59 IN | HEART RATE: 118 BPM | TEMPERATURE: 102 F | BODY MASS INDEX: 43.34 KG/M2 | OXYGEN SATURATION: 96 % | RESPIRATION RATE: 18 BRPM | SYSTOLIC BLOOD PRESSURE: 130 MMHG | WEIGHT: 215 LBS

## 2022-03-22 DIAGNOSIS — J10.1 INFLUENZA A: Primary | ICD-10-CM

## 2022-03-22 LAB
CHP ED QC CHECK: YES
FLU A ANTIGEN: POSITIVE
FLU B ANTIGEN: NEGATIVE
PREGNANCY TEST URINE, POC: NO
SARS-COV-2, RAPID: NOT DETECTED
SPECIMEN DESCRIPTION: NORMAL

## 2022-03-22 PROCEDURE — 99283 EMERGENCY DEPT VISIT LOW MDM: CPT

## 2022-03-22 PROCEDURE — 6370000000 HC RX 637 (ALT 250 FOR IP): Performed by: STUDENT IN AN ORGANIZED HEALTH CARE EDUCATION/TRAINING PROGRAM

## 2022-03-22 PROCEDURE — 71045 X-RAY EXAM CHEST 1 VIEW: CPT

## 2022-03-22 PROCEDURE — 87804 INFLUENZA ASSAY W/OPTIC: CPT

## 2022-03-22 PROCEDURE — 87635 SARS-COV-2 COVID-19 AMP PRB: CPT

## 2022-03-22 RX ORDER — ACETAMINOPHEN 500 MG
1000 TABLET ORAL 3 TIMES DAILY
Qty: 30 TABLET | Refills: 0 | Status: SHIPPED | OUTPATIENT
Start: 2022-03-22 | End: 2022-06-06

## 2022-03-22 RX ORDER — ONDANSETRON 4 MG/1
4 TABLET, ORALLY DISINTEGRATING ORAL EVERY 8 HOURS PRN
Qty: 20 TABLET | Refills: 0 | Status: SHIPPED | OUTPATIENT
Start: 2022-03-22 | End: 2022-06-06

## 2022-03-22 RX ORDER — IBUPROFEN 800 MG/1
800 TABLET ORAL EVERY 8 HOURS PRN
Qty: 30 TABLET | Refills: 0 | Status: SHIPPED | OUTPATIENT
Start: 2022-03-22

## 2022-03-22 RX ORDER — ACETAMINOPHEN 500 MG
1000 TABLET ORAL ONCE
Status: COMPLETED | OUTPATIENT
Start: 2022-03-22 | End: 2022-03-22

## 2022-03-22 RX ORDER — ONDANSETRON 4 MG/1
4 TABLET, ORALLY DISINTEGRATING ORAL ONCE
Status: COMPLETED | OUTPATIENT
Start: 2022-03-22 | End: 2022-03-22

## 2022-03-22 RX ADMIN — ONDANSETRON 4 MG: 4 TABLET, ORALLY DISINTEGRATING ORAL at 12:21

## 2022-03-22 RX ADMIN — ACETAMINOPHEN 1000 MG: 500 TABLET ORAL at 12:21

## 2022-03-22 ASSESSMENT — PAIN SCALES - GENERAL
PAINLEVEL_OUTOF10: 10
PAINLEVEL_OUTOF10: 9

## 2022-03-22 ASSESSMENT — ENCOUNTER SYMPTOMS
SHORTNESS OF BREATH: 0
VOMITING: 1
NAUSEA: 1
DIARRHEA: 1
BACK PAIN: 0
SORE THROAT: 1
RHINORRHEA: 1
COUGH: 1
ABDOMINAL PAIN: 0

## 2022-03-22 NOTE — ED PROVIDER NOTES
[address] ED  Faculty Attestation    I performed a history and physical examination of the patient and discussed management with the resident. I reviewed the residents note and agree with the documented findings and plan of care. Any areas of disagreement are noted on the chart. I was personally present for the key portions of any procedures and the inclusive time noted in any critical care statement. I have documented in the chart those procedures where I was not present during the key portions. I have reviewed the emergency nurses triage note. I agree with the chief complaint, past medical history, past surgical history, allergies, medications, social, and family history as documented unless otherwise noted below.        This is a 43-year-old female who presents to the emergency department for evaluation of multiple concerns  Patient has not felt well for about 5 days  She has had body aches as well as a harsh cough  For the last 2 days she has had a fever  She tested positive for COVID-19 on a home test yesterday  Patient has been nauseous  Patient answers very few questions directly and does not participate otherwise in a history or review of systems    On examination she appears in no acute distress  She is tachycardic  Regular rhythm  Lungs show minimal end expiratory wheezing  No retractions  Normal work of breathing  Harsh cough  Abdomen soft and nontender  Normal peripheral perfusion and skin turgor  No peripheral edema    XR CHEST PORTABLE   Final Result   Negative portable chest.           Patient is positive for influenza  Her Covid test is negative  Pregnancy test is negative  Blood work not indicated  Patient does have an exam and history consistent with a viral URI  Antibiotics not clinically indicated  She does have an albuterol inhaler at home  Normal work of breathing  Normal O2 sat  Normal respiratory rate  Steroids not indicated  Supportive treatment and outpatient follow-up encouraged      Julian Patel, DO  Attending Emergency Physician       Génesis Lund, DO  03/22/22 0957

## 2022-03-22 NOTE — ED PROVIDER NOTES
[facility] ED  Emergency Department Encounter  EmergencyMedicine Resident     Pt Lon Pan  MRN: [id number]  [place] 2000  Date of evaluation: 3/22/22  PCP:  MARY Yost CNP    This patient was evaluated in the Emergency Department for symptoms described in the history of present illness. The patient was evaluated in the context of the global COVID-19 pandemic, which necessitated consideration that the patient might be at risk for infection with the SARS-CoV-2 virus that causes COVID-19. Institutional protocols and algorithms that pertain to the evaluation of patients at risk for COVID-19 are in a state of rapid change based on information released by regulatory bodies including the CDC and federal and state organizations. These policies and algorithms were followed during the patient's care in the ED. CHIEF COMPLAINT       Chief Complaint   Patient presents with    Illness     reports weakness and ill since sunday       HISTORY OF PRESENT ILLNESS  (Location/Symptom, Timing/Onset, Context/Setting, Quality, Duration, Modifying Factors, Severity.)      Ankit Pascual is a 19 y.o. female who presents with viral syndrome for the past 4 to 5 days. Past medical history significant for asthma, sickle cell crisis as well as thalassemia. Patient states that for the past 4 to 5 days she had worsening myalgias, arthralgias, fever, runny nose, congestion. Is not vaccinated against flu or the Covid. They state that she had a positive Covid test at home. Admits to nausea, vomiting, diarrhea, headache, myalgias Wells arthralgias but denies any chest pain, shortness of breath, blurry vision, double vision, abdominal pain, dysuria, hematuria, vaginal bleeding or vaginal discharge.     PAST MEDICAL / SURGICAL / SOCIAL / FAMILY HISTORY      has a past medical history of Acute bronchitis, Allergic, Allergic rhinitis, Allergy, Asthma, Chronic obstructive pulmonary disease ([facility] Chronic vasomotor rhinitis, GERD (gastroesophageal reflux disease), Headache(784.0), Intractable abdominal pain, Morbid obesity with BMI of 40.0-44.9, adult ([facility] NELSON (obstructive sleep apnea), Prediabetes, Pure hypercholesterolemia, Severe persistent asthma, Sickle cell crisis ([facility] Thalassemia minor, Unspecified sleep apnea, and Vision disturbance. has a past surgical history that includes Adenoidectomy; Tonsillectomy; other surgical history (2014); Upper gastrointestinal endoscopy (N/A, 10/8/2020); and Colonoscopy (N/A, 10/8/2020). Social History     Socioeconomic History    Marital status: Single     Spouse name: Not on file    Number of children: Not on file    Years of education: Not on file    Highest education level: Not on file   Occupational History    Not on file   Tobacco Use    Smoking status: Former Smoker     Types: Cigarettes     Quit date: 2016     Years since quittin.8    Smokeless tobacco: Never Used    Tobacco comment: started while youg doesnt rem year    Vaping Use    Vaping Use: Never used   Substance and Sexual Activity    Alcohol use: No     Alcohol/week: 0.0 standard drinks    Drug use: No    Sexual activity: Yes   Other Topics Concern    Not on file   Social History Narrative    Not on file     Social Determinants of Health     Financial Resource Strain: Low Risk     Difficulty of Paying Living Expenses: Not hard at all   Food Insecurity: No Food Insecurity    Worried About [address] in the Last Year: Never true    [address] in the Last Year: Never true   Transportation Needs:     Lack of Transportation (Medical): Not on file    Lack of Transportation (Non-Medical):  Not on file   Physical Activity:     Days of Exercise per Week: Not on file    Minutes of Exercise per Session: Not on file   Stress:     Feeling of Stress : Not on file   Social Connections:     Frequency of Communication with Friends and Family: Not on file    Frequency of Social Gatherings with Friends and Family: Not on file    Attends Confucianist Services: Not on file    Active Member of Clubs or Organizations: Not on file    Attends Club or Organization Meetings: Not on file    Marital Status: Not on file   Intimate Partner Violence:     Fear of Current or Ex-Partner: Not on file    Emotionally Abused: Not on file    Physically Abused: Not on file    Sexually Abused: Not on file   Housing Stability:     Unable to Pay for Housing in the Last Year: Not on file    Number of Places Lived in the Last Year: Not on file    Unstable Housing in the Last Year: Not on file       Family History   Problem Relation Age of Onset    Asthma Mother     Lupus Mother     High Blood Pressure Father     Diabetes Father     Diabetes Maternal Grandfather     High Blood Pressure Maternal Grandfather     Heart Disease Paternal Grandmother     Diabetes Paternal Grandmother     High Blood Pressure Paternal Grandfather     Heart Disease Paternal Uncle     Heart Disease Paternal Aunt     Arthritis Maternal Aunt     Cancer Maternal Aunt     Birth Defects Neg Hx     Depression Neg Hx     Early Death Neg Hx     Hearing Loss Neg Hx     High Cholesterol Neg Hx     Kidney Disease Neg Hx     Learning Disabilities Neg Hx     Mental Illness Neg Hx     Mental Retardation Neg Hx     Miscarriages / Stillbirths Neg Hx     Stroke Neg Hx     Substance Abuse Neg Hx     Vision Loss Neg Hx     Other Neg Hx     Ovarian Cancer Neg Hx     Breast Cancer Neg Hx        Allergies:  Banana, Peanut-containing drug products, and Food    Home Medications:  Prior to Admission medications    Medication Sig Start Date End Date Taking?  Authorizing Provider   acetaminophen (TYLENOL) 500 MG tablet Take 2 tablets by mouth 3 times daily 3/22/22  Yes Isaura Mano, DO   ibuprofen (ADVIL;MOTRIN) 800 MG tablet Take 1 tablet by mouth every 8 hours as needed for Pain 3/22/22  Yes Eliana Piety, DO   ondansetron (ZOFRAN ODT) 4 MG disintegrating tablet Take 1 tablet by mouth every 8 hours as needed for Nausea 3/22/22  Yes Eliana Piety, DO   ferrous sulfate (IRON 325) 325 (65 Fe) MG tablet Take 1 tablet by mouth daily (with breakfast) 2/14/22   MARY Yost CNP   Elastic Bandages & Supports (WRIST/THUMB SPLINT/LEFT MEDIUM) MISC Wear nightly x4 weeks 2/11/22   MARY Yost CNP   Urea (CARMOL) 40 % cream Apply to feet once daily 1/31/22   Elena Diaz DPM   albuterol sulfate HFA (VENTOLIN HFA) 108 (90 Base) MCG/ACT inhaler Inhale 2 puffs into the lungs every 6 hours as needed for Wheezing 11/1/21   MARY Parks CNP   mometasone-formoterol (DULERA) 200-5 MCG/ACT inhaler Inhale 2 puffs into the lungs every 12 hours 11/1/21   MARY Parks CNP   cetirizine (ZYRTEC) 10 MG tablet Take 1 tablet by mouth daily 11/1/21   MARY Parks CNP   linaclotide (LINZESS) 145 MCG capsule Take 1 capsule by mouth every morning (before breakfast) 9/7/21   Kalyn Cruz MD   fluticasone (FLONASE) 50 MCG/ACT nasal spray 2 sprays by Each Nostril route daily 5/13/21   Sylwia Roberts MD   Plecanatide (TRULANCE) 3 MG TABS Take 3 mg by mouth daily 5/4/21   Kalyn Cruz MD   lactulose (CHRONULAC) 10 GM/15ML solution Take 15 mLs by mouth nightly as needed (constipation) 3/30/21   Kalyn Cruz MD   bisacodyl (BISACODYL) 5 MG EC tablet Take 1 tablet by mouth daily as needed for Constipation 3/3/21   MARY Yost CNP   ketoconazole (NIZORAL) 2 % cream Apply topically bid 1/7/21   Luis F Lambert DPM       REVIEW OF SYSTEMS    (2-9 systems for level 4, 10 or more for level 5)      Review of Systems   Constitutional: Positive for chills, fatigue and fever. HENT: Positive for congestion, rhinorrhea and sore throat. Eyes: Negative for visual disturbance. Respiratory: Positive for cough. Negative for shortness of breath.     Cardiovascular: Negative for chest pain. Gastrointestinal: Positive for diarrhea, nausea and vomiting. Negative for abdominal pain. Genitourinary: Negative for dysuria, hematuria, vaginal bleeding, vaginal discharge and vaginal pain. Musculoskeletal: Positive for arthralgias and myalgias. Negative for back pain, neck pain and neck stiffness. Skin: Negative for rash and wound. Neurological: Positive for headaches. Negative for weakness and numbness. PHYSICAL EXAM   (up to 7 for level 4, 8 or more for level 5)      INITIAL VITALS:   BP (!) 130/97   Pulse 118   Temp 102 °F (38.9 °C) (Oral)   Resp 18   Ht 4' 11\" (1.499 m)   Wt 215 lb (97.5 kg)   SpO2 96%   BMI 43.42 kg/m²     Physical Exam  Constitutional:       General: She is not in acute distress. Appearance: She is ill-appearing. She is not toxic-appearing or diaphoretic. Comments: 40-year-old female, slightly ill-appearing, slightly tachycardic as well as febrile   HENT:      Head: Normocephalic and atraumatic. Mouth/Throat:      Mouth: Mucous membranes are moist.      Pharynx: Oropharynx is clear. No oropharyngeal exudate or posterior oropharyngeal erythema. Comments: Uvula midline, no peritonsillar swelling, 70 patient is soft and tender  Eyes:      Extraocular Movements: Extraocular movements intact. Pupils: Pupils are equal, round, and reactive to light. Cardiovascular:      Rate and Rhythm: Regular rhythm. Tachycardia present. Heart sounds: No murmur heard. No friction rub. No gallop. Pulmonary:      Effort: No respiratory distress. Breath sounds: No stridor. No rales. Comments: Slight expiratory wheezes  Chest:      Chest wall: No tenderness. Abdominal:      General: There is no distension. Palpations: There is no mass. Tenderness: There is no abdominal tenderness. There is no guarding or rebound. Hernia: No hernia is present.    Musculoskeletal:         General: No swelling, tenderness, deformity or signs of injury. Cervical back: No rigidity or tenderness. Right lower leg: No edema. Left lower leg: No edema. Lymphadenopathy:      Cervical: No cervical adenopathy. Skin:     Capillary Refill: Capillary refill takes less than 2 seconds. Coloration: Skin is not jaundiced or pale. Findings: No bruising, erythema, lesion or rash. Neurological:      Mental Status: She is alert and oriented to person, place, and time. Cranial Nerves: No cranial nerve deficit. Sensory: No sensory deficit. DIFFERENTIAL  DIAGNOSIS     PLAN (LABS / IMAGING / EKG):  Orders Placed This Encounter   Procedures    RAPID INFLUENZA A/B ANTIGENS    COVID-19, Rapid    XR CHEST PORTABLE    POCT urine pregnancy       MEDICATIONS ORDERED:  Orders Placed This Encounter   Medications    acetaminophen (TYLENOL) tablet 1,000 mg    ondansetron (ZOFRAN-ODT) disintegrating tablet 4 mg    acetaminophen (TYLENOL) 500 MG tablet     Sig: Take 2 tablets by mouth 3 times daily     Dispense:  30 tablet     Refill:  0    ibuprofen (ADVIL;MOTRIN) 800 MG tablet     Sig: Take 1 tablet by mouth every 8 hours as needed for Pain     Dispense:  30 tablet     Refill:  0    ondansetron (ZOFRAN ODT) 4 MG disintegrating tablet     Sig: Take 1 tablet by mouth every 8 hours as needed for Nausea     Dispense:  20 tablet     Refill:  0       DDX: Viral syndrome, influenza, Covid, pneumonia, pregnancy    DIAGNOSTIC RESULTS / EMERGENCY DEPARTMENT COURSE / MDM   LAB RESULTS:  Results for orders placed or performed during the hospital encounter of 03/22/22   RAPID INFLUENZA A/B ANTIGENS    Specimen: Nasopharyngeal   Result Value Ref Range    Flu A Antigen POSITIVE (A) NEGATIVE    Flu B Antigen NEGATIVE NEGATIVE   COVID-19, Rapid    Specimen: Nasopharyngeal Swab   Result Value Ref Range    Specimen Description . NASOPHARYNGEAL SWAB     SARS-CoV-2, Rapid Not Detected Not Detected   POCT urine pregnancy   Result Value Ref Range    Preg Test, Ur no     QC OK? yes        IMPRESSION: Labs consistent with influenza    RADIOLOGY:  XR CHEST PORTABLE    Result Date: 3/22/2022  EXAMINATION: ONE XRAY VIEW OF THE CHEST 3/22/2022 12:17 pm COMPARISON: 11/12/2021 HISTORY: ORDERING SYSTEM PROVIDED HISTORY: cough, fever FINDINGS: The lungs are without acute focal process. No effusion or pneumothorax. The cardiomediastinal silhouette is without acute process. The osseous structures are intact without acute process. Negative portable chest.     EMERGENCY DEPARTMENT COURSE:  29-year-old female presenting chief complaint viral illness. On examination patient ill-appearing but nontoxic. Test positive for Covid at home. However negative here in emergency department did test positive for the flu here in emergency department. Patient given oral Tylenol with improvement in symptoms. Urine pregnancy negative. Chest x-ray negative for pneumonia. Patient given dose of oral Zofran as well as Tylenol with improvement in symptoms. Patient discharged home with PCP follow-up as well strict return precautions. Patient voiced understanding strict return precautions. Patient ambulatory on discharge. PROCEDURES:  n/a    CONSULTS:  None    CRITICAL CARE:  n/a    FINAL IMPRESSION      1.  Influenza A          DISPOSITION / PLAN     DISPOSITION Decision To Discharge 03/22/2022 01:08:05 PM      PATIENT REFERRED TO:  [facility]  [address]  [phone]    Schedule an appointment as soon as possible for a visit         DISCHARGE MEDICATIONS:  Discharge Medication List as of 3/22/2022  1:15 PM      START taking these medications    Details   acetaminophen (TYLENOL) 500 MG tablet Take 2 tablets by mouth 3 times daily, Disp-30 tablet, R-0Print      ondansetron (ZOFRAN ODT) 4 MG disintegrating tablet Take 1 tablet by mouth every 8 hours as needed for Nausea, Disp-20 tablet, R-0Print             Spike Oscar DO  Emergency Medicine Resident    (Please note that portions of thisnote were completed with a voice recognition program.  Efforts were made to edit the dictations but occasionally words are mis-transcribed.)        Mirella Meredith DO  Resident  03/22/22 0611

## 2022-03-24 ENCOUNTER — TELEPHONE (OUTPATIENT)
Dept: GASTROENTEROLOGY | Age: 22
End: 2022-03-24

## 2022-03-24 NOTE — TELEPHONE ENCOUNTER
Pt had ea no show appt on 03/09/22. Called pt back to [facility], and LVM    Letter sent.

## 2022-04-04 ENCOUNTER — OFFICE VISIT (OUTPATIENT)
Dept: PODIATRY | Age: 22
End: 2022-04-04
Payer: COMMERCIAL

## 2022-04-04 VITALS — BODY MASS INDEX: 43.34 KG/M2 | RESPIRATION RATE: 16 BRPM | WEIGHT: 215 LBS | HEIGHT: 59 IN

## 2022-04-04 DIAGNOSIS — M79.604 PAIN IN BOTH LOWER EXTREMITIES: ICD-10-CM

## 2022-04-04 DIAGNOSIS — R61 HYPERHIDROSIS: ICD-10-CM

## 2022-04-04 DIAGNOSIS — M79.605 PAIN IN BOTH LOWER EXTREMITIES: ICD-10-CM

## 2022-04-04 DIAGNOSIS — D23.72 BENIGN NEOPLASM OF SKIN OF LEFT FOOT: ICD-10-CM

## 2022-04-04 DIAGNOSIS — D23.71 BENIGN NEOPLASM OF SKIN OF RIGHT FOOT: Primary | ICD-10-CM

## 2022-04-04 PROCEDURE — G8427 DOCREV CUR MEDS BY ELIG CLIN: HCPCS | Performed by: PODIATRIST

## 2022-04-04 PROCEDURE — 1036F TOBACCO NON-USER: CPT | Performed by: PODIATRIST

## 2022-04-04 PROCEDURE — G8417 CALC BMI ABV UP PARAM F/U: HCPCS | Performed by: PODIATRIST

## 2022-04-04 PROCEDURE — 99213 OFFICE O/P EST LOW 20 MIN: CPT | Performed by: PODIATRIST

## 2022-04-04 PROCEDURE — 17110 DESTRUCTION B9 LES UP TO 14: CPT | Performed by: PODIATRIST

## 2022-04-04 RX ORDER — BENZONATATE 200 MG/1
200 CAPSULE ORAL EVERY 8 HOURS
COMMUNITY
Start: 2022-03-27 | End: 2022-06-06

## 2022-04-04 RX ORDER — IPRATROPIUM BROMIDE AND ALBUTEROL SULFATE 2.5; .5 MG/3ML; MG/3ML
3 SOLUTION RESPIRATORY (INHALATION) EVERY 4 HOURS PRN
COMMUNITY
Start: 2022-03-27

## 2022-04-04 RX ORDER — PREDNISONE 20 MG/1
60 TABLET ORAL DAILY
COMMUNITY
Start: 2022-03-27 | End: 2022-05-24 | Stop reason: ALTCHOICE

## 2022-04-04 NOTE — PROGRESS NOTES
[address] [facility] PODIATRY [facility]  [address] [place] [address]  Dept: [phone]  Dept Fax: [phone]     FOOT PAIN & BENIGN NEOPLASM PROGRESS NOTE  Date of patient's visit: 4/4/2022  Patient's Name:  Tia Palacio YOB: 2000            Patient Care Team:  MARY Nicolas CNP as PCP - General (Family Medicine)  MARY Nicolas CNP as PCP - [facility] Empaneled Provider  Sylvester Huber MD as Consulting Physician (Pulmonology)  MARY Rivas CNP as Nurse Practitioner (Nurse Practitioner)  Serenity Anderson MD as Consulting Physician (Gastroenterology)  Mery Baker DPM as Surgeon (Podiatry)          Chief Complaint   Patient presents with    Benign Neoplasm    Foot Pain       Subjective: This Tia Palacio comes to clinic for foot and nail care. Pt currently has complaint of thickened, painful, skin lesions that he/she cannot manage by themselves. Pt. Relates pain is worse with shoe gear. Pt's primary care physician is MARY Nicolas CNPlast seen 02/11/2022.   Past Medical History:   Diagnosis Date    Acute bronchitis     unspecified organism    Allergic     Allergic rhinitis     Allergy     Asthma     Severe persistent asthma, unspecified whether complicated    Chronic obstructive pulmonary disease ([address] 8/10/2020    Chronic vasomotor rhinitis     GERD (gastroesophageal reflux disease)     Headache(784.0) 3/1/2012    Intractable abdominal pain 8/24/2020    Morbid obesity with BMI of 40.0-44.9, adult ([address] 12/8/2016    NELSON (obstructive sleep apnea)     Prediabetes 12/8/2016    Pure hypercholesterolemia 12/8/2016    Severe persistent asthma     Sickle cell crisis ([address] 5/27/2020    Thalassemia minor 10/29/2011    5/99     Unspecified sleep apnea     Vision disturbance 1/31/2014    wears glassses     Pt has a new complaint of increased moisture and sweaty feet. Pt has tried changing shoes but it has not helped.     Allergies   Allergen Reactions    Banana     Peanut-Containing Drug Products     Food Rash     Peanuts,walnuts,cashews,pecans apples cherries     Current Outpatient Medications on File Prior to Visit   Medication Sig Dispense Refill    benzonatate (TESSALON) 200 MG capsule Take 200 mg by mouth every 8 hours      ipratropium-albuterol (DUONEB) 0.5-2.5 (3) MG/3ML SOLN nebulizer solution Inhale 3 mLs into the lungs every 4 hours as needed      predniSONE (DELTASONE) 20 MG tablet Take 60 mg by mouth daily      acetaminophen (TYLENOL) 500 MG tablet Take 2 tablets by mouth 3 times daily 30 tablet 0    ibuprofen (ADVIL;MOTRIN) 800 MG tablet Take 1 tablet by mouth every 8 hours as needed for Pain 30 tablet 0    ondansetron (ZOFRAN ODT) 4 MG disintegrating tablet Take 1 tablet by mouth every 8 hours as needed for Nausea 20 tablet 0    ferrous sulfate (IRON 325) 325 (65 Fe) MG tablet Take 1 tablet by mouth daily (with breakfast) 30 tablet 5    Elastic Bandages & Supports (WRIST/THUMB SPLINT/LEFT MEDIUM) MISC Wear nightly x4 weeks 1 each 0    Urea (CARMOL) 40 % cream Apply to feet once daily 1 each 2    albuterol sulfate HFA (VENTOLIN HFA) 108 (90 Base) MCG/ACT inhaler Inhale 2 puffs into the lungs every 6 hours as needed for Wheezing 1 each 11    mometasone-formoterol (DULERA) 200-5 MCG/ACT inhaler Inhale 2 puffs into the lungs every 12 hours 1 each 2    cetirizine (ZYRTEC) 10 MG tablet Take 1 tablet by mouth daily 30 tablet 2    linaclotide (LINZESS) 145 MCG capsule Take 1 capsule by mouth every morning (before breakfast) 30 capsule 3    fluticasone (FLONASE) 50 MCG/ACT nasal spray 2 sprays by Each Nostril route daily 1 Bottle 5    Plecanatide (TRULANCE) 3 MG TABS Take 3 mg by mouth daily 30 tablet 3    lactulose (CHRONULAC) 10 GM/15ML solution Take 15 mLs by mouth nightly as needed (constipation) 450 mL 1    bisacodyl (BISACODYL) 5 MG EC tablet Take 1 tablet by mouth daily as needed for Constipation 30 tablet 1    ketoconazole (NIZORAL) 2 % cream Apply topically bid 30 g 3     No current facility-administered medications on file prior to visit. Review of Systems    Review of Systems:   History obtained from chart review and the patient  General ROS: negative for - chills, fatigue, fever, night sweats or weight gain  Constitutional: Negative for chills, diaphoresis, fatigue, fever and unexpected weight change. Musculoskeletal: Positive for arthralgias, gait problem and joint swelling. Neurological ROS: negative for - behavioral changes, confusion, headaches or seizures. Negative for weakness and numbness. Dermatological ROS: negative for - mole changes, rash  Cardiovascular: Negative for leg swelling. Gastrointestinal: Negative for constipation, diarrhea, nausea and vomiting. Objective:  Dermatologic Exam:  Skin lesion/ulceration Absent . Skin No rashes or nodules noted. .   Skin is thin, with flaky sloughing skin as well as decreased hair growth to the lower leg  Small red hemosiderin deposits seen dorsal foot   Musculoskeletal:     1st MPJ ROM decreased, Bilateral.  Muscle strength 5/5, Bilateral.  Pain present upon palpation of toenails 1-5, Bilateral. decreased medial longitudinal arch, Bilateral.  Ankle ROM decreased,Bilateral.    Dorsally contracted digits present digits 2, Bilateral.     Vascular: DP pulses 1/4 bilateral.  PT pulses 0/4 bilateral.   CFT <5 seconds, Bilateral.  Hair growth absent to the level of the digits, Bilateral.  Edema present, Bilateral.  Varicosities absent, Bilateral. Erythema absent, Bilateral    Neurological: Sensation diminshed to light touch to level of digits, Bilateral.  Protective sensation intact 6/10 sites via 5.07/10g Cotuit-Anel Monofilament, Bilateral.  negative Tinel's, Bilateral.  negative Valleix sign, Bilateral.      Integument: Warm, dry, supple, Bilateral.  Open lesion absent, Bilateral.  Interdigital maceration absent to web spaces 4, Bilateral.  Nails 1-5 left and 1-5 right thickened > 3.0 mm, dystrophic and crumbly, discolored with subungual debris. Fissures absent, Bilateral.   General: AAO x 3 in NAD. Derm  Toenail Description  Sites of Onychomycosis Involvement (Check affected area)  [x] [x] [x] [x] [x] [x] [x] [x] [x] [x]  5 4 3 2 1 1 2 3 4 5                          Right                                        Left    Thickness  [x] [x] [x] [x] [x] [x] [x] [x] [x] [x]  5 4 3 2 1 1 2 3 4 5                         Right                                        Left    Dystrophic Changes   [x] [x] [x] [x] [x] [x] [x] [x] [x] [x]  5 4 3 2 1 1 2 3 4 5                         Right                                        Left    Color  [x] [x] [x] [x] [x] [x] [x] [x] [x] [x]  5 4 3 2 1 1 2 3 4 5                          Right                                        Left    Incurvation/Ingrowin   [] [] [] [] [] [] [] [] [] []  5 4 3 2 1 1 2 3 4 5                         Right                                        Left    Inflammation/Pain   [x] [x] [x] [x] [x] [x] [x] [x] [x] [x]  5 4 3  2 1 1 2 3 4 5                         Right                                        Left       Nails are painful 1-10 with direct palpation. Q7   []Yes  []No                Q8   [x]Yes  []No                     Q9   []Yes    []No    Assessment:  19 y.o. female with:    Diagnosis Orders   1. Benign neoplasm of skin of right foot  78761 - MO DESTRUCTION BENIGN LESIONS UP TO 14   2. Benign neoplasm of skin of left foot  02590 - MO DESTRUCTION BENIGN LESIONS UP TO 14   3. Pain in both lower extremities  99566 - MO DESTRUCTION BENIGN LESIONS UP TO 14   4. Hyperhidrosis           Plan:   Pt was evaluated and examined. Patient was given personalized discharge instructions. To address hyperhydrosis, patient to apply drysol to feet daily. Pt to monitor for fissures due to dryness.  Advised pt to contact office is there are any open lesions. Diagnosis was discussed with the pt and all of their questions were answered in detail. Proper foot hygiene and care was discussed with the pt. Patient to check feet daily and contact the office with any questions/problems/concerns. Other comorbidity noted and will be managed by PCP. Pain waiver discussed with patient and confirmed. 4/4/2022    The lesion was partially debrided and silver nitrate was applied with an apperature pad under occlusion. The patient will leave in place for 24-48 hours and than remove. The patient tolerated the procedure well and without complication.    Pt will follow up in 9 weeks         Electronically signed by Sherlyn Castaneda DPM on 4/4/2022 at 1:58 PM  4/4/2022

## 2022-04-25 DIAGNOSIS — J45.41 MODERATE PERSISTENT ASTHMA WITH ACUTE EXACERBATION: ICD-10-CM

## 2022-05-24 ENCOUNTER — HOSPITAL ENCOUNTER (EMERGENCY)
Age: 22
Discharge: HOME | End: 2022-05-24
Attending: EMERGENCY MEDICINE
Payer: COMMERCIAL

## 2022-05-24 ENCOUNTER — APPOINTMENT (OUTPATIENT)
Dept: GENERAL RADIOLOGY | Age: 22
End: 2022-05-24
Payer: COMMERCIAL

## 2022-05-24 VITALS
WEIGHT: 280 LBS | HEIGHT: 59 IN | RESPIRATION RATE: 20 BRPM | HEART RATE: 80 BPM | BODY MASS INDEX: 56.45 KG/M2 | OXYGEN SATURATION: 98 % | SYSTOLIC BLOOD PRESSURE: 133 MMHG | TEMPERATURE: 98.3 F | DIASTOLIC BLOOD PRESSURE: 89 MMHG

## 2022-05-24 DIAGNOSIS — J45.21 MILD INTERMITTENT ASTHMA WITH EXACERBATION: Primary | ICD-10-CM

## 2022-05-24 LAB
ABSOLUTE EOS #: 0.41 K/UL (ref 0–0.44)
ABSOLUTE IMMATURE GRANULOCYTE: 0.01 K/UL (ref 0–0.3)
ABSOLUTE LYMPH #: 2.43 K/UL (ref 1.1–3.7)
ABSOLUTE MONO #: 0.26 K/UL (ref 0.1–1.2)
ANION GAP SERPL CALCULATED.3IONS-SCNC: 8 MMOL/L (ref 9–17)
BASOPHILS # BLD: 0 % (ref 0–2)
BASOPHILS ABSOLUTE: <0.03 K/UL (ref 0–0.2)
BUN BLDV-MCNC: 13 MG/DL (ref 6–20)
BUN/CREAT BLD: 25 (ref 9–20)
CALCIUM SERPL-MCNC: 9.3 MG/DL (ref 8.6–10.4)
CHLORIDE BLD-SCNC: 107 MMOL/L (ref 98–107)
CO2: 25 MMOL/L (ref 20–31)
CREAT SERPL-MCNC: 0.53 MG/DL (ref 0.5–0.9)
EOSINOPHILS RELATIVE PERCENT: 8 % (ref 1–4)
GFR AFRICAN AMERICAN: >60 ML/MIN
GFR NON-AFRICAN AMERICAN: >60 ML/MIN
GFR SERPL CREATININE-BSD FRML MDRD: ABNORMAL ML/MIN/{1.73_M2}
GLUCOSE BLD-MCNC: 96 MG/DL (ref 70–99)
HCG(URINE) PREGNANCY TEST: NEGATIVE
HCT VFR BLD CALC: 40.4 % (ref 36.3–47.1)
HEMOGLOBIN: 11.8 G/DL (ref 11.9–15.1)
IMMATURE GRANULOCYTES: 0 %
LYMPHOCYTES # BLD: 51 % (ref 24–43)
MCH RBC QN AUTO: 23.1 PG (ref 25.2–33.5)
MCHC RBC AUTO-ENTMCNC: 29.2 G/DL (ref 28.4–34.8)
MCV RBC AUTO: 79.2 FL (ref 82.6–102.9)
MONOCYTES # BLD: 5 % (ref 3–12)
NRBC AUTOMATED: 0 PER 100 WBC
PDW BLD-RTO: 14.1 % (ref 11.8–14.4)
PLATELET # BLD: 238 K/UL (ref 138–453)
PMV BLD AUTO: 11.4 FL (ref 8.1–13.5)
POTASSIUM SERPL-SCNC: 3.8 MMOL/L (ref 3.7–5.3)
RBC # BLD: 5.1 M/UL (ref 3.95–5.11)
RBC # BLD: ABNORMAL 10*6/UL
SEG NEUTROPHILS: 36 % (ref 36–65)
SEGMENTED NEUTROPHILS ABSOLUTE COUNT: 1.78 K/UL (ref 1.5–8.1)
SODIUM BLD-SCNC: 140 MMOL/L (ref 135–144)
WBC # BLD: 4.9 K/UL (ref 3.5–11.3)

## 2022-05-24 PROCEDURE — 99284 EMERGENCY DEPT VISIT MOD MDM: CPT

## 2022-05-24 PROCEDURE — 6370000000 HC RX 637 (ALT 250 FOR IP): Performed by: PHYSICIAN ASSISTANT

## 2022-05-24 PROCEDURE — 81025 URINE PREGNANCY TEST: CPT

## 2022-05-24 PROCEDURE — 94640 AIRWAY INHALATION TREATMENT: CPT

## 2022-05-24 PROCEDURE — 6360000002 HC RX W HCPCS: Performed by: PHYSICIAN ASSISTANT

## 2022-05-24 PROCEDURE — 71045 X-RAY EXAM CHEST 1 VIEW: CPT

## 2022-05-24 PROCEDURE — 96374 THER/PROPH/DIAG INJ IV PUSH: CPT

## 2022-05-24 PROCEDURE — 80048 BASIC METABOLIC PNL TOTAL CA: CPT

## 2022-05-24 PROCEDURE — 85025 COMPLETE CBC W/AUTO DIFF WBC: CPT

## 2022-05-24 RX ORDER — PREDNISONE 20 MG/1
20 TABLET ORAL DAILY
Qty: 5 TABLET | Refills: 0 | Status: SHIPPED | OUTPATIENT
Start: 2022-05-24 | End: 2022-05-29

## 2022-05-24 RX ORDER — IPRATROPIUM BROMIDE AND ALBUTEROL SULFATE 2.5; .5 MG/3ML; MG/3ML
1 SOLUTION RESPIRATORY (INHALATION) ONCE
Status: COMPLETED | OUTPATIENT
Start: 2022-05-24 | End: 2022-05-24

## 2022-05-24 RX ORDER — METHYLPREDNISOLONE SODIUM SUCCINATE 125 MG/2ML
125 INJECTION, POWDER, LYOPHILIZED, FOR SOLUTION INTRAMUSCULAR; INTRAVENOUS ONCE
Status: COMPLETED | OUTPATIENT
Start: 2022-05-24 | End: 2022-05-24

## 2022-05-24 RX ADMIN — METHYLPREDNISOLONE SODIUM SUCCINATE 125 MG: 125 INJECTION, POWDER, FOR SOLUTION INTRAMUSCULAR; INTRAVENOUS at 10:23

## 2022-05-24 RX ADMIN — IPRATROPIUM BROMIDE AND ALBUTEROL SULFATE 1 AMPULE: .5; 2.5 SOLUTION RESPIRATORY (INHALATION) at 10:30

## 2022-05-24 ASSESSMENT — ENCOUNTER SYMPTOMS
ABDOMINAL PAIN: 0
COUGH: 1
WHEEZING: 1
VOMITING: 0
SHORTNESS OF BREATH: 1

## 2022-05-24 NOTE — ED PROVIDER NOTES
[facility] ED  eMERGENCY dEPARTMENT eNCOUnter      Pt Name: Raciel Claudio  MRN: [id number]  [place] 2000  Date of evaluation: 5/24/22      CHIEF COMPLAINT       Chief Complaint   Patient presents with    Shortness of Breath     h/o asthma    Cough     harsh dry cough         HISTORY OF PRESENT ILLNESS    Raciel Claudio is a 19 y.o. female who presents complaining of asthma is acting up she has a cough she is wheezing  The history is provided by the patient. Shortness of Breath  Severity:  Moderate  Onset quality:  Sudden  Duration:  1 day  Timing:  Intermittent  Progression:  Waxing and waning  Chronicity:  Recurrent  Context: known allergens and weather changes    Relieved by:  Nothing  Worsened by:  Nothing  Ineffective treatments:  None tried  Associated symptoms: cough and wheezing    Associated symptoms: no abdominal pain, no chest pain, no fever and no vomiting        REVIEW OF SYSTEMS       Review of Systems   Constitutional: Negative for fever. Respiratory: Positive for cough, shortness of breath and wheezing. Cardiovascular: Negative for chest pain. Gastrointestinal: Negative for abdominal pain and vomiting. All other systems reviewed and are negative.       PAST MEDICAL HISTORY     Past Medical History:   Diagnosis Date    Acute bronchitis     unspecified organism    Allergic     Allergic rhinitis     Allergy     Asthma     Severe persistent asthma, unspecified whether complicated    Chronic obstructive pulmonary disease ([facility] 8/10/2020    Chronic vasomotor rhinitis     GERD (gastroesophageal reflux disease)     Headache(784.0) 3/1/2012    Intractable abdominal pain 8/24/2020    Morbid obesity with BMI of 40.0-44.9, adult ([facility] 12/8/2016    NELSON (obstructive sleep apnea)     Prediabetes 12/8/2016    Pure hypercholesterolemia 12/8/2016    Severe persistent asthma     Sickle cell crisis ([facility] 5/27/2020    Thalassemia minor 10/29/2011    5/99     Unspecified sleep apnea     Vision disturbance 1/31/2014    wears glassses       SURGICAL HISTORY       Past Surgical History:   Procedure Laterality Date    ADENOIDECTOMY      COLONOSCOPY N/A 10/8/2020    COLONOSCOPY WITH BIOPSY performed by Ramya Navarro MD at [address] [place] [address]  08/28/2014    Nexplanon placement,  pt has had this removed as of 10-8-202    TONSILLECTOMY      UPPER GASTROINTESTINAL ENDOSCOPY N/A 10/8/2020    EGD BIOPSY performed by Ramya Navarro MD at [address] [facility]       Previous Medications    ACETAMINOPHEN (TYLENOL) 500 MG TABLET    Take 2 tablets by mouth 3 times daily    ALBUTEROL SULFATE HFA (VENTOLIN HFA) 108 (90 BASE) MCG/ACT INHALER    Inhale 2 puffs into the lungs every 6 hours as needed for Wheezing    ALUMINUM CHLORIDE (DRYSOL) 20 % EXTERNAL SOLUTION    Apply topically nightly.     BENZONATATE (TESSALON) 200 MG CAPSULE    Take 200 mg by mouth every 8 hours    BISACODYL (BISACODYL) 5 MG EC TABLET    Take 1 tablet by mouth daily as needed for Constipation    CETIRIZINE (ZYRTEC) 10 MG TABLET    Take 1 tablet by mouth daily    ELASTIC BANDAGES & SUPPORTS (WRIST/THUMB SPLINT/LEFT MEDIUM) MISC    Wear nightly x4 weeks    FERROUS SULFATE (IRON 325) 325 (65 FE) MG TABLET    Take 1 tablet by mouth daily (with breakfast)    FLUTICASONE (FLONASE) 50 MCG/ACT NASAL SPRAY    2 sprays by Each Nostril route daily    IBUPROFEN (ADVIL;MOTRIN) 800 MG TABLET    Take 1 tablet by mouth every 8 hours as needed for Pain    IPRATROPIUM-ALBUTEROL (DUONEB) 0.5-2.5 (3) MG/3ML SOLN NEBULIZER SOLUTION    Inhale 3 mLs into the lungs every 4 hours as needed    KETOCONAZOLE (NIZORAL) 2 % CREAM    Apply topically bid    LACTULOSE (CHRONULAC) 10 GM/15ML SOLUTION    Take 15 mLs by mouth nightly as needed (constipation)    LINACLOTIDE (LINZESS) 145 MCG CAPSULE    Take 1 capsule by mouth every morning (before breakfast)    MOMETASONE-FORMOTEROL (DULERA) 200-5 MCG/ACT INHALER Inhale 2 puffs into the lungs every 12 hours    ONDANSETRON (ZOFRAN ODT) 4 MG DISINTEGRATING TABLET    Take 1 tablet by mouth every 8 hours as needed for Nausea    PLECANATIDE (TRULANCE) 3 MG TABS    Take 3 mg by mouth daily    UREA (CARMOL) 40 % CREAM    Apply to feet once daily       ALLERGIES     is allergic to banana, peanut-containing drug products, and food. FAMILY HISTORY     She indicated that her mother is alive. She indicated that her father is alive. She indicated that her sister is alive. She indicated that her maternal grandmother is alive. She indicated that her maternal grandfather is alive. She indicated that her paternal grandmother is . She indicated that her paternal grandfather is alive. She indicated that the status of her maternal aunt is unknown. She indicated that the status of her paternal aunt is unknown. She indicated that the status of her paternal uncle is unknown. She indicated that the status of her neg hx is unknown. SOCIAL HISTORY      reports that she quit smoking about 6 years ago. Her smoking use included cigarettes. She has never used smokeless tobacco. She reports that she does not drink alcohol and does not use drugs. PHYSICAL EXAM     INITIAL VITALS: /89   Pulse 80   Temp 98.3 °F (36.8 °C) (Oral)   Resp 20   Ht 4' 11\" (1.499 m)   Wt 280 lb (127 kg)   LMP 2022   SpO2 98%   BMI 56.55 kg/m²      Physical Exam  Vitals and nursing note reviewed. Constitutional:       Appearance: She is well-developed. HENT:      Head: Normocephalic and atraumatic. Cardiovascular:      Rate and Rhythm: Normal rate and regular rhythm. Heart sounds: Normal heart sounds. Pulmonary:      Effort: Pulmonary effort is normal.      Breath sounds: Examination of the right-upper field reveals wheezing. Examination of the left-upper field reveals wheezing. Examination of the right-middle field reveals wheezing.  Examination of the left-middle field reveals wheezing. Wheezing present. Neurological:      Mental Status: She is alert and oriented to person, place, and time. MEDICAL DECISION MAKING:     Improved after IV steroids and DuoNeb. She states that she is back to her normal self and would like to go home. Recommend following up with closely with your primary care provider drink plenty of fluids continue aerosols at home will start prednisone for 5 days she should return for any worsening symptoms any other concerns. DIAGNOSTIC RESULTS     EKG: All EKG's are interpreted by the Emergency Department Physician who either signs or Co-signs this chart in the absence of acardiologist.        RADIOLOGY:Allplain film, CT, MRI, and formal ultrasound images (except ED bedside ultrasound) are read by the radiologist and the images and interpretations are directly viewed by the emergency physician. LABS:All lab results were reviewed by myself, and all abnormals are listed below.   Labs Reviewed   CBC WITH AUTO DIFFERENTIAL - Abnormal; Notable for the following components:       Result Value    Hemoglobin 11.8 (*)     MCV 79.2 (*)     MCH 23.1 (*)     Lymphocytes 51 (*)     Eosinophils % 8 (*)     All other components within normal limits   BASIC METABOLIC PANEL W/ REFLEX TO MG FOR LOW K - Abnormal; Notable for the following components:    Bun/Cre Ratio 25 (*)     Anion Gap 8 (*)     All other components within normal limits   PREGNANCY, URINE         EMERGENCY DEPARTMENT COURSE:   Vitals:    Vitals:    05/24/22 0832   BP: 133/89   Pulse: 80   Resp: 20   Temp: 98.3 °F (36.8 °C)   TempSrc: Oral   SpO2: 98%   Weight: 280 lb (127 kg)   Height: 4' 11\" (1.499 m)       The patient was given the following medications while in the emergency department:  Orders Placed This Encounter   Medications    methylPREDNISolone sodium (SOLU-MEDROL) injection 125 mg    ipratropium-albuterol (DUONEB) nebulizer solution 1 ampule     Order Specific Question:   Initiate RT Bronchodilator Protocol     Answer: Yes    predniSONE (DELTASONE) 20 MG tablet     Sig: Take 1 tablet by mouth daily for 5 days     Dispense:  5 tablet     Refill:  0       -------------------------      CRITICAL CARE:     CONSULTS:  None    PROCEDURES:  Procedures    FINAL IMPRESSION      1.  Mild intermittent asthma with exacerbation          DISPOSITION/PLAN   DISPOSITION Decision To Discharge 05/24/2022 12:46:00 PM      PATIENT REFERREDTO:  MARY Sunshine CNP  [address]  [phone]    Schedule an appointment as soon as possible for a visit in 2 days      [facility] ED  [address] [facility]  [phone]    If symptoms worsen      DISCHARGEMEDICATIONS:  New Prescriptions    PREDNISONE (DELTASONE) 20 MG TABLET    Take 1 tablet by mouth daily for 5 days       (Please note that portions of this note were completed with a voice recognition program.  Efforts were made to edit thedictations but occasionally words are mis-transcribed.)    RONNIE Bolton PA-C  05/24/22 3078

## 2022-06-06 ENCOUNTER — OFFICE VISIT (OUTPATIENT)
Dept: FAMILY MEDICINE CLINIC | Age: 22
End: 2022-06-06
Payer: COMMERCIAL

## 2022-06-06 VITALS
SYSTOLIC BLOOD PRESSURE: 132 MMHG | WEIGHT: 214 LBS | TEMPERATURE: 97.2 F | HEART RATE: 63 BPM | DIASTOLIC BLOOD PRESSURE: 84 MMHG | BODY MASS INDEX: 43.22 KG/M2 | OXYGEN SATURATION: 97 %

## 2022-06-06 DIAGNOSIS — R73.01 IFG (IMPAIRED FASTING GLUCOSE): ICD-10-CM

## 2022-06-06 DIAGNOSIS — G89.29 CHRONIC NONINTRACTABLE HEADACHE, UNSPECIFIED HEADACHE TYPE: ICD-10-CM

## 2022-06-06 DIAGNOSIS — E66.01 CLASS 3 SEVERE OBESITY DUE TO EXCESS CALORIES WITH BODY MASS INDEX (BMI) OF 40.0 TO 44.9 IN ADULT, UNSPECIFIED WHETHER SERIOUS COMORBIDITY PRESENT (HCC): ICD-10-CM

## 2022-06-06 DIAGNOSIS — Z76.89 ENCOUNTER TO ESTABLISH CARE: Primary | ICD-10-CM

## 2022-06-06 DIAGNOSIS — N94.6 DYSMENORRHEA: ICD-10-CM

## 2022-06-06 DIAGNOSIS — J30.2 SEASONAL ALLERGIC RHINITIS, UNSPECIFIED TRIGGER: ICD-10-CM

## 2022-06-06 DIAGNOSIS — J45.51 SEVERE PERSISTENT ASTHMA WITH ACUTE EXACERBATION: ICD-10-CM

## 2022-06-06 DIAGNOSIS — R51.9 CHRONIC NONINTRACTABLE HEADACHE, UNSPECIFIED HEADACHE TYPE: ICD-10-CM

## 2022-06-06 DIAGNOSIS — E01.0 THYROMEGALY: ICD-10-CM

## 2022-06-06 DIAGNOSIS — E78.00 PURE HYPERCHOLESTEROLEMIA: ICD-10-CM

## 2022-06-06 DIAGNOSIS — H66.001 NON-RECURRENT ACUTE SUPPURATIVE OTITIS MEDIA OF RIGHT EAR WITHOUT SPONTANEOUS RUPTURE OF TYMPANIC MEMBRANE: ICD-10-CM

## 2022-06-06 PROBLEM — R10.9 INTRACTABLE ABDOMINAL PAIN: Status: RESOLVED | Noted: 2020-08-24 | Resolved: 2022-06-06

## 2022-06-06 PROBLEM — F40.240 CLAUSTROPHOBIA: Status: RESOLVED | Noted: 2019-02-12 | Resolved: 2022-06-06

## 2022-06-06 PROBLEM — D57.00 SICKLE CELL CRISIS (HCC): Status: RESOLVED | Noted: 2020-05-27 | Resolved: 2022-06-06

## 2022-06-06 PROBLEM — R10.13 EPIGASTRIC ABDOMINAL PAIN: Status: RESOLVED | Noted: 2019-02-04 | Resolved: 2022-06-06

## 2022-06-06 PROBLEM — J45.50 SEVERE PERSISTENT ASTHMA WITHOUT COMPLICATION: Status: ACTIVE | Noted: 2022-06-06

## 2022-06-06 PROCEDURE — G8427 DOCREV CUR MEDS BY ELIG CLIN: HCPCS | Performed by: NURSE PRACTITIONER

## 2022-06-06 PROCEDURE — 1036F TOBACCO NON-USER: CPT | Performed by: NURSE PRACTITIONER

## 2022-06-06 PROCEDURE — G8417 CALC BMI ABV UP PARAM F/U: HCPCS | Performed by: NURSE PRACTITIONER

## 2022-06-06 PROCEDURE — 99204 OFFICE O/P NEW MOD 45 MIN: CPT | Performed by: NURSE PRACTITIONER

## 2022-06-06 RX ORDER — PREDNISONE 20 MG/1
20 TABLET ORAL 2 TIMES DAILY
Qty: 10 TABLET | Refills: 0 | Status: SHIPPED | OUTPATIENT
Start: 2022-06-06 | End: 2022-06-11

## 2022-06-06 RX ORDER — AMOXICILLIN AND CLAVULANATE POTASSIUM 875; 125 MG/1; MG/1
1 TABLET, FILM COATED ORAL 2 TIMES DAILY
Qty: 20 TABLET | Refills: 0 | Status: SHIPPED | OUTPATIENT
Start: 2022-06-06 | End: 2022-06-16

## 2022-06-06 RX ORDER — FLUTICASONE PROPIONATE 50 MCG
2 SPRAY, SUSPENSION (ML) NASAL DAILY
Qty: 1 EACH | Refills: 5 | Status: SHIPPED | OUTPATIENT
Start: 2022-06-06

## 2022-06-06 RX ORDER — MONTELUKAST SODIUM 10 MG/1
10 TABLET ORAL DAILY
Qty: 30 TABLET | Refills: 5 | Status: SHIPPED | OUTPATIENT
Start: 2022-06-06

## 2022-06-06 ASSESSMENT — ENCOUNTER SYMPTOMS
RHINORRHEA: 1
EYE REDNESS: 0
SORE THROAT: 0
FACIAL SWEATING: 0
PHOTOPHOBIA: 1
COUGH: 0
NAUSEA: 1
SINUS PRESSURE: 0
COLOR CHANGE: 0
RESPIRATORY NEGATIVE: 1
BLURRED VISION: 0
EYE ITCHING: 1
SWOLLEN GLANDS: 0
SCALP TENDERNESS: 0
DIARRHEA: 0
VOMITING: 0
EYE PAIN: 0
EYE WATERING: 0
VISUAL CHANGE: 0
ABDOMINAL PAIN: 0
BACK PAIN: 0

## 2022-06-06 NOTE — PROGRESS NOTES
[facility]  [address] [facility]  Dept: [phone]    Kaitlin Shanks is a 19 y.o. female who presents today for her medical conditions/complaintsas noted below.       Kaitlin Shanks is here today c/o Establish Care, Allergies, Headache (2 times weekly), and Otalgia (right X 2 weeks)    Past Medical History:   Diagnosis Date    Asthma     Severe persistent asthma, unspecified whether complicated    Chronic vasomotor rhinitis     GERD (gastroesophageal reflux disease)     Headache(784.0) 03/01/2012    Obesity     NELSON (obstructive sleep apnea)     Prediabetes 12/08/2016    Pure hypercholesterolemia 12/08/2016    Thalassemia minor 10/29/2011    5/99     Vision disturbance 01/31/2014    wears glassses      Past Surgical History:   Procedure Laterality Date    ADENOIDECTOMY      COLONOSCOPY N/A 10/8/2020    COLONOSCOPY WITH BIOPSY performed by Gabriele Horton MD at [address] [place] [address] Drive  08/28/2014    Nexplanon placement,  pt has had this removed as of 10-8-202    TONSILLECTOMY      UPPER GASTROINTESTINAL ENDOSCOPY N/A 10/8/2020    EGD BIOPSY performed by Gabriele Horton MD at [facility]       Family History   Problem Relation Age of Onset    Asthma Mother    [facility] Lupus Mother     High Blood Pressure Father     Diabetes Father     COPD Father     Stroke Father     Lupus Maternal Grandmother     Diabetes Maternal Grandfather     High Blood Pressure Maternal Grandfather     Heart Disease Paternal Grandmother     Diabetes Paternal Grandmother     High Blood Pressure Paternal Grandfather     Hypertension Paternal Grandfather     Diabetes Paternal Grandfather     Arthritis Maternal Aunt     Cancer Maternal Aunt     Heart Disease Paternal Aunt     Heart Disease Paternal Uncle     Birth Defects Neg Hx     Depression Neg Hx     Early Death Neg Hx     Hearing Loss Neg Hx     High Cholesterol Neg Hx     Kidney Disease Neg Hx  Learning Disabilities Neg Hx     Mental Illness Neg Hx     Mental Retardation Neg Hx     Miscarriages / Stillbirths Neg Hx     Substance Abuse Neg Hx     Vision Loss Neg Hx     Other Neg Hx     Ovarian Cancer Neg Hx     Breast Cancer Neg Hx        Social History     Tobacco Use    Smoking status: Former Smoker     Years: 2.00     Types: Cigarettes     Quit date: 2016     Years since quittin.0    Smokeless tobacco: Never Used   Substance Use Topics    Alcohol use: No     Alcohol/week: 0.0 standard drinks      Current Outpatient Medications   Medication Sig Dispense Refill    montelukast (SINGULAIR) 10 MG tablet Take 1 tablet by mouth daily 30 tablet 5    fluticasone (FLONASE) 50 MCG/ACT nasal spray 2 sprays by Each Nostril route daily 1 each 5    amoxicillin-clavulanate (AUGMENTIN) 875-125 MG per tablet Take 1 tablet by mouth 2 times daily for 10 days 20 tablet 0    predniSONE (DELTASONE) 20 MG tablet Take 1 tablet by mouth 2 times daily for 5 days 10 tablet 0    mometasone-formoterol (DULERA) 200-5 MCG/ACT inhaler Inhale 2 puffs into the lungs every 12 hours 1 each 2    ipratropium-albuterol (DUONEB) 0.5-2.5 (3) MG/3ML SOLN nebulizer solution Inhale 3 mLs into the lungs every 4 hours as needed      ibuprofen (ADVIL;MOTRIN) 800 MG tablet Take 1 tablet by mouth every 8 hours as needed for Pain 30 tablet 0    Urea (CARMOL) 40 % cream Apply to feet once daily 1 each 2    albuterol sulfate HFA (VENTOLIN HFA) 108 (90 Base) MCG/ACT inhaler Inhale 2 puffs into the lungs every 6 hours as needed for Wheezing 1 each 11    bisacodyl (BISACODYL) 5 MG EC tablet Take 1 tablet by mouth daily as needed for Constipation 30 tablet 1    ketoconazole (NIZORAL) 2 % cream Apply topically bid 30 g 3     No current facility-administered medications for this visit.      Allergies   Allergen Reactions    Banana     Peanut-Containing Drug Products     Food Rash     Peanuts,walnuts,cashews,pecans apples jori         HPI:     Presents today c/o new patient, last PCP [facility] - h/o constipation     [facility] - h/o asthma , h/o NELSON (doesn't use CPAP?)    Headache   This is a chronic (since age 23) problem. The current episode started more than 1 year ago. The problem occurs intermittently (2 x per week). The problem has been unchanged. The pain is located in the bilateral region. The pain does not radiate. The pain quality is similar to prior headaches. The quality of the pain is described as throbbing. The pain is at a severity of 10/10. The pain is severe. Associated symptoms include insomnia, nausea (w/ headaches), photophobia and rhinorrhea. Pertinent negatives include no abdominal pain, abnormal behavior, anorexia, back pain, blurred vision, coughing, dizziness, drainage, ear pain, eye pain, eye redness, eye watering, facial sweating, fever, hearing loss, loss of balance, phonophobia, scalp tenderness, seizures, sinus pressure, sore throat, swollen glands, tingling, tinnitus, visual change, vomiting, weakness or weight loss. Exacerbated by: Allergies  She has tried NSAIDs for the symptoms. The treatment provided moderate relief. Her past medical history is significant for migraine headaches and sinus disease. There is no history of cancer, cluster headaches, hypertension, immunosuppression, migraines in the family, obesity, pseudotumor cerebri, recent head traumas or TMJ.      H/o asthma & allergies  Taking Dulera , albuterol   Uses albuterol qd   Triggers include allergies, exercise, & heat   Symptoms include coughing & wheezing  Night time symptoms are 3 x weekly   Has been to ER a few times recently for asthma exacerbation   Due for f/u with Pulmonary     Reports painful monthly menstrual cycles   Typically last 4 days   Heavy with cramping that she needs Motrin for  Sexually active with females     Health Maintenance:      Subjective:     Review of Systems Constitutional: Negative. Negative for appetite change, chills, diaphoresis, fever and weight loss. HENT: Positive for rhinorrhea and sneezing. Negative for ear pain, hearing loss, sinus pressure, sore throat and tinnitus. Eyes: Positive for photophobia and itching. Negative for blurred vision, pain, redness and visual disturbance. Respiratory: Negative. Negative for cough. Cardiovascular: Negative. Gastrointestinal: Positive for nausea (w/ headaches). Negative for abdominal pain, anorexia, diarrhea and vomiting. Endocrine: Negative. Genitourinary: Negative. Negative for difficulty urinating. Musculoskeletal: Negative. Negative for back pain. Skin: Negative. Negative for color change, pallor, rash and wound. Allergic/Immunologic: Positive for environmental allergies and food allergies. Neurological: Positive for headaches. Negative for dizziness, tingling, seizures, syncope, facial asymmetry, weakness and loss of balance. Hematological: Negative. Psychiatric/Behavioral: Negative for dysphoric mood. The patient has insomnia. The patient is not nervous/anxious. Objective:     Vitals:    06/06/22 1002   BP: 132/84   Pulse: 63   Temp: 97.2 °F (36.2 °C)   SpO2: 97%       Body mass index is 43.22 kg/m². Physical Exam  Constitutional:       General: She is not in acute distress. Appearance: She is well-developed. She is obese. She is not ill-appearing, toxic-appearing or diaphoretic. HENT:      Head: Normocephalic and atraumatic. Right Ear: Ear canal and external ear normal. No drainage, swelling or tenderness. There is no impacted cerumen. Tympanic membrane is erythematous and bulging (severe). Tympanic membrane is not scarred or perforated. Left Ear: Ear canal and external ear normal. No drainage, swelling or tenderness. There is no impacted cerumen. Tympanic membrane is bulging. Tympanic membrane is not scarred or perforated.       Nose: Nose normal. Mouth/Throat:      Pharynx: Oropharynx is clear. Uvula midline. Eyes:      General: No scleral icterus. Right eye: No discharge. Left eye: No discharge. Conjunctiva/sclera: Conjunctivae normal.      Pupils: Pupils are equal, round, and reactive to light. Neck:      Thyroid: Thyromegaly present. Trachea: No tracheal deviation. Cardiovascular:      Rate and Rhythm: Normal rate and regular rhythm. Heart sounds: Normal heart sounds. No murmur heard. No friction rub. No gallop. Pulmonary:      Effort: Pulmonary effort is normal. No tachypnea, accessory muscle usage or respiratory distress. Breath sounds: No stridor. Decreased breath sounds and wheezing present. No rhonchi or rales. Abdominal:      General: Bowel sounds are normal. There is no distension. Palpations: Abdomen is soft. Musculoskeletal:         General: No tenderness or deformity. Normal range of motion. Cervical back: Normal range of motion and neck supple. Skin:     General: Skin is warm and dry. Coloration: Skin is not pale. Findings: No erythema or rash. Neurological:      Mental Status: She is alert and oriented to person, place, and time. GCS: GCS eye subscore is 4. GCS verbal subscore is 5. GCS motor subscore is 6. Gait: Gait is intact. Gait normal.   Psychiatric:         Speech: Speech normal.         Behavior: Behavior normal.         Thought Content: Thought content normal.         Judgment: Judgment normal.           Assessment:         1. Encounter to establish care    2. Severe persistent asthma with acute exacerbation    3. Seasonal allergic rhinitis, unspecified trigger    4. Non-recurrent acute suppurative otitis media of right ear without spontaneous rupture of tympanic membrane    5. Chronic nonintractable headache, unspecified headache type    6. IFG (impaired fasting glucose)    7.  Class 3 severe obesity due to excess calories with body mass index (BMI) of 40.0 to 44.9 in adult, unspecified whether serious comorbidity present ([facility] [address]    8. Pure hypercholesterolemia    9. Dysmenorrhea    10. Thyromegaly        Plan:     1. Encounter to establish care      2. Severe persistent asthma with acute exacerbation    - montelukast (SINGULAIR) 10 MG tablet; Take 1 tablet by mouth daily  Dispense: 30 tablet; Refill: 5  - predniSONE (DELTASONE) 20 MG tablet; Take 1 tablet by mouth 2 times daily for 5 days  Dispense: 10 tablet; Refill: 0    Continue current inhalers  F/u with Pulmonary encouraged   Prednisone burst & trial of Singular   F/u 3 weeks   Pneumonia vaccine at f/u appointment     3. Seasonal allergic rhinitis, unspecified trigger    - montelukast (SINGULAIR) 10 MG tablet; Take 1 tablet by mouth daily  Dispense: 30 tablet; Refill: 5  - fluticasone (FLONASE) 50 MCG/ACT nasal spray; 2 sprays by Each Nostril route daily  Dispense: 1 each; Refill: 5    4. Non-recurrent acute suppurative otitis media of right ear without spontaneous rupture of tympanic membrane    - amoxicillin-clavulanate (AUGMENTIN) 875-125 MG per tablet; Take 1 tablet by mouth 2 times daily for 10 days  Dispense: 20 tablet; Refill: 0  - predniSONE (DELTASONE) 20 MG tablet; Take 1 tablet by mouth 2 times daily for 5 days  Dispense: 10 tablet; Refill: 0    5. Chronic nonintractable headache, unspecified headache type    - montelukast (SINGULAIR) 10 MG tablet; Take 1 tablet by mouth daily  Dispense: 30 tablet; Refill: 5    Likely secondary to allergies  Trial of Singulair f/u 3 weeks  Encouraged headache diary to identify & avoid triggers  Encouraged healthy diet, staying well hydrated & adequate sleep  Consideration for maintenance mediation if needed at f/u appointment     6. IFG (impaired fasting glucose)    - Hemoglobin A1C; Future    7.  Class 3 severe obesity due to excess calories with body mass index (BMI) of 40.0 to 44.9 in adult, unspecified whether serious comorbidity present (HCC)    - CBC with Auto

## 2022-06-06 NOTE — PATIENT INSTRUCTIONS
Patient Education        Migraine Headache: Care Instructions  Overview     Migraines are painful, throbbing headaches that often start on one side of the head. They may cause nausea and vomiting and make you sensitive to light,sound, or smell. Without treatment, migraines can last from 4 hours to a few days. Medicines can help prevent migraines or stop them after they have started. Your doctor canhelp you find which ones work best for you. Follow-up care is a key part of your treatment and safety. Be sure to make and go to all appointments, and call your doctor if you are having problems. It's also a good idea to know your test results and keep alist of the medicines you take. How can you care for yourself at home?  Do not drive if you have taken a prescription pain medicine.  Rest in a quiet, dark room until your headache is gone. Close your eyes, and try to relax or go to sleep. Don't watch TV or read.  Put a cold, moist cloth or cold pack on the painful area for 10 to 20 minutes at a time. Put a thin cloth between the cold pack and your skin.  Use a warm, moist towel or a heating pad set on low to relax tight shoulder and neck muscles.  Have someone gently massage your neck and shoulders.  Take your medicines exactly as prescribed. Call your doctor if you think you are having a problem with your medicine. You will get more details on the specific medicines your doctor prescribes.  Don't take medicine for headache pain too often. Talk to your doctor if you are taking medicine more than 2 days a week to stop a headache. Taking too much pain medicine can lead to more headaches. These are called medicine-overuse headaches. To prevent migraines   Keep a headache diary so you can figure out what triggers your headaches. Avoiding triggers may help you prevent headaches. Record when each headache began, how long it lasted, and what the pain was like.  Write down any other symptoms you had with the headache, such as nausea, flashing lights or dark spots, or sensitivity to bright light or loud noise. Note if the headache occurred near your period. List anything that might have triggered the headache. Triggers may include certain foods (chocolate, cheese, wine) or odors, smoke, bright light, stress, or lack of sleep.  If your doctor has prescribed medicine for your migraines, take it as directed. You may have medicine that you take only when you get a migraine and medicine that you take all the time to help prevent migraines. ? If your doctor has prescribed medicine for when you get a headache, take it at the first sign of a migraine, unless your doctor has given you other instructions. ? If your doctor has prescribed medicine to prevent migraines, take it exactly as prescribed. Call your doctor if you think you are having a problem with your medicine.  Find healthy ways to deal with stress. Migraines are most common during or right after stressful times. Try finding ways to reduce stress like practicing mindfulness or deep breathing exercises.  Get plenty of sleep and exercise. But be careful to not push yourself too hard during exercise. It may trigger a headache.  Eat meals on a regular schedule. Avoid foods and drinks that often trigger migraines. These include chocolate, alcohol (especially red wine and port), aspartame, monosodium glutamate (MSG), and some additives found in foods (such as hot dogs, pimentel, cold cuts, aged cheeses, and pickled foods).  Limit caffeine. Don't drink too much coffee, tea, or soda. But don't quit caffeine suddenly. That can also give you migraines.  Do not smoke or allow others to smoke around you. If you need help quitting, talk to your doctor about stop-smoking programs and medicines. These can increase your chances of quitting for good.    If you are taking birth control pills or hormone therapy, talk to your doctor about whether they are triggering your migraines. When should you call for help? Call 911 anytime you think you may need emergency care. For example, call if:     You have signs of a stroke. These may include:  ? Sudden numbness, paralysis, or weakness in your face, arm, or leg, especially on only one side of your body. ? Sudden vision changes. ? Sudden trouble speaking. ? Sudden confusion or trouble understanding simple statements. ? Sudden problems with walking or balance. ? A sudden, severe headache that is different from past headaches. Call your doctor now or seek immediate medical care if:     You have new or worse nausea and vomiting.      You have a new or higher fever.      Your headache gets much worse. Watch closely for changes in your health, and be sure to contact your doctor if:     You are not getting better after 2 days (48 hours). Where can you learn more? Go to https://Voovio aka 3Ditizeantonellaeb.[employer]. org and sign in to your [employer] account. Enter J365 in the trustedsafe box to learn more about \"Migraine Headache: Care Instructions. \"     If you do not have an account, please click on the \"Sign Up Now\" link. Current as of: December 13, 2021               Content Version: 13.2  © 4028-9977 [employer]. Care instructions adapted under license by [facility] ([facility]). If you have questions about a medical condition or this instruction, always ask your healthcare professional. [place] [id number] any warranty or liability for your use of this information. Patient Education        Recurring Migraine Headache: Care Instructions  Overview  Migraines are painful, throbbing headaches. They often start on one side of the head. They may cause nausea and vomiting and make you sensitive to light, sound, or smell. Some people may have only a few migraines throughout life. Others have them as often as several times a month.   The goal of treatment is to reduce the number of migraines you have and relieve your symptoms. Even with treatment, you may continue to have migraines. You play an important role in dealing with your headaches. Work on avoiding things that seem to trigger your migraines. When you feel a headache coming on, actquickly to stop it before it gets worse. Follow-up care is a key part of your treatment and safety. Be sure to make and go to all appointments, and call your doctor if you are having problems. It's also a good idea to know your test results and keep alist of the medicines you take. How can you care for yourself at home?  Do not drive if you have taken a prescription pain medicine.  Rest in a quiet, dark room until your headache is gone. Close your eyes and try to relax or go to sleep. Do not watch TV or read.  Put a cold, moist cloth or cold pack on the painful area for 10 to 20 minutes at a time. Put a thin cloth between the cold pack and your skin.  Have someone gently massage your neck and shoulders.  Take your medicines exactly as prescribed. Call your doctor if you think you are having a problem with your medicine. You will get more details on the specific medicines your doctor prescribes.  Don't take medicine for headache pain too often. Talk to your doctor if you are taking medicine more than 2 days a week to stop a headache. Taking too much pain medicine can lead to more headaches. These are called medicine-overuse headaches. To prevent migraines   Keep a headache diary so you can figure out what triggers your headaches. Avoiding triggers may help you prevent headaches. Record when each headache began, how long it lasted, and what the pain was like. Write down any other symptoms you had with the headache. These may include nausea, flashing lights or dark spots, or sensitivity to bright light or loud noise. Note if the headache occurred near your period. List anything that might have triggered the headache.  Triggers may include certain foods (chocolate, cheese, wine) or odors, smoke, bright light, stress, or lack of sleep.  If your doctor has prescribed medicine for your migraines, take it as directed. You may have medicine that you take only when you get a migraine and medicine that you take all the time to help prevent migraines. ? If your doctor has prescribed medicine for when you get a headache, take it at the first sign of a migraine, unless your doctor has given you other instructions. ? If your doctor has prescribed medicine to prevent migraines, take it exactly as prescribed. Call your doctor if you think you are having a problem with your medicine.  Find healthy ways to deal with stress. Migraines are most common during or right after stressful times. Try finding ways to reduce stress like practicing mindfulness or deep breathing exercises.  Get regular sleep and exercise. But be careful to not push yourself too hard during exercise. It may trigger a headache.  Eat regular meals, and avoid foods and drinks that often trigger migraines. These include chocolate and alcohol, especially red wine and port. Chemicals used in food, such as aspartame and monosodium glutamate (MSG), also can trigger migraines. So can some food additives, such as those found in hot dogs, pimentel, cold cuts, aged cheeses, and pickled foods.  Limit caffeine by not drinking too much coffee, tea, or soda. Do not quit caffeine suddenly, because that can also give you migraines.  Do not smoke or allow others to smoke around you. If you need help quitting, talk to your doctor about stop-smoking programs and medicines. These can increase your chances of quitting for good.  If you are taking birth control pills or hormone therapy, talk to your doctor about whether they are triggering your migraines. When should you call for help? Call 911 anytime you think you may need emergency care. For example, call if:     You have symptoms of a stroke.  These may include:  ? Sudden numbness, tingling, weakness, or loss of movement in your face, arm, or leg, especially on only one side of your body. ? Sudden vision changes. ? Sudden trouble speaking. ? Sudden confusion or trouble understanding simple statements. ? Sudden problems with walking or balance. ? A sudden, severe headache that is different from past headaches. Call your doctor now or seek immediate medical care if:     You develop a fever and a stiff neck.      You have new nausea and vomiting, or you cannot keep down food or liquids. Watch closely for changes in your health, and be sure to contact your doctor if:     You have a headache that does not get better within 1 or 2 days.      Your headaches get worse or happen more often. Where can you learn more? Go to https://A[employer] LogspeLoksys Solutions.[employer]. org and sign in to your [employer] account. Enter  in the Acusphere box to learn more about \"Recurring Migraine Headache: Care Instructions. \"     If you do not have an account, please click on the \"Sign Up Now\" link. Current as of: December 13, 2021               Content Version: 13.2  © 0892-3831 [employer]. Care instructions adapted under license by [facility] ([facility]). If you have questions about a medical condition or this instruction, always ask your healthcare professional. [place] [address] any warranty or liability for your use of this information. Patient Education        Deciding About Taking Medicine to Prevent Migraines  How can you decide about taking medicine to prevent migraine headaches? What are migraines? Migraines are painful, throbbing headaches. They can last from 4 to 72 hours. They often occur on only one side of your head. But you may feel them on bothsides. The pain may keep you from doing your daily activities. You may take a daily medicine if you get bad migraines often. This can helpprevent them.   What are key points about this decision?  Medicines to prevent migraines may not stop them every time. But if you take them daily, you can reduce how many migraines you get by more than half. They can also reduce how long migraines last. And your symptoms may not be as bad.  Medicines that prevent migraines may cause side effects. You may have sleep and memory problems, upset stomach, dry mouth, or constipation. Some of these side effects may last for as long as you take the medicine. Or they may go away within a few weeks. Why might you choose to take medicine to prevent migraines?  You are willing to take medicine daily if it will help your symptoms.  You don't think the side effects of the medicine could be as bad as your migraine symptoms.  Your migraines get in the way of your work. Or they harm your relationships with friends and family.  Benefits of medicine include fewer or no migraines. And your migraines may not last as long or feel as bad. Why might you choose not to take medicine to prevent migraines?  You want to avoid the side effects of the medicine.  You don't want to take medicine every day.  Your migraines are not affecting your work and relationships.  If your symptoms don't improve with home treatment and other medicines, you can decide later to take medicine every day to help prevent migraines. Your decision  Thinking about the facts and your feelings can help you make a decision that is right for you. Be sure you understand the benefits and risks of your options,and think about what else you need to do before you make the decision. Where can you learn more? Go to https://dipti.ttwick. org and sign in to your [employer]entia account. Enter R134 in the [facility] box to learn more about \"Deciding About Taking Medicine to Prevent Migraines. \"     If you do not have an account, please click on the \"Sign Up Now\" link.   Current as of: December 13, 2021               Content Version: 13.2  © 9532-9195 [employer]. Care instructions adapted under license by [facility] ([facility]). If you have questions about a medical condition or this instruction, always ask your healthcare professional. [address] any warranty or liability for your use of this information.

## 2022-06-08 ENCOUNTER — HOSPITAL ENCOUNTER (OUTPATIENT)
Dept: ULTRASOUND IMAGING | Age: 22
Discharge: HOME | End: 2022-06-10
Payer: COMMERCIAL

## 2022-06-08 DIAGNOSIS — E01.0 THYROMEGALY: ICD-10-CM

## 2022-06-08 PROCEDURE — 76536 US EXAM OF HEAD AND NECK: CPT

## 2022-06-28 ENCOUNTER — HOSPITAL ENCOUNTER (OUTPATIENT)
Age: 22
Setting detail: SPECIMEN
Discharge: HOME | End: 2022-06-28

## 2022-06-28 DIAGNOSIS — E01.0 THYROMEGALY: ICD-10-CM

## 2022-06-28 DIAGNOSIS — R73.01 IFG (IMPAIRED FASTING GLUCOSE): ICD-10-CM

## 2022-06-28 DIAGNOSIS — E66.01 CLASS 3 SEVERE OBESITY DUE TO EXCESS CALORIES WITH BODY MASS INDEX (BMI) OF 40.0 TO 44.9 IN ADULT, UNSPECIFIED WHETHER SERIOUS COMORBIDITY PRESENT (HCC): ICD-10-CM

## 2022-06-28 DIAGNOSIS — E78.00 PURE HYPERCHOLESTEROLEMIA: ICD-10-CM

## 2022-06-28 LAB
ABSOLUTE EOS #: 0.14 K/UL (ref 0–0.44)
ABSOLUTE IMMATURE GRANULOCYTE: <0.03 K/UL (ref 0–0.3)
ABSOLUTE LYMPH #: 2.51 K/UL (ref 1.1–3.7)
ABSOLUTE MONO #: 0.35 K/UL (ref 0.1–1.2)
ALBUMIN SERPL-MCNC: 4.1 G/DL (ref 3.5–5.2)
ALBUMIN/GLOBULIN RATIO: 1.5 (ref 1–2.5)
ALP BLD-CCNC: 65 U/L (ref 35–104)
ALT SERPL-CCNC: 23 U/L (ref 5–33)
ANION GAP SERPL CALCULATED.3IONS-SCNC: 17 MMOL/L (ref 9–17)
AST SERPL-CCNC: 23 U/L
BASOPHILS # BLD: 1 % (ref 0–2)
BASOPHILS ABSOLUTE: 0.03 K/UL (ref 0–0.2)
BILIRUB SERPL-MCNC: 0.26 MG/DL (ref 0.3–1.2)
BUN BLDV-MCNC: 16 MG/DL (ref 6–20)
CALCIUM SERPL-MCNC: 9.3 MG/DL (ref 8.6–10.4)
CHLORIDE BLD-SCNC: 105 MMOL/L (ref 98–107)
CHOLESTEROL, FASTING: 205 MG/DL
CHOLESTEROL/HDL RATIO: 4.2
CO2: 20 MMOL/L (ref 20–31)
CREAT SERPL-MCNC: 0.54 MG/DL (ref 0.5–0.9)
EOSINOPHILS RELATIVE PERCENT: 2 % (ref 1–4)
ESTIMATED AVERAGE GLUCOSE: 117 MG/DL
GFR AFRICAN AMERICAN: >60 ML/MIN
GFR NON-AFRICAN AMERICAN: >60 ML/MIN
GFR SERPL CREATININE-BSD FRML MDRD: ABNORMAL ML/MIN/{1.73_M2}
GLUCOSE BLD-MCNC: 82 MG/DL (ref 70–99)
HBA1C MFR BLD: 5.7 % (ref 4–6)
HCT VFR BLD CALC: 42.3 % (ref 36.3–47.1)
HDLC SERPL-MCNC: 49 MG/DL
HEMOGLOBIN: 12.4 G/DL (ref 11.9–15.1)
IMMATURE GRANULOCYTES: 0 %
LDL CHOLESTEROL: 143 MG/DL (ref 0–130)
LYMPHOCYTES # BLD: 40 % (ref 24–43)
MCH RBC QN AUTO: 23 PG (ref 25.2–33.5)
MCHC RBC AUTO-ENTMCNC: 29.3 G/DL (ref 28.4–34.8)
MCV RBC AUTO: 78.3 FL (ref 82.6–102.9)
MONOCYTES # BLD: 6 % (ref 3–12)
NRBC AUTOMATED: 0 PER 100 WBC
PDW BLD-RTO: 14.2 % (ref 11.8–14.4)
PLATELET # BLD: 248 K/UL (ref 138–453)
PMV BLD AUTO: 12.2 FL (ref 8.1–13.5)
POTASSIUM SERPL-SCNC: 4.1 MMOL/L (ref 3.7–5.3)
RBC # BLD: 5.4 M/UL (ref 3.95–5.11)
RBC # BLD: ABNORMAL 10*6/UL
SEG NEUTROPHILS: 51 % (ref 36–65)
SEGMENTED NEUTROPHILS ABSOLUTE COUNT: 3.25 K/UL (ref 1.5–8.1)
SODIUM BLD-SCNC: 142 MMOL/L (ref 135–144)
TOTAL PROTEIN: 6.8 G/DL (ref 6.4–8.3)
TRIGLYCERIDE, FASTING: 66 MG/DL
TSH SERPL DL<=0.05 MIU/L-ACNC: 1.63 UIU/ML (ref 0.3–5)
WBC # BLD: 6.3 K/UL (ref 3.5–11.3)

## 2022-07-01 ENCOUNTER — OFFICE VISIT (OUTPATIENT)
Dept: FAMILY MEDICINE CLINIC | Age: 22
End: 2022-07-01
Payer: COMMERCIAL

## 2022-07-01 VITALS
DIASTOLIC BLOOD PRESSURE: 84 MMHG | TEMPERATURE: 98.6 F | HEART RATE: 103 BPM | BODY MASS INDEX: 45.53 KG/M2 | OXYGEN SATURATION: 98 % | SYSTOLIC BLOOD PRESSURE: 132 MMHG | WEIGHT: 225.4 LBS

## 2022-07-01 DIAGNOSIS — J45.40 MODERATE PERSISTENT ASTHMA WITHOUT COMPLICATION: Primary | ICD-10-CM

## 2022-07-01 DIAGNOSIS — F12.90 MARIJUANA USE: ICD-10-CM

## 2022-07-01 DIAGNOSIS — E66.01 CLASS 3 SEVERE OBESITY DUE TO EXCESS CALORIES WITH BODY MASS INDEX (BMI) OF 45.0 TO 49.9 IN ADULT, UNSPECIFIED WHETHER SERIOUS COMORBIDITY PRESENT (HCC): ICD-10-CM

## 2022-07-01 DIAGNOSIS — R73.01 IFG (IMPAIRED FASTING GLUCOSE): ICD-10-CM

## 2022-07-01 DIAGNOSIS — E78.00 PURE HYPERCHOLESTEROLEMIA: ICD-10-CM

## 2022-07-01 DIAGNOSIS — Z71.3 DIETARY COUNSELING: ICD-10-CM

## 2022-07-01 DIAGNOSIS — Z87.828 HISTORY OF HEAD INJURY: ICD-10-CM

## 2022-07-01 DIAGNOSIS — G44.319 ACUTE POST-TRAUMATIC HEADACHE, NOT INTRACTABLE: ICD-10-CM

## 2022-07-01 PROCEDURE — G8417 CALC BMI ABV UP PARAM F/U: HCPCS | Performed by: NURSE PRACTITIONER

## 2022-07-01 PROCEDURE — G8427 DOCREV CUR MEDS BY ELIG CLIN: HCPCS | Performed by: NURSE PRACTITIONER

## 2022-07-01 PROCEDURE — 1036F TOBACCO NON-USER: CPT | Performed by: NURSE PRACTITIONER

## 2022-07-01 PROCEDURE — 99214 OFFICE O/P EST MOD 30 MIN: CPT | Performed by: NURSE PRACTITIONER

## 2022-07-01 ASSESSMENT — PATIENT HEALTH QUESTIONNAIRE - PHQ9
SUM OF ALL RESPONSES TO PHQ QUESTIONS 1-9: 4
SUM OF ALL RESPONSES TO PHQ QUESTIONS 1-9: 4
SUM OF ALL RESPONSES TO PHQ9 QUESTIONS 1 & 2: 4
3. TROUBLE FALLING OR STAYING ASLEEP: 0
1. LITTLE INTEREST OR PLEASURE IN DOING THINGS: 2
10. IF YOU CHECKED OFF ANY PROBLEMS, HOW DIFFICULT HAVE THESE PROBLEMS MADE IT FOR YOU TO DO YOUR WORK, TAKE CARE OF THINGS AT HOME, OR GET ALONG WITH OTHER PEOPLE: 1
SUM OF ALL RESPONSES TO PHQ QUESTIONS 1-9: 4
7. TROUBLE CONCENTRATING ON THINGS, SUCH AS READING THE NEWSPAPER OR WATCHING TELEVISION: 0
2. FEELING DOWN, DEPRESSED OR HOPELESS: 2
SUM OF ALL RESPONSES TO PHQ QUESTIONS 1-9: 4
9. THOUGHTS THAT YOU WOULD BE BETTER OFF DEAD, OR OF HURTING YOURSELF: 0
6. FEELING BAD ABOUT YOURSELF - OR THAT YOU ARE A FAILURE OR HAVE LET YOURSELF OR YOUR FAMILY DOWN: 0
8. MOVING OR SPEAKING SO SLOWLY THAT OTHER PEOPLE COULD HAVE NOTICED. OR THE OPPOSITE, BEING SO FIGETY OR RESTLESS THAT YOU HAVE BEEN MOVING AROUND A LOT MORE THAN USUAL: 0
4. FEELING TIRED OR HAVING LITTLE ENERGY: 0
5. POOR APPETITE OR OVEREATING: 0

## 2022-07-01 ASSESSMENT — ENCOUNTER SYMPTOMS
DIARRHEA: 0
RESPIRATORY NEGATIVE: 1
ALLERGIC/IMMUNOLOGIC NEGATIVE: 1
SHORTNESS OF BREATH: 0
GASTROINTESTINAL NEGATIVE: 1
CHEST TIGHTNESS: 0
EYES NEGATIVE: 1
NAUSEA: 0
COLOR CHANGE: 0
WHEEZING: 0
VOMITING: 0

## 2022-07-01 NOTE — PATIENT INSTRUCTIONS
Patient Education        Postconcussion Syndrome: Care Instructions  Your Care Instructions     Postconcussion syndrome occurs after a blow to the head or body. Common symptoms are changes in the ability to concentrate, think, remember, or solve problems. Symptoms, which may include headaches, personality changes, and dizziness, may be related to stress from the events surrounding the accidentthat caused the injury. Follow-up care is a key part of your treatment and safety. Be sure to make and go to all appointments, and call your doctor if you are having problems. It's also a good idea to know your test results and keep alist of the medicines you take. How can you care for yourself at home? Pain    Rest is the best treatment for postconcussion syndrome.  Do not drive if you have taken a prescription pain medicine.  Rest in a quiet, dark room until your headache is gone. Close your eyes and try to relax or go to sleep. Do not watch TV or read.  Put a cold, moist cloth or cold pack on the painful area for 10 to 20 minutes at a time. Put a thin cloth between the cold pack and your skin.  Have someone gently massage your neck and shoulders.  Take your medicines exactly as prescribed. Call your doctor if you think you are having a problem with your medicine. You will get more details on the specific medicines your doctor prescribes. Stress    Try to reduce stress. Some ways to do this include:  ? Taking slow, deep breaths. ? Soaking in a warm bath. ? Listening to soothing music. ? Having a massage or back rub. ? Drinking a warm, nonalcoholic, noncaffeinated beverage.  Get enough sleep.  Eat a healthy, balanced diet. A balanced diet includes whole grains, dairy, fruits and vegetables, and protein. Eat a variety of foods from each of those groups so you get all the nutrients you need.  Avoid alcohol and illegal drugs.    Try relaxation exercises, such as breathing and muscle relaxation exercises.  Talk to your doctor about counseling. It may help you deal with stress from your accident. When should you call for help? Watch closely for changes in your health, and be sure to contact your doctor if:     You do not get better as expected.      Your symptoms, such as headaches, trouble concentrating, or changes in mood, get worse. Where can you learn more? Go to [email or web address] and sign in to your Ratio account. Enter X056 in the Vantos box to learn more about \"Postconcussion Syndrome: Care Instructions. \"     If you do not have an account, please click on the \"Sign Up Now\" link. Current as of: December 13, 2021               Content Version: 13.3  © 2006-2022 Healthwise, Edtrips. Care instructions adapted under license by [facility] ([facility]). If you have questions about a medical condition or this instruction, always ask your healthcare professional. [place] [address] any warranty or liability for your use of this information. Learning About Obesity  What is obesity? Obesity means having an unhealthy amount of body fat. This puts your health in danger. It can lead to other health problems, such as type 2 diabetes and highblood pressure. How do you know if your weight is in the obesity range? To know if your weight is in the obesity range, your doctor looks at your bodymass index (BMI) and waist size. BMI is a number that is calculated from your weight and your height. To figure out your BMI for yourself, you can use an online tool, such as http://www.walker.com/ on PureWRX. If your BMI is 30.0 or higher, it falls within the obesity range. Keep in mind that BMI and waist size are only guides. They are not tools to determine yourideal body weight. What causes obesity? When you take in more calories than you burn off, you gain weight. surgery may be an option for you. Before your doctor will prescribe medicines or surgery, he or she will probably want you to be more active and follow your healthy eating plan for a period of time. These habits are key lifelong changes for managing your weight, with or without other medical treatment. And these changes can help you avoid weight-relatedhealth problems. How can you stay on your plan for change? Be ready. Choose to start during a time when there are few events like holidays, socialevents, and high-stress periods. These events might trigger slip-ups. Decide on your first few steps. Most people have more success when they make small changes, one step at a time. For example, you might switch a daily candy bar to a piece of fruit, walk10 minutes more, or add more vegetables to a meal.  Line up your support people. Make sure you're not going to be alone as you make this change. Connect with people who understand how important it is to you. Ask family members and friends for help in keeping with your plan. And think about who could make itharder for you, and how to handle them. Try tracking. People who keep track of what they eat, feel, and do are better at losingweight. Try writing down things like:   What and how much you eat.  How you feel before and after each meal.   Details about each meal (like eating out or at home, eating alone, or with friends or family).  What you do to be active. Look and plan. As you track, look for patterns that you may want to change. Take note of:   When you eat and whether you skip meals.  How often you eat out.  How many fruits and vegetables you eat.  When you eat beyond feeling full.  When and why you eat for reasons other than being hungry. When you stray from your plan, don't get upset. Figure out what made you slipup and how you can fix it. Can you take medicines or have surgery to lose weight?   If you have a BMI in a certain range and have not been able to lose weight withdiet and exercise, medicine or surgery may be an option for you. If you have a BMI of at least 30.0 (or a BMI of at least 27.0 and another health problem related to your weight), ask your doctor about weight-loss medicines. They work by making you feel less hungry, making you feel full more quickly, or changing how you digest fat. Medicines are used along with dietchanges and more physical activity to help you make lasting changes. If you have a BMI of 40.0 or more (or a BMI of 35.0 or more and another health problem related to your weight), your doctor may talk with you about surgery. Weight-loss surgery has risks, and you will need to work with your doctor tocompare the risk of having obesity with the risks of surgery. With any option you choose, you will still need to eat a healthy diet and getregular exercise. Follow-up care is a key part of your treatment and safety. Be sure to make and go to all appointments, and call your doctor if you are having problems. It's also a good idea to know your test results and keep alist of the medicines you take. Where can you learn more? Go to [email or web address]://[email or web address] org and sign in to your Investicare account. Enter N111 in the [facility] box to learn more about \"Learning About Obesity. \"     If you do not have an account, please click on the \"Sign Up Now\" link. Current as of: December 27, 2021               Content Version: 13.3  © 2006-2022 Healthwise, Incorporated. Care instructions adapted under license by [facility] ([facility]). If you have questions about a medical condition or this instruction, always ask your healthcare professional. [place] [address] any warranty or liability for your use of this information. Learning About Weight-Loss (Bariatric) Surgery  What is weight-loss surgery? Bariatric surgery is surgery to help you lose weight.  This type of surgery is only used for people who are very overweight and have not been able to loseweight with diet and exercise. This surgery makes the stomach smaller. Some types of surgery also change theconnection between your stomach and intestines. Having weight-loss surgery is a big step. After surgery, you'll need to [facility], lifelong changes in how you eat and drink. How is weight-loss surgery done? Bariatric surgery may be either \"open\" or \"laparoscopic. \" Open surgery is done through a large cut (incision) in the belly. Laparoscopic surgery is done through several small cuts. The doctor puts a lighted tube, or scope, and other surgical tools through small cuts in your belly. The doctor is able to see yourorgans with the scope. There are different types of bariatric surgery. Gastric sleeve surgery  The surgery is usually done through several small incisions in the belly. The doctor removes more than half of your stomach. This leaves a thin sleeve, or tube, that is about the size of a banana. Because part of your stomach has beenremoved, this can't be reversed. Ofelia-en-Y gastric bypass surgery  Ofelia-en-Y (say \"delores-en-why\") surgery changes the connection between the stomachand the intestines. The doctor separates a section of your stomach from the rest of your stomach. This makes a small pouch. The new pouch will hold the food you eat. The doctorconnects the stomach pouch to the middle part of the small intestine. Gastric banding surgery  The surgery is usually done through several small incisions in the belly. The doctor wraps a band around the upper part of the stomach. This creates a small pouch. The small size of the pouch means that you will get full after you eat just a small amount of food. The doctor can inflate or deflate the band to adjust the size. This lets the doctor adjust how quickly food passes from the new pouch into the stomach. It does not change the connection between thestomach and the intestines.   What can you expect after the surgery? You may stay in the hospital for one or more days after the surgery. How longyou stay depends on the type of surgery you had. Most people need 2 to 4 weeks before they are ready to get back to their usualroutine. Your doctor will give you specific instructions about what to eat after the surgery. You'll start with only small amounts of soft foods and liquids. Bit by bit, you will be able to eat more solid foods. Your doctor may advise you to work with a dietitian. This way you'll be sure to get enough protein, vitamins, and minerals while you are losing weight. Even with a healthy diet, you mayneed to take vitamin and mineral supplements. After surgery, you will not be able to eat very much at one time. You will get full quickly. Try not to eat too much at one time or eat foods that are high infat or sugar. If you do, you may vomit, get stomach pain, or have diarrhea. Weight loss  You probably will lose weight very quickly in the first few months after surgery. As time goes on, your weight loss will slow down. You will haveregular doctor visits to check how you are doing. Emotions  It is common to have many emotions after this surgery. You may feel happy or excited as you begin to lose weight. But you may also feel overwhelmed or frustrated by the changes that you have to make in your diet, activity, andlifestyle. Talk with your doctor if you have concerns or questions. Think of bariatric surgery as a tool to help you lose weight. It isn't an instant fix. You will still need to eat a healthy diet and get regular exercise. This will help you reach your weight goal and avoid regaining theweight you lose. Follow-up care is a key part of your treatment and safety. Be sure to make and go to all appointments, and call your doctor if you are having problems. It's also a good idea to know your test results and keep alist of the medicines you take. Where can you learn more?   Go to [email or web address] and sign in to your UrbnDesignz account. Enter G469 in the [facility] box to learn more about \"Learning About Weight-Loss (Bariatric) Surgery. \"     If you do not have an account, please click on the \"Sign Up Now\" link. Current as of: December 27, 2021               Content Version: 13.3  © 2006-2022 [employer]. Care instructions adapted under license by [facility] ([facility]). If you have questions about a medical condition or this instruction, always ask your healthcare professional. [place] [address] any warranty or liability for your use of this information. Walking for Exercise: Care Instructions  Overview     Walking is one of the easiest ways to get the exercise you need for good health. A brisk, 30-minute walk each day can help you feel better and have more energy. It can help you lower your risk of disease. Walking can help you keepyour bones strong and your heart healthy. Check with your doctor before you start a walking plan if you have heart problems, other health issues, or you have not been active in a long time. Follow your doctor's instructions for safe levels of exercise. Follow-up care is a key part of your treatment and safety. Be sure to make and go to all appointments, and call your doctor if you are having problems. It's also a good idea to know your test results and keep alist of the medicines you take. How can you care for yourself at home? Getting started  [facility] slowly and set a short-term goal. For example, walk for 5 or 10 minutes every day.  Bit by bit, increase the amount you walk every day. Try for at least 30 minutes on most days of the week. You also may want to swim, bike, or do other activities.  If finding enough time is a problem, it's fine to be active in shorter periods of time throughout your day.    To get the heart-healthy benefits of walking, you need to walk briskly enough to increase your heart rate and breathing, but not so fast that you can't talk comfortably.  Wear comfortable shoes that fit well and provide good support for your feet and ankles. Staying with your plan   After you've made walking a habit, set a longer-term goal. You may want to set a goal of walking briskly for longer or walking farther. Experts say to do 2½ hours (150 minutes) of moderate activity a week. A faster heartbeat is what defines moderate-level activity.  To stay motivated, walk with friends, coworkers, or pets.  Use a phone jae, pedometer, or wearable device to track your steps each day. Set a goal to increase your steps. When you reach that goal, set a higher goal.   If the weather keeps you from walking outside, go for walks at the mall with a friend. Local schools and churches may have indoor gyms where you can walk. Fitting a walk into your workday  [facility] several blocks away from work, or get off the bus a few stops early.  Use the stairs instead of the elevator, at least for a few floors.  Suggest holding meetings with colleagues during a walk inside or outside the building.  Use the restroom that is the farthest from your desk or workstation.  Use your morning and afternoon breaks to take quick 15 minutes walks. Staying safe   Know your surroundings. Walk in a well-lighted, safe place. If it's dark, walk with a partner. Wear light-colored clothing. If you can, buy a vest or jacket that reflects light.  Carry a cell phone for emergencies.  Drink plenty of water. Take a water bottle with you when you walk. This is very important if it is hot out.  Be careful not to slip on wet or icy ground. You can buy \"grippers\" for your shoes to help keep you from slipping.  Pay attention to your walking surface. Use sidewalks and paths.    If you have health issues such as asthma, COPD, or heart problems, or if you haven't been active for a long time, check with your doctor before you start a new activity. Where can you learn more? Go to [email or web address] and sign in to your [employer] account. Enter R159 in the [facility] box to learn more about \"Walking for Exercise: Care Instructions. \"     If you do not have an account, please click on the \"Sign Up Now\" link. Current as of: January 26, 2022               Content Version: 13.3  © 2006-2022 [employer]. Care instructions adapted under license by [facility] ([facility]). If you have questions about a medical condition or this instruction, always ask your healthcare professional. [address] any warranty or liability for your use of this information. Starting a Weight Loss Plan: Care Instructions  Overview     If you're thinking about losing weight, it can be hard to know where to start. Your doctor can help you set up a weight loss plan that best meets your needs. You may want to take a class on nutrition or exercise, or you could join a weight loss support group. If you have questions about how to make changes to your eating or exercise habits, ask your doctor about seeing a registereddietitian or an exercise specialist.  It can be a big challenge to lose weight. But you don't have to make huge changes at once. Make small changes, and stick with them. When those changesbecome habit, add a few more changes. If you don't think you're ready to make changes right now, try to pick a date in the future. Make an appointment to see your doctor to discuss whether thetime is right for you to start a plan. Follow-up care is a key part of your treatment and safety. Be sure to make and go to all appointments, and call your doctor if you are having problems. It's also a good idea to know your test results and keep alist of the medicines you take. How can you care for yourself at home?  Set realistic goals. Many people expect to lose much more weight than is likely.  A weight loss of 5% to 10% of your body weight may be enough to improve your health.  Get family and friends involved to provide support. Talk to them about why you are trying to lose weight, and ask them to help. They can help by participating in exercise and having meals with you, even if they may be eating something different.  Find what works best for you. If you do not have time or do not like to cook, a program that offers meal replacement bars or shakes may be better for you. Or if you like to prepare meals, finding a plan that includes daily menus and recipes may be best.   Ask your doctor about other health professionals who can help you achieve your weight loss goals. ? A dietitian can help you make healthy changes in your diet. ? An exercise specialist or  can help you develop a safe and effective exercise program.  ? A counselor or psychiatrist can help you cope with issues such as depression, anxiety, or family problems that can make it hard to focus on weight loss.  Consider joining a support group for people who are trying to lose weight. Your doctor can suggest groups in your area. Where can you learn more? Go to [email or web address]://Adara Global.Emerald [email or web address] org and sign in to your Kinnek account. Enter D579 in the [facility] box to learn more about \"Starting a Weight Loss Plan: Care Instructions. \"     If you do not have an account, please click on the \"Sign Up Now\" link. Current as of: December 27, 2021               Content Version: 13.3  © 4064-1947 Healthwise, SpamLion. Care instructions adapted under license by [facility] ([facility]). If you have questions about a medical condition or this instruction, always ask your healthcare professional. [place] [address] any warranty or liability for your use of this information.            Body Mass Index: Care Instructions  Your Care Instructions     Body mass index (BMI) can help you see if your weight is raising your risk cause health problems. If you have a BMI higher than 25   Your doctor may do other tests to check your risk for weight-related health problems. This may include measuring the distance around your waist. A waist measurement of more than 40 inches in men or 35 inches in women can increase the risk of weight-related health problems.  Talk with your doctor about steps you can take to stay healthy or improve your health. You may need to make lifestyle changes to lose weight and stay healthy, such as changing your diet and getting regular exercise. If you have a BMI lower than 18.5   Your doctor may do other tests to check your risk for health problems.  Talk with your doctor about steps you can take to stay healthy or improve your health. You may need to make lifestyle changes to gain or maintain weight and stay healthy, such as getting more healthy foods in your diet and doing exercises to build muscle. Where can you learn more? Go to https://dipti.[email or web address] [email or web address] org and sign in to your [employer] account. Enter S176 in the [employer] box to learn more about \"Body Mass Index: Care Instructions. \"     If you do not have an account, please click on the \"Sign Up Now\" link. Current as of: December 27, 2021               Content Version: 13.3  © 2006-2022 katena. Care instructions adapted under license by [facility] ([facility]). If you have questions about a medical condition or this instruction, always ask your healthcare professional. [place] [address] any warranty or liability for your use of this information. Prediabetes: Care Instructions  Overview     Prediabetes is a warning sign that you're at risk for getting type 2 diabetes. It means that your blood sugar is higher than it should be. But it's not highenough to be diabetes. The food you eat naturally turns into sugar. Your body uses the sugar for energy.  Normally, an organ called the pancreas makes insulin. And insulin allows the sugar in your blood to get into your body's cells. But sometimes the body can't use insulin the right way. So the sugar stays in your blood instead. This is called insulin resistance. The buildup of sugar in your blood means youhave prediabetes. The good news is that you may be able to prevent or delay diabetes. Making small lifestyle changes, like getting active and changing your eating habits, may help you get your blood sugar back to normal. You can work with your doctorto make a treatment plan. Follow-up care is a key part of your treatment and safety. Be sure to make and go to all appointments, and call your doctor if you are having problems. It's also a good idea to know your test results and keep alist of the medicines you take. How can you care for yourself at home?  Watch your weight. A healthy weight helps your body use insulin properly.  Limit the amount of calories, sweets, and unhealthy fat you eat. Ask your doctor if you should see a dietitian. A registered dietitian can help you create meal plans that fit your lifestyle.  Get at least 30 minutes of exercise on most days of the week. Exercise helps control your blood sugar. It also helps you maintain a healthy weight. Walking is a good choice. You also may want to do other activities, such as running, swimming, cycling, or playing tennis or team sports.  Do not smoke. Smoking can make prediabetes worse. If you need help quitting, talk to your doctor about stop-smoking programs and medicines. These can increase your chances of quitting for good.  If your doctor prescribed medicines, take them exactly as prescribed. Call your doctor if you think you are having a problem with your medicine. You will get more details on the specific medicines your doctor prescribes. When should you call for help?   Watch closely for changes in your health, and be sure to contact your doctor if:     You have any symptoms of diabetes. These may include:  ? Being thirsty more often. ? Urinating more. ? Being hungrier. ? Losing weight. ? Being very tired. ? Having blurry vision.      You have a wound that will not heal.      You have an infection that will not go away.      You have problems with your blood pressure.      You want more information about diabetes and how you can keep from getting it. Where can you learn more? Go to https://[email or web address].[email or web address] org and sign in to your DNA Guide account. Enter I222 in the Sch[facility]inger box to learn more about \"Prediabetes: Care Instructions. \"     If you do not have an account, please click on the \"Sign Up Now\" link. Current as of: July 28, 2021               Content Version: 13.3  © 2162-3897 [employer]. Care instructions adapted under license by [facility] [place] ([facility]). If you have questions about a medical condition or this instruction, always ask your healthcare professional. [place] [address] any warranty or liability for your use of this information.

## 2022-07-01 NOTE — PROGRESS NOTES
[facility]  [address] [facility]  Dept: [phone]    Vee Grajeda is a 19 y.o. female who presents today for her medical conditions/complaintsas noted below.       Vee Grajeda is here today c/o Asthma (f/u), Discuss Medications (stopped Augmentin due to sx of yeast infection), and Head Injury (6/17/22 bumped head while getting in car- headaches since)    Past Medical History:   Diagnosis Date    Asthma     Severe persistent asthma, unspecified whether complicated    Chronic vasomotor rhinitis     GERD (gastroesophageal reflux disease)     Headache(784.0) 03/01/2012    Obesity     NELSON (obstructive sleep apnea)     Prediabetes 12/08/2016    Pure hypercholesterolemia 12/08/2016    Thalassemia minor 10/29/2011    5/99     Vision disturbance 01/31/2014    wears glassses      Past Surgical History:   Procedure Laterality Date    ADENOIDECTOMY      COLONOSCOPY N/A 10/8/2020    COLONOSCOPY WITH BIOPSY performed by Jan De La Cruz MD at [address] [place] [address] Drive  08/28/2014    Nexplanon placement,  pt has had this removed as of 10-8-202    TONSILLECTOMY      UPPER GASTROINTESTINAL ENDOSCOPY N/A 10/8/2020    EGD BIOPSY performed by Jan De La Cruz MD at [facility]       Family History   Problem Relation Age of Onset    Asthma Mother    Jamas Roys Lupus Mother     High Blood Pressure Father     Diabetes Father     COPD Father     Stroke Father     Lupus Maternal Grandmother     Diabetes Maternal Grandfather     High Blood Pressure Maternal Grandfather     Heart Disease Paternal Grandmother     Diabetes Paternal Grandmother     High Blood Pressure Paternal Grandfather     Hypertension Paternal Grandfather     Diabetes Paternal Grandfather     Arthritis Maternal Aunt     Cancer Maternal Aunt     Heart Disease Paternal Aunt     Heart Disease Paternal Uncle     Birth Defects Neg Hx     Depression Neg Hx     Early Death Neg Hx     Hearing Loss Neg Hx     High Cholesterol Neg Hx     Kidney Disease Neg Hx     Learning Disabilities Neg Hx     Mental Illness Neg Hx     Mental Retardation Neg Hx     Miscarriages / Stillbirths Neg Hx     Substance Abuse Neg Hx     Vision Loss Neg Hx     Other Neg Hx     Ovarian Cancer Neg Hx     Breast Cancer Neg Hx        Social History     Tobacco Use    Smoking status: Former Smoker     Years: 2.00     Types: Cigarettes     Quit date: 2016     Years since quittin.1    Smokeless tobacco: Never Used   Substance Use Topics    Alcohol use: No     Alcohol/week: 0.0 standard drinks      Current Outpatient Medications   Medication Sig Dispense Refill    montelukast (SINGULAIR) 10 MG tablet Take 1 tablet by mouth daily 30 tablet 5    fluticasone (FLONASE) 50 MCG/ACT nasal spray 2 sprays by Each Nostril route daily 1 each 5    mometasone-formoterol (DULERA) 200-5 MCG/ACT inhaler Inhale 2 puffs into the lungs every 12 hours 1 each 2    ipratropium-albuterol (DUONEB) 0.5-2.5 (3) MG/3ML SOLN nebulizer solution Inhale 3 mLs into the lungs every 4 hours as needed      ibuprofen (ADVIL;MOTRIN) 800 MG tablet Take 1 tablet by mouth every 8 hours as needed for Pain 30 tablet 0    Urea (CARMOL) 40 % cream Apply to feet once daily 1 each 2    albuterol sulfate HFA (VENTOLIN HFA) 108 (90 Base) MCG/ACT inhaler Inhale 2 puffs into the lungs every 6 hours as needed for Wheezing 1 each 11    bisacodyl (BISACODYL) 5 MG EC tablet Take 1 tablet by mouth daily as needed for Constipation 30 tablet 1    ketoconazole (NIZORAL) 2 % cream Apply topically bid 30 g 3     No current facility-administered medications for this visit.      Allergies   Allergen Reactions    Banana     Peanut-Containing Drug Products     Food Rash     Peanuts,walnuts,cashews,pecans apples cherries         HPI:     HPI    Presents today c/o follow-up asthma / allergies  Taking Dulera BID, Singulair   Finished prednisone with improvement Using albuterol 3-4 x per week  Triggers include allergies, exercise, lung irritants & heat  Has f/u with Pulmonary on 7/19   Still smoking marijuana regularly , discussed     H/o IFG - A1C 5.7%     H/o dyslipidemia     2 weeks ago patient was getting into moms vehicle & hit her head on the car door   Declines any loss of consciousness   Headaches are occurring 2-3 x per week ever since   Declines associated nausea, vomiting, blurred vision, confusion, numbness, weakness or other neurological symptoms    Has tried 800 mg Ibuprofen with relief     Health Maintenance:      Subjective:     Review of Systems   Constitutional: Negative. Negative for appetite change, chills, diaphoresis and fever. HENT: Negative. Eyes: Negative. Respiratory: Negative. Negative for chest tightness, shortness of breath and wheezing. Cardiovascular: Negative. Gastrointestinal: Negative. Negative for diarrhea, nausea and vomiting. Endocrine: Negative. Genitourinary: Negative. Negative for difficulty urinating. Musculoskeletal: Negative. Skin: Negative. Negative for color change, pallor, rash and wound. Allergic/Immunologic: Negative. Neurological: Positive for headaches. Negative for dizziness, tremors, seizures, syncope, facial asymmetry, speech difficulty, weakness, light-headedness and numbness. Hematological: Negative. Psychiatric/Behavioral: Negative. Negative for confusion. Objective:     Vitals:    07/01/22 1323   BP: 132/84   Pulse: (!) 103   Temp: 98.6 °F (37 °C)   SpO2: 98%       Body mass index is 45.53 kg/m². Physical Exam  Constitutional:       General: She is not in acute distress. Appearance: Normal appearance. She is well-developed. She is obese. She is not ill-appearing, toxic-appearing or diaphoretic. HENT:      Head: Normocephalic and atraumatic.       Right Ear: Tympanic membrane, ear canal and external ear normal.      Left Ear: Tympanic membrane, ear canal and external ear normal.      Nose: Nose normal.      Mouth/Throat:      Mouth: Mucous membranes are moist.      Pharynx: Oropharynx is clear. Eyes:      General: No scleral icterus. Right eye: No discharge. Left eye: No discharge. Conjunctiva/sclera: Conjunctivae normal.      Pupils: Pupils are equal, round, and reactive to light. Neck:      Trachea: No tracheal deviation. Cardiovascular:      Rate and Rhythm: Normal rate and regular rhythm. Heart sounds: Normal heart sounds. No murmur heard. No friction rub. No gallop. Pulmonary:      Effort: Pulmonary effort is normal. No tachypnea, accessory muscle usage or respiratory distress. Breath sounds: No stridor. Wheezing present. No decreased breath sounds, rhonchi or rales. Abdominal:      Palpations: Abdomen is soft. Musculoskeletal:         General: No tenderness or deformity. Normal range of motion. Cervical back: Normal range of motion and neck supple. Skin:     General: Skin is warm and dry. Coloration: Skin is not pale. Findings: No erythema or rash. Neurological:      Mental Status: She is alert and oriented to person, place, and time. GCS: GCS eye subscore is 4. GCS verbal subscore is 5. GCS motor subscore is 6. Cranial Nerves: Cranial nerves are intact. Sensory: Sensation is intact. Motor: Motor function is intact. Coordination: Coordination is intact. Gait: Gait is intact. Gait normal.   Psychiatric:         Speech: Speech normal.         Behavior: Behavior normal.         Thought Content: Thought content normal.         Judgment: Judgment normal.           Assessment:         1. Moderate persistent asthma without complication    2. Marijuana use    3. History of head injury    4. Acute post-traumatic headache, not intractable    5.  Class 3 severe obesity due to excess calories with body mass index (BMI) of 45.0 to 49.9 in adult, unspecified whether serious comorbidity present ([facility] [address]    6. Dietary counseling    7. Pure hypercholesterolemia    8. IFG (impaired fasting glucose)        Plan:     1. Moderate persistent asthma without complication    Continue meds  Follow-up with Pulmonary as scheduled  Smoking cessation encouraged   Prevnar 20 when available     2. Marijuana use    Smoking cessation encouraged     3. History of head injury    No red flag s/s   Monitor for resolution of symptoms     4. Acute post-traumatic headache, not intractable    No red flag s/s or need for imaging   Symptomatic treatment encouraged  Neurology referral if persistent     5. Class 3 severe obesity due to excess calories with body mass index (BMI) of 45.0 to 49.9 in adult, unspecified whether serious comorbidity present (HCC)    BMI was elevated today, and weight loss plan recommended is : conventional weight loss and daily exercise regimen. 6. Dietary counseling    -  BMI ABOVE NORMAL F/U    7. Pure hypercholesteremia     8. IFG     Recommended weight loss, dietary modifications & routine exercise     Discussed use, benefit, and side effects of prescribed medications. All patient questions answered. Pt voiced understanding. Reviewed health maintenance. Instructed to continue current medications, diet and exercise. Patient agreedwith treatment plan. Follow up as directed.      Electronically signed by MARY Mccloud CNP on 7/1/2022

## 2022-10-14 ENCOUNTER — OFFICE VISIT (OUTPATIENT)
Dept: PODIATRY | Age: 22
End: 2022-10-14
Payer: COMMERCIAL

## 2022-10-14 VITALS — WEIGHT: 225 LBS | BODY MASS INDEX: 45.36 KG/M2 | HEIGHT: 59 IN

## 2022-10-14 DIAGNOSIS — L98.8 MACERATION OF SKIN: ICD-10-CM

## 2022-10-14 DIAGNOSIS — D23.71 BENIGN NEOPLASM OF SKIN OF RIGHT FOOT: Primary | ICD-10-CM

## 2022-10-14 DIAGNOSIS — M79.604 PAIN IN BOTH LOWER EXTREMITIES: ICD-10-CM

## 2022-10-14 DIAGNOSIS — M79.605 PAIN IN BOTH LOWER EXTREMITIES: ICD-10-CM

## 2022-10-14 DIAGNOSIS — B35.3 TINEA PEDIS OF BOTH FEET: ICD-10-CM

## 2022-10-14 PROCEDURE — 17110 DESTRUCTION B9 LES UP TO 14: CPT | Performed by: PODIATRIST

## 2022-10-14 PROCEDURE — 99213 OFFICE O/P EST LOW 20 MIN: CPT | Performed by: PODIATRIST

## 2022-10-14 PROCEDURE — G8417 CALC BMI ABV UP PARAM F/U: HCPCS | Performed by: PODIATRIST

## 2022-10-14 PROCEDURE — G8427 DOCREV CUR MEDS BY ELIG CLIN: HCPCS | Performed by: PODIATRIST

## 2022-10-14 PROCEDURE — G8484 FLU IMMUNIZE NO ADMIN: HCPCS | Performed by: PODIATRIST

## 2022-10-14 PROCEDURE — 1036F TOBACCO NON-USER: CPT | Performed by: PODIATRIST

## 2022-10-14 RX ORDER — MIRTAZAPINE 15 MG/1
15 TABLET, FILM COATED ORAL NIGHTLY
COMMUNITY

## 2022-10-14 RX ORDER — VENLAFAXINE 37.5 MG/1
37.5 TABLET ORAL DAILY
COMMUNITY

## 2022-10-14 RX ORDER — CLOTRIMAZOLE AND BETAMETHASONE DIPROPIONATE 10; .64 MG/G; MG/G
CREAM TOPICAL
Qty: 45 G | Refills: 2 | Status: SHIPPED | OUTPATIENT
Start: 2022-10-14

## 2022-10-14 NOTE — PROGRESS NOTES
[address] [facility]  [address] [place] [address]  Dept: [phone]  Dept Fax: [phone]     FOOT PAIN & BENIGN NEOPLASM PROGRESS NOTE  Date of patient's visit: 10/14/2022  Patient's Name:  Lizbeth Eddy YOB: 2000            Patient Care Team:  MARY Ramirez CNP as PCP - General (Family Medicine)  MARY Ramirez CNP as PCP - [facility] Empaneled Provider  Blank Negrete MD as Consulting Physician (Pulmonology)  MARY Choudhary CNP as Nurse Practitioner (Nurse Practitioner)  Arielle Arguelles MD as Consulting Physician (Gastroenterology)  Rani Reese DPM as Surgeon (Podiatry)          Chief Complaint   Patient presents with    Foot Pain     Right foot pain    Benign Neoplasm     Right foot lesion       Subjective: This Lizbeth Eddy comes to clinic for foot and nail care. Pt currently has complaint of thickened, painful, skin lesion to right foot that he/she cannot manage by themselves. Pt. Relates pain is worse with shoe gear. She relates to itchy skin to webspaces. Pt's primary care physician is MARY Ramirez CNPlasrona seen  07/01/2022.   Past Medical History:   Diagnosis Date    Asthma     Severe persistent asthma, unspecified whether complicated    Chronic vasomotor rhinitis     GERD (gastroesophageal reflux disease)     Headache(784.0) 03/01/2012    Obesity     NELSON (obstructive sleep apnea)     Prediabetes 12/08/2016    Pure hypercholesterolemia 12/08/2016    Thalassemia minor 10/29/2011    5/99     Vision disturbance 01/31/2014    wears glassses         Allergies   Allergen Reactions    Banana     Peanut-Containing Drug Products     Food Rash     Peanuts,walnuts,cashews,pecans apples cherries     Current Outpatient Medications on File Prior to Visit   Medication Sig Dispense Refill    venlafaxine (EFFEXOR) 37.5 MG tablet Take 37.5 mg by mouth daily mirtazapine (REMERON) 15 MG tablet Take 15 mg by mouth nightly Take 1/2 tab in morning and 1/2 tab at night      montelukast (SINGULAIR) 10 MG tablet Take 1 tablet by mouth daily 30 tablet 5    fluticasone (FLONASE) 50 MCG/ACT nasal spray 2 sprays by Each Nostril route daily 1 each 5    mometasone-formoterol (DULERA) 200-5 MCG/ACT inhaler Inhale 2 puffs into the lungs every 12 hours 1 each 2    ipratropium-albuterol (DUONEB) 0.5-2.5 (3) MG/3ML SOLN nebulizer solution Inhale 3 mLs into the lungs every 4 hours as needed      ibuprofen (ADVIL;MOTRIN) 800 MG tablet Take 1 tablet by mouth every 8 hours as needed for Pain 30 tablet 0    Urea (CARMOL) 40 % cream Apply to feet once daily 1 each 2    albuterol sulfate HFA (VENTOLIN HFA) 108 (90 Base) MCG/ACT inhaler Inhale 2 puffs into the lungs every 6 hours as needed for Wheezing 1 each 11    ketoconazole (NIZORAL) 2 % cream Apply topically bid 30 g 3    bisacodyl (BISACODYL) 5 MG EC tablet Take 1 tablet by mouth daily as needed for Constipation (Patient not taking: Reported on 10/14/2022) 30 tablet 1     No current facility-administered medications on file prior to visit. Review of Systems    Review of Systems:   History obtained from chart review and the patient  General ROS: negative for - chills, fatigue, fever, night sweats or weight gain  Constitutional: Negative for chills, diaphoresis, fatigue, fever and unexpected weight change. Musculoskeletal: Positive for arthralgias, gait problem and joint swelling. Neurological ROS: negative for - behavioral changes, confusion, headaches or seizures. Negative for weakness and numbness. Dermatological ROS: negative for - mole changes, rash  Cardiovascular: Negative for leg swelling. Gastrointestinal: Negative for constipation, diarrhea, nausea and vomiting.             Objective:  Dermatologic Exam:  Maceration noted to interspaces 2,3,4 right foot  Soft tissue lesion to the plantar right foot with central core and petechiae. Pain on palpation of lesion. Skin No rashes or nodules noted. .   Skin is thin, with flaky sloughing skin as well as decreased hair growth to the lower leg  Small red hemosiderin deposits seen dorsal foot   Musculoskeletal:     1st MPJ ROM decreased, Bilateral.  Muscle strength 5/5, Bilateral.  Pain present upon palpation of toenails 1-5, Bilateral. decreased medial longitudinal arch, Bilateral.  Ankle ROM decreased,Bilateral.    Dorsally contracted digits present digits 2, Bilateral.     Vascular: DP pulses 1/4 bilateral.  PT pulses 0/4 bilateral.   CFT <5 seconds, Bilateral.  Hair growth absent to the level of the digits, Bilateral.  Edema present, Bilateral.  Varicosities absent, Bilateral. Erythema absent, Bilateral    Neurological: Sensation diminshed to light touch to level of digits, Bilateral.  Protective sensation intact 6/10 sites via 5.07/10g [place] Monofilament, Bilateral.  negative Tinel's, Bilateral.  negative Valleix sign, Bilateral.      Integument: Warm, dry, supple, Bilateral.  Open lesion absent, Bilateral.  Interdigital maceration absent to web spaces 4, Bilateral.  Nails 1-5 left and 1-5 right thickened > 3.0 mm, dystrophic and crumbly, discolored with subungual debris. Fissures absent, Bilateral.   General: AAO x 3 in NAD.     Derm  Toenail Description  Sites of Onychomycosis Involvement (Check affected area)  [x] [x] [x] [x] [x] [x] [x] [x] [x] [x]  5 4 3 2 1 1 2 3 4 5                          Right                                        Left    Thickness  [x] [x] [x] [x] [x] [x] [x] [x] [x] [x]  5 4 3 2 1 1 2 3 4 5                         Right                                        Left    Dystrophic Changes   [x] [x] [x] [x] [x] [x] [x] [x] [x] [x]  5 4 3 2 1 1 2 3 4 5                         Right                                        Left    Color  [x] [x] [x] [x] [x] [x] [x] [x] [x] [x]  5 4 3 2 1 1 2 3 4 5                          Right Left    Incurvation/Ingrowin   [] [] [] [] [] [] [] [] [] []  5 4 3 2 1 1 2 3 4 5                         Right                                        Left    Inflammation/Pain   [x] [x] [x] [x] [x] [x] [x] [x] [x] [x]  5 4 3  2 1 1 2 3 4 5                         Right                                        Left       Nails are painful 1-10 with direct palpation. Q7   []Yes  []No                Q8   [x]Yes  []No                     Q9   []Yes    []No    Assessment:  23 y.o. female with:    Diagnosis Orders   1. Benign neoplasm of skin of right foot  11111 - MA DESTRUCTION BENIGN LESIONS UP TO 14      2. Tinea pedis of both feet        3. Pain in both lower extremities  48065 - MA DESTRUCTION BENIGN LESIONS UP TO 14      4. Maceration of skin                Plan:   Pt was evaluated and examined. Patient was given personalized discharge instructions. The lesions were partially excised via 15 blade and silver nitrate was applied under occlusion. The patient tolerated the procedure well and without complication. Advised patient to use vasoline to the area after tomorrow to prevent surrounding tissue irritation. RX: lotrisone to be applied once daily for tinea. Diagnosis was discussed with the pt and all of their questions were answered in detail. Proper foot hygiene and care was discussed with the pt. Patient to check feet daily and contact the office with any questions/problems/concerns. Other comorbidity noted and will be managed by PCP. Pain waiver discussed with patient and confirmed.    10/14/2022        Electronically signed by Lacey Nesbitt DPM on 10/14/2022 at 9:06 AM  10/14/2022

## 2022-10-19 ENCOUNTER — HOSPITAL ENCOUNTER (OUTPATIENT)
Age: 22
Discharge: HOME | End: 2022-10-19
Payer: COMMERCIAL

## 2022-10-19 LAB
ABSOLUTE EOS #: 0.17 K/UL (ref 0–0.44)
ABSOLUTE IMMATURE GRANULOCYTE: <0.03 K/UL (ref 0–0.3)
ABSOLUTE LYMPH #: 2.52 K/UL (ref 1.1–3.7)
ABSOLUTE MONO #: 0.24 K/UL (ref 0.1–1.2)
ALBUMIN SERPL-MCNC: 3.9 G/DL (ref 3.5–5.2)
ALBUMIN/GLOBULIN RATIO: 1.5 (ref 1–2.5)
ALP BLD-CCNC: 67 U/L (ref 35–104)
ALT SERPL-CCNC: 20 U/L (ref 5–33)
ANION GAP SERPL CALCULATED.3IONS-SCNC: 8 MMOL/L (ref 9–17)
AST SERPL-CCNC: 24 U/L
BASOPHILS # BLD: 0 % (ref 0–2)
BASOPHILS ABSOLUTE: <0.03 K/UL (ref 0–0.2)
BILIRUB SERPL-MCNC: <0.1 MG/DL (ref 0.3–1.2)
BUN BLDV-MCNC: 11 MG/DL (ref 6–20)
CALCIUM SERPL-MCNC: 9.2 MG/DL (ref 8.6–10.4)
CHLORIDE BLD-SCNC: 107 MMOL/L (ref 98–107)
CHOLESTEROL/HDL RATIO: 4
CHOLESTEROL: 204 MG/DL
CO2: 23 MMOL/L (ref 20–31)
CREAT SERPL-MCNC: 0.58 MG/DL (ref 0.5–0.9)
EOSINOPHILS RELATIVE PERCENT: 3 % (ref 1–4)
GFR SERPL CREATININE-BSD FRML MDRD: >60 ML/MIN/1.73M2
GLUCOSE BLD-MCNC: 85 MG/DL (ref 70–99)
HCT VFR BLD CALC: 39.5 % (ref 36.3–47.1)
HDLC SERPL-MCNC: 51 MG/DL
HEMOGLOBIN: 11.4 G/DL (ref 11.9–15.1)
IMMATURE GRANULOCYTES: 0 %
IRON: 43 UG/DL (ref 37–145)
LDL CHOLESTEROL: 138 MG/DL (ref 0–130)
LYMPHOCYTES # BLD: 49 % (ref 24–43)
MCH RBC QN AUTO: 23 PG (ref 25.2–33.5)
MCHC RBC AUTO-ENTMCNC: 28.9 G/DL (ref 28.4–34.8)
MCV RBC AUTO: 79.8 FL (ref 82.6–102.9)
MONOCYTES # BLD: 5 % (ref 3–12)
NRBC AUTOMATED: 0 PER 100 WBC
PDW BLD-RTO: 14.6 % (ref 11.8–14.4)
PLATELET # BLD: 274 K/UL (ref 138–453)
PMV BLD AUTO: 12.4 FL (ref 8.1–13.5)
POTASSIUM SERPL-SCNC: 3.9 MMOL/L (ref 3.7–5.3)
RBC # BLD: 4.95 M/UL (ref 3.95–5.11)
RBC # BLD: ABNORMAL 10*6/UL
SEG NEUTROPHILS: 43 % (ref 36–65)
SEGMENTED NEUTROPHILS ABSOLUTE COUNT: 2.2 K/UL (ref 1.5–8.1)
SODIUM BLD-SCNC: 138 MMOL/L (ref 135–144)
TOTAL PROTEIN: 6.5 G/DL (ref 6.4–8.3)
TRIGL SERPL-MCNC: 76 MG/DL
TSH SERPL DL<=0.05 MIU/L-ACNC: 2.1 UIU/ML (ref 0.3–5)
URIC ACID: 4.2 MG/DL (ref 2.4–5.7)
WBC # BLD: 5.2 K/UL (ref 3.5–11.3)

## 2022-10-19 PROCEDURE — 80053 COMPREHEN METABOLIC PANEL: CPT

## 2022-10-19 PROCEDURE — 85025 COMPLETE CBC W/AUTO DIFF WBC: CPT

## 2022-10-19 PROCEDURE — 80061 LIPID PANEL: CPT

## 2022-10-19 PROCEDURE — 36415 COLL VENOUS BLD VENIPUNCTURE: CPT

## 2022-10-19 PROCEDURE — 84443 ASSAY THYROID STIM HORMONE: CPT

## 2022-10-19 PROCEDURE — 83540 ASSAY OF IRON: CPT

## 2022-10-19 PROCEDURE — 84550 ASSAY OF BLOOD/URIC ACID: CPT

## 2022-10-19 PROCEDURE — 83036 HEMOGLOBIN GLYCOSYLATED A1C: CPT

## 2022-10-20 LAB
ESTIMATED AVERAGE GLUCOSE: 103 MG/DL
HBA1C MFR BLD: 5.2 % (ref 4–6)

## 2022-11-15 ENCOUNTER — OFFICE VISIT (OUTPATIENT)
Dept: GASTROENTEROLOGY | Age: 22
End: 2022-11-15
Payer: COMMERCIAL

## 2022-11-15 VITALS
SYSTOLIC BLOOD PRESSURE: 118 MMHG | TEMPERATURE: 97.3 F | BODY MASS INDEX: 44.03 KG/M2 | WEIGHT: 218 LBS | DIASTOLIC BLOOD PRESSURE: 80 MMHG | HEART RATE: 68 BPM

## 2022-11-15 DIAGNOSIS — K21.9 GASTROESOPHAGEAL REFLUX DISEASE, UNSPECIFIED WHETHER ESOPHAGITIS PRESENT: ICD-10-CM

## 2022-11-15 DIAGNOSIS — K58.9 IRRITABLE BOWEL SYNDROME, UNSPECIFIED TYPE: Primary | ICD-10-CM

## 2022-11-15 DIAGNOSIS — K59.09 OTHER CONSTIPATION: ICD-10-CM

## 2022-11-15 PROCEDURE — G8484 FLU IMMUNIZE NO ADMIN: HCPCS | Performed by: INTERNAL MEDICINE

## 2022-11-15 PROCEDURE — G8427 DOCREV CUR MEDS BY ELIG CLIN: HCPCS | Performed by: INTERNAL MEDICINE

## 2022-11-15 PROCEDURE — 99214 OFFICE O/P EST MOD 30 MIN: CPT | Performed by: INTERNAL MEDICINE

## 2022-11-15 PROCEDURE — 1036F TOBACCO NON-USER: CPT | Performed by: INTERNAL MEDICINE

## 2022-11-15 PROCEDURE — G8417 CALC BMI ABV UP PARAM F/U: HCPCS | Performed by: INTERNAL MEDICINE

## 2022-11-15 RX ORDER — BISACODYL 10 MG
10 SUPPOSITORY, RECTAL RECTAL DAILY
Qty: 30 SUPPOSITORY | Refills: 1 | Status: SHIPPED | OUTPATIENT
Start: 2022-11-15 | End: 2022-12-15

## 2022-11-15 ASSESSMENT — ENCOUNTER SYMPTOMS
SHORTNESS OF BREATH: 0
CONSTIPATION: 1
DIARRHEA: 0
BLOOD IN STOOL: 0
ANAL BLEEDING: 0
ABDOMINAL DISTENTION: 1
COUGH: 0
WHEEZING: 0
TROUBLE SWALLOWING: 0
ABDOMINAL PAIN: 1
CHOKING: 0
RECTAL PAIN: 0
NAUSEA: 1
VOMITING: 0

## 2022-11-15 NOTE — PROGRESS NOTES
GI CLINIC FOLLOW UP    INTERVAL HISTORY:   No referring provider defined for this encounter. Chief Complaint   Patient presents with    Colonoscopy     Patient is here with c/o constipation. She states it is worse when she is on her menstrual. She states she has changed her diet and has had no fast food for about 6 months. Gastroesophageal Reflux     Patient c/o severe reflux of food and heartburn for the past 2 months. HISTORY OF PRESENT ILLNESS: Mohan Mora is a 23 y.o. female , referred for evaluation of constipation   GERD     Here for f/u   Severe  constipation, she does complain that her abdominal size is big although it is soft right now. She does have a bowel movement almost every day  She think her constipation is worse when she is on her period  Her acid reflux has been controlled with no medication  Get worse when she is constipated. No odynophagia or dysphagia  But lately the heartburn is happening daily because she is constipated. In the past we have done  egd/colon done    negative colon   egd: gastritis,bxs SB negative   Liver enzymes are all normal  Hemoglobin and white blood count are all normal  Ct 2/2021 : negative   TSH normal 10/19/22  LFTs normal    Mild anemia     Past Medical,Family, and Social History reviewed and does contribute to the patient presentingcondition. Patient's PMH/PSH,SH,PSYCH Hx, MEDs, ALLERGIES, and ROS were all reviewed and updated in the appropriate sections.     PAST MEDICAL HISTORY:  Past Medical History:   Diagnosis Date    Asthma     Severe persistent asthma, unspecified whether complicated    Chronic vasomotor rhinitis     GERD (gastroesophageal reflux disease)     Headache(784.0) 03/01/2012    Obesity     NELSON (obstructive sleep apnea)     Prediabetes 12/08/2016    Pure hypercholesterolemia 12/08/2016    Thalassemia minor 10/29/2011    5/99     Vision disturbance 01/31/2014    wears glassses       Past Surgical History: Procedure Laterality Date    ADENOIDECTOMY      COLONOSCOPY N/A 10/8/2020    COLONOSCOPY WITH BIOPSY performed by Rubi Thornton MD at [place] [address]  08/28/2014    Nexplanon placement,  pt has had this removed as of 10-8-202    TONSILLECTOMY      UPPER GASTROINTESTINAL ENDOSCOPY N/A 10/8/2020    EGD BIOPSY performed by Rubi Thornton MD at [address] [place] [address] Street:    Current Outpatient Medications:     bisacodyl (DULCOLAX) 10 MG suppository, Place 1 suppository rectally daily, Disp: 30 suppository, Rfl: 1    Plecanatide 3 MG TABS, Take 3 mg by mouth daily, Disp: 30 tablet, Rfl: 1    venlafaxine (EFFEXOR) 37.5 MG tablet, Take 37.5 mg by mouth daily, Disp: , Rfl:     mirtazapine (REMERON) 15 MG tablet, Take 15 mg by mouth nightly Take 1/2 tab in morning and 1/2 tab at night, Disp: , Rfl:     clotrimazole-betamethasone (LOTRISONE) 1-0.05 % cream, Apply topically 2 times daily. , Disp: 45 g, Rfl: 2    montelukast (SINGULAIR) 10 MG tablet, Take 1 tablet by mouth daily, Disp: 30 tablet, Rfl: 5    fluticasone (FLONASE) 50 MCG/ACT nasal spray, 2 sprays by Each Nostril route daily, Disp: 1 each, Rfl: 5    mometasone-formoterol (DULERA) 200-5 MCG/ACT inhaler, Inhale 2 puffs into the lungs every 12 hours, Disp: 1 each, Rfl: 2    ipratropium-albuterol (DUONEB) 0.5-2.5 (3) MG/3ML SOLN nebulizer solution, Inhale 3 mLs into the lungs every 4 hours as needed, Disp: , Rfl:     ibuprofen (ADVIL;MOTRIN) 800 MG tablet, Take 1 tablet by mouth every 8 hours as needed for Pain, Disp: 30 tablet, Rfl: 0    Urea (CARMOL) 40 % cream, Apply to feet once daily, Disp: 1 each, Rfl: 2    albuterol sulfate HFA (VENTOLIN HFA) 108 (90 Base) MCG/ACT inhaler, Inhale 2 puffs into the lungs every 6 hours as needed for Wheezing, Disp: 1 each, Rfl: 11    bisacodyl (BISACODYL) 5 MG EC tablet, Take 1 tablet by mouth daily as needed for Constipation (Patient not taking: Reported on 10/14/2022), Disp: 30 tablet, Rfl: 1 ketoconazole (NIZORAL) 2 % cream, Apply topically bid, Disp: 30 g, Rfl: 3    ALLERGIES:   Allergies   Allergen Reactions    Banana     Peanut-Containing Drug Products     Food Rash     Peanuts,walnuts,cashews,pecans apples cherries       FAMILY HISTORY:       Problem Relation Age of Onset    Asthma Mother     Lupus Mother     High Blood Pressure Father     Diabetes Father     COPD Father     Stroke Father     Lupus Maternal Grandmother     Diabetes Maternal Grandfather     High Blood Pressure Maternal Grandfather     Heart Disease Paternal Grandmother     Diabetes Paternal Grandmother     High Blood Pressure Paternal Grandfather     Hypertension Paternal Grandfather     Diabetes Paternal Grandfather     Arthritis Maternal Aunt     Cancer Maternal Aunt     Heart Disease Paternal Aunt     Heart Disease Paternal Uncle     Birth Defects Neg Hx     Depression Neg Hx     Early Death Neg Hx     Hearing Loss Neg Hx     High Cholesterol Neg Hx     Kidney Disease Neg Hx     Learning Disabilities Neg Hx     Mental Illness Neg Hx     Mental Retardation Neg Hx     Miscarriages / Stillbirths Neg Hx     Substance Abuse Neg Hx     Vision Loss Neg Hx     Other Neg Hx     Ovarian Cancer Neg Hx     Breast Cancer Neg Hx          SOCIAL HISTORY:   Social History     Socioeconomic History    Marital status: Single     Spouse name: Not on file    Number of children: Not on file    Years of education: Not on file    Highest education level: Not on file   Occupational History    Not on file   Tobacco Use    Smoking status: Former     Years: 2.00     Types: Cigarettes     Quit date: 2016     Years since quittin.5    Smokeless tobacco: Never   Vaping Use    Vaping Use: Never used   Substance and Sexual Activity    Alcohol use: No     Alcohol/week: 0.0 standard drinks    Drug use: Yes     Types: Marijuana Pio Calamity)     Comment: occasionally    Sexual activity: Yes     Partners: Female   Other Topics Concern    Not on file   Social History Narrative    Not on file     Social Determinants of Health     Financial Resource Strain: Not on file   Food Insecurity: Not on file   Transportation Needs: Not on file   Physical Activity: Not on file   Stress: Not on file   Social Connections: Not on file   Intimate Partner Violence: Not on file   Housing Stability: Not on file       REVIEW OF SYSTEMS: A 12-point review of systemswas obtained and pertinent positives and negatives were enumerated above in the history of present illness. All other reviewed systems / symptoms were negative. Review of Systems   Constitutional:  Positive for fatigue. Negative for appetite change and unexpected weight change. HENT:  Negative for trouble swallowing. Respiratory:  Negative for cough, choking, shortness of breath and wheezing. Cardiovascular:  Negative for chest pain, palpitations and leg swelling. Gastrointestinal:  Positive for abdominal distention, abdominal pain, constipation and nausea. Negative for anal bleeding, blood in stool, diarrhea, rectal pain and vomiting. Genitourinary:  Negative for difficulty urinating. Allergic/Immunologic: Negative for environmental allergies and food allergies. Neurological:  Negative for dizziness, weakness, light-headedness, numbness and headaches. Hematological:  Does not bruise/bleed easily. Psychiatric/Behavioral:  Negative for sleep disturbance. The patient is not nervous/anxious.           LABORATORY DATA: Reviewed  Lab Results   Component Value Date    WBC 5.2 10/19/2022    HGB 11.4 (L) 10/19/2022    HCT 39.5 10/19/2022    MCV 79.8 (L) 10/19/2022     10/19/2022     10/19/2022    K 3.9 10/19/2022     10/19/2022    CO2 23 10/19/2022    BUN 11 10/19/2022    CREATININE 0.58 10/19/2022    LABPROT 7.5 10/27/2012    LABALBU 3.9 10/19/2022    BILITOT <0.1 (L) 10/19/2022    ALKPHOS 67 10/19/2022    AST 24 10/19/2022    ALT 20 10/19/2022    INR 0.9 10/05/2012         Lab Results   Component Value Date    RBC 4.95 10/19/2022    HGB 11.4 (L) 10/19/2022    MCV 79.8 (L) 10/19/2022    MCH 23.0 (L) 10/19/2022    MCHC 28.9 10/19/2022    RDW 14.6 (H) 10/19/2022    MPV 12.4 10/19/2022    BASOPCT 0 10/19/2022    LYMPHSABS 2.52 10/19/2022    MONOSABS 0.24 10/19/2022    NEUTROABS 2.20 10/19/2022    EOSABS 0.17 10/19/2022    BASOSABS <0.03 10/19/2022         DIAGNOSTIC TESTING:     No results found. PHYSICAL EXAMINATION: Vital signs reviewed per the nursing documentation. /80   Pulse 68   Temp 97.3 °F (36.3 °C)   Wt 218 lb (98.9 kg)   BMI 44.03 kg/m²   Body mass index is 44.03 kg/m². Physical Exam  Vitals and nursing note reviewed. Constitutional:       General: She is not in acute distress. Appearance: She is well-developed. She is not diaphoretic. HENT:      Head: Normocephalic. Mouth/Throat:      Pharynx: No oropharyngeal exudate. Eyes:      General: No scleral icterus. Pupils: Pupils are equal, round, and reactive to light. Neck:      Thyroid: No thyromegaly. Vascular: No JVD. Trachea: No tracheal deviation. Cardiovascular:      Rate and Rhythm: Normal rate and regular rhythm. Heart sounds: Normal heart sounds. No murmur heard. Pulmonary:      Effort: Pulmonary effort is normal. No respiratory distress. Breath sounds: Normal breath sounds. No wheezing. Abdominal:      General: Bowel sounds are normal. There is no distension. Palpations: Abdomen is soft. Tenderness: There is no abdominal tenderness. There is no guarding or rebound. Comments: No ascites   Musculoskeletal:         General: Normal range of motion. Cervical back: Normal range of motion and neck supple. Skin:     General: Skin is warm. Coloration: Skin is not pale. Findings: No erythema or rash. Comments: She is not diaphoretic   Neurological:      Mental Status: She is alert and oriented to person, place, and time.       Deep Tendon Reflexes: Reflexes are normal and symmetric. Psychiatric:         Behavior: Behavior normal.         Thought Content: Thought content normal.         Judgment: Judgment normal.         IMPRESSION: Ms. Jin Tyler is a 23 y.o. female with      Diagnosis Orders   1. Irritable bowel syndrome, unspecified type        2. Other constipation        3. Gastroesophageal reflux disease, unspecified whether esophagitis present          Getting give this patient Dulcolax suppository daily in addition to Trulance and see how she does, and then avoid putting her on PPI yet we will see after the constipation has been treated if she continues to have symptoms of heartburn then we will start her on Pepcid    Diet/life style/natural hx /complication of the dx were all explained in details   Past medical, past surgical, social history, psychiatric history, medications or allergies, all reviewed and  updated    Thank you for allowing me to participate in the care of Ms. Tesfaye Springer. For any further questions please do not hesitate to contact me. I have reviewed and agree with the ROS entered by the MA/RN. Note is dictated utilizing voice recognition software. Unfortunately this leads to occasional typographical errors. Please contact our office if you have any questions.       Nessa Cabrera MD  [facility]  O: #[phone]

## 2022-12-15 ENCOUNTER — OFFICE VISIT (OUTPATIENT)
Dept: PULMONOLOGY | Age: 22
End: 2022-12-15
Payer: COMMERCIAL

## 2022-12-15 VITALS
TEMPERATURE: 98 F | RESPIRATION RATE: 16 BRPM | BODY MASS INDEX: 43.75 KG/M2 | HEIGHT: 59 IN | SYSTOLIC BLOOD PRESSURE: 121 MMHG | OXYGEN SATURATION: 95 % | WEIGHT: 217 LBS | DIASTOLIC BLOOD PRESSURE: 79 MMHG

## 2022-12-15 DIAGNOSIS — K21.9 GASTROESOPHAGEAL REFLUX DISEASE, UNSPECIFIED WHETHER ESOPHAGITIS PRESENT: ICD-10-CM

## 2022-12-15 DIAGNOSIS — G47.33 OSA (OBSTRUCTIVE SLEEP APNEA): ICD-10-CM

## 2022-12-15 DIAGNOSIS — R09.82 POST-NASAL DRIP: ICD-10-CM

## 2022-12-15 DIAGNOSIS — J45.41 MODERATE PERSISTENT ASTHMA WITH ACUTE EXACERBATION: Primary | ICD-10-CM

## 2022-12-15 DIAGNOSIS — E66.09 OBESITY DUE TO EXCESS CALORIES, UNSPECIFIED OBESITY SEVERITY: ICD-10-CM

## 2022-12-15 DIAGNOSIS — J30.2 SEASONAL ALLERGIC RHINITIS, UNSPECIFIED TRIGGER: ICD-10-CM

## 2022-12-15 DIAGNOSIS — R06.83 SNORING: ICD-10-CM

## 2022-12-15 PROCEDURE — 1036F TOBACCO NON-USER: CPT | Performed by: INTERNAL MEDICINE

## 2022-12-15 PROCEDURE — G8417 CALC BMI ABV UP PARAM F/U: HCPCS | Performed by: INTERNAL MEDICINE

## 2022-12-15 PROCEDURE — G8427 DOCREV CUR MEDS BY ELIG CLIN: HCPCS | Performed by: INTERNAL MEDICINE

## 2022-12-15 PROCEDURE — 99214 OFFICE O/P EST MOD 30 MIN: CPT | Performed by: INTERNAL MEDICINE

## 2022-12-15 PROCEDURE — G8484 FLU IMMUNIZE NO ADMIN: HCPCS | Performed by: INTERNAL MEDICINE

## 2022-12-15 RX ORDER — FLUTICASONE PROPIONATE 50 MCG
2 SPRAY, SUSPENSION (ML) NASAL DAILY
Qty: 1 EACH | Refills: 5 | Status: SHIPPED | OUTPATIENT
Start: 2022-12-15

## 2022-12-15 NOTE — PROGRESS NOTES
PULMONARY OP  PROGRESS NOTE          Patient:  Radhika Meier  YOB: 2000    MRN: [id number]     Acct:        Pt seen and Chart reviewed. Ms. Radhika Meier is here in followup for   1. Moderate persistent asthma with acute exacerbation    2. Post-nasal drip    3. Obesity due to excess calories, unspecified obesity severity    4. Gastroesophageal reflux disease, unspecified whether esophagitis present    5. Snoring    6. NELSON (obstructive sleep apnea)          Pt has not been hospitalizedor been to er since last visit for asthma. Has been using meds as recommended.   She is using Dulera more than usual  Has some shortness of breath and wheezing  Albuterol helps briefly  Has a postnasal discharge  Not much cough or sputum production  No acid reflux symptoms currently  She is making an effort to lose weight  Patient does not smoke anymore  Patient reports having daytime sleepiness and fatigue and tiredness associated with snoring  No motor vehicle accidents  No hypothyroidism  No narcolepsy symptoms      Subjective:  Review of Systems    Review of Systems -   General ROS: Completed and except as mentioned above were negative   Psychological ROS:  Completed and except as mentioned above were negative  Ophthalmic ROS:  Completed and except as mentioned above were negative  ENT ROS:  Completed and except as mentioned above were negative  Allergy and Immunology ROS:  Completed and except as mentioned above werenegative  Hematological and Lymphatic ROS:  Completed and except as mentioned above were negative  Endocrine ROS: Completed and except as mentioned above were negative  Respiratory ROS:  Completed and except asmentioned above were negative  Cardiovascular ROS:  Completed and except as mentioned above were negative  Gastrointestinal ROS: Completed and except as mentioned above were negative  Genito-Urinary ROS:  Completedand except as mentioned above were negative  Musculoskeletal ROS:  Completed and except as mentioned above were negative  Neurological ROS:  Completed and except as mentioned above were negative  Dermatological ROS:Completed and except as mentioned above were negative    SLEEP  No epistaxis or sore throat. No fatigue in am. No eds. No MVA. No edema. Allergies: Allergies   Allergen Reactions    Banana     Peanut-Containing Drug Products     Food Rash     Peanuts,walnuts,cashews,pecans apples cherries       Medications:    Current Outpatient Medications:     bisacodyl (DULCOLAX) 10 MG suppository, Place 1 suppository rectally daily, Disp: 30 suppository, Rfl: 1    Plecanatide 3 MG TABS, Take 3 mg by mouth daily, Disp: 30 tablet, Rfl: 1    venlafaxine (EFFEXOR) 37.5 MG tablet, Take 37.5 mg by mouth daily, Disp: , Rfl:     mirtazapine (REMERON) 15 MG tablet, Take 15 mg by mouth nightly Take 1/2 tab in morning and 1/2 tab at night, Disp: , Rfl:     clotrimazole-betamethasone (LOTRISONE) 1-0.05 % cream, Apply topically 2 times daily. , Disp: 45 g, Rfl: 2    montelukast (SINGULAIR) 10 MG tablet, Take 1 tablet by mouth daily, Disp: 30 tablet, Rfl: 5    fluticasone (FLONASE) 50 MCG/ACT nasal spray, 2 sprays by Each Nostril route daily, Disp: 1 each, Rfl: 5    mometasone-formoterol (DULERA) 200-5 MCG/ACT inhaler, Inhale 2 puffs into the lungs every 12 hours, Disp: 1 each, Rfl: 2    ipratropium-albuterol (DUONEB) 0.5-2.5 (3) MG/3ML SOLN nebulizer solution, Inhale 3 mLs into the lungs every 4 hours as needed, Disp: , Rfl:     ibuprofen (ADVIL;MOTRIN) 800 MG tablet, Take 1 tablet by mouth every 8 hours as needed for Pain, Disp: 30 tablet, Rfl: 0    Urea (CARMOL) 40 % cream, Apply to feet once daily, Disp: 1 each, Rfl: 2    albuterol sulfate HFA (VENTOLIN HFA) 108 (90 Base) MCG/ACT inhaler, Inhale 2 puffs into the lungs every 6 hours as needed for Wheezing, Disp: 1 each, Rfl: 11    bisacodyl (BISACODYL) 5 MG EC tablet, Take 1 tablet by mouth daily as needed for Constipation, Disp: 30 tablet, Rfl: 1    ketoconazole (NIZORAL) 2 % cream, Apply topically bid, Disp: 30 g, Rfl: 3      Objective:    Physical Exam:  Vitals: /79 (Site: Left Upper Arm)   Temp 98 °F (36.7 °C)   Resp 16   Ht 4' 11\" (1.499 m)   Wt 217 lb (98.4 kg)   SpO2 95% Comment: Room air at rest  BMI 43.83 kg/m²   Last 3 weights: Wt Readings from Last 3 Encounters:   12/15/22 217 lb (98.4 kg)   11/15/22 218 lb (98.9 kg)   10/14/22 225 lb (102.1 kg)     Body mass index is 43.83 kg/m². Physical Examination:   PHYSICAL EXAMINATION:  Vitals:    12/15/22 1302   BP: 121/79   Site: Left Upper Arm   Resp: 16   Temp: 98 °F (36.7 °C)   SpO2: 95%   Weight: 217 lb (98.4 kg)   Height: 4' 11\" (1.499 m)       Physical Exam        PHYSICAL EXAMINATION:    Constitutional: Appears well, no distress, morbidly obese  EENT: PERRLA, sclera clear, anicteric, oropharynx clear, no lesions, neck supple with midline trachea. Neck: Supple, symmetrical, trachea midline, no adenopathy, no jvd skin normal  Respiratory: clear to auscultation, no wheezes or rales and unlabored breathing. No intercostal tenderness  Cardiovascular: regular rate and rhythm, normal S1, S2, no murmur noted  Abdomen: soft, nontender, nondistended, no masses or organomegaly  Extremities: No pedal edema, no clubbing or cyanosis             Labs:  Chest x-ray was done on August 24, 2020 and it did not show any acute process      Chest x-ray in February 2021 without any acute problems    Chest x-ray on 5/24/2022 without any problems    Assessment:    1. Moderate persistent asthma with acute exacerbation    2. Post-nasal drip    3. Obesity due to excess calories, unspecified obesity severity    4. Gastroesophageal reflux disease, unspecified whether esophagitis present    5. Snoring    6.  NELSON (obstructive sleep apnea)          PLAN:    Reviewed chest x-ray with the patient  Ordered sleep study with CPAP titration if needed  Refills were provided-Flonase, Asmanex  Patient was recommended to have prednisone and an antibiotic available for use during an exacerbation  Educated and clarified the medication use. Shown her how to use the Flonase  Discusseduse, benefit, and side effects of prescribed medications. Barriers to medication compliance addressed. Donald PECK Tesfaye Bolivar received counseling on the following healthy behaviors: nutrition, exercise and medication adherence  Recommend flu vaccination in the fall annually. Recommendations given regarding pneumococcal vaccinations. Recommended COVID-19 vaccination  Patient is not up-to-date with vaccinations from pulmonary perspective. Maintain an active lifestyle. Continue smoking cessation. Patient was educated on how to use the respiratory medications. All the questions that the patient has had were answered to her satisfaction. Home O2 evaluation was done. Supplemental oxygen was not needed. After reviewing the patient's smoking history and her age patient does not meet the criteria for lung cancer screening. Pt is not to drive if sleepy. We'll see the patient back in 3 months or earlier if needed. Patient will call us if she is sick, so she can be seen sooner. Thank youfor having us involved in the care of your patient. Please call us if you have any questions or concerns.         Rishi Kaur MD, MD             12/15/2022, 1:20 PM

## 2022-12-15 NOTE — PATIENT INSTRUCTIONS
@[facility]        YOU ARE BEING REFERRED TO:    VAMSHI LAINEZ (a [facility])    [address] [place] [address] [address], [place]    Main Phone Number: [phone]    Phone number for scheduling sleep study: [phone]      Please contact the above numbers to schedule your sleep study. Once you have completed the FIRST NIGHT you may be asked to return for a SECOND NIGHT if necessary. After the SECOND NIGHT the sleep center will order a CPAP/BiPAP machine for you. An order for the machine will be sent to a durable medical equipment company ([employer]). The sleep center will provide you with the [employer] companies information. Once you have your machine please contact our office to schedule a follow up appointment. Your follow up will be scheduled for a date 30-90 days after you have received your machine. At that follow up visit we will need to have a compliance download from your DME company. Please contact your [employer] company to have the compliance download faxed to our office at   [phone] for your follow up appointment. IF YOU HAVE ANY QUESTIONS ABOUT YOUR SLEEP STUDY   PLEASE CONTACT THE SLEEP CENTER.

## 2022-12-22 ENCOUNTER — TELEPHONE (OUTPATIENT)
Dept: PULMONOLOGY | Age: 22
End: 2022-12-22

## 2022-12-22 DIAGNOSIS — J45.41 MODERATE PERSISTENT ASTHMA WITH ACUTE EXACERBATION: ICD-10-CM

## 2022-12-22 RX ORDER — MOMETASONE FUROATE AND FORMOTEROL FUMARATE DIHYDRATE 200; 5 UG/1; UG/1
AEROSOL RESPIRATORY (INHALATION)
Qty: 13 G | Refills: 2 | OUTPATIENT
Start: 2022-12-22

## 2022-12-22 NOTE — TELEPHONE ENCOUNTER
Patient called, states she discussed a letter with you at her last visit regarding needing to transfer from her current apartment building on the 11th floor, to a lower unit or another apt in another unit. The elevator goes out frequently and she has to walk up and down 11 flights of stairs, making it hard to breath. How would you like to proceed?

## 2022-12-22 NOTE — TELEPHONE ENCOUNTER
Pt just seen- unsigned office note-  Azmanex was just filled - pt has been on Dulera. Refused Rx  Please make any changes if needed.

## 2022-12-23 NOTE — TELEPHONE ENCOUNTER
Angele Rinne, MD  You 12 hours ago (8:30 PM)     SK  I put in a letter. Please fax it to whoever she needs to send it to.   Thank you

## 2023-01-27 ENCOUNTER — HOSPITAL ENCOUNTER (OUTPATIENT)
Dept: SLEEP CENTER | Age: 23
Discharge: HOME | End: 2023-01-27
Payer: COMMERCIAL

## 2023-01-27 DIAGNOSIS — G47.33 OSA (OBSTRUCTIVE SLEEP APNEA): ICD-10-CM

## 2023-01-27 PROCEDURE — 95810 POLYSOM 6/> YRS 4/> PARAM: CPT

## 2023-01-31 LAB — STATUS: NORMAL

## 2023-02-03 ENCOUNTER — OFFICE VISIT (OUTPATIENT)
Dept: FAMILY MEDICINE CLINIC | Age: 23
End: 2023-02-03
Payer: COMMERCIAL

## 2023-02-03 VITALS
HEART RATE: 90 BPM | TEMPERATURE: 97.6 F | OXYGEN SATURATION: 97 % | WEIGHT: 223 LBS | BODY MASS INDEX: 45.04 KG/M2 | SYSTOLIC BLOOD PRESSURE: 114 MMHG | DIASTOLIC BLOOD PRESSURE: 64 MMHG

## 2023-02-03 DIAGNOSIS — N64.4 NIPPLE PAIN: ICD-10-CM

## 2023-02-03 DIAGNOSIS — J45.41 MODERATE PERSISTENT ASTHMA WITH ACUTE EXACERBATION: Primary | ICD-10-CM

## 2023-02-03 DIAGNOSIS — M25.532 LEFT WRIST PAIN: ICD-10-CM

## 2023-02-03 DIAGNOSIS — G56.02 CARPAL TUNNEL SYNDROME OF LEFT WRIST: ICD-10-CM

## 2023-02-03 PROCEDURE — 1036F TOBACCO NON-USER: CPT | Performed by: NURSE PRACTITIONER

## 2023-02-03 PROCEDURE — G8427 DOCREV CUR MEDS BY ELIG CLIN: HCPCS | Performed by: NURSE PRACTITIONER

## 2023-02-03 PROCEDURE — 99214 OFFICE O/P EST MOD 30 MIN: CPT | Performed by: NURSE PRACTITIONER

## 2023-02-03 PROCEDURE — G8417 CALC BMI ABV UP PARAM F/U: HCPCS | Performed by: NURSE PRACTITIONER

## 2023-02-03 PROCEDURE — G8484 FLU IMMUNIZE NO ADMIN: HCPCS | Performed by: NURSE PRACTITIONER

## 2023-02-03 RX ORDER — PREDNISONE 20 MG/1
20 TABLET ORAL 2 TIMES DAILY
Qty: 10 TABLET | Refills: 0 | Status: SHIPPED | OUTPATIENT
Start: 2023-02-03 | End: 2023-02-08

## 2023-02-03 SDOH — ECONOMIC STABILITY: FOOD INSECURITY: WITHIN THE PAST 12 MONTHS, THE FOOD YOU BOUGHT JUST DIDN'T LAST AND YOU DIDN'T HAVE MONEY TO GET MORE.: NEVER TRUE

## 2023-02-03 SDOH — ECONOMIC STABILITY: FOOD INSECURITY: WITHIN THE PAST 12 MONTHS, YOU WORRIED THAT YOUR FOOD WOULD RUN OUT BEFORE YOU GOT MONEY TO BUY MORE.: NEVER TRUE

## 2023-02-03 SDOH — ECONOMIC STABILITY: INCOME INSECURITY: HOW HARD IS IT FOR YOU TO PAY FOR THE VERY BASICS LIKE FOOD, HOUSING, MEDICAL CARE, AND HEATING?: NOT HARD AT ALL

## 2023-02-03 SDOH — ECONOMIC STABILITY: HOUSING INSECURITY
IN THE LAST 12 MONTHS, WAS THERE A TIME WHEN YOU DID NOT HAVE A STEADY PLACE TO SLEEP OR SLEPT IN A SHELTER (INCLUDING NOW)?: NO

## 2023-02-03 ASSESSMENT — ENCOUNTER SYMPTOMS
SWOLLEN GLANDS: 0
STRIDOR: 0
SHORTNESS OF BREATH: 1
SINUS PAIN: 0
TROUBLE SWALLOWING: 0
SPUTUM PRODUCTION: 1
COLOR CHANGE: 0
ORTHOPNEA: 0
WHEEZING: 1
DIARRHEA: 0
COUGH: 1
HEMOPTYSIS: 0
CHOKING: 0
ABDOMINAL PAIN: 0
CHEST TIGHTNESS: 1
GASTROINTESTINAL NEGATIVE: 1
SORE THROAT: 0
EYES NEGATIVE: 1
NAUSEA: 0
VOMITING: 0
SINUS PRESSURE: 0

## 2023-02-03 ASSESSMENT — PATIENT HEALTH QUESTIONNAIRE - PHQ9
SUM OF ALL RESPONSES TO PHQ9 QUESTIONS 1 & 2: 0
SUM OF ALL RESPONSES TO PHQ QUESTIONS 1-9: 0
1. LITTLE INTEREST OR PLEASURE IN DOING THINGS: 0
2. FEELING DOWN, DEPRESSED OR HOPELESS: 0
SUM OF ALL RESPONSES TO PHQ QUESTIONS 1-9: 0

## 2023-02-03 NOTE — LETTER
[facility] [address] [facility] [address] [facility] [address]  Phone: [phone]  Fax: [phone]    Lindaann Dakin, APRN - CNP        February 3, 2023     Patient: Jacqueline Bolivar   YOB: 2000   Date of Visit: 2/3/2023       To Whom It May Concern: It is my medical opinion that [address] off work 1/2/23. If you have any questions or concerns, please don't hesitate to call.     Sincerely,        Lindaann Dakin, APRN - CNP

## 2023-02-03 NOTE — PROGRESS NOTES
[facility]  [address] [facility]  Dept: [phone]    Tova Cagle is a 23 y.o. female who presents today for her medical conditions/complaintsas noted below.       Tova Cagle is here today c/o Wrist Pain (left), Otalgia (right), Skin Problem (On left breast), and Shortness of Breath (Started Tuesday)    Past Medical History:   Diagnosis Date    Asthma     Severe persistent asthma, unspecified whether complicated    Chronic vasomotor rhinitis     GERD (gastroesophageal reflux disease)     Headache(784.0) 03/01/2012    Obesity     NELSON (obstructive sleep apnea)     Prediabetes 12/08/2016    Pure hypercholesterolemia 12/08/2016    Thalassemia minor 10/29/2011    5/99     Vision disturbance 01/31/2014    wears glassses      Past Surgical History:   Procedure Laterality Date    ADENOIDECTOMY      COLONOSCOPY N/A 10/8/2020    COLONOSCOPY WITH BIOPSY performed by Yony Brewster MD at [place] [address]  08/28/2014    Nexplanon placement,  pt has had this removed as of 10-8-202    TONSILLECTOMY      UPPER GASTROINTESTINAL ENDOSCOPY N/A 10/8/2020    EGD BIOPSY performed by Yony Brewster MD at [facility] History   Problem Relation Age of Onset    Asthma Mother     Lupus Mother     High Blood Pressure Father     Diabetes Father     COPD Father     Stroke Father     Lupus Maternal Grandmother     Diabetes Maternal Grandfather     High Blood Pressure Maternal Grandfather     Heart Disease Paternal Grandmother     Diabetes Paternal Grandmother     High Blood Pressure Paternal Grandfather     Hypertension Paternal Grandfather     Diabetes Paternal Grandfather     Arthritis Maternal Aunt     Cancer Maternal Aunt     Heart Disease Paternal Aunt     Heart Disease Paternal Uncle     Birth Defects Neg Hx     Depression Neg Hx     Early Death Neg Hx     Hearing Loss Neg Hx     High Cholesterol Neg Hx     Kidney Disease Neg Hx     Learning Disabilities Neg Hx     Mental Illness Neg Hx     Mental Retardation Neg Hx     Miscarriages / Stillbirths Neg Hx     Substance Abuse Neg Hx     Vision Loss Neg Hx     Other Neg Hx     Ovarian Cancer Neg Hx     Breast Cancer Neg Hx        Social History     Tobacco Use    Smoking status: Former     Years: 2.00     Types: Cigarettes     Quit date: 2016     Years since quittin.7    Smokeless tobacco: Never   Substance Use Topics    Alcohol use: No     Alcohol/week: 0.0 standard drinks      Current Outpatient Medications   Medication Sig Dispense Refill    mometasone-formoterol (DULERA) 200-5 MCG/ACT inhaler Inhale 2 puffs into the lungs in the morning and 2 puffs in the evening. 1 each 5    mometasone (ASMANEX, 120 METERED DOSES,) 220 MCG/ACT AEPB inhaler Inhale 2 puffs into the lungs daily 1 each 5    fluticasone (FLONASE) 50 MCG/ACT nasal spray 2 sprays by Each Nostril route daily 1 each 5    Plecanatide 3 MG TABS Take 3 mg by mouth daily 30 tablet 1    venlafaxine (EFFEXOR) 37.5 MG tablet Take 37.5 mg by mouth daily      mirtazapine (REMERON) 15 MG tablet Take 15 mg by mouth nightly Take 1/2 tab in morning and 1/2 tab at night      clotrimazole-betamethasone (LOTRISONE) 1-0.05 % cream Apply topically 2 times daily.  45 g 2    montelukast (SINGULAIR) 10 MG tablet Take 1 tablet by mouth daily 30 tablet 5    ipratropium-albuterol (DUONEB) 0.5-2.5 (3) MG/3ML SOLN nebulizer solution Inhale 3 mLs into the lungs every 4 hours as needed      ibuprofen (ADVIL;MOTRIN) 800 MG tablet Take 1 tablet by mouth every 8 hours as needed for Pain 30 tablet 0    Urea (CARMOL) 40 % cream Apply to feet once daily 1 each 2    albuterol sulfate HFA (VENTOLIN HFA) 108 (90 Base) MCG/ACT inhaler Inhale 2 puffs into the lungs every 6 hours as needed for Wheezing 1 each 11    bisacodyl (BISACODYL) 5 MG EC tablet Take 1 tablet by mouth daily as needed for Constipation 30 tablet 1    ketoconazole (NIZORAL) 2 % cream Apply topically bid 30 g 3     No current facility-administered medications for this visit. Allergies   Allergen Reactions    Banana     Peanut-Containing Drug Products     Food Rash     Peanuts,walnuts,cashews,pecans apples cherries         HPI:     Shortness of Breath  This is a chronic problem. The current episode started more than 1 month ago (got worse on Tuesday). The problem has been gradually worsening. Associated symptoms include ear pain (R ear), sputum production (chronic) and wheezing. Pertinent negatives include no abdominal pain, chest pain, claudication, fever, headaches, hemoptysis, leg pain, leg swelling, neck pain, orthopnea, PND, rash, sore throat, swollen glands, syncope or vomiting. The symptoms are aggravated by weather changes and any activity. Risk factors: Quit smoking 2-3 months ago. Treatments tried: Dulera, Albuterol (3  x weekly), Singulair. Follows w/ Pulmonary for asthma & NELSON  She was recently changed to [facility] she believes     Reports L nipple pain  Described as burning & itching  Ongoing x 1 week  Reports associated tenderness  There is no mass or discharge     Reports left wrist pain  Ongoing > 1 year  Described as throbbing, numbness & tingling   Reports associated weakness   Lifting makes symptoms worse   Has tried bracing & Ibuprofen without relief   Saw previous PCP for this & was diagnosed with tendonitis     Health Maintenance:      Subjective:     Review of Systems   Constitutional: Negative. Negative for appetite change, chills, diaphoresis and fever. HENT:  Positive for congestion (Tues/Weds) and ear pain (R ear). Negative for ear discharge, sinus pressure, sinus pain, sore throat and trouble swallowing. Eyes: Negative. Respiratory:  Positive for cough (reports productive of sputum , chronic), sputum production (chronic), chest tightness (intermittent), shortness of breath and wheezing. Negative for hemoptysis, choking and stridor.     Cardiovascular:  Negative for chest pain, orthopnea, claudication, leg swelling, syncope and PND. Gastrointestinal: Negative. Negative for abdominal pain, diarrhea, nausea and vomiting. Musculoskeletal:  Negative for neck pain. Skin: Negative. Negative for color change, pallor, rash and wound. Neurological:  Negative for headaches. Objective:     Vitals:    02/03/23 1006   BP: 114/64   Pulse: 90   Temp: 97.6 °F (36.4 °C)   SpO2: 97%       Body mass index is 45.04 kg/m². Physical Exam  Constitutional:       General: She is not in acute distress. Appearance: Normal appearance. She is well-developed. She is obese. She is not ill-appearing, toxic-appearing or diaphoretic. HENT:      Head: Normocephalic and atraumatic. Right Ear: Tympanic membrane, ear canal and external ear normal. There is no impacted cerumen. Left Ear: Tympanic membrane, ear canal and external ear normal. There is no impacted cerumen. Nose: Rhinorrhea present. Mouth/Throat:      Mouth: Mucous membranes are moist.      Pharynx: Oropharynx is clear. No oropharyngeal exudate or posterior oropharyngeal erythema. Eyes:      General: No scleral icterus. Right eye: No discharge. Left eye: No discharge. Conjunctiva/sclera: Conjunctivae normal.      Pupils: Pupils are equal, round, and reactive to light. Neck:      Trachea: No tracheal deviation. Cardiovascular:      Rate and Rhythm: Normal rate and regular rhythm. Heart sounds: Normal heart sounds. No murmur heard. No friction rub. No gallop. Pulmonary:      Effort: Pulmonary effort is normal. No tachypnea, accessory muscle usage or respiratory distress. Breath sounds: No stridor. Examination of the left-upper field reveals wheezing. Examination of the left-middle field reveals wheezing. Decreased breath sounds and wheezing present. No rhonchi or rales. Chest:   Breasts:     Left: Tenderness (left nipple) present.  No swelling, bleeding, inverted nipple, mass or nipple discharge. Abdominal:      Palpations: Abdomen is soft. Musculoskeletal:         General: No deformity. Left wrist: Tenderness present. No swelling, deformity, effusion, lacerations, snuff box tenderness or crepitus. Decreased range of motion. Normal pulse. Cervical back: Normal range of motion and neck supple. Comments: Phalen + pain negative for paresthesia's    Skin:     General: Skin is warm and dry. Coloration: Skin is not pale. Findings: No erythema or rash. Neurological:      Mental Status: She is alert and oriented to person, place, and time. GCS: GCS eye subscore is 4. GCS verbal subscore is 5. GCS motor subscore is 6. Gait: Gait is intact. Gait normal.   Psychiatric:         Speech: Speech normal.         Behavior: Behavior normal.         Thought Content: Thought content normal.         Judgment: Judgment normal.         Assessment:         1. Moderate persistent asthma with acute exacerbation    2. Nipple pain    3. Left wrist pain    4. Carpal tunnel syndrome of left wrist        Plan:     1. Moderate persistent asthma with acute exacerbation    - predniSONE (DELTASONE) 20 MG tablet; Take 1 tablet by mouth 2 times daily for 5 days  Dispense: 10 tablet; Refill: 0    Continue current maintenance inhalers as prescribed  Can increase nebulizer to Q4-6h & taper off PRN  Prednisone burst for acute sx     2. Nipple pain    - US BREAST COMPLETE LEFT; Future    3. Left wrist pain    - EMG; Future  - Elastic Bandages & Supports (WRIST SPLINT) MISC; Apply 1 each topically continuous Cock up wrist splint  Dispense: 1 each; Refill: 0  - XR WRIST LEFT (MIN 3 VIEWS); Future    Imaging & splint as ordered  Symptomatic treatment discussed   Exercises given for home  F/u after above completed     4. Carpal tunnel syndrome of left wrist    - EMG; Future  - Elastic Bandages & Supports (WRIST SPLINT) MISC;  Apply 1 each topically continuous Cock up wrist splint  Dispense: 1 each; Refill: 0@        Discussed use, benefit, and side effects of prescribed medications.All patient questions answered.  Pt voiced understanding. Reviewed health maintenance.Instructed to continue current medications, diet and exercise.  Patient agreedwith treatment plan. Follow up as directed.     Electronically signed by MARY Luo CNP on 2/3/2023

## 2023-02-06 ENCOUNTER — TELEPHONE (OUTPATIENT)
Dept: PULMONOLOGY | Age: 23
End: 2023-02-06

## 2023-02-06 ENCOUNTER — TELEPHONE (OUTPATIENT)
Dept: FAMILY MEDICINE CLINIC | Age: 23
End: 2023-02-06

## 2023-02-06 NOTE — TELEPHONE ENCOUNTER
Patient was here 02/03/23 you wrote  a note for her to be off work 01/02/23, it needs to state pt was off 02/02/23.     Please create a new note with correct date and I will fax it to pt's work    Thank you,  Fabian Alegre

## 2023-02-07 RX ORDER — IPRATROPIUM BROMIDE AND ALBUTEROL SULFATE 2.5; .5 MG/3ML; MG/3ML
3 SOLUTION RESPIRATORY (INHALATION) EVERY 4 HOURS PRN
Qty: 360 ML | Refills: 3 | Status: SHIPPED | OUTPATIENT
Start: 2023-02-07

## 2023-02-13 ENCOUNTER — HOSPITAL ENCOUNTER (EMERGENCY)
Age: 23
Discharge: HOME | End: 2023-02-13
Attending: EMERGENCY MEDICINE
Payer: COMMERCIAL

## 2023-02-13 VITALS
HEART RATE: 100 BPM | SYSTOLIC BLOOD PRESSURE: 118 MMHG | HEIGHT: 59 IN | RESPIRATION RATE: 18 BRPM | WEIGHT: 220 LBS | BODY MASS INDEX: 44.35 KG/M2 | DIASTOLIC BLOOD PRESSURE: 72 MMHG | TEMPERATURE: 97.2 F

## 2023-02-13 DIAGNOSIS — R11.10 ABDOMINAL PAIN, VOMITING, AND DIARRHEA: Primary | ICD-10-CM

## 2023-02-13 DIAGNOSIS — R10.9 ABDOMINAL PAIN, VOMITING, AND DIARRHEA: Primary | ICD-10-CM

## 2023-02-13 DIAGNOSIS — R19.7 ABDOMINAL PAIN, VOMITING, AND DIARRHEA: Primary | ICD-10-CM

## 2023-02-13 PROCEDURE — 6370000000 HC RX 637 (ALT 250 FOR IP)

## 2023-02-13 PROCEDURE — 99283 EMERGENCY DEPT VISIT LOW MDM: CPT

## 2023-02-13 RX ORDER — ONDANSETRON HYDROCHLORIDE 4 MG/1
4 TABLET, FILM COATED ORAL EVERY 8 HOURS PRN
Qty: 10 TABLET | Refills: 0 | Status: SHIPPED | OUTPATIENT
Start: 2023-02-13

## 2023-02-13 RX ORDER — ONDANSETRON 4 MG/1
4 TABLET, FILM COATED ORAL ONCE
Status: COMPLETED | OUTPATIENT
Start: 2023-02-13 | End: 2023-02-13

## 2023-02-13 RX ORDER — ACETAMINOPHEN 325 MG/1
650 TABLET ORAL ONCE
Status: COMPLETED | OUTPATIENT
Start: 2023-02-13 | End: 2023-02-13

## 2023-02-13 RX ADMIN — ACETAMINOPHEN 650 MG: 325 TABLET ORAL at 18:54

## 2023-02-13 RX ADMIN — ONDANSETRON HYDROCHLORIDE 4 MG: 4 TABLET, FILM COATED ORAL at 18:54

## 2023-02-13 ASSESSMENT — ENCOUNTER SYMPTOMS
APNEA: 0
RECTAL PAIN: 0
CONSTIPATION: 0
ABDOMINAL DISTENTION: 0
NAUSEA: 1
CHEST TIGHTNESS: 0
COUGH: 0
DIARRHEA: 1
ABDOMINAL PAIN: 0
VOMITING: 1
CHOKING: 0
EYE DISCHARGE: 0

## 2023-02-13 ASSESSMENT — PAIN DESCRIPTION - LOCATION: LOCATION: ABDOMEN

## 2023-02-13 ASSESSMENT — PAIN SCALES - GENERAL: PAINLEVEL_OUTOF10: 9

## 2023-02-13 ASSESSMENT — PAIN - FUNCTIONAL ASSESSMENT: PAIN_FUNCTIONAL_ASSESSMENT: 0-10

## 2023-02-13 NOTE — ED PROVIDER NOTES
[facility] ED     Emergency Department     Faculty Attestation    I performed a history and physical examination of the patient and discussed management with the resident. I reviewed the residents note and agree with the documented findings and plan of care. Any areas of disagreement are noted on the chart. I was personally present for the key portions of any procedures. I have documented in the chart those procedures where I was not present during the key portions. I have reviewed the emergency nurses triage note. I agree with the chief complaint, past medical history, past surgical history, allergies, medications, social and family history as documented unless otherwise noted below. For Physician Assistant/ Nurse Practitioner cases/documentation I have personally evaluated this patient and have completed at least one if not all key elements of the E/M (history, physical exam, and MDM). Additional findings are as noted. Patient presents with nausea, vomiting, diarrhea that she says started last night. She says her last bout of emesis was at around 3 AM.  She says she has continued to have some abdominal pain all day. She says she also has had a little bit of a sore throat as well as generalized weakness and fatigue. She denies fever, cough, congestion. She denies chest pain or shortness of breath. On exam, patient is resting comfortably in the bed. Lungs clear to auscultation bilaterally and heart sounds are normal.  Abdomen is soft and nontender. No rebound or guarding is present. The oropharynx appears normal.  We will treat patient's symptoms and reassess.       Renata Torres MD  Attending Emergency  Physician            Zechariah Prajapati MD  02/13/23 [id number]

## 2023-02-13 NOTE — Clinical Note
Michaela Poole was seen and treated in our emergency department on 2/13/2023. She may return to work on 02/14/2023. Patient was evaluated in the emergency room end and required admission and evaluation. Patient was given medications with resolution of her symptoms. If you have any questions or concerns, please don't hesitate to call.       Chacha Modi, DO

## 2023-02-13 NOTE — ED PROVIDER NOTES
[facility] ED  Emergency Department Encounter  Emergency Medicine Resident     Pt Ed Jayden PECK [facility] Jayden  MRN: [id number]  [place] 2000  Date of evaluation: 23  PCP:  MARY Solis CNP  Note Started: 5:56 PM EST      CHIEF COMPLAINT       No chief complaint on file. HISTORY OF PRESENT ILLNESS  (Location/Symptom, Timing/Onset, Context/Setting, Quality, Duration, Modifying Factors, Severity.)      Kady Perkins is a 23 y.o. female who presents with 1 day of nausea/emesis with diarrhea and a mildly sore throat. Patient went to a [employer] yesterday (only drank 1 matias) and subsequently had multiple episodes of non-bilious, non-bloody emesis and non-bloody diarrhea. Patient's last episode was 3AM and was unable to go to work due to feeling fatigued. She also is complaining of a mild sore throat. Patient denies any smoking history or recreational drug usage. Patient has a Hx of asthma and uses her respiratory therapies regularly per her PCP. PAST MEDICAL / SURGICAL / SOCIAL / FAMILY HISTORY      has a past medical history of Asthma, Chronic vasomotor rhinitis, GERD (gastroesophageal reflux disease), Headache(784.0), Obesity, NELSON (obstructive sleep apnea), Prediabetes, Pure hypercholesterolemia, Thalassemia minor, and Vision disturbance. has a past surgical history that includes Adenoidectomy; Tonsillectomy; other surgical history (2014); Upper gastrointestinal endoscopy (N/A, 10/8/2020); and Colonoscopy (N/A, 10/8/2020).       Social History     Socioeconomic History    Marital status: Single     Spouse name: Not on file    Number of children: Not on file    Years of education: Not on file    Highest education level: Not on file   Occupational History    Not on file   Tobacco Use    Smoking status: Former     Years: 2.00     Types: Cigarettes     Quit date: 2016     Years since quittin.7    Smokeless tobacco: Never   Vaping Use    Vaping Use: Never used   Substance and Sexual Activity    Alcohol use: No     Alcohol/week: 0.0 standard drinks    Drug use: Yes     Types: Marijuana [place]     Comment: occasionally    Sexual activity: Yes     Partners: Female   Other Topics Concern    Not on file   Social History Narrative    Not on file     Social Determinants of Health     Financial Resource Strain: Low Risk     Difficulty of Paying Living Expenses: Not hard at all   Food Insecurity: No Food Insecurity    Worried About [address] in the Last Year: Never true    [address] [employer] in the Last Year: Never true   Transportation Needs: Unknown    Lack of Transportation (Medical): Not on file    Lack of Transportation (Non-Medical):  No   Physical Activity: Not on file   Stress: Not on file   Social Connections: Not on file   Intimate Partner Violence: Not on file   Housing Stability: Unknown    Unable to Pay for Housing in the Last Year: Not on file    Number of Places Lived in the Last Year: Not on file    Unstable Housing in the Last Year: No       Family History   Problem Relation Age of Onset    Asthma Mother     Lupus Mother     High Blood Pressure Father     Diabetes Father     COPD Father     Stroke Father     Lupus Maternal Grandmother     Diabetes Maternal Grandfather     High Blood Pressure Maternal Grandfather     Heart Disease Paternal Grandmother     Diabetes Paternal Grandmother     High Blood Pressure Paternal Grandfather     Hypertension Paternal Grandfather     Diabetes Paternal Grandfather     Arthritis Maternal Aunt     Cancer Maternal Aunt     Heart Disease Paternal Aunt     Heart Disease Paternal Uncle     Birth Defects Neg Hx     Depression Neg Hx     Early Death Neg Hx     Hearing Loss Neg Hx     High Cholesterol Neg Hx     Kidney Disease Neg Hx     Learning Disabilities Neg Hx     Mental Illness Neg Hx     Mental Retardation Neg Hx     Miscarriages / Stillbirths Neg Hx     Substance Abuse Neg Hx Vision Loss Neg Hx     Other Neg Hx     Ovarian Cancer Neg Hx     Breast Cancer Neg Hx        Allergies:  Banana, Peanut-containing drug products, and Food    Home Medications:  Prior to Admission medications    Medication Sig Start Date End Date Taking? Authorizing Provider   ZOFRAN 4 MG tablet Take 1 tablet by mouth every 8 hours as needed for Nausea 2/13/23  Yes Rafiq Zuñiga DO   ipratropium-albuterol (DUONEB) 0.5-2.5 (3) MG/3ML SOLN nebulizer solution Inhale 3 mLs into the lungs every 4 hours as needed for Shortness of Breath 2/7/23   Sherly Vaca MD   Elastic Bandages & Supports (WRIST SPLINT) MISC Apply 1 each topically continuous Cock up wrist splint 2/3/23   MARY Luo CNP   mometasone-formoterol (DULERA) 200-5 MCG/ACT inhaler Inhale 2 puffs into the lungs in the morning and 2 puffs in the evening. 12/22/22   Sherly Vaca MD   mometasone (ASMANEX, 120 METERED DOSES,) 220 MCG/ACT AEPB inhaler Inhale 2 puffs into the lungs daily 12/15/22   Sherly Vaca MD   fluticasone [facility]) 50 MCG/ACT nasal spray 2 sprays by Each Nostril route daily 12/15/22   Sherly Vaca MD   Plecanatide 3 MG TABS Take 3 mg by mouth daily 11/15/22   Darrel Chambers MD   venlafaxine (EFFEXOR) 37.5 MG tablet Take 37.5 mg by mouth daily    Historical Provider, MD   mirtazapine (REMERON) 15 MG tablet Take 15 mg by mouth nightly Take 1/2 tab in morning and 1/2 tab at night    Historical Provider, MD   clotrimazole-betamethasone (LOTRISONE) 1-0.05 % cream Apply topically 2 times daily.  10/14/22   Santosh Patel DPM   montelukast (SINGULAIR) 10 MG tablet Take 1 tablet by mouth daily 6/6/22   MARY Falcon - CNP   ibuprofen (ADVIL;MOTRIN) 800 MG tablet Take 1 tablet by mouth every 8 hours as needed for Pain 3/22/22   Creed Pries, DO   Urea (CARMOL) 40 % cream Apply to feet once daily 1/31/22   Santosh Patel DPM   albuterol sulfate HFA (VENTOLIN HFA) 108 (90 Base) MCG/ACT inhaler Inhale 2 puffs into the lungs every 6 hours as needed for Wheezing 11/1/21   Carlos Milan, APRN - CNP   bisacodyl (BISACODYL) 5 MG EC tablet Take 1 tablet by mouth daily as needed for Constipation 3/3/21   ShanianiMARY Crenshaw CNP   ketoconazole (NIZORAL) 2 % cream Apply topically bid 1/7/21   Susan Giraldo DPM         REVIEW OF SYSTEMS       Review of Systems   Constitutional:  Positive for activity change, appetite change and fatigue. Negative for chills, diaphoresis, fever and unexpected weight change. HENT:  Negative for congestion and ear pain. Eyes:  Negative for discharge. Respiratory:  Negative for apnea, cough, choking and chest tightness. Cardiovascular:  Negative for chest pain and palpitations. Gastrointestinal:  Positive for diarrhea, nausea and vomiting. Negative for abdominal distention, abdominal pain, constipation and rectal pain. Endocrine: Negative for polyuria. Genitourinary:  Negative for dysuria, flank pain, frequency, hematuria and urgency. Musculoskeletal:  Negative for arthralgias. Neurological:  Negative for dizziness, facial asymmetry, weakness, light-headedness and headaches. Psychiatric/Behavioral:  Negative for agitation and confusion. PHYSICAL EXAM      INITIAL VITALS:   /72   Pulse 100   Temp 97.2 °F (36.2 °C) (Oral)   Resp 18   Ht 4' 11\" (1.499 m)   Wt 220 lb (99.8 kg)   BMI 44.43 kg/m²     Physical Exam  Vitals and nursing note reviewed. Constitutional:       General: She is not in acute distress. Appearance: Normal appearance. She is obese. She is not ill-appearing, toxic-appearing or diaphoretic. HENT:      Head: Normocephalic and atraumatic. Right Ear: External ear normal.      Left Ear: External ear normal.      Nose: Nose normal.      Mouth/Throat:      Mouth: Mucous membranes are moist.      Pharynx: Oropharynx is clear. Eyes:      General: No scleral icterus. Right eye: No discharge. Left eye: No discharge. Extraocular Movements: Extraocular movements intact. Conjunctiva/sclera: Conjunctivae normal.      Pupils: Pupils are equal, round, and reactive to light. Cardiovascular:      Rate and Rhythm: Normal rate and regular rhythm. Pulses: Normal pulses. Heart sounds: Normal heart sounds. Pulmonary:      Effort: Pulmonary effort is normal.      Breath sounds: Wheezing (faint bilateral wheezing) present. No rales. Chest:      Chest wall: No tenderness. Abdominal:      General: Abdomen is flat. Bowel sounds are normal. There is no distension. Palpations: Abdomen is soft. Tenderness: There is no abdominal tenderness. There is no guarding. Musculoskeletal:         General: No swelling, tenderness, deformity or signs of injury. Normal range of motion. Cervical back: Normal range of motion. Right lower leg: No edema. Left lower leg: No edema. Skin:     General: Skin is warm and dry. Capillary Refill: Capillary refill takes less than 2 seconds. Neurological:      General: No focal deficit present. Mental Status: She is alert and oriented to person, place, and time. Psychiatric:         Mood and Affect: Mood normal.         Behavior: Behavior normal.         Thought Content: Thought content normal.         Judgment: Judgment normal.         DDX/DIAGNOSTIC RESULTS / EMERGENCY DEPARTMENT COURSE / MDM     Medical Decision Making  Please see ED course    Risk  OTC drugs. Prescription drug management. Critical Care  Total time providing critical care: < 30 minutes      EKG    All EKG's are interpreted by the Emergency Department Physician who either signs or Co-signs this chart in the absence of a cardiologist.    EMERGENCY DEPARTMENT COURSE:    ED Course as of 02/13/23 2041 Mon Feb 13, 2023   8117 Basic chart review performed.  History and physical to be performed by resident. [AS]   [id number] Patient went to a [facility] yesterday (only drank 1 matias) and subsequently had multiple episodes of non-bilious, non-bloody emesis and non-bloody diarrhea. Patient's last episode was 3AM and was unable to go to work due to feeling fatigued. She also is complaining of a mild sore throat. Patient denies any smoking history or recreational drug usage. Patient has a Hx of asthma and uses her respiratory therapies regularly per her PCP. Concern for mild food poisoning and mild upper respiratory infection. Will give Zofran for nausea and vomiting control with PO challenge. If patient is unable to tolerate will give bolus of fluids. Tylenol given for sore throat. [AS]   9355 Pending medication administration per nursing staff. [AS]   1910 S/p 15 minutes from administration of Zofran. Patient continues to have some abdominal pain but is resting comfortably in bed and in no acute distress. Will re-evaluate patient in 15-20 minutes and PO challenge her. She was amenable to this plan. [AS]   6870 Patient stating that her abdominal discomfort has improved. She will attempt to work through a boxed lunch. [AS]   2005 Patient ate most of the box lunch. Patient still states that her stomach is experiencing some mild discomfort. Plan to discharge home with a short course of Zofran and return precautions. [AS]      ED Course User Index  [AS] Alie Masters DO       PROCEDURES:  None    CONSULTS:  None    CRITICAL CARE:  There was significant risk of life threatening deterioration of patient's condition requiring my direct management. Critical care time <15 minutes, excluding any documented procedures. FINAL IMPRESSION      1.  Abdominal pain, vomiting, and diarrhea          DISPOSITION / PLAN     DISPOSITION Decision To Discharge 02/13/2023 08:05:55 PM      PATIENT REFERRED TO:  MARY Haddad - CNP  [address]  [place] [address]  [phone]    Schedule an appointment as soon as possible for a visit   As needed, If symptoms worsen    DISCHARGE MEDICATIONS:  New Prescriptions    ZOFRAN 4 MG TABLET    Take 1 tablet by mouth every 8 hours as needed for Nausea       Rafiq Zuñiga DO  Emergency Medicine Resident    (Please note that portions of thisnote were completed with a voice recognition program.  Efforts were made to edit the dictations but occasionally words are mis-transcribed.)       Rafiq Zuñiga DO  Resident  02/13/23 2041       Rafiq Zuñiga DO  Resident  02/13/23 2042

## 2023-02-14 NOTE — ED NOTES
Patient ate 1/2 turkey sandwich from box lunch and did not vomit.      Milagro Asencio, CARSON  77/82/68 [id number]

## 2023-02-14 NOTE — DISCHARGE INSTRUCTIONS
- You are seen at the [facility] Emergency Department and evaluated by physicians and staff. You are diagnosed with abdominal pain with vomiting and diarrhea. - During admission you were given a dose of oral Zofran. You had improvement in your abdominal discomfort and were able to tolerate a meal.    -You will be discharged with a short course of Zofran to be taken as needed for nausea and vomiting. Continue to hydrate for the next several days and avoid any unusual foods.    -Please return to the emergency room if experiencing the following symptoms acutely: Headache, fever, chills, nausea, vomiting, worsening abdominal pain, worsening diarrhea, chest pain, shortness of breath, blood in your bowel movements, blood in your vomit and/or any change in baseline health.

## 2023-02-22 ENCOUNTER — TELEPHONE (OUTPATIENT)
Dept: FAMILY MEDICINE CLINIC | Age: 23
End: 2023-02-22

## 2023-02-22 NOTE — TELEPHONE ENCOUNTER
----- Message from Ivet Mcgovern sent at 2/22/2023 11:19 AM EST -----  Subject: Message to Provider    QUESTIONS  Information for Provider? Pt needs a copy of her immunization record.  ---------------------------------------------------------------------------  --------------  Maris Guillermo INFO  [phone]; OK to leave message on voicemail  ---------------------------------------------------------------------------  --------------  SCRIPT ANSWERS  Relationship to Patient?  Self

## 2023-03-01 ENCOUNTER — HOSPITAL ENCOUNTER (OUTPATIENT)
Dept: ULTRASOUND IMAGING | Age: 23
Discharge: HOME | End: 2023-03-03
Payer: COMMERCIAL

## 2023-03-01 DIAGNOSIS — N64.4 NIPPLE PAIN: ICD-10-CM

## 2023-03-01 PROCEDURE — 76642 ULTRASOUND BREAST LIMITED: CPT

## 2023-03-08 ENCOUNTER — HOSPITAL ENCOUNTER (OUTPATIENT)
Age: 23
Discharge: HOME | End: 2023-03-10
Payer: COMMERCIAL

## 2023-03-08 ENCOUNTER — HOSPITAL ENCOUNTER (OUTPATIENT)
Dept: GENERAL RADIOLOGY | Age: 23
Discharge: HOME | End: 2023-03-10
Payer: COMMERCIAL

## 2023-03-08 DIAGNOSIS — M25.532 LEFT WRIST PAIN: ICD-10-CM

## 2023-03-08 PROCEDURE — 73110 X-RAY EXAM OF WRIST: CPT

## 2023-03-30 ENCOUNTER — TELEPHONE (OUTPATIENT)
Dept: PULMONOLOGY | Age: 23
End: 2023-03-30

## 2023-03-30 NOTE — TELEPHONE ENCOUNTER
3/30/2023 11:39 AM  Your 4pm appt today Nancysantosh Bolivar ROSHNI 2000 called and asked to be Covid tested during her appointment. She has SOB, body aches, chills, headache and feels like she did before she got Covid in the past. Do you want her to come into the office still or would you rather she get tested at an urgent care or walk in clinic? Please advise, thank you Sawyer Blanton 3/30/2023 11:40 AM    3/30/2023 11:40 AM  Test at urgent care or walk in      Called pt to inform.  She will call back to r/s her appointment

## 2023-04-24 ENCOUNTER — OFFICE VISIT (OUTPATIENT)
Dept: PODIATRY | Age: 23
End: 2023-04-24
Payer: COMMERCIAL

## 2023-04-24 VITALS — WEIGHT: 217 LBS | HEIGHT: 59 IN | BODY MASS INDEX: 43.75 KG/M2

## 2023-04-24 DIAGNOSIS — M79.605 PAIN IN BOTH LOWER EXTREMITIES: ICD-10-CM

## 2023-04-24 DIAGNOSIS — B35.1 DERMATOPHYTOSIS OF NAIL: ICD-10-CM

## 2023-04-24 DIAGNOSIS — D23.72 BENIGN NEOPLASM OF SKIN OF LEFT FOOT: ICD-10-CM

## 2023-04-24 DIAGNOSIS — L85.3 XEROSIS CUTIS: ICD-10-CM

## 2023-04-24 DIAGNOSIS — L60.0 INGROWN NAIL: ICD-10-CM

## 2023-04-24 DIAGNOSIS — D23.71 BENIGN NEOPLASM OF SKIN OF RIGHT FOOT: Primary | ICD-10-CM

## 2023-04-24 DIAGNOSIS — M79.604 PAIN IN BOTH LOWER EXTREMITIES: ICD-10-CM

## 2023-04-24 PROCEDURE — 17110 DESTRUCTION B9 LES UP TO 14: CPT | Performed by: PODIATRIST

## 2023-04-24 PROCEDURE — G8417 CALC BMI ABV UP PARAM F/U: HCPCS | Performed by: PODIATRIST

## 2023-04-24 PROCEDURE — 11721 DEBRIDE NAIL 6 OR MORE: CPT | Performed by: PODIATRIST

## 2023-04-24 PROCEDURE — 99213 OFFICE O/P EST LOW 20 MIN: CPT | Performed by: PODIATRIST

## 2023-04-24 PROCEDURE — G8427 DOCREV CUR MEDS BY ELIG CLIN: HCPCS | Performed by: PODIATRIST

## 2023-04-24 PROCEDURE — 1036F TOBACCO NON-USER: CPT | Performed by: PODIATRIST

## 2023-04-24 NOTE — PROGRESS NOTES
dry, supple, Bilateral.  Open lesion absent, Bilateral.  Interdigital maceration absent to web spaces 4, Bilateral.  Nails 1-5 left and 1-5 right thickened > 3.0 mm, dystrophic and crumbly, discolored with subungual debris. Fissures absent, Bilateral.   General: AAO x 3 in NAD. Derm  Toenail Description  Sites of Onychomycosis Involvement (Check affected area)  [x] [x] [x] [x] [x] [x] [x] [x] [x] [x]  5 4 3 2 1 1 2 3 4 5                          Right                                        Left    Thickness  [x] [x] [x] [x] [x] [x] [x] [x] [x] [x]  5 4 3 2 1 1 2 3 4 5                         Right                                        Left    Dystrophic Changes   [x] [x] [x] [x] [x] [x] [x] [x] [x] [x]  5 4 3 2 1 1 2 3 4 5                         Right                                        Left    Color  [x] [x] [x] [x] [x] [x] [x] [x] [x] [x]  5 4 3 2 1 1 2 3 4 5                          Right                                        Left    Incurvation/Ingrowin   [] [] [] [] [] [] [] [] [] []  5 4 3 2 1 1 2 3 4 5                         Right                                        Left    Inflammation/Pain   [x] [x] [x] [x] [x] [x] [x] [x] [x] [x]  5 4 3  2 1 1 2 3 4 5                         Right                                        Left       Nails are painful 1-10 with direct palpation. Q7   []Yes  []No                Q8   [x]Yes  []No                     Q9   []Yes    []No    Assessment:  23 y.o. female with:    Diagnosis Orders   1. Benign neoplasm of skin of right foot  47049 - [place] DESTRUCTION BENIGN LESIONS UP TO 14      2. Benign neoplasm of skin of left foot  14710 - [place] DESTRUCTION BENIGN LESIONS UP TO 14      3. Pain in both lower extremities  90169 - [place] DESTRUCTION BENIGN LESIONS UP TO 14    38115 - [place] DEBRIDEMENT OF NAILS, 6 OR MORE      4. Xerosis cutis  07954 - [place] DEBRIDEMENT OF NAILS, 6 OR MORE      5. Dermatophytosis of nail  96467 - [place] DEBRIDEMENT OF NAILS, 6 OR MORE      6.  Ingrown

## 2023-05-16 ENCOUNTER — TELEPHONE (OUTPATIENT)
Dept: FAMILY MEDICINE CLINIC | Age: 23
End: 2023-05-16

## 2023-05-16 ENCOUNTER — OFFICE VISIT (OUTPATIENT)
Dept: GASTROENTEROLOGY | Age: 23
End: 2023-05-16
Payer: COMMERCIAL

## 2023-05-16 VITALS
DIASTOLIC BLOOD PRESSURE: 89 MMHG | TEMPERATURE: 97.3 F | SYSTOLIC BLOOD PRESSURE: 137 MMHG | BODY MASS INDEX: 43.22 KG/M2 | HEART RATE: 81 BPM | WEIGHT: 214 LBS

## 2023-05-16 DIAGNOSIS — K29.00 ACUTE SUPERFICIAL GASTRITIS WITHOUT HEMORRHAGE: ICD-10-CM

## 2023-05-16 DIAGNOSIS — R10.84 ABDOMINAL PAIN, GENERALIZED: Primary | ICD-10-CM

## 2023-05-16 DIAGNOSIS — R14.0 BLOATING: ICD-10-CM

## 2023-05-16 DIAGNOSIS — K58.9 IRRITABLE BOWEL SYNDROME, UNSPECIFIED TYPE: ICD-10-CM

## 2023-05-16 DIAGNOSIS — K21.9 GASTROESOPHAGEAL REFLUX DISEASE, UNSPECIFIED WHETHER ESOPHAGITIS PRESENT: ICD-10-CM

## 2023-05-16 PROCEDURE — 99214 OFFICE O/P EST MOD 30 MIN: CPT | Performed by: INTERNAL MEDICINE

## 2023-05-16 PROCEDURE — G8427 DOCREV CUR MEDS BY ELIG CLIN: HCPCS | Performed by: INTERNAL MEDICINE

## 2023-05-16 PROCEDURE — G8417 CALC BMI ABV UP PARAM F/U: HCPCS | Performed by: INTERNAL MEDICINE

## 2023-05-16 PROCEDURE — 1036F TOBACCO NON-USER: CPT | Performed by: INTERNAL MEDICINE

## 2023-05-16 ASSESSMENT — ENCOUNTER SYMPTOMS
WHEEZING: 0
CONSTIPATION: 1
COUGH: 0
TROUBLE SWALLOWING: 0
VOMITING: 0
ABDOMINAL PAIN: 1
BLOOD IN STOOL: 0
ABDOMINAL DISTENTION: 1
RECTAL PAIN: 0
SHORTNESS OF BREATH: 0
DIARRHEA: 0
CHOKING: 0
ANAL BLEEDING: 0
NAUSEA: 0

## 2023-05-16 NOTE — PROGRESS NOTES
GI CLINIC FOLLOW UP    INTERVAL HISTORY:   No referring provider defined for this encounter. Chief Complaint   Patient presents with    Constipation     Patient is f/u on constipation and GERD. She c/o increase in constipation and bloating during menstrual.  She also states that she has been feeling full off of a few bites of food for the past 2 months. HISTORY OF PRESENT ILLNESS: Kimo Bolden is a 23 y.o. female , referred for evaluation of constipation , GERD,      Patient is here for follow-up  egd/colon done    negative colon   egd: gastritis,bxs SB negative   Liver enzymes are all normal  Hemoglobin and white blood count are all normal  Ct 2/2021 : negative   TSH normal 10/19/22  LFTs normal    Mild anemia       Last vist was given trulance , could not not keep it down she said   Not taking the Trulance as above  Bloated and distended abd pain especially in the epigastric area and the upper abdomen  She did lose around 5 to 10 pounds  Denies any other symptoms              Past Medical,Family, and Social History reviewed and does contribute to the patient presentingcondition. I did review all the labs results available for the labs which were ordered by the primary care physician, and the other consultants, we search on [facility] at [address] [facility] and all the available care everywhere epic    I did review all the imaging studies of the abdomen available on EMR, ordered by the primary care physician and the other consultant    I did review all the pathology from the biopsies done on the previous endoscopies    Patient's PMH/PSH,SH,PSYCH Hx, MEDs, ALLERGIES, and ROS were all reviewed and updated in the appropriate sections.     PAST MEDICAL HISTORY:  Past Medical History:   Diagnosis Date    Asthma     Severe persistent asthma, unspecified whether complicated    Chronic vasomotor rhinitis     GERD (gastroesophageal reflux disease)     Headache(784.0) 03/01/2012    Obesity     NELSON

## 2023-05-18 RX ORDER — POLYETHYLENE GLYCOL 3350, SODIUM SULFATE ANHYDROUS, SODIUM BICARBONATE, SODIUM CHLORIDE, POTASSIUM CHLORIDE 236; 22.74; 6.74; 5.86; 2.97 G/4L; G/4L; G/4L; G/4L; G/4L
1 POWDER, FOR SOLUTION ORAL DAILY
Qty: 4000 ML | Refills: 0 | Status: SHIPPED | OUTPATIENT
Start: 2023-05-18 | End: 2023-05-19

## 2023-05-18 RX ORDER — BISACODYL 5 MG/1
TABLET, DELAYED RELEASE ORAL
Qty: 4 TABLET | Refills: 0 | Status: SHIPPED | OUTPATIENT
Start: 2023-05-18

## 2023-05-19 ENCOUNTER — HOSPITAL ENCOUNTER (OUTPATIENT)
Dept: PREADMISSION TESTING | Age: 23
Discharge: HOME | End: 2023-05-23

## 2023-05-19 VITALS — WEIGHT: 215 LBS | BODY MASS INDEX: 43.34 KG/M2 | HEIGHT: 59 IN

## 2023-05-28 PROCEDURE — 96365 THER/PROPH/DIAG IV INF INIT: CPT

## 2023-05-28 PROCEDURE — 96375 TX/PRO/DX INJ NEW DRUG ADDON: CPT

## 2023-05-28 PROCEDURE — 99285 EMERGENCY DEPT VISIT HI MDM: CPT

## 2023-05-29 ENCOUNTER — APPOINTMENT (OUTPATIENT)
Dept: GENERAL RADIOLOGY | Age: 23
End: 2023-05-29
Payer: COMMERCIAL

## 2023-05-29 ENCOUNTER — HOSPITAL ENCOUNTER (EMERGENCY)
Age: 23
Discharge: HOME | End: 2023-05-29
Attending: STUDENT IN AN ORGANIZED HEALTH CARE EDUCATION/TRAINING PROGRAM
Payer: COMMERCIAL

## 2023-05-29 VITALS
TEMPERATURE: 98.3 F | HEIGHT: 59 IN | HEART RATE: 100 BPM | DIASTOLIC BLOOD PRESSURE: 106 MMHG | RESPIRATION RATE: 18 BRPM | SYSTOLIC BLOOD PRESSURE: 134 MMHG | WEIGHT: 215 LBS | BODY MASS INDEX: 43.34 KG/M2 | OXYGEN SATURATION: 98 %

## 2023-05-29 DIAGNOSIS — H92.01 ACUTE OTALGIA, RIGHT: ICD-10-CM

## 2023-05-29 DIAGNOSIS — J45.901 MODERATE ASTHMA WITH EXACERBATION, UNSPECIFIED WHETHER PERSISTENT: Primary | ICD-10-CM

## 2023-05-29 LAB
ANION GAP SERPL CALCULATED.3IONS-SCNC: 8 MMOL/L (ref 9–17)
BASOPHILS # BLD: 0.05 K/UL (ref 0–0.2)
BASOPHILS NFR BLD: 1 % (ref 0–2)
BUN SERPL-MCNC: 15 MG/DL (ref 6–20)
BUN/CREAT SERPL: 21 (ref 9–20)
CALCIUM SERPL-MCNC: 8.9 MG/DL (ref 8.6–10.4)
CHLORIDE SERPL-SCNC: 107 MMOL/L (ref 98–107)
CO2 SERPL-SCNC: 22 MMOL/L (ref 20–31)
CREAT SERPL-MCNC: 0.73 MG/DL (ref 0.5–0.9)
EOSINOPHIL # BLD: 0.53 K/UL (ref 0–0.44)
EOSINOPHILS RELATIVE PERCENT: 6 % (ref 1–4)
ERYTHROCYTE [DISTWIDTH] IN BLOOD BY AUTOMATED COUNT: 13.8 % (ref 11.8–14.4)
GFR SERPL CREATININE-BSD FRML MDRD: >60 ML/MIN/1.73M2
GLUCOSE SERPL-MCNC: 115 MG/DL (ref 70–99)
HCO3 VENOUS: 24 MMOL/L (ref 22–29)
HCT VFR BLD AUTO: 40.4 % (ref 36.3–47.1)
HGB BLD-MCNC: 11.8 G/DL (ref 11.9–15.1)
IMM GRANULOCYTES # BLD AUTO: 0.02 K/UL (ref 0–0.3)
IMM GRANULOCYTES NFR BLD: 0 %
LYMPHOCYTES # BLD: 46 % (ref 24–43)
LYMPHOCYTES NFR BLD: 3.8 K/UL (ref 1.1–3.7)
MCH RBC QN AUTO: 22.6 PG (ref 25.2–33.5)
MCHC RBC AUTO-ENTMCNC: 29.2 G/DL (ref 28.4–34.8)
MCV RBC AUTO: 77.4 FL (ref 82.6–102.9)
MONOCYTES NFR BLD: 0.3 K/UL (ref 0.1–1.2)
MONOCYTES NFR BLD: 4 % (ref 3–12)
NEUTROPHILS NFR BLD: 43 % (ref 36–65)
NEUTS SEG NFR BLD: 3.52 K/UL (ref 1.5–8.1)
NRBC AUTOMATED: 0 PER 100 WBC
O2 SAT, VEN: 89 % (ref 60–85)
PCO2, VEN: 37.9 MM HG (ref 41–51)
PH VENOUS: 7.41 (ref 7.32–7.43)
PLATELET # BLD AUTO: 246 K/UL (ref 138–453)
PMV BLD AUTO: 10.7 FL (ref 8.1–13.5)
PO2, VEN: 55.3 MM HG (ref 30–50)
POSITIVE BASE EXCESS, VEN: 0 (ref 0–3)
POTASSIUM SERPL-SCNC: 3.4 MMOL/L (ref 3.7–5.3)
RBC # BLD AUTO: 5.22 M/UL (ref 3.95–5.11)
RBC # BLD: ABNORMAL 10*6/UL
SODIUM SERPL-SCNC: 137 MMOL/L (ref 135–144)
WBC OTHER # BLD: 8.2 K/UL (ref 3.5–11.3)

## 2023-05-29 PROCEDURE — 94761 N-INVAS EAR/PLS OXIMETRY MLT: CPT

## 2023-05-29 PROCEDURE — 85025 COMPLETE CBC W/AUTO DIFF WBC: CPT

## 2023-05-29 PROCEDURE — 93005 ELECTROCARDIOGRAM TRACING: CPT | Performed by: STUDENT IN AN ORGANIZED HEALTH CARE EDUCATION/TRAINING PROGRAM

## 2023-05-29 PROCEDURE — 94640 AIRWAY INHALATION TREATMENT: CPT

## 2023-05-29 PROCEDURE — 6370000000 HC RX 637 (ALT 250 FOR IP): Performed by: STUDENT IN AN ORGANIZED HEALTH CARE EDUCATION/TRAINING PROGRAM

## 2023-05-29 PROCEDURE — 6360000002 HC RX W HCPCS: Performed by: STUDENT IN AN ORGANIZED HEALTH CARE EDUCATION/TRAINING PROGRAM

## 2023-05-29 PROCEDURE — 96375 TX/PRO/DX INJ NEW DRUG ADDON: CPT

## 2023-05-29 PROCEDURE — 82803 BLOOD GASES ANY COMBINATION: CPT

## 2023-05-29 PROCEDURE — 80048 BASIC METABOLIC PNL TOTAL CA: CPT

## 2023-05-29 PROCEDURE — 96365 THER/PROPH/DIAG IV INF INIT: CPT

## 2023-05-29 PROCEDURE — 71045 X-RAY EXAM CHEST 1 VIEW: CPT

## 2023-05-29 RX ORDER — ALBUTEROL SULFATE 2.5 MG/3ML
2.5 SOLUTION RESPIRATORY (INHALATION) ONCE
Status: COMPLETED | OUTPATIENT
Start: 2023-05-29 | End: 2023-05-29

## 2023-05-29 RX ORDER — AMOXICILLIN 500 MG/1
500 CAPSULE ORAL ONCE
Status: COMPLETED | OUTPATIENT
Start: 2023-05-29 | End: 2023-05-29

## 2023-05-29 RX ORDER — MAGNESIUM SULFATE IN WATER 40 MG/ML
2000 INJECTION, SOLUTION INTRAVENOUS ONCE
Status: COMPLETED | OUTPATIENT
Start: 2023-05-29 | End: 2023-05-29

## 2023-05-29 RX ORDER — AMOXICILLIN 500 MG/1
500 CAPSULE ORAL 2 TIMES DAILY
Qty: 14 CAPSULE | Refills: 0 | Status: SHIPPED | OUTPATIENT
Start: 2023-05-29 | End: 2023-06-05

## 2023-05-29 RX ORDER — METHYLPREDNISOLONE SODIUM SUCCINATE 125 MG/2ML
125 INJECTION, POWDER, LYOPHILIZED, FOR SOLUTION INTRAMUSCULAR; INTRAVENOUS ONCE
Status: COMPLETED | OUTPATIENT
Start: 2023-05-29 | End: 2023-05-29

## 2023-05-29 RX ORDER — PREDNISONE 10 MG/1
TABLET ORAL
Qty: 20 TABLET | Refills: 0 | Status: SHIPPED | OUTPATIENT
Start: 2023-05-29

## 2023-05-29 RX ORDER — POTASSIUM CHLORIDE 20 MEQ/1
40 TABLET, EXTENDED RELEASE ORAL ONCE
Status: COMPLETED | OUTPATIENT
Start: 2023-05-29 | End: 2023-05-29

## 2023-05-29 RX ADMIN — METHYLPREDNISOLONE SODIUM SUCCINATE 125 MG: 125 INJECTION, POWDER, FOR SOLUTION INTRAMUSCULAR; INTRAVENOUS at 01:53

## 2023-05-29 RX ADMIN — AMOXICILLIN 500 MG: 500 CAPSULE ORAL at 02:55

## 2023-05-29 RX ADMIN — POTASSIUM CHLORIDE 40 MEQ: 1500 TABLET, EXTENDED RELEASE ORAL at 02:56

## 2023-05-29 RX ADMIN — ALBUTEROL SULFATE 2.5 MG: 2.5 SOLUTION RESPIRATORY (INHALATION) at 01:42

## 2023-05-29 RX ADMIN — MAGNESIUM SULFATE HEPTAHYDRATE 2000 MG: 40 INJECTION, SOLUTION INTRAVENOUS at 01:56

## 2023-05-29 ASSESSMENT — PAIN DESCRIPTION - LOCATION: LOCATION: CHEST

## 2023-05-29 ASSESSMENT — PAIN - FUNCTIONAL ASSESSMENT: PAIN_FUNCTIONAL_ASSESSMENT: 0-10

## 2023-05-29 ASSESSMENT — PAIN DESCRIPTION - DESCRIPTORS: DESCRIPTORS: ACHING;SHARP;SHOOTING

## 2023-05-29 ASSESSMENT — PAIN DESCRIPTION - ORIENTATION: ORIENTATION: MID

## 2023-05-29 ASSESSMENT — PAIN DESCRIPTION - FREQUENCY: FREQUENCY: INTERMITTENT

## 2023-05-29 ASSESSMENT — PAIN SCALES - GENERAL: PAINLEVEL_OUTOF10: 8

## 2023-05-29 NOTE — DISCHARGE INSTRUCTIONS
Take your prednisone as prescribed. Take Pepcid (famotidine - over the counter) every day while you are taking the steroids. If you are a diabetic, you should check your blood sugar more frequently while taking prednisone. Use your inhaler or nebulizer as prescribed, or at minimum every 4 hours while you are having an asthma attack. Being around people that smoke, bakari houses, weather change can cause an asthma exacerbation. PLEASE RETURN TO THE EMERGENCY DEPARTMENT IMMEDIATELY for worsening symptoms of shortness of breath, wheezing, change in the amount of sputum that you cough up or a change in the color of your sputum, using your inhaler more frequently or if your inhaler only lasts up to 2 hours, or if you develop any concerning symptoms such as: high fever not relieved by acetaminophen (Tylenol) and/or ibuprofen (Motrin / Advil), chills, shortness of breath, chest pain, feeling of your heart fluttering or racing, persistent nausea and/or vomiting, numbness, weakness or tingling in the arms or legs or change in color of the extremities, changes in mental status, persistent headache, blurry vision, unable to follow up with your physician, or other any other care or concern. Yonny 

## 2023-05-30 ENCOUNTER — TELEPHONE (OUTPATIENT)
Dept: GASTROENTEROLOGY | Age: 23
End: 2023-05-30

## 2023-05-30 LAB
EKG ATRIAL RATE: 97 BPM
EKG P AXIS: 58 DEGREES
EKG P-R INTERVAL: 154 MS
EKG Q-T INTERVAL: 344 MS
EKG QRS DURATION: 72 MS
EKG QTC CALCULATION (BAZETT): 436 MS
EKG R AXIS: 70 DEGREES
EKG T AXIS: 36 DEGREES
EKG VENTRICULAR RATE: 97 BPM

## 2023-05-30 NOTE — TELEPHONE ENCOUNTER
Writer rec'd call from pt stating she cannot tolerate any of the prep she is doing for colon tomorrow, pt states the prep is making her nauseated and she cannot tolerate any oral prep. Writer advised pt she has to take the prep in order to get cleaned out, pt also has very bad constipation and needed a 2 day prep per [facility]. Pt states she is cx'ing colon/EGD, pt wants to know why [facility] \"can't order a gall bladder us to see where her pain is coming from. \" Writer sent pt to clinical staff line to ask about ordering other test.

## 2023-06-01 ENCOUNTER — OFFICE VISIT (OUTPATIENT)
Dept: PULMONOLOGY | Age: 23
End: 2023-06-01
Payer: COMMERCIAL

## 2023-06-01 ENCOUNTER — TELEPHONE (OUTPATIENT)
Dept: PHYSICAL MEDICINE AND REHAB | Age: 23
End: 2023-06-01

## 2023-06-01 VITALS
HEART RATE: 82 BPM | OXYGEN SATURATION: 100 % | SYSTOLIC BLOOD PRESSURE: 126 MMHG | DIASTOLIC BLOOD PRESSURE: 80 MMHG | WEIGHT: 217 LBS | RESPIRATION RATE: 16 BRPM | HEIGHT: 59 IN | BODY MASS INDEX: 43.75 KG/M2

## 2023-06-01 DIAGNOSIS — G47.61 PERIODIC LIMB MOVEMENT SLEEP DISORDER: ICD-10-CM

## 2023-06-01 DIAGNOSIS — R06.83 SNORING: ICD-10-CM

## 2023-06-01 DIAGNOSIS — G47.33 OSA (OBSTRUCTIVE SLEEP APNEA): ICD-10-CM

## 2023-06-01 DIAGNOSIS — R09.82 POST-NASAL DRIP: ICD-10-CM

## 2023-06-01 DIAGNOSIS — E66.09 OBESITY DUE TO EXCESS CALORIES, UNSPECIFIED OBESITY SEVERITY: ICD-10-CM

## 2023-06-01 DIAGNOSIS — Z87.891 PERSONAL HISTORY OF TOBACCO USE: ICD-10-CM

## 2023-06-01 DIAGNOSIS — J45.41 MODERATE PERSISTENT ASTHMA WITH ACUTE EXACERBATION: Primary | ICD-10-CM

## 2023-06-01 DIAGNOSIS — K21.9 GASTROESOPHAGEAL REFLUX DISEASE, UNSPECIFIED WHETHER ESOPHAGITIS PRESENT: ICD-10-CM

## 2023-06-01 PROCEDURE — G8417 CALC BMI ABV UP PARAM F/U: HCPCS | Performed by: INTERNAL MEDICINE

## 2023-06-01 PROCEDURE — G8427 DOCREV CUR MEDS BY ELIG CLIN: HCPCS | Performed by: INTERNAL MEDICINE

## 2023-06-01 PROCEDURE — 99214 OFFICE O/P EST MOD 30 MIN: CPT | Performed by: INTERNAL MEDICINE

## 2023-06-01 PROCEDURE — 1036F TOBACCO NON-USER: CPT | Performed by: INTERNAL MEDICINE

## 2023-06-01 NOTE — PROGRESS NOTES
PULMONARY OP  PROGRESS NOTE          Patient:  Sea Baig  YOB: 2000    MRN: [id number]     Acct:        Pt seen and Chart reviewed. Ms. Sea Baig is here in followup for   1. Moderate persistent asthma with acute exacerbation    2. Post-nasal drip    3. Obesity due to excess calories, unspecified obesity severity    4. Gastroesophageal reflux disease, unspecified whether esophagitis present    5. Snoring    6. NELSON (obstructive sleep apnea)    7. Periodic limb movement sleep disorder    8. Personal history of tobacco use          Pt has been to the emergency room x1 since last visit  Has been using meds as recommended.   She likes the [facility]  Her insurance has changed her medication and she did not like it  She has some cough with deep breath at times  Has some shortness of breath and wheezing at times  Albuterol helps  Denied much of postnasal discharge  No acid reflux symptoms currently  She is making an effort to lose weight  Patient does not smoke anymore  Patient reports having daytime sleepiness and fatigue and tiredness associated with snoring  She had a sleep study done that showed mild sleep apnea and mild periodic leg movements  No motor vehicle accidents  No hypothyroidism  No narcolepsy symptoms      Subjective:  Review of Systems    Review of Systems -   General ROS: Completed and except as mentioned above were negative   Psychological ROS:  Completed and except as mentioned above were negative  Ophthalmic ROS:  Completed and except as mentioned above were negative  ENT ROS:  Completed and except as mentioned above were negative  Allergy and Immunology ROS:  Completed and except as mentioned above werenegative  Hematological and Lymphatic ROS:  Completed and except as mentioned above were negative  Endocrine ROS: Completed and except as mentioned above were negative  Respiratory ROS:  Completed and except asmentioned above were negative  Cardiovascular ROS:

## 2023-06-01 NOTE — ED PROVIDER NOTES
[facility]      Pt Name: Rochelle Lynn  MRN: [id number]  [place] 2000  Date of evaluation: 6/1/23    CHIEF COMPLAINT       Chief Complaint   Patient presents with    Chest Pain     Started at 4am     Shortness of Breath     Hx of asthma has been doing treatments w/o relief        HISTORY OF PRESENT ILLNESS   Rochelle Lynn is a 23 y.o. female who presents with shortness of breath, wheezing. Minimal relief with attempted treatments. Symptoms have been worsening.       PASTMEDICAL HISTORY     Past Medical History:   Diagnosis Date    Abdominal pain     Asthma     Severe persistent asthma, unspecified whether complicated    Chronic vasomotor rhinitis     GERD (gastroesophageal reflux disease)     Headache(784.0) 03/01/2012    Obesity     NELSON (obstructive sleep apnea)     Poor venous access     Prediabetes 12/08/2016    Pure hypercholesterolemia 12/08/2016    Thalassemia minor 10/29/2011    5/99     Vision disturbance 01/31/2014    wears glassses     Past Problem List  Patient Active Problem List   Diagnosis Code    Thalassemia minor     Food allergy Z91.018    Allergic rhinitis J30.9    NELSON (obstructive sleep apnea) G47.33    GERD (gastroesophageal reflux disease) K21.9    Chronic headache R51.9, G89.29    Class 3 severe obesity due to excess calories in adult ([facility] [address] E66.01    Pure hypercholesterolemia E78.00    Thyromegaly E01.0    Dysmenorrhea N94.6    Severe persistent asthma without complication S04.85    Marijuana use F12.90    IFG (impaired fasting glucose) R73.01       SURGICAL HISTORY       Past Surgical History:   Procedure Laterality Date    ADENOIDECTOMY      COLONOSCOPY N/A 10/8/2020    COLONOSCOPY WITH BIOPSY performed by Janelle Irizarry MD at [place] [address]  08/28/2014    Nexplanon placement,  pt has had this removed as of 10-8-202    TONSILLECTOMY      UPPER GASTROINTESTINAL ENDOSCOPY N/A 10/8/2020    EGD BIOPSY performed by Kalyn

## 2023-07-27 ENCOUNTER — HOSPITAL ENCOUNTER (OUTPATIENT)
Dept: CT IMAGING | Age: 23
Discharge: HOME | End: 2023-07-29
Attending: INTERNAL MEDICINE
Payer: COMMERCIAL

## 2023-07-27 DIAGNOSIS — R14.0 BLOATING: ICD-10-CM

## 2023-07-27 DIAGNOSIS — R10.84 ABDOMINAL PAIN, GENERALIZED: ICD-10-CM

## 2023-07-27 PROCEDURE — 6360000004 HC RX CONTRAST MEDICATION: Performed by: INTERNAL MEDICINE

## 2023-07-27 PROCEDURE — 74177 CT ABD & PELVIS W/CONTRAST: CPT

## 2023-07-27 RX ADMIN — IOPAMIDOL 75 ML: 755 INJECTION, SOLUTION INTRAVENOUS at 10:50

## 2023-08-01 NOTE — PROGRESS NOTES
Obstetric Comments   TAB 2019 at 6wks       PAST MEDICAL HISTORY:      Diagnosis Date    Abdominal pain     Asthma     Severe persistent asthma, unspecified whether complicated    Chronic vasomotor rhinitis     GERD (gastroesophageal reflux disease)     Headache(784.0) 03/01/2012    Obesity     NELSON (obstructive sleep apnea)     Poor venous access     Prediabetes 12/08/2016    Pure hypercholesterolemia 12/08/2016    Thalassemia minor 10/29/2011    5/99     Vision disturbance 01/31/2014    wears glassses       PAST SURGICAL HISTORY:                                                                    Procedure Laterality Date    ADENOIDECTOMY      COLONOSCOPY N/A 10/8/2020    COLONOSCOPY WITH BIOPSY performed by Shirin Bruner MD at [address]  08/28/2014    Nexplanon placement,  pt has had this removed as of 10-8-202    TONSILLECTOMY      UPPER GASTROINTESTINAL ENDOSCOPY N/A 10/8/2020    EGD BIOPSY performed by Shirin Bruner MD at [email or web address]:  Current Outpatient Medications   Medication Sig Dispense Refill    mometasone-formoterol (DULERA) 200-5 MCG/ACT inhaler Inhale 2 puffs into the lungs in the morning and 2 puffs in the evening. 1 each 5    mometasone-formoterol (DULERA) 200-5 MCG/ACT inhaler Inhale 2 puffs into the lungs in the morning and 2 puffs in the evening.  1 each 5    albuterol sulfate HFA (VENTOLIN HFA) 108 (90 Base) MCG/ACT inhaler Inhale 2 puffs into the lungs every 6 hours as needed for Wheezing 1 each 11    bisacodyl 5 MG EC tablet Please follow instructions given to you by your provider (Patient not taking: Reported on 8/3/2023) 4 tablet 0    mometasone (ASMANEX, 120 METERED DOSES,) 220 MCG/ACT AEPB inhaler Inhale 2 puffs into the lungs daily (Patient not taking: Reported on 8/3/2023) 1 each 5    Plecanatide 3 MG TABS Take 3 mg by mouth daily (Patient not taking: Reported on 8/3/2023) 30 tablet 1    montelukast (SINGULAIR) 10 MG tablet Take 1 tablet by mouth

## 2023-08-03 ENCOUNTER — HOSPITAL ENCOUNTER (OUTPATIENT)
Age: 23
Setting detail: SPECIMEN
Discharge: HOME | End: 2023-08-03

## 2023-08-03 ENCOUNTER — OFFICE VISIT (OUTPATIENT)
Dept: OBGYN | Age: 23
End: 2023-08-03
Payer: COMMERCIAL

## 2023-08-03 VITALS — DIASTOLIC BLOOD PRESSURE: 88 MMHG | HEART RATE: 91 BPM | SYSTOLIC BLOOD PRESSURE: 137 MMHG

## 2023-08-03 DIAGNOSIS — N89.8 VAGINAL DISCHARGE: Primary | ICD-10-CM

## 2023-08-03 DIAGNOSIS — N89.8 VAGINAL DISCHARGE: ICD-10-CM

## 2023-08-03 LAB
C TRACH DNA SPEC QL PROBE+SIG AMP: NORMAL
N GONORRHOEA DNA SPEC QL PROBE+SIG AMP: NORMAL
SPECIMEN DESCRIPTION: NORMAL

## 2023-08-03 PROCEDURE — 99211 OFF/OP EST MAY X REQ PHY/QHP: CPT | Performed by: STUDENT IN AN ORGANIZED HEALTH CARE EDUCATION/TRAINING PROGRAM

## 2023-08-04 LAB
CANDIDA SPECIES: NEGATIVE
GARDNERELLA VAGINALIS: POSITIVE
SOURCE: ABNORMAL
TRICHOMONAS: NEGATIVE

## 2023-08-04 RX ORDER — METRONIDAZOLE 500 MG/1
500 TABLET ORAL 2 TIMES DAILY
Qty: 14 TABLET | Refills: 0 | Status: SHIPPED | OUTPATIENT
Start: 2023-08-04 | End: 2023-08-11

## 2023-08-07 ENCOUNTER — OFFICE VISIT (OUTPATIENT)
Dept: PODIATRY | Age: 23
End: 2023-08-07
Payer: COMMERCIAL

## 2023-08-07 VITALS — BODY MASS INDEX: 43.75 KG/M2 | HEIGHT: 59 IN | WEIGHT: 217 LBS

## 2023-08-07 DIAGNOSIS — D23.72 BENIGN NEOPLASM OF SKIN OF LEFT FOOT: ICD-10-CM

## 2023-08-07 DIAGNOSIS — M79.605 PAIN IN BOTH LOWER EXTREMITIES: ICD-10-CM

## 2023-08-07 DIAGNOSIS — R61 HYPERHIDROSIS: Primary | ICD-10-CM

## 2023-08-07 DIAGNOSIS — D23.71 BENIGN NEOPLASM OF SKIN OF RIGHT FOOT: ICD-10-CM

## 2023-08-07 DIAGNOSIS — M79.604 PAIN IN BOTH LOWER EXTREMITIES: ICD-10-CM

## 2023-08-07 DIAGNOSIS — B35.3 TINEA PEDIS OF BOTH FEET: ICD-10-CM

## 2023-08-07 PROCEDURE — 99213 OFFICE O/P EST LOW 20 MIN: CPT | Performed by: PODIATRIST

## 2023-08-07 PROCEDURE — G8417 CALC BMI ABV UP PARAM F/U: HCPCS | Performed by: PODIATRIST

## 2023-08-07 PROCEDURE — 1036F TOBACCO NON-USER: CPT | Performed by: PODIATRIST

## 2023-08-07 PROCEDURE — 17110 DESTRUCTION B9 LES UP TO 14: CPT | Performed by: PODIATRIST

## 2023-08-07 PROCEDURE — G8427 DOCREV CUR MEDS BY ELIG CLIN: HCPCS | Performed by: PODIATRIST

## 2023-08-07 RX ORDER — UREA 40 %
CREAM (GRAM) TOPICAL
Qty: 1 EACH | Refills: 2 | Status: SHIPPED | OUTPATIENT
Start: 2023-08-07

## 2023-08-07 NOTE — PROGRESS NOTES
[address] [facility] PODIATRY [address] [place] [address]  Dept: [phone]  Dept Fax: [phone]    RETURN PATIENT PROGRESS NOTE  Date of patient's visit: 8/7/2023  Patient's Name:  Jennifer Alberto YOB: 2000            Patient Care Team:  MARY Kwong CNP as PCP - General (Family Medicine)  MARY Kwong CNP as PCP - Empaneled Provider  Joanie Austin MD as Consulting Physician (Pulmonology)  MARY Vera CNP as Nurse Practitioner (Nurse Practitioner)  Shanita Ventura MD as Consulting Physician (Gastroenterology)  Guzman Avalos DPM as Surgeon (Naveed Butler)  Alverto Lau DPM as Physician (Podiatry)       Magnolia Juan M Bolivar 23 y.o. female that presents for follow-up of   Chief Complaint   Patient presents with    Foot Pain     Right foot, rtc 3 months       Symptoms began several month(s) ago and are unchanged . Patient relates pain is Present to wiliam feet along skin lesions. Pain is rated 4 out of 10 and is described as constant, mild, moderate. Treatments prior to today's visit include: previous podiatry treatment. Currently denies F/C/N/V. Allergies   Allergen Reactions    Banana     Peanut-Containing Drug Products     Food Rash     Peanuts,walnuts,cashews,pecans apples cherries       Past Medical History:   Diagnosis Date    Abdominal pain     Asthma     Severe persistent asthma, unspecified whether complicated    Chronic vasomotor rhinitis     GERD (gastroesophageal reflux disease)     Headache(784.0) 03/01/2012    Obesity     NELSON (obstructive sleep apnea)     Poor venous access     Prediabetes 12/08/2016    Pure hypercholesterolemia 12/08/2016    Thalassemia minor 10/29/2011    5/99     Vision disturbance 01/31/2014    wears glassses       Prior to Admission medications    Medication Sig Start Date End Date Taking?  Authorizing Provider   metroNIDAZOLE (FLAGYL) 500 MG

## 2023-08-07 NOTE — PATIENT INSTRUCTIONS
Schedule a Vaccine  When you qualify to receive the vaccine, call the [facility]) COVID-19 Vaccination Hotline to schedule your appointment or to get additional information about the [facility]) locations which are offering the COVID-19 vaccine. To be 94% effective, it's important that you receive two doses of one of the COVID-19 vaccines. -If you are receiving the [place] vaccine, your second shot will be scheduled as close to 21 days after the first shot as possible. -If you are receiving the Moderna vaccine, your second shot will be scheduled as close to 28 days after the first shot as possible. [facility]) COVID-19 Vaccination Hotline: [phone]    Links to [facility]) website and [facility] website:    [employer]/[facility]-monitoring-coronavirus-covid-19/covid-19-vaccine/[email or web address]    [email or web address]

## 2023-08-14 ENCOUNTER — TELEPHONE (OUTPATIENT)
Dept: OBGYN | Age: 23
End: 2023-08-14

## 2023-08-14 DIAGNOSIS — B96.89 BV (BACTERIAL VAGINOSIS): Primary | ICD-10-CM

## 2023-08-14 DIAGNOSIS — N76.0 BV (BACTERIAL VAGINOSIS): Primary | ICD-10-CM

## 2023-08-14 RX ORDER — METRONIDAZOLE 7.5 MG/G
GEL VAGINAL
Qty: 1 EACH | Refills: 0 | Status: SHIPPED | OUTPATIENT
Start: 2023-08-14 | End: 2023-08-21

## 2023-08-14 NOTE — TELEPHONE ENCOUNTER
Patient is experiencing stomach upset with Flagyl. She is not drinking any alcohol with medication. Requesting a different medication.

## 2023-09-12 ENCOUNTER — PROCEDURE VISIT (OUTPATIENT)
Dept: PHYSICAL MEDICINE AND REHAB | Age: 23
End: 2023-09-12

## 2023-09-12 DIAGNOSIS — M25.532 LEFT WRIST PAIN: Primary | ICD-10-CM

## 2023-09-14 NOTE — PROGRESS NOTES
[address] [facility] PHYSICAL MEDICINE AND REHABILITATION  [address]  [address] [place] [address]  Dept: [phone]  Dept Fax: [phone]    EMG/NCS Left Upper Limb    Subjective:     Curtis Anthony is a 20y.o.-year-old right-handed female presenting with left lateral wrist pain for at least 3-4 months. She denies any known inciting injury. She reports that pain is worse at night and with lifting objects. She states that she has numbness/tingling in the left hand sometimes. She also notes weakness in the left wrist, which may be primarily related to pain. She denies any neck pain. PMH:  no diabetes, no thyroid disease    PSH:  no neck surgery, no hand surgery    Objective:     Physical Exam    Constitutional: She appears well-developed and well-nourished. In no distress. Pulmonary/Chest: Respirations WNL and unlabored. MSK:  Tenderness to palpation of the left lateral wrist.  Neurological: She is alert. Sensation to light touch intact in the bilateral upper limbs. Strength 5/5 in the bilateral upper limbs, except strength 4/5 with bilateral finger abduction. Negative Fredrick's reflex bilaterally. Negative Tinel sign at the left wrist.  Skin: Skin is warm and dry with good turgor. Imaging  Left wrist x-rays, 3/8/23: IMPRESSION:  No acute osseous abnormality. Findings:     The procedure was explained to the patient prior to beginning the test.  Risks and benefits of the procedure were discussed. Patient gave verbal consent to proceed. The left median sensory studies to digits 1 and 3 are normal.  The left radial sensory study to digit 1 is normal.  The left ulnar sensory study to digit 5 is normal.  The left median motor study to APB is normal.  The left ulnar motor study to ADM is normal; the drop in amplitude at the below-elbow stimulation site is related to technical difficulty rather than pathology.     EMG of selected

## 2023-09-26 ENCOUNTER — TELEPHONE (OUTPATIENT)
Dept: FAMILY MEDICINE CLINIC | Age: 23
End: 2023-09-26

## 2023-09-26 NOTE — TELEPHONE ENCOUNTER
----- Message from Eliana Milian sent at 9/25/2023  3:18 PM EDT -----  Subject: Appointment Request    Reason for Call: Established Patient Appointment needed: Semi-Routine Skin   Problem    QUESTIONS    Reason for appointment request? No appointments available during search     Additional Information for Provider? patient states she has a rash /   breakout on both hand needs an appointment please call thank you   ---------------------------------------------------------------------------  --------------  [address] [place]  [phone]; OK to leave message on voicemail  ---------------------------------------------------------------------------  --------------  SCRIPT ANSWERS

## 2023-09-29 ENCOUNTER — OFFICE VISIT (OUTPATIENT)
Dept: FAMILY MEDICINE CLINIC | Age: 23
End: 2023-09-29

## 2023-09-29 VITALS
SYSTOLIC BLOOD PRESSURE: 139 MMHG | BODY MASS INDEX: 45.39 KG/M2 | HEART RATE: 77 BPM | OXYGEN SATURATION: 99 % | WEIGHT: 231.2 LBS | HEIGHT: 60 IN | DIASTOLIC BLOOD PRESSURE: 89 MMHG | TEMPERATURE: 98.4 F

## 2023-09-29 DIAGNOSIS — Z13.220 SCREENING, LIPID: ICD-10-CM

## 2023-09-29 DIAGNOSIS — K21.9 GASTROESOPHAGEAL REFLUX DISEASE, UNSPECIFIED WHETHER ESOPHAGITIS PRESENT: ICD-10-CM

## 2023-09-29 DIAGNOSIS — Z11.59 NEED FOR HEPATITIS C SCREENING TEST: ICD-10-CM

## 2023-09-29 DIAGNOSIS — J45.51 SEVERE PERSISTENT ASTHMA WITH ACUTE EXACERBATION: ICD-10-CM

## 2023-09-29 DIAGNOSIS — J45.50 SEVERE PERSISTENT ASTHMA WITHOUT COMPLICATION: ICD-10-CM

## 2023-09-29 DIAGNOSIS — E55.9 VITAMIN D DEFICIENCY: ICD-10-CM

## 2023-09-29 DIAGNOSIS — I10 ESSENTIAL HYPERTENSION: ICD-10-CM

## 2023-09-29 DIAGNOSIS — L30.9 DERMATITIS: ICD-10-CM

## 2023-09-29 DIAGNOSIS — F12.90 MARIJUANA USE: ICD-10-CM

## 2023-09-29 DIAGNOSIS — Z12.4 PAP SMEAR FOR CERVICAL CANCER SCREENING: ICD-10-CM

## 2023-09-29 DIAGNOSIS — Z13.29 SCREENING FOR THYROID DISORDER: ICD-10-CM

## 2023-09-29 DIAGNOSIS — M79.645 THUMB PAIN, LEFT: ICD-10-CM

## 2023-09-29 DIAGNOSIS — G47.33 OSA (OBSTRUCTIVE SLEEP APNEA): Primary | ICD-10-CM

## 2023-09-29 DIAGNOSIS — N94.6 PAINFUL MENSTRUAL PERIODS: ICD-10-CM

## 2023-09-29 DIAGNOSIS — E53.9 VITAMIN B DEFICIENCY: ICD-10-CM

## 2023-09-29 DIAGNOSIS — Z13.1 SCREENING FOR DIABETES MELLITUS: ICD-10-CM

## 2023-09-29 DIAGNOSIS — E66.01 CLASS 3 SEVERE OBESITY DUE TO EXCESS CALORIES WITH BODY MASS INDEX (BMI) OF 40.0 TO 44.9 IN ADULT, UNSPECIFIED WHETHER SERIOUS COMORBIDITY PRESENT (HCC): ICD-10-CM

## 2023-09-29 RX ORDER — AMLODIPINE BESYLATE 2.5 MG/1
2.5 TABLET ORAL DAILY
Qty: 90 TABLET | Refills: 1 | Status: SHIPPED | OUTPATIENT
Start: 2023-09-29

## 2023-09-29 RX ORDER — PREDNISONE 10 MG/1
10 TABLET ORAL 2 TIMES DAILY
Qty: 10 TABLET | Refills: 0 | Status: SHIPPED | OUTPATIENT
Start: 2023-09-29 | End: 2023-10-04

## 2023-09-29 SDOH — ECONOMIC STABILITY: FOOD INSECURITY: WITHIN THE PAST 12 MONTHS, THE FOOD YOU BOUGHT JUST DIDN'T LAST AND YOU DIDN'T HAVE MONEY TO GET MORE.: NEVER TRUE

## 2023-09-29 SDOH — ECONOMIC STABILITY: FOOD INSECURITY: WITHIN THE PAST 12 MONTHS, YOU WORRIED THAT YOUR FOOD WOULD RUN OUT BEFORE YOU GOT MONEY TO BUY MORE.: NEVER TRUE

## 2023-09-29 SDOH — ECONOMIC STABILITY: INCOME INSECURITY: HOW HARD IS IT FOR YOU TO PAY FOR THE VERY BASICS LIKE FOOD, HOUSING, MEDICAL CARE, AND HEATING?: NOT VERY HARD

## 2023-09-29 ASSESSMENT — ENCOUNTER SYMPTOMS
PHOTOPHOBIA: 0
EYE REDNESS: 0
SHORTNESS OF BREATH: 0
BACK PAIN: 0
SORE THROAT: 0
ABDOMINAL PAIN: 0
VOMITING: 0
COUGH: 0
CONSTIPATION: 0
EYE PAIN: 0
STRIDOR: 0
NAUSEA: 0
BLOOD IN STOOL: 0
EYE DISCHARGE: 0
DIARRHEA: 0

## 2023-09-29 ASSESSMENT — ANXIETY QUESTIONNAIRES
5. BEING SO RESTLESS THAT IT IS HARD TO SIT STILL: 0
IF YOU CHECKED OFF ANY PROBLEMS ON THIS QUESTIONNAIRE, HOW DIFFICULT HAVE THESE PROBLEMS MADE IT FOR YOU TO DO YOUR WORK, TAKE CARE OF THINGS AT HOME, OR GET ALONG WITH OTHER PEOPLE: NOT DIFFICULT AT ALL
6. BECOMING EASILY ANNOYED OR IRRITABLE: 0
1. FEELING NERVOUS, ANXIOUS, OR ON EDGE: 0
2. NOT BEING ABLE TO STOP OR CONTROL WORRYING: 0
GAD7 TOTAL SCORE: 0
4. TROUBLE RELAXING: 0
3. WORRYING TOO MUCH ABOUT DIFFERENT THINGS: 0
7. FEELING AFRAID AS IF SOMETHING AWFUL MIGHT HAPPEN: 0

## 2023-09-29 ASSESSMENT — PATIENT HEALTH QUESTIONNAIRE - PHQ9
1. LITTLE INTEREST OR PLEASURE IN DOING THINGS: 0
SUM OF ALL RESPONSES TO PHQ9 QUESTIONS 1 & 2: 1
SUM OF ALL RESPONSES TO PHQ QUESTIONS 1-9: 1
2. FEELING DOWN, DEPRESSED OR HOPELESS: 1
SUM OF ALL RESPONSES TO PHQ9 QUESTIONS 1 & 2: 1
SUM OF ALL RESPONSES TO PHQ QUESTIONS 1-9: 1
2. FEELING DOWN, DEPRESSED OR HOPELESS: 1
1. LITTLE INTEREST OR PLEASURE IN DOING THINGS: 0
SUM OF ALL RESPONSES TO PHQ QUESTIONS 1-9: 1
SUM OF ALL RESPONSES TO PHQ QUESTIONS 1-9: 1

## 2023-09-29 NOTE — PROGRESS NOTES
Subjective:      Patient ID: Marvel Sauceda is a 20 y.o. female. States works at [employer] - long hours x 1 month. H/O Asthma- sees Dr Taj Baxter - takes Cy Noriega ( states feels she does not need it daily -   but encouraged to do so as she is wheezy when she she is speaking) and singulair daily-  States neck muscles tight on an doff- back of neck and calves. Mood, sleep, appetite , bowels and bladder ok. States Hx tendonitis left wrist.  States tested for CTS - was ok. Sx numbness and throbbing worse at night - every night. Has pain base left thumb x months- states not any better . States fingers breaking out x last 2- 3 weeks. Has to use gloves at work. Review of Systems   Constitutional:  Negative for chills and fever. HENT:  Negative for congestion, ear pain, hearing loss, nosebleeds, sore throat and tinnitus. Eyes:  Negative for photophobia, pain, discharge and redness. Respiratory:  Negative for cough, shortness of breath and stridor. Cardiovascular:  Negative for chest pain, palpitations and leg swelling. Gastrointestinal:  Negative for abdominal pain, blood in stool, constipation, diarrhea, nausea and vomiting. Endocrine: Negative for polydipsia. Genitourinary:  Negative for dysuria, flank pain, frequency, hematuria and urgency. Musculoskeletal:  Negative for back pain, myalgias and neck pain. Skin:  Negative for rash. Allergic/Immunologic: Negative for environmental allergies. Neurological:  Negative for dizziness, tremors, seizures, weakness and headaches. Hematological:  Does not bruise/bleed easily. Psychiatric/Behavioral:  Negative for hallucinations and suicidal ideas. The patient is not nervous/anxious. Objective:   Physical Exam  Constitutional:       General: She is not in acute distress. Appearance: She is well-developed. She is obese. HENT:      Head: Normocephalic and atraumatic.       Right Ear: External ear normal.      Left Ear: External

## 2023-09-29 NOTE — PATIENT INSTRUCTIONS
serving is 1 slice of bread, 1 ounce of dry cereal, or 1/2 cup of cooked rice, pasta, or cooked cereal. Try to choose whole-grain products as much as possible. Limit lean meat, poultry, and fish to 6 ounces or less each day. One egg counts as 1 ounce. Eat 4 to 5 servings of nuts, seeds, and legumes (cooked dried beans, lentils, and split peas) each week. A serving is 1/3 cup of nuts, 2 tablespoons of seeds, 2 tablespoons of peanut butter, or 1/2 cup of cooked beans or peas. Limit fats and oils to 2 to 3 servings each day. A serving is 1 teaspoon of vegetable oil or 2 tablespoons of salad dressing. Limit sweets and added sugars to 5 servings or less a week. A serving is 1 tablespoon jelly or jam, 1/2 cup sorbet, or 1 cup of lemonade. Eat less than 2,300 milligrams (mg) of sodium a day. If you limit your sodium to 1,500 mg a day, you can lower your blood pressure even more. Be aware that all of these are the suggested number of servings for people who eat 1,800 to 2,000 calories a day. Your recommended number of servings may be different if you need more or fewer calories. Tips for success  Start small. Make small changes, and stick with them. Once those changes become habit, add a few more changes. Try some of the following:  Make it a goal to eat a fruit or vegetable at every meal and at snacks. This will make it easy to get the recommended amount of fruits and vegetables each day. Try yogurt topped with fruit and nuts for a snack or healthy dessert. Add lettuce, tomato, cucumber, and onion to sandwiches. Have a variety of cut-up vegetables with a low-fat dip as an appetizer instead of chips and dip. Sprinkle sunflower seeds or chopped almonds over salads. Or try adding chopped walnuts or almonds to cooked vegetables. Try some vegetarian meals using beans and peas. Add garbanzo or kidney beans to salads. Make burritos and tacos with mashed garcia beans or black beans. Where can you learn more?   Go to

## 2023-10-17 ENCOUNTER — TELEPHONE (OUTPATIENT)
Dept: OBGYN | Age: 23
End: 2023-10-17

## 2023-10-17 NOTE — TELEPHONE ENCOUNTER
No Show. Writer talked with Pt. Pt had to work. No openings, Pt was put on the wait list for her annual.    Thank you.

## 2023-10-30 ENCOUNTER — OFFICE VISIT (OUTPATIENT)
Dept: PODIATRY | Age: 23
End: 2023-10-30
Payer: COMMERCIAL

## 2023-10-30 VITALS — HEIGHT: 61 IN | BODY MASS INDEX: 43.61 KG/M2 | WEIGHT: 231 LBS

## 2023-10-30 DIAGNOSIS — M79.605 PAIN IN BOTH LOWER EXTREMITIES: ICD-10-CM

## 2023-10-30 DIAGNOSIS — R61 HYPERHIDROSIS: Primary | ICD-10-CM

## 2023-10-30 DIAGNOSIS — M79.604 PAIN IN BOTH LOWER EXTREMITIES: ICD-10-CM

## 2023-10-30 DIAGNOSIS — B35.3 TINEA PEDIS OF BOTH FEET: ICD-10-CM

## 2023-10-30 PROCEDURE — G8417 CALC BMI ABV UP PARAM F/U: HCPCS | Performed by: PODIATRIST

## 2023-10-30 PROCEDURE — 99213 OFFICE O/P EST LOW 20 MIN: CPT | Performed by: PODIATRIST

## 2023-10-30 PROCEDURE — 1036F TOBACCO NON-USER: CPT | Performed by: PODIATRIST

## 2023-10-30 PROCEDURE — G8427 DOCREV CUR MEDS BY ELIG CLIN: HCPCS | Performed by: PODIATRIST

## 2023-10-30 PROCEDURE — G8484 FLU IMMUNIZE NO ADMIN: HCPCS | Performed by: PODIATRIST

## 2023-10-30 RX ORDER — KETOCONAZOLE 20 MG/G
CREAM TOPICAL
Qty: 15 G | Refills: 2 | Status: SHIPPED | OUTPATIENT
Start: 2023-10-30

## 2023-10-30 NOTE — PROGRESS NOTES
[address] [facility],[address]ODIATRY [address] [place] [address]  Dept: [phone]  Dept Fax: [phone]    RETURN PATIENT PROGRESS NOTE  Date of patient's visit: 10/30/2023  Patient's Name:  Zehra Baer YOB: 2000            Patient Care Team:  Alena Nuñez MD as PCP - General (Family Medicine)  Alena Nuñez [facility] P[facility]  Toby Oliver MD as Consulting Physician (Pulmonology)  MARY Abraham - CNP as Nurse Practitioner (Nurse Practitioner)  Ghassan Cyr MD as Consulting Physician (Gastroenterology)  Lorraine Johnson DPM as Surgeon (Podiatry)  Lorene Hooks DPM as Physician (Podiatry)       Kateryna Reddy 20 y.o. female that presents for follow-up of   Chief Complaint   Patient presents with    Foot Pain     Right bottom foot peeling and itching with spreading x 1 month        Symptoms began several month(s) ago and are unchanged . Patient relates pain is Present. Pain is rated 8 out of 10 and is described as constant, mild, severe. Treatments prior to today's visit include: previous podiatry treatment. Currently denies F/C/N/V. Allergies   Allergen Reactions    Banana     Peanut-Containing Drug Products     Food Rash     Peanuts,walnuts,cashews,pecans apples cherries       Past Medical History:   Diagnosis Date    Abdominal pain     Asthma     Severe persistent asthma, unspecified whether complicated    Chronic vasomotor rhinitis     GERD (gastroesophageal reflux disease)     Headache(784.0) 03/01/2012    Obesity     NELSON (obstructive sleep apnea)     Poor venous access     Prediabetes 12/08/2016    Pure hypercholesterolemia 12/08/2016    Thalassemia minor 10/29/2011    5/99     Vision disturbance 01/31/2014    wears glassses       Prior to Admission medications    Medication Sig Start Date End Date Taking?  Authorizing Provider   ketoconazole (NIZORAL) 2 % cream

## 2023-11-05 NOTE — PROGRESS NOTES
[facility]  [address] [facility], [address] [place] [address] Drive     DATE OF VISIT:  23        History and Physical    Donald Reddy    :  2000  CHIEF COMPLAINT:    Chief Complaint   Patient presents with    Annual Exam     Pap                    HPI :   Cipriano Cormier is a 20 y.o. female here for her annual exam She is sexually active. She does not use contraception (sexually active with women). Periods are regular, occurring every 29 days and lasting -56 days, uses 3-4 per day. She tracks it with an jae on her phone. She reports having a bump or boil a few weeks ago that was painful. It has since resolved.  Her girlfriend did not have anything similar.    _____________________________________________________________________  Past Medical History:   Diagnosis Date    Abdominal pain     Asthma     Severe persistent asthma, unspecified whether complicated    Chronic vasomotor rhinitis     GERD (gastroesophageal reflux disease)     Headache(784.0) 2012    Obesity     NELSON (obstructive sleep apnea)     Poor venous access     Prediabetes 2016    Pure hypercholesterolemia 2016    Thalassemia minor 10/29/2011    5/99     Vision disturbance 2014    wears glassses                                                                   Past Surgical History:   Procedure Laterality Date    ADENOIDECTOMY      COLONOSCOPY N/A 10/8/2020    COLONOSCOPY WITH BIOPSY performed by Aden Sweeney MD at [address] [place] [address]  2014    Nexplanon placement,  pt has had this removed as of 10-8-202    TONSILLECTOMY      UPPER GASTROINTESTINAL ENDOSCOPY N/A 10/8/2020    EGD BIOPSY performed by Aden Sweeney MD at [facility] History   Problem Relation Age of Onset    Asthma Mother     Lupus Mother     High Blood Pressure Father     Diabetes Father     COPD Father     Stroke Father     Lupus Maternal Grandmother     Diabetes Maternal Grandfather     High Blood Pressure

## 2023-11-06 ENCOUNTER — HOSPITAL ENCOUNTER (OUTPATIENT)
Age: 23
Setting detail: SPECIMEN
Discharge: HOME | End: 2023-11-06

## 2023-11-06 ENCOUNTER — OFFICE VISIT (OUTPATIENT)
Dept: OBGYN | Age: 23
End: 2023-11-06
Payer: COMMERCIAL

## 2023-11-06 VITALS
SYSTOLIC BLOOD PRESSURE: 133 MMHG | HEART RATE: 100 BPM | DIASTOLIC BLOOD PRESSURE: 89 MMHG | WEIGHT: 230 LBS | BODY MASS INDEX: 43.46 KG/M2

## 2023-11-06 DIAGNOSIS — Z01.419 ENCOUNTER FOR ANNUAL ROUTINE GYNECOLOGICAL EXAMINATION: Primary | ICD-10-CM

## 2023-11-06 PROCEDURE — G8484 FLU IMMUNIZE NO ADMIN: HCPCS | Performed by: OBSTETRICS & GYNECOLOGY

## 2023-11-06 PROCEDURE — 3079F DIAST BP 80-89 MM HG: CPT | Performed by: OBSTETRICS & GYNECOLOGY

## 2023-11-06 PROCEDURE — 99395 PREV VISIT EST AGE 18-39: CPT | Performed by: OBSTETRICS & GYNECOLOGY

## 2023-11-06 PROCEDURE — 3075F SYST BP GE 130 - 139MM HG: CPT | Performed by: OBSTETRICS & GYNECOLOGY

## 2023-11-06 ASSESSMENT — ENCOUNTER SYMPTOMS
NAUSEA: 0
DIARRHEA: 0
COUGH: 0
CONSTIPATION: 0
ABDOMINAL PAIN: 0
VOMITING: 0
WHEEZING: 0

## 2023-11-07 DIAGNOSIS — Z01.419 ENCOUNTER FOR ANNUAL ROUTINE GYNECOLOGICAL EXAMINATION: ICD-10-CM

## 2023-11-07 LAB
CANDIDA SPECIES: NEGATIVE
GARDNERELLA VAGINALIS: POSITIVE
SOURCE: ABNORMAL
TRICHOMONAS: NEGATIVE

## 2023-11-08 LAB
C TRACH DNA SPEC QL PROBE+SIG AMP: NEGATIVE
N GONORRHOEA DNA SPEC QL PROBE+SIG AMP: NEGATIVE
SPECIMEN DESCRIPTION: NORMAL

## 2023-11-08 RX ORDER — METRONIDAZOLE 500 MG/1
500 TABLET ORAL 2 TIMES DAILY
Qty: 14 TABLET | Refills: 0 | Status: SHIPPED | OUTPATIENT
Start: 2023-11-08 | End: 2023-11-15

## 2023-11-26 LAB — CYTOLOGY REPORT: NORMAL

## 2023-12-04 ENCOUNTER — TELEPHONE (OUTPATIENT)
Dept: FAMILY MEDICINE CLINIC | Age: 23
End: 2023-12-04

## 2023-12-04 NOTE — TELEPHONE ENCOUNTER
----- Message from Sabra Gomezbessy sent at 12/4/2023  1:56 PM EST -----  Subject: Message to Provider    QUESTIONS  Information for Provider? Pt is requesting a print out of her vaccination   records, Pt would like to stop by tomorrow 12/5/23 to pick this paperwork   up, please reach out to Pt to verify if this will be possible.   ---------------------------------------------------------------------------  --------------  [address] [place]  [phone]; OK to leave message on voicemail  ---------------------------------------------------------------------------  --------------  SCRIPT ANSWERS  Relationship to Patient?  Self

## 2024-01-17 ENCOUNTER — OFFICE VISIT (OUTPATIENT)
Dept: PODIATRY | Age: 24
End: 2024-01-17
Payer: COMMERCIAL

## 2024-01-17 VITALS — WEIGHT: 230 LBS | HEIGHT: 61 IN | BODY MASS INDEX: 43.43 KG/M2

## 2024-01-17 DIAGNOSIS — L85.3 XEROSIS CUTIS: ICD-10-CM

## 2024-01-17 DIAGNOSIS — D23.71 BENIGN NEOPLASM OF SKIN OF RIGHT FOOT: ICD-10-CM

## 2024-01-17 DIAGNOSIS — M79.604 PAIN IN BOTH LOWER EXTREMITIES: Primary | ICD-10-CM

## 2024-01-17 DIAGNOSIS — D23.72 BENIGN NEOPLASM OF SKIN OF LEFT FOOT: ICD-10-CM

## 2024-01-17 DIAGNOSIS — M79.605 PAIN IN BOTH LOWER EXTREMITIES: Primary | ICD-10-CM

## 2024-01-17 PROCEDURE — 99213 OFFICE O/P EST LOW 20 MIN: CPT | Performed by: PODIATRIST

## 2024-01-17 PROCEDURE — G8427 DOCREV CUR MEDS BY ELIG CLIN: HCPCS | Performed by: PODIATRIST

## 2024-01-17 PROCEDURE — 1036F TOBACCO NON-USER: CPT | Performed by: PODIATRIST

## 2024-01-17 PROCEDURE — G8484 FLU IMMUNIZE NO ADMIN: HCPCS | Performed by: PODIATRIST

## 2024-01-17 PROCEDURE — G8417 CALC BMI ABV UP PARAM F/U: HCPCS | Performed by: PODIATRIST

## 2024-01-17 PROCEDURE — 17110 DESTRUCTION B9 LES UP TO 14: CPT | Performed by: PODIATRIST

## 2024-01-17 NOTE — PROGRESS NOTES
[x]Yes  []No                     Q9   []Yes    []No    Assessment:  23 y.o. female with:    Diagnosis Orders   1. Pain in both lower extremities  55988 - MS DESTRUCTION BENIGN LESIONS UP TO 14      2. Benign neoplasm of skin of left foot  69546 - MS DESTRUCTION BENIGN LESIONS UP TO 14      3. Benign neoplasm of skin of right foot  75559 - MS DESTRUCTION BENIGN LESIONS UP TO 14      4. Xerosis cutis            Plan:   Pt was evaluated and examined. Patient was given personalized discharge instructions. The lesions were partially excised via 15 blade and silver nitrate was applied under occlusion.  The patient tolerated the procedure well and without complication.  Advised patient to use vasoline to the area after tomorrow to prevent surrounding tissue irritation.     To address xerosis, patient to apply lachydrin cream to feet daily. Pt to monitor for fissures due to dryness. Advised pt to contact office is there are any open lesions.    All labs were reviewed and all imaging including the above findings were reviewed PRIOR to the patients arrival and with the patient today.    Previous patient encounter was reviewed.  Encounters from the patients other medical providers were reviewed and noted.   I personally interpreted the imaging and labs and discussed the results with the patient. Time was spent educating the patient and their families/caregivers on proper care of the feet and ankles.  All the above diagnosis were addressed at todays visit and all questions were answered.  A total of 20 minutes was spent with this patients encounter which included charting after the patients visit    Pt will follow up in 9 weeks or sooner if any problems arise. Diagnosis was discussed with the pt and all of their questions were answered in detail. Proper foot hygiene and care was discussed with the pt. Patient to check feet daily and contact the office with any questions/problems/concerns.  Other comorbidity noted and will be

## 2024-03-20 NOTE — TELEPHONE ENCOUNTER
[facility] - Outpatient Follow-up Note  Beatrice Quiñonez 41 y.o. male MRN: [id number]  Encounter: [id number]          ASSESSMENT AND PLAN:      1. Septic embolism (HCC)  2. Portal vein thrombosis  3. Hepatic vein thrombosis (HCC)  4. Liver abscess  -Will follow-up on MRI with and without contrast scheduled for 4/3/2024.    -Recent CBC and CMP essentially unremarkable except for an elevated blood sugar.  Encouraged strict diabetic control.    -Continue healthy lifestyle.    -No plans for any further GI intervention until MRI is completed.    -Continue Eliquis 5 mg twice a day until MRI is completed.    ______________________________________________________________________    SUBJECTIVE:    41-year-old male with a past medical history significant for uncontrolled diabetes mellitus, history of liver abscess, history of septic embolism, history of portal vein thrombosis, history of hepatic vein thrombosis who presents to the GI clinic today for follow-up.  Patient was first evaluated by the GI service when he was admitted at [facility] in June 2023.  Patient was admitted with Klebsiella pneumonia and also found to have a liver abscess, portal vein thrombosis, hepatic vein thrombosis, and septic embolism.  Patient has been maintained on Eliquis 5 mg twice a day since that time.  He did complete a prolonged course of IV antibiotics.  Patient most recently followed up with our hematology oncology team yesterday and an MRI with and without contrast has been ordered.  Overall patient is doing great.  He denies any abdominal pain, nausea, vomiting.  He denies any fevers or chills.  He reports that he is going to the gym and working out daily.  He reports that he is eating a very well-balanced diet.  His recent CBC and CMP was essentially unremarkable except for an elevated blood sugar.      REVIEW OF SYSTEMS IS OTHERWISE NEGATIVE.      Historical Information   Past Medical History:  Patient advised records at    Diagnosis Date    Asthma      Past Surgical History:   Procedure Laterality Date    IR BIOPSY LIVER MASS  6/29/2023    IR DRAINAGE TUBE CHECK AND/OR REMOVAL  7/20/2023     Social History   Social History     Substance and Sexual Activity   Alcohol Use Not Currently     Social History     Substance and Sexual Activity   Drug Use Never     Social History     Tobacco Use   Smoking Status Never   Smokeless Tobacco Never     History reviewed. No pertinent family history.    Meds/Allergies       Current Outpatient Medications:     acetaminophen (TYLENOL) 325 mg tablet    albuterol (PROVENTIL HFA,VENTOLIN HFA) 90 mcg/act inhaler    atorvastatin (LIPITOR) 10 mg tablet    Blood Glucose Monitoring Suppl (ONE TOUCH ULTRA 2) w/Device KIT    cyanocobalamin (VITAMIN B-12) 100 MCG tablet    glucose blood test strip    glucose blood test strip    Lancets Ultra Thin MISC    metFORMIN (GLUCOPHAGE) 1000 MG tablet    montelukast (SINGULAIR) 10 mg tablet    apixaban (Eliquis) 5 mg    dextromethorphan-guaiFENesin (ROBITUSSIN DM)  mg/5 mL syrup    No Known Allergies        Objective     Blood pressure 130/76, pulse 88, temperature 98.3 °F (36.8 °C), height 5' (1.524 m), weight 83.9 kg (185 lb), SpO2 99%. Body mass index is 36.13 kg/m².      PHYSICAL EXAM:      General Appearance:   Alert, cooperative, no distress   HEENT:   Normocephalic, atraumatic, anicteric.     Neck:  Supple, symmetrical, trachea midline   Lungs:   Clear to auscultation bilaterally; no rales, rhonchi or wheezing; respirations unlabored    Heart::   Regular rate and rhythm; no murmur, rub, or gallop.   Abdomen:   Soft, non-tender, non-distended; normal bowel sounds; no masses, no organomegaly    Genitalia:   Deferred    Rectal:   Deferred    Extremities:  No cyanosis, clubbing or edema    Pulses:  2+ and symmetric    Skin:  No jaundice, rashes, or lesions    Lymph nodes:  No palpable cervical lymphadenopathy        Lab Results:   No visits with results within 1  Day(s) from this visit.   Latest known visit with results is:   Appointment on 03/18/2024   Component Date Value    WBC 03/18/2024 7.25     RBC 03/18/2024 4.97     Hemoglobin 03/18/2024 13.9     Hematocrit 03/18/2024 43.1     MCV 03/18/2024 87     MCH 03/18/2024 28.0     MCHC 03/18/2024 32.3     RDW 03/18/2024 12.8     MPV 03/18/2024 11.5     Platelets 03/18/2024 271     nRBC 03/18/2024 0     Neutrophils Relative 03/18/2024 33 (L)     Immature Grans % 03/18/2024 0     Lymphocytes Relative 03/18/2024 56 (H)     Monocytes Relative 03/18/2024 8     Eosinophils Relative 03/18/2024 2     Basophils Relative 03/18/2024 1     Neutrophils Absolute 03/18/2024 2.37     Absolute Immature Grans 03/18/2024 0.03     Absolute Lymphocytes 03/18/2024 4.06     Absolute Monocytes 03/18/2024 0.57     Eosinophils Absolute 03/18/2024 0.16     Basophils Absolute 03/18/2024 0.06     Sodium 03/18/2024 134 (L)     Potassium 03/18/2024 5.1     Chloride 03/18/2024 100     CO2 03/18/2024 26     ANION GAP 03/18/2024 8     BUN 03/18/2024 18     Creatinine 03/18/2024 1.19     Glucose, Fasting 03/18/2024 361 (H)     Calcium 03/18/2024 10.1     AST 03/18/2024 17     ALT 03/18/2024 21     Alkaline Phosphatase 03/18/2024 67     Total Protein 03/18/2024 8.1     Albumin 03/18/2024 4.5     Total Bilirubin 03/18/2024 0.39     eGFR 03/18/2024 74          Radiology Results:   No results found.

## 2024-05-02 ENCOUNTER — HOSPITAL ENCOUNTER (EMERGENCY)
Facility: CLINIC | Age: 24
Discharge: HOME | End: 2024-05-02
Attending: EMERGENCY MEDICINE
Payer: COMMERCIAL

## 2024-05-02 VITALS
SYSTOLIC BLOOD PRESSURE: 114 MMHG | RESPIRATION RATE: 17 BRPM | DIASTOLIC BLOOD PRESSURE: 72 MMHG | BODY MASS INDEX: 41.95 KG/M2 | HEART RATE: 68 BPM | OXYGEN SATURATION: 99 % | WEIGHT: 222 LBS

## 2024-05-02 DIAGNOSIS — H16.9: Primary | ICD-10-CM

## 2024-05-02 PROCEDURE — 99283 EMERGENCY DEPT VISIT LOW MDM: CPT

## 2024-05-02 RX ORDER — POLYMYXIN B SULFATE AND TRIMETHOPRIM 1; 10000 MG/ML; [USP'U]/ML
1 SOLUTION OPHTHALMIC EVERY 6 HOURS
Qty: 1 ML | Refills: 0 | Status: SHIPPED | OUTPATIENT
Start: 2024-05-02 | End: 2024-05-07

## 2024-05-02 ASSESSMENT — ENCOUNTER SYMPTOMS: EYE PAIN: 1

## 2024-05-02 ASSESSMENT — PAIN SCALES - GENERAL: PAINLEVEL_OUTOF10: 4

## 2024-05-02 ASSESSMENT — PAIN - FUNCTIONAL ASSESSMENT: PAIN_FUNCTIONAL_ASSESSMENT: 0-10

## 2024-05-02 NOTE — ED PROVIDER NOTES
10-8-202    TONSILLECTOMY      UPPER GASTROINTESTINAL ENDOSCOPY N/A 10/8/2020    EGD BIOPSY performed by Kalyn Cruz MD at [facility]         CURRENT MEDICATIONS     Previous Medications    KETOCONAZOLE (NIZORAL) 2 % CREAM    Apply topically bid    KETOCONAZOLE (NIZORAL) 2 % CREAM    Apply topically daily.    MOMETASONE-FORMOTEROL (DULERA) 200-5 MCG/ACT INHALER    Inhale 2 puffs into the lungs in the morning and 2 puffs in the evening.    MOMETASONE-FORMOTEROL (DULERA) 200-5 MCG/ACT INHALER    Inhale 2 puffs into the lungs in the morning and 2 puffs in the evening.    MONTELUKAST (SINGULAIR) 10 MG TABLET    Take 1 tablet by mouth daily    PLECANATIDE 3 MG TABS    Take 3 mg by mouth daily    UREA (CARMOL) 40 % CREAM    Apply to affected area once daily.       ALLERGIES     Banana, Peanut-containing drug products, and Food    FAMILY HISTORY       Family History   Problem Relation Age of Onset    Asthma Mother     Lupus Mother     High Blood Pressure Father     Diabetes Father     COPD Father     Stroke Father     Lupus Maternal Grandmother     Diabetes Maternal Grandfather     High Blood Pressure Maternal Grandfather     Heart Disease Paternal Grandmother     Diabetes Paternal Grandmother     High Blood Pressure Paternal Grandfather     Hypertension Paternal Grandfather     Diabetes Paternal Grandfather     Arthritis Maternal Aunt     Cancer Maternal Aunt     Heart Disease Paternal Aunt     Heart Disease Paternal Uncle     Birth Defects Neg Hx     Depression Neg Hx     Early Death Neg Hx     Hearing Loss Neg Hx     High Cholesterol Neg Hx     Kidney Disease Neg Hx     Learning Disabilities Neg Hx     Mental Illness Neg Hx     Mental Retardation Neg Hx     Miscarriages / Stillbirths Neg Hx     Substance Abuse Neg Hx     Vision Loss Neg Hx     Other Neg Hx     Ovarian Cancer Neg Hx     Breast Cancer Neg Hx           SOCIAL HISTORY       Social History     Socioeconomic History    Marital status: Single   Tobacco Use

## 2024-07-10 ENCOUNTER — HOSPITAL ENCOUNTER (EMERGENCY)
Age: 24
Discharge: HOME | End: 2024-07-10
Attending: EMERGENCY MEDICINE
Payer: COMMERCIAL

## 2024-07-10 VITALS
HEART RATE: 77 BPM | OXYGEN SATURATION: 97 % | SYSTOLIC BLOOD PRESSURE: 119 MMHG | TEMPERATURE: 97.7 F | RESPIRATION RATE: 16 BRPM | DIASTOLIC BLOOD PRESSURE: 84 MMHG

## 2024-07-10 DIAGNOSIS — B36.9 FUNGAL INFECTION OF SKIN: Primary | ICD-10-CM

## 2024-07-10 PROCEDURE — 99283 EMERGENCY DEPT VISIT LOW MDM: CPT

## 2024-07-10 RX ORDER — CLOTRIMAZOLE AND BETAMETHASONE DIPROPIONATE 10; .64 MG/G; MG/G
CREAM TOPICAL 2 TIMES DAILY
Status: DISCONTINUED | OUTPATIENT
Start: 2024-07-10 | End: 2024-07-11 | Stop reason: HOSPADM

## 2024-07-10 RX ORDER — CLOTRIMAZOLE AND BETAMETHASONE DIPROPIONATE 10; .5 MG/ML; MG/ML
LOTION TOPICAL
Qty: 30 ML | Refills: 0 | Status: SHIPPED | OUTPATIENT
Start: 2024-07-10

## 2024-07-10 ASSESSMENT — PAIN DESCRIPTION - ORIENTATION: ORIENTATION: RIGHT

## 2024-07-10 ASSESSMENT — PAIN SCALES - GENERAL: PAINLEVEL_OUTOF10: 10

## 2024-07-10 ASSESSMENT — PAIN DESCRIPTION - LOCATION: LOCATION: FOOT;HAND

## 2024-07-10 ASSESSMENT — PAIN DESCRIPTION - DESCRIPTORS: DESCRIPTORS: SORE;DISCOMFORT

## 2024-07-10 ASSESSMENT — PAIN - FUNCTIONAL ASSESSMENT: PAIN_FUNCTIONAL_ASSESSMENT: 0-10

## 2024-07-11 ASSESSMENT — ENCOUNTER SYMPTOMS
BACK PAIN: 0
NAUSEA: 0
COUGH: 0
VOMITING: 0
DIARRHEA: 0
ABDOMINAL PAIN: 0
SHORTNESS OF BREATH: 0

## 2024-07-11 NOTE — DISCHARGE INSTRUCTIONS
Take your medication as indicated and prescribed.  You can use the prescription anti-fungal cream to your feet twice a day.       For pain use acetaminophen (Tylenol) or ibuprofen (Motrin / Advil), unless prescribed medications that have acetaminophen or ibuprofen (or similar medications) in it.  You can take over the counter acetaminophen tablets (1 - 2 tablets of the 500-mg strength every 6 hours) or ibuprofen tablets (2 tablets every 4 hours).    PLEASE RETURN TO THE EMERGENCY DEPARTMENT IMMEDIATELY for worsening symptoms, white drainage from the wound, redness or streaking, or if you develop any concerning symptoms such as: high fever not relieved by acetaminophen (Tylenol) and/or ibuprofen (Motrin / Advil), chills, shortness of breath, chest pain, feeling of your heart fluttering or racing, persistent nausea and/or vomiting, vomiting up blood, blood in your stool, loss of consciousness, numbness, weakness or tingling in the arms or legs or change in color of the extremities, changes in mental status, persistent headache, blurry vision, loss of bladder / bowel control, unable to follow up with your physician, or any other care or concern.

## 2024-07-11 NOTE — ED PROVIDER NOTES
[facility] ED  Emergency Department Encounter  Emergency Medicine Resident     Pt Name:Donald Bolivar  MRN: [id number]  Birthdate 2000  Date of evaluation: 7/10/24  PCP:  Kamala Lackey MD  Note Started: 10:30 PM EDT      CHIEF COMPLAINT       Chief Complaint   Patient presents with    Foot Pain    Rash     Rash on right hand from work gloves       HISTORY OF PRESENT ILLNESS  (Location/Symptom, Timing/Onset, Context/Setting, Quality, Duration, Modifying Factors, Severity.)      Donald Bolivar is a 23 y.o. female who presents with rash to hand and right foot.  Patient states has been present for some time.  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  The gloves have rendered powder all over her hand and she notes a banana allergy.  She does note that she sees podiatry for bunions of her lateral foot but the provided cream has been ineffective for a burning lesion on her right webspace between the fourth and fifth digit.    PAST MEDICAL / SURGICAL / SOCIAL / FAMILY HISTORY      has a past medical history of Abdominal pain, Asthma, Chronic vasomotor rhinitis, GERD (gastroesophageal reflux disease), Headache(784.0), Obesity, NELSON (obstructive sleep apnea), Poor venous access, Prediabetes, Pure hypercholesterolemia, Thalassemia minor, and Vision disturbance.       has a past surgical history that includes Adenoidectomy; Tonsillectomy; other surgical history (08/28/2014); Upper gastrointestinal endoscopy (N/A, 10/8/2020); and Colonoscopy (N/A, 10/8/2020).      Social History     Socioeconomic History    Marital status: Single     Spouse name: Not on file    Number of children: Not on file    Years of education: Not on file    Highest education level: Not on file   Occupational History    Not on file   Tobacco Use    Smoking status: Never    Smokeless tobacco: Never   Vaping Use    Vaping Use: Never used   Substance and Sexual Activity    Alcohol use:  Emergency Department Physician who either signs or Co-signs this chart in the absence of a cardiologist.    EMERGENCY DEPARTMENT COURSE:  ED Course as of 07/11/24 0539   Wed Jul 10, 2024   2300 Patient left prior to obtaining AVS.  Prescription for Lotrisone given. [KM]      ED Course User Index  [KM] Vishal Varghese MD       PROCEDURES:  None    CONSULTS:  None    CRITICAL CARE:  There was significant risk of life threatening deterioration of patient's condition requiring my direct management. Critical care time 0 minutes, excluding any documented procedures.    FINAL IMPRESSION      1. Fungal infection of skin          DISPOSITION / PLAN     DISPOSITION Decision To Discharge 07/10/2024 10:41:04 PM      PATIENT REFERRED TO:  Kamala Lackey MD  [address], [address]  [facility] [address] [phone]    Schedule an appointment as soon as possible for a visit in 1 week      DISCHARGE MEDICATIONS:  Discharge Medication List as of 7/10/2024 10:59 PM        START taking these medications    Details   clotrimazole-betamethasone (LOTRISONE) 1-0.05 % lotion Apply topically 2 times daily., Disp-30 mL, R-0, Print             Vishal Varghese MD  Emergency Medicine Resident    (Please note that portions of thisnote were completed with a voice recognition program.  Efforts were made to edit the dictations but occasionally words are mis-transcribed.)

## 2024-07-11 NOTE — ED PROVIDER NOTES
[facility]     Emergency Department     Faculty Attestation    I performed a history and physical examination of the patient and discussed management with the resident. I reviewed the resident´s note and agree with the documented findings and plan of care. Any areas of disagreement are noted on the chart. I was personally present for the key portions of any procedures. I have documented in the chart those procedures where I was not present during the key portions. I have reviewed the emergency nurses triage note. I agree with the chief complaint, past medical history, past surgical history, allergies, medications, social and family history as documented unless otherwise noted below. For Physician Assistant/ Nurse Practitioner cases/documentation I have personally evaluated this patient and have completed at least one if not all key elements of the E/M (history, physical exam, and MDM). Additional findings are as noted.    Fungal infection right foot and right hand.     Jairo Braswell MD  07/10/24 3873

## 2024-07-11 NOTE — ED NOTES
Pt came into the ed via triage due to having a rash on her right hand, on the top of the hand and also on two fingers   Pt also has a rash on the right foot in between the pinky toe   No bleeding noted   Pt stated she has been dealing with this rash for a while   Pt is seeing a foot Dr   Pt stated what the Dr is giving her for meds is not working   Pt has no other C/O at this time   RR are equal and regular   No abscesses noted

## 2024-07-13 ENCOUNTER — HOSPITAL ENCOUNTER (EMERGENCY)
Age: 24
Discharge: LEFT BEFORE DISCHARGE | End: 2024-07-13

## 2024-07-13 VITALS
OXYGEN SATURATION: 98 % | WEIGHT: 220 LBS | RESPIRATION RATE: 15 BRPM | HEART RATE: 107 BPM | TEMPERATURE: 98.2 F | SYSTOLIC BLOOD PRESSURE: 122 MMHG | BODY MASS INDEX: 41.57 KG/M2 | DIASTOLIC BLOOD PRESSURE: 72 MMHG

## 2024-07-16 ENCOUNTER — APPOINTMENT (OUTPATIENT)
Dept: GENERAL RADIOLOGY | Facility: CLINIC | Age: 24
End: 2024-07-16
Attending: STUDENT IN AN ORGANIZED HEALTH CARE EDUCATION/TRAINING PROGRAM
Payer: COMMERCIAL

## 2024-07-16 ENCOUNTER — HOSPITAL ENCOUNTER (EMERGENCY)
Facility: CLINIC | Age: 24
Discharge: HOME | End: 2024-07-16
Attending: STUDENT IN AN ORGANIZED HEALTH CARE EDUCATION/TRAINING PROGRAM
Payer: COMMERCIAL

## 2024-07-16 VITALS
DIASTOLIC BLOOD PRESSURE: 91 MMHG | OXYGEN SATURATION: 97 % | SYSTOLIC BLOOD PRESSURE: 124 MMHG | BODY MASS INDEX: 42.94 KG/M2 | TEMPERATURE: 98.3 F | HEART RATE: 91 BPM | WEIGHT: 213 LBS | HEIGHT: 59 IN | RESPIRATION RATE: 18 BRPM

## 2024-07-16 DIAGNOSIS — L08.9 RIGHT FOOT INFECTION: Primary | ICD-10-CM

## 2024-07-16 LAB
ANION GAP SERPL CALCULATED.3IONS-SCNC: 12 MMOL/L (ref 9–17)
BASOPHILS # BLD: 0 K/UL (ref 0–0.2)
BASOPHILS NFR BLD: 1 % (ref 0–2)
BUN SERPL-MCNC: 17 MG/DL (ref 6–20)
CALCIUM SERPL-MCNC: 10.1 MG/DL (ref 8.6–10.4)
CHLORIDE SERPL-SCNC: 106 MMOL/L (ref 98–107)
CO2 SERPL-SCNC: 24 MMOL/L (ref 20–31)
CREAT SERPL-MCNC: 0.6 MG/DL (ref 0.5–0.9)
CRP SERPL HS-MCNC: 37.3 MG/L (ref 0–5)
EOSINOPHIL # BLD: 0.1 K/UL (ref 0–0.4)
EOSINOPHILS RELATIVE PERCENT: 2 % (ref 1–4)
ERYTHROCYTE [DISTWIDTH] IN BLOOD BY AUTOMATED COUNT: 14.2 % (ref 12.5–15.4)
ERYTHROCYTE [SEDIMENTATION RATE] IN BLOOD BY PHOTOMETRIC METHOD: 53 MM/HR (ref 0–20)
GFR, ESTIMATED: >90 ML/MIN/1.73M2
GLUCOSE SERPL-MCNC: 98 MG/DL (ref 70–99)
HCT VFR BLD AUTO: 38.4 % (ref 36–46)
HGB BLD-MCNC: 12.1 G/DL (ref 12–16)
LYMPHOCYTES NFR BLD: 1.9 K/UL (ref 1–4.8)
LYMPHOCYTES RELATIVE PERCENT: 37 % (ref 24–44)
MCH RBC QN AUTO: 22.9 PG (ref 26–34)
MCHC RBC AUTO-ENTMCNC: 31.6 G/DL (ref 31–37)
MCV RBC AUTO: 72.3 FL (ref 80–100)
MONOCYTES NFR BLD: 0.3 K/UL (ref 0.1–1.2)
MONOCYTES NFR BLD: 7 % (ref 2–11)
NEUTROPHILS NFR BLD: 53 % (ref 36–66)
NEUTS SEG NFR BLD: 2.8 K/UL (ref 1.8–7.7)
PLATELET # BLD AUTO: 249 K/UL (ref 140–450)
PMV BLD AUTO: 8.6 FL (ref 6–12)
POTASSIUM SERPL-SCNC: 3.8 MMOL/L (ref 3.7–5.3)
RBC # BLD AUTO: 5.31 M/UL (ref 4–5.2)
SODIUM SERPL-SCNC: 142 MMOL/L (ref 135–144)
WBC OTHER # BLD: 5.1 K/UL (ref 3.5–11)

## 2024-07-16 PROCEDURE — 85025 COMPLETE CBC W/AUTO DIFF WBC: CPT

## 2024-07-16 PROCEDURE — 80048 BASIC METABOLIC PNL TOTAL CA: CPT

## 2024-07-16 PROCEDURE — 6370000000 HC RX 637 (ALT 250 FOR IP): Performed by: STUDENT IN AN ORGANIZED HEALTH CARE EDUCATION/TRAINING PROGRAM

## 2024-07-16 PROCEDURE — 86140 C-REACTIVE PROTEIN: CPT

## 2024-07-16 PROCEDURE — 99284 EMERGENCY DEPT VISIT MOD MDM: CPT

## 2024-07-16 PROCEDURE — 85652 RBC SED RATE AUTOMATED: CPT

## 2024-07-16 PROCEDURE — 96374 THER/PROPH/DIAG INJ IV PUSH: CPT

## 2024-07-16 PROCEDURE — 73630 X-RAY EXAM OF FOOT: CPT

## 2024-07-16 PROCEDURE — 6360000002 HC RX W HCPCS: Performed by: STUDENT IN AN ORGANIZED HEALTH CARE EDUCATION/TRAINING PROGRAM

## 2024-07-16 RX ORDER — CEPHALEXIN 500 MG/1
500 CAPSULE ORAL ONCE
Status: COMPLETED | OUTPATIENT
Start: 2024-07-16 | End: 2024-07-16

## 2024-07-16 RX ORDER — CEPHALEXIN 500 MG/1
500 CAPSULE ORAL 4 TIMES DAILY
Qty: 28 CAPSULE | Refills: 0 | Status: SHIPPED | OUTPATIENT
Start: 2024-07-16 | End: 2024-07-23

## 2024-07-16 RX ORDER — IBUPROFEN 600 MG/1
600 TABLET ORAL EVERY 6 HOURS PRN
Qty: 21 TABLET | Refills: 0 | Status: SHIPPED | OUTPATIENT
Start: 2024-07-16 | End: 2024-07-24

## 2024-07-16 RX ORDER — KETOROLAC TROMETHAMINE 30 MG/ML
30 INJECTION, SOLUTION INTRAMUSCULAR; INTRAVENOUS ONCE
Status: COMPLETED | OUTPATIENT
Start: 2024-07-16 | End: 2024-07-16

## 2024-07-16 RX ORDER — ACETAMINOPHEN 500 MG
1000 TABLET ORAL EVERY 6 HOURS PRN
Qty: 30 TABLET | Refills: 1 | Status: SHIPPED | OUTPATIENT
Start: 2024-07-16

## 2024-07-16 RX ADMIN — KETOROLAC TROMETHAMINE 30 MG: 30 INJECTION, SOLUTION INTRAMUSCULAR at 00:26

## 2024-07-16 RX ADMIN — CEPHALEXIN 500 MG: 500 CAPSULE ORAL at 01:48

## 2024-07-16 ASSESSMENT — PAIN SCALES - GENERAL
PAINLEVEL_OUTOF10: 10
PAINLEVEL_OUTOF10: 8

## 2024-07-16 ASSESSMENT — PAIN - FUNCTIONAL ASSESSMENT
PAIN_FUNCTIONAL_ASSESSMENT: 0-10
PAIN_FUNCTIONAL_ASSESSMENT: 0-10

## 2024-07-16 ASSESSMENT — PAIN DESCRIPTION - LOCATION: LOCATION: FOOT

## 2024-07-16 ASSESSMENT — PAIN DESCRIPTION - DESCRIPTORS: DESCRIPTORS: BURNING

## 2024-07-16 ASSESSMENT — PAIN DESCRIPTION - ORIENTATION: ORIENTATION: RIGHT

## 2024-07-16 NOTE — ED NOTES
Pt presents to ED ambulatory a&o x4. Pt states for the past month she has been having issues with her skin on her R foot. Pt reports seeing a podiatrist for this . States about 3 days ago she went to [facility] and they gave her an antifungal cream but it is causing burning. Small open wound noted to R pinky toe.

## 2024-07-16 NOTE — ED PROVIDER NOTES
[facility]  Emergency Department Encounter  [facility]       Pt Name:Donald Bolivar  MRN: [id number]  Birthdate 2000  Date of evaluation: 7/16/24  PCP:  Kamala Lackey MD      CHIEF COMPLAINT       Chief Complaint   Patient presents with    Toe Pain       HISTORY OF PRESENT ILLNESS     Donald Bolivar is a 23 y.o. female who presents via personal vehicle with reports of right foot discomfort.  Has been present for quite some time.  Reports wound that she noticed back in January and has continued to worsen.  Was provided fungal cream at previous ER visit which has not resulted in any improvement of symptoms.  No fevers.  No chills.  No systemic symptoms.  Nondiabetic.  Has not seen physicians regularly in quite some time.  No known injury.  No radiation of discomfort.  Achy type sensation although sharp sometimes.  Localized to webspace of fourth and fifth toes.    PAST MEDICAL / SURGICAL / SOCIAL / FAMILY HISTORY      has a past medical history of Abdominal pain, Asthma, Chronic vasomotor rhinitis, GERD (gastroesophageal reflux disease), Headache(784.0), Obesity, NELSON (obstructive sleep apnea), Poor venous access, Prediabetes, Pure hypercholesterolemia, Thalassemia minor, and Vision disturbance.       has a past surgical history that includes Adenoidectomy; Tonsillectomy; other surgical history (08/28/2014); Upper gastrointestinal endoscopy (N/A, 10/8/2020); and Colonoscopy (N/A, 10/8/2020).      Social History     Socioeconomic History    Marital status: Single     Spouse name: Not on file    Number of children: Not on file    Years of education: Not on file    Highest education level: Not on file   Occupational History    Not on file   Tobacco Use    Smoking status: Never    Smokeless tobacco: Never   Vaping Use    Vaping Use: Never used   Substance and Sexual Activity    Alcohol use: No     Alcohol/week: 0.0 standard drinks of alcohol    Drug use: Yes      Types: Marijuana (Weed)     Comment: last use 3 months ago    Sexual activity: Yes     Partners: Female   Other Topics Concern    Not on file   Social History Narrative    Not on file     Social Determinants of Health     Financial Resource Strain: Low Risk  (9/29/2023)    Overall Financial Resource Strain (CARDIA)     Difficulty of Paying Living Expenses: Not very hard   Food Insecurity: Not on file (9/29/2023)   Transportation Needs: Unknown (9/29/2023)    PRAPARE - Transportation     Lack of Transportation (Medical): Not on file     Lack of Transportation (Non-Medical): No   Physical Activity: Not on file   Stress: Not on file   Social Connections: Not on file   Intimate Partner Violence: Not on file   Housing Stability: Unknown (9/29/2023)    Housing Stability Vital Sign     Unable to Pay for Housing in the Last Year: Not on file     Number of Places Lived in the Last Year: Not on file     Unstable Housing in the Last Year: No       Family History   Problem Relation Age of Onset    Asthma Mother     Lupus Mother     High Blood Pressure Father     Diabetes Father     COPD Father     Stroke Father     Lupus Maternal Grandmother     Diabetes Maternal Grandfather     High Blood Pressure Maternal Grandfather     Heart Disease Paternal Grandmother     Diabetes Paternal Grandmother     High Blood Pressure Paternal Grandfather     Hypertension Paternal Grandfather     Diabetes Paternal Grandfather     Arthritis Maternal Aunt     Cancer Maternal Aunt     Heart Disease Paternal Aunt     Heart Disease Paternal Uncle     Birth Defects Neg Hx     Depression Neg Hx     Early Death Neg Hx     Hearing Loss Neg Hx     High Cholesterol Neg Hx     Kidney Disease Neg Hx     Learning Disabilities Neg Hx     Mental Illness Neg Hx     Mental Retardation Neg Hx     Miscarriages / Stillbirths Neg Hx     Substance Abuse Neg Hx     Vision Loss Neg Hx     Other Neg Hx     Ovarian Cancer Neg Hx     Breast Cancer Neg Hx        Allergies:

## 2024-07-16 NOTE — DISCHARGE INSTRUCTIONS
SUMMARY OF YOUR VISIT    Today you were seen for wound of your right foot.  As we discussed I recommend you keep the dressing that we placed in place for the next 12 to 24 hours.  I recommend you complete the area clean and dry.  Wash at least twice a day with antibacterial soap.  I have started you on an antibiotic, you received your first dose here in the emergency department.  Pick your prescription up and take as prescribed for the full duration.  Call the podiatry office tomorrow to set up an appointment to be seen in the next 2 to 5 days for reevaluation and management of your foot wound.  If you have any new, changing or worsening of symptoms I recommend you return to the emergency department for reevaluation.    Please continue to take your home medication as previously prescribed, I have made no changes to your home medications.        You can return to our or another Emergency Department as needed or for worsening symptoms of chest pain, shortness of breath, high fevers not relieved by acetaminophen (Tylenol) and/or ibuprofen (Motrin / Advil), chills, feeling of your heart fluttering or racing, persistent nausea and/or vomiting, vomiting up blood, blood in your stool, loss of consciousness, numbness, weakness or tingling in the arms or legs or change in color of the extremities, changes in mental status, persistent headache, blurry vision, loss of bladder / bowel control, if you are unable to follow up with your physician, or other any other care or concern.    Thank You!    On behalf of the Emergency Department staff and team, I would like to thank you for allowing us the opportunity to participate in your health care and evaluation today.

## 2024-07-24 ENCOUNTER — OFFICE VISIT (OUTPATIENT)
Dept: PODIATRY | Age: 24
End: 2024-07-24
Payer: COMMERCIAL

## 2024-07-24 ENCOUNTER — OFFICE VISIT (OUTPATIENT)
Dept: INTERNAL MEDICINE | Age: 24
End: 2024-07-24
Payer: COMMERCIAL

## 2024-07-24 ENCOUNTER — TELEPHONE (OUTPATIENT)
Dept: ADMINISTRATIVE | Age: 24
End: 2024-07-24

## 2024-07-24 VITALS
WEIGHT: 217 LBS | HEIGHT: 59 IN | OXYGEN SATURATION: 100 % | BODY MASS INDEX: 43.75 KG/M2 | TEMPERATURE: 97.7 F | DIASTOLIC BLOOD PRESSURE: 76 MMHG | HEART RATE: 64 BPM | SYSTOLIC BLOOD PRESSURE: 122 MMHG

## 2024-07-24 VITALS — BODY MASS INDEX: 42.94 KG/M2 | WEIGHT: 213 LBS | HEIGHT: 59 IN

## 2024-07-24 DIAGNOSIS — G47.33 OSA (OBSTRUCTIVE SLEEP APNEA): Primary | ICD-10-CM

## 2024-07-24 DIAGNOSIS — R73.01 IFG (IMPAIRED FASTING GLUCOSE): ICD-10-CM

## 2024-07-24 DIAGNOSIS — E78.00 HYPERCHOLESTEREMIA: ICD-10-CM

## 2024-07-24 DIAGNOSIS — F12.90 MARIJUANA USE: ICD-10-CM

## 2024-07-24 DIAGNOSIS — Z11.59 ENCOUNTER FOR HEPATITIS C SCREENING TEST FOR LOW RISK PATIENT: ICD-10-CM

## 2024-07-24 DIAGNOSIS — J45.40 MODERATE PERSISTENT ASTHMA WITHOUT COMPLICATION: ICD-10-CM

## 2024-07-24 DIAGNOSIS — E66.01 CLASS 3 SEVERE OBESITY DUE TO EXCESS CALORIES WITHOUT SERIOUS COMORBIDITY WITH BODY MASS INDEX (BMI) OF 40.0 TO 44.9 IN ADULT (HCC): ICD-10-CM

## 2024-07-24 DIAGNOSIS — L98.8 MACERATION OF SKIN: ICD-10-CM

## 2024-07-24 DIAGNOSIS — B35.3 TINEA PEDIS OF BOTH FEET: Primary | ICD-10-CM

## 2024-07-24 PROCEDURE — 99213 OFFICE O/P EST LOW 20 MIN: CPT | Performed by: INTERNAL MEDICINE

## 2024-07-24 PROCEDURE — 99213 OFFICE O/P EST LOW 20 MIN: CPT | Performed by: PODIATRIST

## 2024-07-24 ASSESSMENT — ENCOUNTER SYMPTOMS
CONSTIPATION: 0
SHORTNESS OF BREATH: 0
ALLERGIC/IMMUNOLOGIC NEGATIVE: 1
WHEEZING: 0

## 2024-07-24 NOTE — PROGRESS NOTES
Attending Physician Statement  I have discussed the care of Tesfaye Bennett, including pertinent history and exam findings,  with the resident. I have seen and examined the patient and the key elements of all parts of the encounter have been performed by me.  I agree with the assessment, plan and orders as documented by the resident.  (GC Modifier)

## 2024-07-24 NOTE — PROGRESS NOTES
[facility]  [facility]  [address] [place] [address]  Dept: [phone]  Dept Fax: [phone]    Office New Visit Note  Date ofpatient's visit: 7/24/2024  Patient's Name:  Donald Bolivar YOB: 2000            Patient Care Team:  Destin Can MD as PCP - General (Internal Medicine)  [facility]  Aravind Slater MD as Consulting Physician (Pulmonology)  Sarah Tucker APRN - CNP as Nurse Practitioner (Nurse Practitioner)  Kalyn Cruz MD as Consulting Physician (Gastroenterology)  Xenia Rollins DPM as Surgeon (Podiatry)  Bina Boles DPM as Physician (Podiatry)  ================================================================    REASON FOR VISIT/CHIEF COMPLAINT:  New Patient (Patient is here today to establish care and follow up on right foot infection was in ER on 7-16-24.  Patient also states gets muscle spasms bilateral lower back.  Was working at [employer] wearing latex gloves to find out she is allergic to Latex hands itch at times from this.)    HISTORY OF PRESENTING ILLNESS:  History was obtained from: patient. Donald Lopez a 23 y.o. is here for establishing care with new provider.  Patient has tinea pedis of both feet which is under control and sees podiatrist Luci Curran, was seen today to and was advised to continue to keep both feet dry  Patient denies any signs or symptoms of asthma exacerbation, compliant with Dulera and uses nebulizer twice weekly.    Patient states that she has constipation and bloating sensation during menstruation.  She follows Dr. Cruz for her GI issues.  EGD and colonoscopy done in 2020 did not reveal any abnormality.  Denies any GI symptoms today and states that her constipation got better by using over-the-counter laxative.  She was diagnosed with obstructive sleep apnea with CPAP titration last year in January 2023, advised to use CPAP

## 2024-07-24 NOTE — TELEPHONE ENCOUNTER
Patient was seen this morning with Dr Bina Boles and she is requesting a work note stating  that she was seen in the office today. Can she get a call back when this is ready at the office    Thanks

## 2024-07-25 NOTE — TELEPHONE ENCOUNTER
Patient would like the doctor's note faxed to her employer at  [phone] attention : Mary Esquivel

## 2024-08-03 NOTE — PROGRESS NOTES
swelling.   Gastrointestinal: Negative for constipation, diarrhea, nausea and vomiting.         Lower Extremity Physical Examination:     Vitals: There were no vitals filed for this visit.  General: AAO x 3 in NAD.     Dermatologic Exam:  Dry scaley skin to right 4th interspace. Wound appears healed. No purulence. Minimal erythema.    Musculoskeletal:     1st MPJ ROM decreased, Bilateral.  Muscle strength 5/5, Bilateral.   Medial longitudinal arch, Bilateral WNL.  Ankle ROM WNL,Bilateral.    Dorsally contracted digits absent digits 1-5 Bilateral.     Vascular: DP and PT pulses palpable 2/4, Bilateral.  CFT <3 seconds, Bilateral.  Hair growth present to the level of the digits, Bilateral.  Edema absent, Bilateral.  Varicosities absent, Bilateral. Erythema absent, Bilateral    Neurological: Sensation intact to light touch to level of digits, Bilateral.  Protective sensation intact 10/10 sites via 5.07/10g [place]-Anel Monofilament, Bilateral.  negative Tinel's, Bilateral.  negative Valleix sign, Bilateral.      Integument: Warm, dry, supple, Bilateral.  Open lesion absent, Bilateral.  Interdigital maceration 4th interspace, right.  Nails are normal in length, thickness and color 1-5 bilateral.  Fissures absent, Bilateral.       Asessment: Patient is a 23 y.o. female with:    Diagnosis Orders   1. Tinea pedis of both feet        2. Maceration of skin              Plan: Patient examined and evaluated.  Current condition and treatment options discussed in detail.   Advised pt to monitor daily for infection. At this time as symptoms have improved, we do no recommend further antibiotics. Keep feet clean and dry.  All labs were reviewed and all imaging including the above findings were reviewed PRIOR to the patients arrival and with the patient today.    Previous patient encounter was reviewed.  Encounters from the patients other medical providers were reviewed and noted.   I personally interpreted the imaging and labs 
normal balance

## 2024-08-06 ENCOUNTER — OFFICE VISIT (OUTPATIENT)
Dept: PULMONOLOGY | Age: 24
End: 2024-08-06
Payer: COMMERCIAL

## 2024-08-06 VITALS
SYSTOLIC BLOOD PRESSURE: 115 MMHG | HEIGHT: 59 IN | OXYGEN SATURATION: 96 % | DIASTOLIC BLOOD PRESSURE: 78 MMHG | BODY MASS INDEX: 43.14 KG/M2 | HEART RATE: 76 BPM | RESPIRATION RATE: 18 BRPM | WEIGHT: 214 LBS

## 2024-08-06 DIAGNOSIS — G47.33 OSA (OBSTRUCTIVE SLEEP APNEA): ICD-10-CM

## 2024-08-06 DIAGNOSIS — Z87.891 PERSONAL HISTORY OF TOBACCO USE: ICD-10-CM

## 2024-08-06 DIAGNOSIS — K21.9 GASTROESOPHAGEAL REFLUX DISEASE, UNSPECIFIED WHETHER ESOPHAGITIS PRESENT: ICD-10-CM

## 2024-08-06 DIAGNOSIS — J45.41 MODERATE PERSISTENT ASTHMA WITH ACUTE EXACERBATION: Primary | ICD-10-CM

## 2024-08-06 DIAGNOSIS — G47.61 PERIODIC LIMB MOVEMENT SLEEP DISORDER: ICD-10-CM

## 2024-08-06 DIAGNOSIS — E66.09 OBESITY DUE TO EXCESS CALORIES, UNSPECIFIED OBESITY SEVERITY: ICD-10-CM

## 2024-08-06 DIAGNOSIS — R06.83 SNORING: ICD-10-CM

## 2024-08-06 DIAGNOSIS — R09.82 POST-NASAL DRIP: ICD-10-CM

## 2024-08-06 PROCEDURE — 3074F SYST BP LT 130 MM HG: CPT | Performed by: INTERNAL MEDICINE

## 2024-08-06 PROCEDURE — 3078F DIAST BP <80 MM HG: CPT | Performed by: INTERNAL MEDICINE

## 2024-08-06 PROCEDURE — 99214 OFFICE O/P EST MOD 30 MIN: CPT | Performed by: INTERNAL MEDICINE

## 2024-08-06 RX ORDER — ALBUTEROL SULFATE 90 UG/1
2 AEROSOL, METERED RESPIRATORY (INHALATION) 4 TIMES DAILY PRN
Qty: 18 G | Refills: 5 | Status: SHIPPED | OUTPATIENT
Start: 2024-08-06

## 2024-08-06 NOTE — PROGRESS NOTES
PULMONARY OP  PROGRESS NOTE          Patient:  Donald Bolivar  YOB: 2000    MRN: [id number]     Acct:        Pt seen and Chart reviewed. Ms. Donald Bolivar is here in followup for   1. Moderate persistent asthma with acute exacerbation    2. Post-nasal drip    3. Obesity due to excess calories, unspecified obesity severity    4. Snoring    5. Gastroesophageal reflux disease, unspecified whether esophagitis present    6. NELSON (obstructive sleep apnea)    7. Personal history of tobacco use    8. Periodic limb movement sleep disorder          Denied any hospitalization or ER visits for breathing problems  Has been using meds as recommended.  Denied much of shortness of breath or wheezing  Denied much of cough or sputum production  Does not use any CPAP  No excessive daytime sleepiness or fatigue or tiredness  Denied any periodic leg movements  Albuterol helps  Denied much of postnasal discharge  No acid reflux symptoms currently  She is making an effort to lose weight  Patient does not smoke anymore  No motor vehicle accidents  No hypothyroidism  No narcolepsy symptoms      Subjective:  Review of Systems    Review of Systems -   General ROS: Completed and except as mentioned above were negative   Psychological ROS:  Completed and except as mentioned above were negative  Ophthalmic ROS:  Completed and except as mentioned above were negative  ENT ROS:  Completed and except as mentioned above were negative  Allergy and Immunology ROS:  Completed and except as mentioned above werenegative  Hematological and Lymphatic ROS:  Completed and except as mentioned above were negative  Endocrine ROS: Completed and except as mentioned above were negative  Respiratory ROS:  Completed and except asmentioned above were negative  Cardiovascular ROS:  Completed and except as mentioned above were negative  Gastrointestinal ROS: Completed and except as mentioned above were negative  Genito-Urinary ROS:

## 2024-08-07 ENCOUNTER — TELEPHONE (OUTPATIENT)
Dept: PULMONOLOGY | Age: 24
End: 2024-08-07

## 2024-09-10 ENCOUNTER — NURSE ONLY (OUTPATIENT)
Dept: INTERNAL MEDICINE | Age: 24
End: 2024-09-10
Payer: COMMERCIAL

## 2024-09-10 DIAGNOSIS — Z11.1 VISIT FOR TB SKIN TEST: Primary | ICD-10-CM

## 2024-09-10 PROCEDURE — 86580 TB INTRADERMAL TEST: CPT | Performed by: INTERNAL MEDICINE

## 2024-09-13 ENCOUNTER — NURSE ONLY (OUTPATIENT)
Dept: INTERNAL MEDICINE | Age: 24
End: 2024-09-13

## 2024-09-13 DIAGNOSIS — Z11.1 VISIT FOR TB SKIN TEST: Primary | ICD-10-CM

## 2024-09-13 LAB
INDURATION: NORMAL
TB SKIN TEST: NEGATIVE

## 2024-09-20 ENCOUNTER — TELEPHONE (OUTPATIENT)
Dept: ADMINISTRATIVE | Age: 24
End: 2024-09-20

## 2024-09-20 NOTE — TELEPHONE ENCOUNTER
Patient called to schedule with Dr Cruz for stomach cramping but there are no soon appointments. She mentioned that she cut out the hot chips to see if that would help but it still felt like acid reflux.

## 2024-09-23 ENCOUNTER — TELEPHONE (OUTPATIENT)
Dept: INTERNAL MEDICINE | Age: 24
End: 2024-09-23

## 2024-09-29 ENCOUNTER — HOSPITAL ENCOUNTER (EMERGENCY)
Age: 24
Discharge: HOME | End: 2024-09-29
Payer: COMMERCIAL

## 2024-09-29 VITALS
DIASTOLIC BLOOD PRESSURE: 88 MMHG | SYSTOLIC BLOOD PRESSURE: 117 MMHG | HEART RATE: 92 BPM | BODY MASS INDEX: 44.43 KG/M2 | TEMPERATURE: 97.3 F | OXYGEN SATURATION: 99 % | WEIGHT: 220 LBS | RESPIRATION RATE: 16 BRPM

## 2024-09-29 DIAGNOSIS — R21 RASH: Primary | ICD-10-CM

## 2024-09-29 PROCEDURE — 99283 EMERGENCY DEPT VISIT LOW MDM: CPT

## 2024-09-29 RX ORDER — DIPHENHYDRAMINE HCL 25 MG
25 CAPSULE ORAL EVERY 6 HOURS PRN
Qty: 20 CAPSULE | Refills: 0 | Status: SHIPPED | OUTPATIENT
Start: 2024-09-29 | End: 2024-10-09

## 2024-09-29 RX ORDER — CLOTRIMAZOLE AND BETAMETHASONE DIPROPIONATE 10; .64 MG/G; MG/G
CREAM TOPICAL
Qty: 45 G | Refills: 0 | Status: SHIPPED | OUTPATIENT
Start: 2024-09-29

## 2024-09-29 RX ORDER — PREDNISONE 20 MG/1
40 TABLET ORAL DAILY
Qty: 10 TABLET | Refills: 0 | Status: SHIPPED | OUTPATIENT
Start: 2024-09-29 | End: 2024-10-04

## 2024-09-29 NOTE — ED PROVIDER NOTES
[facility] ED  eMERGENCY dEPARTMENT eNCOUnter      Pt Name: Donald Bolivar  MRN: [id number]  Birthdate 2000  Date of evaluation: 9/29/2024  Provider: MARY Rodrigues CNP    CHIEF COMPLAINT       Chief Complaint   Patient presents with    Rash     Several months ago pt wore gloves with latex, which she is allergic to. Rash wont go away Right hand 2nd and 3rd fingers and top of right wrist         HISTORY OF PRESENT ILLNESS  (Location/Symptom, Timing/Onset, Context/Setting, Quality, Duration, Modifying Factors, Severity.)   Donald Bolivar is a 24 y.o. female who presents to the emergency department for evaluation of rash on her second and third fingers of right hand and to the back of her right hand that started a few days ago.  Patient states she has had this rash before.  Denies fever or chills.  She has been scratching at the rash.  Patient states she has a small open area to the rash on her index finger from where she was scratching.  Denies bleeding or drainage.  Denies use of new soaps or detergents.  No shortness of breath or fever.      Nursing Notes were reviewed.    ALLERGIES     Latex, Apple, Banana, Cherry, and Nuts [peanut-containing drug products]    CURRENT MEDICATIONS       Previous Medications    ACETAMINOPHEN (TYLENOL) 500 MG TABLET    Take 2 tablets by mouth every 6 hours as needed for Pain    ALBUTEROL SULFATE HFA (VENTOLIN HFA) 108 (90 BASE) MCG/ACT INHALER    Inhale 2 puffs into the lungs 4 times daily as needed for Wheezing    IBUPROFEN (IBU) 600 MG TABLET    Take 1 tablet by mouth every 6 hours as needed for Pain    KETOCONAZOLE (NIZORAL) 2 % CREAM    Apply topically bid    KETOCONAZOLE (NIZORAL) 2 % CREAM    Apply topically daily.    MOMETASONE-FORMOTEROL (DULERA) 200-5 MCG/ACT INHALER    Inhale 2 puffs into the lungs in the morning and 2 puffs in the evening.    MONTELUKAST (SINGULAIR) 10 MG TABLET    Take 1 tablet by mouth daily    PLECANATIDE 3 MG

## 2024-09-29 NOTE — ED NOTES
Pt presents to ER from home due to Rash. Pt states rash started 2 months ago due to wearing latex gloves. Pt states rash is located to her hands.Pt states increased itching and irration.Pt denies new changes in Soaps, Laundry detergents lotions.Pt is A/O x 4, equal chest expansion with non labored breathing,wheels locked, bed in lowest position, call light in reach.

## 2024-09-29 NOTE — DISCHARGE INSTRUCTIONS
Take medications as prescribed.  Follow-up with your primary care provider in a week.  Return to emergency department for worsening symptoms peer

## 2024-09-30 ENCOUNTER — OFFICE VISIT (OUTPATIENT)
Dept: GASTROENTEROLOGY | Age: 24
End: 2024-09-30
Payer: COMMERCIAL

## 2024-09-30 VITALS
SYSTOLIC BLOOD PRESSURE: 125 MMHG | DIASTOLIC BLOOD PRESSURE: 87 MMHG | HEART RATE: 76 BPM | HEIGHT: 59 IN | BODY MASS INDEX: 43.14 KG/M2 | WEIGHT: 214 LBS | TEMPERATURE: 97.5 F

## 2024-09-30 DIAGNOSIS — K58.9 IRRITABLE BOWEL SYNDROME, UNSPECIFIED TYPE: ICD-10-CM

## 2024-09-30 DIAGNOSIS — R10.84 ABDOMINAL PAIN, GENERALIZED: ICD-10-CM

## 2024-09-30 DIAGNOSIS — K21.9 GASTROESOPHAGEAL REFLUX DISEASE, UNSPECIFIED WHETHER ESOPHAGITIS PRESENT: ICD-10-CM

## 2024-09-30 DIAGNOSIS — K59.09 OTHER CONSTIPATION: Primary | ICD-10-CM

## 2024-09-30 DIAGNOSIS — R14.0 ABDOMINAL BLOATING: ICD-10-CM

## 2024-09-30 PROCEDURE — 99214 OFFICE O/P EST MOD 30 MIN: CPT | Performed by: PHYSICIAN ASSISTANT

## 2024-09-30 PROCEDURE — 3074F SYST BP LT 130 MM HG: CPT | Performed by: PHYSICIAN ASSISTANT

## 2024-09-30 PROCEDURE — 3079F DIAST BP 80-89 MM HG: CPT | Performed by: PHYSICIAN ASSISTANT

## 2024-09-30 RX ORDER — PANTOPRAZOLE SODIUM 40 MG/1
40 TABLET, DELAYED RELEASE ORAL
Qty: 30 TABLET | Refills: 1 | Status: SHIPPED | OUTPATIENT
Start: 2024-09-30

## 2024-09-30 ASSESSMENT — ENCOUNTER SYMPTOMS: ABDOMINAL PAIN: 1

## 2024-09-30 NOTE — PROGRESS NOTES
Irritable bowel syndrome, unspecified type  Hepatic Function Panel    NM GASTRIC EMPTYING    tTG IgA/G    CBC    Iron and TIBC    linaclotide (LINZESS) 145 MCG capsule      3. Abdominal bloating  Hepatic Function Panel    NM GASTRIC EMPTYING    tTG IgA/G    CBC    Iron and TIBC    linaclotide (LINZESS) 145 MCG capsule    pantoprazole (PROTONIX) 40 MG tablet    TSH With Reflex Ft4      4. Gastroesophageal reflux disease, unspecified whether esophagitis present  NM GASTRIC EMPTYING    pantoprazole (PROTONIX) 40 MG tablet      5. Abdominal pain, generalized  Hepatic Function Panel    NM GASTRIC EMPTYING    tTG IgA/G    CBC    Iron and TIBC    linaclotide (LINZESS) 145 MCG capsule    pantoprazole (PROTONIX) 40 MG tablet         Switch from trulance to linzess  Start PPI for chronic GERD, currently untreated. At least 6 weeks then will attempt to taper down on dose. Consider repeat EGD in future if these tests are negative  Gastric emptying study. May benefit from   Colonoscopy was negative  No red flag symptoms requiring CT/urgent colonoscopy at this time      Medications and potential side effects were discussed with patient. GI rx refilled.   Diet/life style/natural hx /complication of the dx were all explained in detail     She should alert me if there are persistent or worsening symptoms, dysphagia, weight loss or GI bleeding.     Past medical, past surgical, social history, psychiatric history, medications or allergies, all reviewed and updated. I personally reviewed all labs results and imaging studies of the abdomen available which were ordered by the primary care physician, and the other consultants. I did review all the pathology from the biopsies done on the previous endoscopies.  I have reviewed and agree with the ROS entered by the MA/RN.     Thank you for allowing me to participate in the care of Ms. Tesfaye Bolivar. For any further questions please do not hesitate to contact me.        Electronically signed

## 2024-10-01 ENCOUNTER — HOSPITAL ENCOUNTER (OUTPATIENT)
Age: 24
Discharge: HOME | End: 2024-10-01
Payer: COMMERCIAL

## 2024-10-01 ENCOUNTER — TELEPHONE (OUTPATIENT)
Dept: GASTROENTEROLOGY | Age: 24
End: 2024-10-01

## 2024-10-01 DIAGNOSIS — R14.0 ABDOMINAL BLOATING: ICD-10-CM

## 2024-10-01 DIAGNOSIS — K59.09 OTHER CONSTIPATION: ICD-10-CM

## 2024-10-01 DIAGNOSIS — R10.84 ABDOMINAL PAIN, GENERALIZED: ICD-10-CM

## 2024-10-01 DIAGNOSIS — K58.9 IRRITABLE BOWEL SYNDROME, UNSPECIFIED TYPE: ICD-10-CM

## 2024-10-01 LAB
ALBUMIN SERPL-MCNC: 4.4 G/DL (ref 3.5–5.2)
ALBUMIN/GLOB SERPL: 1 {RATIO} (ref 1–2.5)
ALP SERPL-CCNC: 76 U/L (ref 35–104)
ALT SERPL-CCNC: 11 U/L (ref 10–35)
AST SERPL-CCNC: 20 U/L (ref 10–35)
BILIRUB DIRECT SERPL-MCNC: <0.1 MG/DL (ref 0–0.2)
BILIRUB INDIRECT SERPL-MCNC: NORMAL MG/DL (ref 0–1)
BILIRUB SERPL-MCNC: 0.2 MG/DL (ref 0–1.2)
ERYTHROCYTE [DISTWIDTH] IN BLOOD BY AUTOMATED COUNT: 15 % (ref 11.8–14.4)
GLOBULIN SER CALC-MCNC: 3.5 G/DL
HCT VFR BLD AUTO: 41.8 % (ref 36.3–47.1)
HGB BLD-MCNC: 12.2 G/DL (ref 11.9–15.1)
IRON SATN MFR SERPL: 8 % (ref 20–55)
IRON SERPL-MCNC: 30 UG/DL (ref 37–145)
MCH RBC QN AUTO: 22.3 PG (ref 25.2–33.5)
MCHC RBC AUTO-ENTMCNC: 29.2 G/DL (ref 28.4–34.8)
MCV RBC AUTO: 76.3 FL (ref 82.6–102.9)
NRBC BLD-RTO: 0 PER 100 WBC
PLATELET # BLD AUTO: 314 K/UL (ref 138–453)
PMV BLD AUTO: 11.4 FL (ref 8.1–13.5)
PROT SERPL-MCNC: 7.9 G/DL (ref 6.6–8.7)
RBC # BLD AUTO: 5.48 M/UL (ref 3.95–5.11)
TIBC SERPL-MCNC: 355 UG/DL (ref 250–450)
TSH SERPL DL<=0.05 MIU/L-ACNC: 0.51 UIU/ML (ref 0.27–4.2)
UNSATURATED IRON BINDING CAPACITY: 325 UG/DL (ref 112–347)
WBC OTHER # BLD: 5.8 K/UL (ref 3.5–11.3)

## 2024-10-01 PROCEDURE — 83540 ASSAY OF IRON: CPT

## 2024-10-01 PROCEDURE — 85027 COMPLETE CBC AUTOMATED: CPT

## 2024-10-01 PROCEDURE — 83550 IRON BINDING TEST: CPT

## 2024-10-01 PROCEDURE — 80076 HEPATIC FUNCTION PANEL: CPT

## 2024-10-01 PROCEDURE — 36415 COLL VENOUS BLD VENIPUNCTURE: CPT

## 2024-10-01 PROCEDURE — 84443 ASSAY THYROID STIM HORMONE: CPT

## 2024-10-01 PROCEDURE — 83516 IMMUNOASSAY NONANTIBODY: CPT

## 2024-10-01 NOTE — TELEPHONE ENCOUNTER
PA was started in [facility] with key [id number]. PA was completed and approved.

## 2024-10-02 ENCOUNTER — TELEPHONE (OUTPATIENT)
Dept: GASTROENTEROLOGY | Age: 24
End: 2024-10-02

## 2024-10-02 LAB
TISSUE TRANSGLUTAMINASE ANTIBODY IGG: 0.9 U/ML
TTG IGA SER IA-ACNC: 0.3 U/ML

## 2024-10-02 NOTE — TELEPHONE ENCOUNTER
Writer spoke with  pt regarding lab results. Pt understands she is to start otc iron supplements. Asked to get switched to scheduling to schedule follow up. Pt transferred. Call ends

## 2024-10-02 NOTE — TELEPHONE ENCOUNTER
----- Message from CRUZ Chung sent at 10/2/2024  9:27 AM EDT -----  Needs to start taking 2 tabs ferrous sulfate (iron) OTC daily

## 2024-10-02 NOTE — TELEPHONE ENCOUNTER
Patient calling to schedule appt with Anahi to go over her lab results.  Please contact patient at [phone]  anytime

## 2024-10-07 ENCOUNTER — HOSPITAL ENCOUNTER (OUTPATIENT)
Dept: NUCLEAR MEDICINE | Age: 24
Discharge: HOME | End: 2024-10-09
Payer: COMMERCIAL

## 2024-10-07 DIAGNOSIS — R10.84 ABDOMINAL PAIN, GENERALIZED: ICD-10-CM

## 2024-10-07 DIAGNOSIS — R14.0 ABDOMINAL BLOATING: ICD-10-CM

## 2024-10-07 DIAGNOSIS — K58.9 IRRITABLE BOWEL SYNDROME, UNSPECIFIED TYPE: ICD-10-CM

## 2024-10-07 DIAGNOSIS — K21.9 GASTROESOPHAGEAL REFLUX DISEASE, UNSPECIFIED WHETHER ESOPHAGITIS PRESENT: ICD-10-CM

## 2024-10-07 PROCEDURE — 3430000000 HC RX DIAGNOSTIC RADIOPHARMACEUTICAL: Performed by: PHYSICIAN ASSISTANT

## 2024-10-07 PROCEDURE — 78264 GASTRIC EMPTYING IMG STUDY: CPT

## 2024-10-07 PROCEDURE — A9541 TC99M SULFUR COLLOID: HCPCS | Performed by: PHYSICIAN ASSISTANT

## 2024-10-07 RX ORDER — TECHNETIUM TC 99M SULFUR COLLOID 2 MG
2.8 KIT MISCELLANEOUS
OUTPATIENT
Start: 2024-10-07

## 2024-10-07 RX ORDER — TECHNETIUM TC 99M SULFUR COLLOID 2 MG
2.8 KIT MISCELLANEOUS
Status: COMPLETED | OUTPATIENT
Start: 2024-10-07 | End: 2024-10-07

## 2024-10-07 RX ADMIN — Medication 2.8 MILLICURIE: at 07:25

## 2024-10-22 DIAGNOSIS — K21.9 GASTROESOPHAGEAL REFLUX DISEASE, UNSPECIFIED WHETHER ESOPHAGITIS PRESENT: ICD-10-CM

## 2024-10-22 DIAGNOSIS — R14.0 ABDOMINAL BLOATING: ICD-10-CM

## 2024-10-22 DIAGNOSIS — R10.84 ABDOMINAL PAIN, GENERALIZED: ICD-10-CM

## 2024-10-22 RX ORDER — PANTOPRAZOLE SODIUM 40 MG/1
40 TABLET, DELAYED RELEASE ORAL
Qty: 90 TABLET | Refills: 1 | Status: SHIPPED | OUTPATIENT
Start: 2024-10-22

## 2024-10-23 ENCOUNTER — TELEPHONE (OUTPATIENT)
Dept: INTERNAL MEDICINE | Age: 24
End: 2024-10-23

## 2024-12-21 DIAGNOSIS — K21.9 GASTROESOPHAGEAL REFLUX DISEASE, UNSPECIFIED WHETHER ESOPHAGITIS PRESENT: ICD-10-CM

## 2024-12-21 DIAGNOSIS — R10.84 ABDOMINAL PAIN, GENERALIZED: ICD-10-CM

## 2024-12-21 DIAGNOSIS — R14.0 ABDOMINAL BLOATING: ICD-10-CM

## 2024-12-23 NOTE — TELEPHONE ENCOUNTER
LAST VISIT: 8/6/24  NEXT VISIT: 2/25/25    Per last dictation patient is on this medication. Please sign for refill if ok. Thank you.

## 2024-12-26 RX ORDER — PANTOPRAZOLE SODIUM 40 MG/1
40 TABLET, DELAYED RELEASE ORAL
Qty: 90 TABLET | Refills: 2 | Status: SHIPPED | OUTPATIENT
Start: 2024-12-26

## 2025-01-14 ENCOUNTER — TELEPHONE (OUTPATIENT)
Dept: PULMONOLOGY | Age: 25
End: 2025-01-14

## 2025-01-14 NOTE — TELEPHONE ENCOUNTER
Patient called requesting a refill on Dulera and she should not need a refill until June.  I have a call into [facility] but I had to leave a message.  I will reach back out to them after bit to make sure medication is filled for patient.  I let patient know Im waiting to talk with pharmacy and will reach out to her once I have an answer

## 2025-01-15 ENCOUNTER — TELEPHONE (OUTPATIENT)
Dept: PULMONOLOGY | Age: 25
End: 2025-01-15

## 2025-01-15 NOTE — TELEPHONE ENCOUNTER
RECEIVED A PA NOTICE FOR DULERA.  I have tried to process a PA for this medication with the insurance information we have on file from 2024.  I phoned the patient this morning informed her that we need a copy of her current insurance card in order to get this medication approved.  \"I'll have to call you back I am not up yet\".      Waiting on front and back of new insurance card once receive a PA for the Dulera inhaler will be processed

## 2025-01-20 DIAGNOSIS — R10.84 ABDOMINAL PAIN, GENERALIZED: ICD-10-CM

## 2025-01-20 DIAGNOSIS — R14.0 ABDOMINAL BLOATING: ICD-10-CM

## 2025-01-20 DIAGNOSIS — K21.9 GASTROESOPHAGEAL REFLUX DISEASE, UNSPECIFIED WHETHER ESOPHAGITIS PRESENT: ICD-10-CM

## 2025-01-20 RX ORDER — PANTOPRAZOLE SODIUM 40 MG/1
40 TABLET, DELAYED RELEASE ORAL
Qty: 90 TABLET | Refills: 3 | Status: SHIPPED | OUTPATIENT
Start: 2025-01-20

## 2025-02-14 DIAGNOSIS — K21.9 GASTROESOPHAGEAL REFLUX DISEASE, UNSPECIFIED WHETHER ESOPHAGITIS PRESENT: ICD-10-CM

## 2025-02-14 DIAGNOSIS — R14.0 ABDOMINAL BLOATING: ICD-10-CM

## 2025-02-14 DIAGNOSIS — R10.84 ABDOMINAL PAIN, GENERALIZED: ICD-10-CM

## 2025-02-14 RX ORDER — PANTOPRAZOLE SODIUM 40 MG/1
40 TABLET, DELAYED RELEASE ORAL
Qty: 90 TABLET | Refills: 3 | Status: SHIPPED | OUTPATIENT
Start: 2025-02-14 | End: 2025-02-14

## 2025-02-14 RX ORDER — LANSOPRAZOLE 30 MG/1
30 CAPSULE, DELAYED RELEASE ORAL DAILY
Qty: 90 CAPSULE | Refills: 3 | Status: SHIPPED | OUTPATIENT
Start: 2025-02-14

## 2025-03-02 ENCOUNTER — APPOINTMENT (OUTPATIENT)
Dept: GENERAL RADIOLOGY | Facility: CLINIC | Age: 25
End: 2025-03-02

## 2025-03-02 ENCOUNTER — HOSPITAL ENCOUNTER (EMERGENCY)
Facility: CLINIC | Age: 25
Discharge: HOME | End: 2025-03-02
Attending: EMERGENCY MEDICINE

## 2025-03-02 VITALS
SYSTOLIC BLOOD PRESSURE: 124 MMHG | RESPIRATION RATE: 20 BRPM | BODY MASS INDEX: 44.35 KG/M2 | DIASTOLIC BLOOD PRESSURE: 73 MMHG | HEART RATE: 85 BPM | OXYGEN SATURATION: 100 % | HEIGHT: 59 IN | WEIGHT: 220 LBS | TEMPERATURE: 98.5 F

## 2025-03-02 DIAGNOSIS — J45.21 MILD INTERMITTENT ASTHMA WITH EXACERBATION: ICD-10-CM

## 2025-03-02 DIAGNOSIS — J18.9 PNEUMONIA DUE TO INFECTIOUS ORGANISM, UNSPECIFIED LATERALITY, UNSPECIFIED PART OF LUNG: Primary | ICD-10-CM

## 2025-03-02 LAB
FLUAV AG SPEC QL: NEGATIVE
FLUBV AG SPEC QL: NEGATIVE
SARS-COV-2 RDRP RESP QL NAA+PROBE: NOT DETECTED
SPECIMEN DESCRIPTION: NORMAL

## 2025-03-02 PROCEDURE — 6370000000 HC RX 637 (ALT 250 FOR IP)

## 2025-03-02 PROCEDURE — 99284 EMERGENCY DEPT VISIT MOD MDM: CPT

## 2025-03-02 PROCEDURE — 87804 INFLUENZA ASSAY W/OPTIC: CPT

## 2025-03-02 PROCEDURE — 87635 SARS-COV-2 COVID-19 AMP PRB: CPT

## 2025-03-02 PROCEDURE — 71045 X-RAY EXAM CHEST 1 VIEW: CPT

## 2025-03-02 RX ORDER — PREDNISONE 50 MG/1
50 TABLET ORAL DAILY
Qty: 5 TABLET | Refills: 0 | Status: SHIPPED | OUTPATIENT
Start: 2025-03-02 | End: 2025-03-07

## 2025-03-02 RX ORDER — IPRATROPIUM BROMIDE AND ALBUTEROL SULFATE 2.5; .5 MG/3ML; MG/3ML
1 SOLUTION RESPIRATORY (INHALATION) ONCE
Status: COMPLETED | OUTPATIENT
Start: 2025-03-02 | End: 2025-03-02

## 2025-03-02 RX ORDER — PREDNISONE 20 MG/1
60 TABLET ORAL ONCE
Status: COMPLETED | OUTPATIENT
Start: 2025-03-02 | End: 2025-03-02

## 2025-03-02 RX ORDER — AZITHROMYCIN 250 MG/1
TABLET, FILM COATED ORAL
Qty: 1 PACKET | Refills: 0 | Status: SHIPPED | OUTPATIENT
Start: 2025-03-02 | End: 2025-03-06

## 2025-03-02 RX ADMIN — IPRATROPIUM BROMIDE AND ALBUTEROL SULFATE 1 DOSE: .5; 2.5 SOLUTION RESPIRATORY (INHALATION) at 15:48

## 2025-03-02 RX ADMIN — PREDNISONE 60 MG: 20 TABLET ORAL at 15:47

## 2025-03-02 ASSESSMENT — PAIN - FUNCTIONAL ASSESSMENT
PAIN_FUNCTIONAL_ASSESSMENT: NONE - DENIES PAIN
PAIN_FUNCTIONAL_ASSESSMENT: NONE - DENIES PAIN

## 2025-03-02 NOTE — ED PROVIDER NOTES
eMERGENCY dEPARTMENT  Attending Physician Attestation     Pt Name: Donald Bolivar  MRN: [id number]  Birthdate 2000  Date of evaluation: 3/2/25     Donald Bolivar is a 24 y.o. female with CC: Asthma and Cough (Started a few days ago)      Based on the medical record the care appears appropriate.  I was personally available for consultation in the Emergency Department.    Cornelio Gonzales MD  Attending Emergency Physician                Cornelio Gonzales MD  03/02/25 [id number]    
Hx     High Cholesterol Neg Hx     Kidney Disease Neg Hx     Learning Disabilities Neg Hx     Mental Illness Neg Hx     Mental Retardation Neg Hx     Miscarriages / Stillbirths Neg Hx     Substance Abuse Neg Hx     Vision Loss Neg Hx     Other Neg Hx     Ovarian Cancer Neg Hx     Breast Cancer Neg Hx           SOCIAL HISTORY       Social History     Socioeconomic History    Marital status: Single     Spouse name: None    Number of children: None    Years of education: None    Highest education level: None   Tobacco Use    Smoking status: Never    Smokeless tobacco: Never   Vaping Use    Vaping status: Never Used   Substance and Sexual Activity    Alcohol use: No     Alcohol/week: 0.0 standard drinks of alcohol    Drug use: Yes     Types: Marijuana (Weed)     Comment: last use 3 months ago    Sexual activity: Yes     Partners: Female     Social Determinants of Health     Financial Resource Strain: Low Risk  (9/29/2023)    Overall Financial Resource Strain (CARDIA)     Difficulty of Paying Living Expenses: Not very hard   Transportation Needs: Unknown (9/29/2023)    PRAPARE - Transportation     Lack of Transportation (Non-Medical): No   Housing Stability: Unknown (9/29/2023)    Housing Stability Vital Sign     Unstable Housing in the Last Year: No       SCREENINGS         [place] Coma Scale  Eye Opening: Spontaneous  Best Verbal Response: Oriented  Best Motor Response: Obeys commands  [place] Coma Scale Score: 15                     CIWA Assessment  BP: 124/73  Pulse: 85                 PHYSICAL EXAM       ED Triage Vitals [03/02/25 1544]   BP Systolic BP Percentile Diastolic BP Percentile Temp Temp Source Pulse Respirations SpO2   124/73 -- -- 98.5 °F (36.9 °C) Oral 85 20 98 %      Height Weight - Scale         1.499 m (4' 11\") 99.8 kg (220 lb)             Physical Exam  Constitutional:       Appearance: Normal appearance. She is not ill-appearing or toxic-appearing.   HENT:      Head: Normocephalic and atraumatic.

## 2025-03-02 NOTE — DISCHARGE INSTRUCTIONS
Take your medication as indicated and prescribed.  If you are given an antibiotic then, make sure you get the prescription filled and take the antibiotics until finished.  Drink plenty of water while taking the antibiotics.  Avoid drinking alcohol or drinks that have caffeine in it while taking antibiotics.       If you were given a prescription for prednisone or any other steroid then, take Pepcid (famotidine - over the counter) every day while you are taking the steroids.  If you are a diabetic, you should check your blood sugar more frequently while taking prednisone.      For fever or pain use acetaminophen (Tylenol) or ibuprofen (Motrin / Advil), unless prescribed medications that have acetaminophen or ibuprofen (or similar medications) in it.  You can take over the counter acetaminophen tablets (1 - 2 tablets of the 500-mg strength every 6 hours) or ibuprofen tablets (2 tablets every 4 hours).    PLEASE RETURN TO THE EMERGENCY DEPARTMENT IMMEDIATELY for worsening symptoms, shortness of breathing, change in the amount of sputum that you cough up or a change in the color of your sputum, using your inhaler more frequently or if your inhaler only lasts up to 2 hours (if given an inhaler), or if you develop any concerning symptoms such as: high fever not relieved by acetaminophen (Tylenol) and/or ibuprofen (Motrin / Advil), chills, shortness of breath, chest pain, feeling of your heart fluttering or racing, persistent nausea and/or vomiting, vomiting up blood, blood in your stool, loss of consciousness, numbness, weakness or tingling in the arms or legs or change in color of the extremities, changes in mental status, persistent headache, blurry vision, loss of bladder / bowel control, unable to follow up with your physician, or other any other care or concern.

## 2025-07-21 ENCOUNTER — HOSPITAL ENCOUNTER (EMERGENCY)
Facility: CLINIC | Age: 25
Discharge: HOME | End: 2025-07-21
Attending: EMERGENCY MEDICINE

## 2025-07-21 VITALS
BODY MASS INDEX: 41.43 KG/M2 | HEART RATE: 85 BPM | HEIGHT: 60 IN | RESPIRATION RATE: 18 BRPM | OXYGEN SATURATION: 98 % | WEIGHT: 211 LBS | SYSTOLIC BLOOD PRESSURE: 140 MMHG | DIASTOLIC BLOOD PRESSURE: 97 MMHG | TEMPERATURE: 97.4 F

## 2025-07-21 DIAGNOSIS — M54.9 ACUTE BILATERAL BACK PAIN, UNSPECIFIED BACK LOCATION: Primary | ICD-10-CM

## 2025-07-21 DIAGNOSIS — M62.838 SPASM OF MUSCLE: ICD-10-CM

## 2025-07-21 PROCEDURE — 6360000002 HC RX W HCPCS

## 2025-07-21 PROCEDURE — 96372 THER/PROPH/DIAG INJ SC/IM: CPT

## 2025-07-21 PROCEDURE — 6370000000 HC RX 637 (ALT 250 FOR IP)

## 2025-07-21 PROCEDURE — 99284 EMERGENCY DEPT VISIT MOD MDM: CPT

## 2025-07-21 RX ORDER — KETOROLAC TROMETHAMINE 30 MG/ML
30 INJECTION, SOLUTION INTRAMUSCULAR; INTRAVENOUS ONCE
Status: COMPLETED | OUTPATIENT
Start: 2025-07-21 | End: 2025-07-21

## 2025-07-21 RX ORDER — PREDNISONE 10 MG/1
TABLET ORAL
Qty: 20 TABLET | Refills: 0 | Status: SHIPPED | OUTPATIENT
Start: 2025-07-21

## 2025-07-21 RX ORDER — LIDOCAINE 4 G/G
1 PATCH TOPICAL ONCE
Qty: 1 EACH | Refills: 0 | Status: SHIPPED | OUTPATIENT
Start: 2025-07-21 | End: 2025-07-21

## 2025-07-21 RX ORDER — CYCLOBENZAPRINE HCL 5 MG
5 TABLET ORAL 3 TIMES DAILY PRN
Qty: 21 TABLET | Refills: 0 | Status: SHIPPED | OUTPATIENT
Start: 2025-07-21 | End: 2025-07-28

## 2025-07-21 RX ORDER — LIDOCAINE 4 G/G
1 PATCH TOPICAL ONCE
Status: DISCONTINUED | OUTPATIENT
Start: 2025-07-21 | End: 2025-07-21 | Stop reason: HOSPADM

## 2025-07-21 RX ORDER — DIAZEPAM 5 MG/1
5 TABLET ORAL ONCE
Status: COMPLETED | OUTPATIENT
Start: 2025-07-21 | End: 2025-07-21

## 2025-07-21 RX ADMIN — PREDNISONE 50 MG: 5 TABLET ORAL at 13:32

## 2025-07-21 RX ADMIN — KETOROLAC TROMETHAMINE 30 MG: 30 INJECTION, SOLUTION INTRAMUSCULAR at 13:32

## 2025-07-21 RX ADMIN — DIAZEPAM 5 MG: 5 TABLET ORAL at 13:32

## 2025-07-21 ASSESSMENT — ENCOUNTER SYMPTOMS
BACK PAIN: 1
VOMITING: 0
ABDOMINAL PAIN: 0
NAUSEA: 0
COLOR CHANGE: 0
SHORTNESS OF BREATH: 0
DIARRHEA: 0
CONSTIPATION: 0

## 2025-07-21 ASSESSMENT — PAIN DESCRIPTION - ONSET: ONSET: ON-GOING

## 2025-07-21 ASSESSMENT — PAIN DESCRIPTION - ORIENTATION: ORIENTATION: MID

## 2025-07-21 ASSESSMENT — PAIN DESCRIPTION - FREQUENCY: FREQUENCY: CONTINUOUS

## 2025-07-21 ASSESSMENT — PAIN DESCRIPTION - PAIN TYPE: TYPE: ACUTE PAIN

## 2025-07-21 ASSESSMENT — PAIN - FUNCTIONAL ASSESSMENT
PAIN_FUNCTIONAL_ASSESSMENT: ACTIVITIES ARE NOT PREVENTED
PAIN_FUNCTIONAL_ASSESSMENT: 0-10

## 2025-07-21 ASSESSMENT — PAIN DESCRIPTION - LOCATION: LOCATION: BACK

## 2025-07-21 ASSESSMENT — PAIN DESCRIPTION - DESCRIPTORS: DESCRIPTORS: ACHING

## 2025-07-21 ASSESSMENT — PAIN SCALES - GENERAL: PAINLEVEL_OUTOF10: 10

## 2025-07-21 NOTE — ED PROVIDER NOTES
[facility]  [address] [address]  Phone: [phone]        Pt Name: Donald Bolivar  MRN: [id number]  Birthdate 2000  Date of evaluation: 7/21/25    CHIEFCOMPLAINT       Chief Complaint   Patient presents with    Back Pain       HISTORY OF PRESENT ILLNESS (Location/Symptom, Timing/Onset, Context/Setting, Quality, Duration, Modifying Factors, Severity)      Donald Bolivar is a 24 y.o. female with no pertinent PMH who presents to the ED via private auto with complaint of bilateral lower back pain for the last week.  No known injury.  No previous injury or surgery to the lower back.  No numbness or tingling to her arms or legs.  Aggravating factors include moving, lifting and twisting.  No loss of bowel or bladder control.  No saddle anesthesia.  No dysuria or difficulty urinating.  No abdominal pain, nausea or vomiting.  Patient has Salonpas patch in place since last night, states this does not help with her pain.  Over-the-counter Tylenol and ibuprofen ineffective for pain relief.  Rating her pain 10 out of 10.  No history of IV drug use.  No fever or chills.    PAST MEDICAL / SURGICAL / SOCIAL / FAMILY HISTORY     PMH:  has a past medical history of Abdominal pain, Asthma, Chronic vasomotor rhinitis, GERD (gastroesophageal reflux disease), Headache(784.0), Obesity, NELSON (obstructive sleep apnea), Poor venous access, Prediabetes, Pure hypercholesterolemia, Thalassemia minor, and Vision disturbance.  Surgical History:  has a past surgical history that includes Adenoidectomy; Tonsillectomy; other surgical history (08/28/2014); Upper gastrointestinal endoscopy (N/A, 10/8/2020); and Colonoscopy (N/A, 10/8/2020).  Social History:  reports that she has never smoked. She has never used smokeless tobacco. She reports current drug use. Drug: Marijuana (Weed). She reports that she does not drink alcohol.  Family History: She indicated that her mother is alive.

## 2025-07-21 NOTE — DISCHARGE INSTRUCTIONS
You were seen today for back pain.  This is likely a muscle spasm versus a herniated disc.      You may take 1000 mg of Tylenol over-the-counter up to every 8 hours as needed for pain.     prescription for prednisone, take as prescribed until completed.  Take with food to reduce stomach upset.  Avoid taking any ibuprofen or naproxen, no NSAIDs while taking the steroid.    Apply lidocaine patch to affected area, 12 hours on, 2 hours off.  No more than 2 patches at 1 time.    You may also take Flexeril as needed for muscle relaxation.  Please note that this may make you drowsy, so please do not drive or operate heavy machinery until you know how this medication may affect you.     It is important with lower back pain that you continue with daily activities.  Bed rest can worsen symptoms and lead to long term complications.  Make sure to stretch daily.  Avoid lifting heavy items > 20 pounds until pain improves.  Wearing a back brace in the future may help prevent future injuries.     Please call Destin Can MD on the next business day for ER follow-up within 1 week.     Return to the ED if you develop severe worsening of pain, inability to walk, inability to control bowel or bladder, high fevers, or any other concerns arise.

## 2025-07-21 NOTE — ED PROVIDER NOTES
[facility]      Pt Name: Donald Bolivar  MRN: [id number]  Birthdate 2000  Date of evaluation: 7/21/2025    EMERGENCY DEPARTMENT ENCOUNTER           I reviewed the mid level provider's note and agree with the documented findings and we have discussed the plan of care. I have reviewed the emergency nurses triage note. I agree with the chief complaint, past medical history, past surgical history, allergies, medications, social and family history as documented unless otherwise noted below.        Darrick Suh,   07/21/25 1327

## 2025-07-24 ENCOUNTER — TELEPHONE (OUTPATIENT)
Age: 25
End: 2025-07-24

## 2025-07-24 NOTE — TELEPHONE ENCOUNTER
----- Message from Bigg SALCEDO sent at 7/24/2025 11:51 AM EDT -----  Regarding: ECC Escalation To Practice  ECC Escalation To Practice      Type of Escalation: Acute Care Symptom  --------------------------------------------------------------------------------------------------------------------------    Information for Provider:  Patient is looking for appointment for: Symptom severe backpain   Reasons for Message: Unable to reach practice     Additional Information wants to get a sooner appointment   --------------------------------------------------------------------------------------------------------------------------    Relationship to Patient: Self  Call Back Info: OK to leave message on voicemail  Preferred Call Back Number:  [phone]

## 2025-08-03 ENCOUNTER — HOSPITAL ENCOUNTER (EMERGENCY)
Facility: CLINIC | Age: 25
Discharge: HOME | End: 2025-08-03
Attending: EMERGENCY MEDICINE

## 2025-08-03 ENCOUNTER — APPOINTMENT (OUTPATIENT)
Dept: GENERAL RADIOLOGY | Facility: CLINIC | Age: 25
End: 2025-08-03

## 2025-08-03 VITALS
SYSTOLIC BLOOD PRESSURE: 149 MMHG | WEIGHT: 210 LBS | BODY MASS INDEX: 41.01 KG/M2 | OXYGEN SATURATION: 99 % | DIASTOLIC BLOOD PRESSURE: 84 MMHG | TEMPERATURE: 98.4 F | HEART RATE: 92 BPM | RESPIRATION RATE: 18 BRPM

## 2025-08-03 DIAGNOSIS — G89.29 ACUTE EXACERBATION OF CHRONIC LOW BACK PAIN: Primary | ICD-10-CM

## 2025-08-03 DIAGNOSIS — M54.50 ACUTE EXACERBATION OF CHRONIC LOW BACK PAIN: Primary | ICD-10-CM

## 2025-08-03 LAB
BILIRUB UR QL STRIP: NEGATIVE
CLARITY UR: CLEAR
COLOR UR: YELLOW
COMMENT: NORMAL
GLUCOSE UR STRIP-MCNC: NEGATIVE MG/DL
HCG UR QL: NEGATIVE
HGB UR QL STRIP.AUTO: NEGATIVE
KETONES UR STRIP-MCNC: NEGATIVE MG/DL
LEUKOCYTE ESTERASE UR QL STRIP: NEGATIVE
NITRITE UR QL STRIP: NEGATIVE
PH UR STRIP: 7 [PH] (ref 5–8)
PROT UR STRIP-MCNC: NEGATIVE MG/DL
SP GR UR STRIP: 1.02 (ref 1–1.03)
UROBILINOGEN UR STRIP-ACNC: NORMAL EU/DL (ref 0–1)

## 2025-08-03 PROCEDURE — 99284 EMERGENCY DEPT VISIT MOD MDM: CPT

## 2025-08-03 PROCEDURE — 81003 URINALYSIS AUTO W/O SCOPE: CPT

## 2025-08-03 PROCEDURE — 96372 THER/PROPH/DIAG INJ SC/IM: CPT

## 2025-08-03 PROCEDURE — 72100 X-RAY EXAM L-S SPINE 2/3 VWS: CPT

## 2025-08-03 PROCEDURE — 81025 URINE PREGNANCY TEST: CPT

## 2025-08-03 PROCEDURE — 6360000002 HC RX W HCPCS: Performed by: EMERGENCY MEDICINE

## 2025-08-03 RX ORDER — LIDOCAINE 4 G/G
1 PATCH TOPICAL DAILY
Qty: 30 PATCH | Refills: 0 | Status: SHIPPED | OUTPATIENT
Start: 2025-08-03 | End: 2025-09-02

## 2025-08-03 RX ORDER — METHYLPREDNISOLONE 4 MG/1
TABLET ORAL
Qty: 1 KIT | Refills: 0 | Status: SHIPPED | OUTPATIENT
Start: 2025-08-03 | End: 2025-08-07

## 2025-08-03 RX ORDER — IBUPROFEN 800 MG/1
800 TABLET, FILM COATED ORAL EVERY 8 HOURS PRN
Qty: 30 TABLET | Refills: 0 | Status: SHIPPED | OUTPATIENT
Start: 2025-08-03

## 2025-08-03 RX ORDER — KETOROLAC TROMETHAMINE 30 MG/ML
30 INJECTION, SOLUTION INTRAMUSCULAR; INTRAVENOUS ONCE
Status: COMPLETED | OUTPATIENT
Start: 2025-08-03 | End: 2025-08-03

## 2025-08-03 RX ADMIN — KETOROLAC TROMETHAMINE 30 MG: 30 INJECTION, SOLUTION INTRAMUSCULAR at 12:55

## 2025-08-03 ASSESSMENT — PAIN SCALES - GENERAL: PAINLEVEL_OUTOF10: 10

## 2025-08-03 ASSESSMENT — PAIN - FUNCTIONAL ASSESSMENT: PAIN_FUNCTIONAL_ASSESSMENT: 0-10

## 2025-08-03 ASSESSMENT — PAIN DESCRIPTION - ORIENTATION: ORIENTATION: LOWER

## 2025-08-03 ASSESSMENT — PAIN DESCRIPTION - DESCRIPTORS: DESCRIPTORS: ACHING

## 2025-08-03 ASSESSMENT — PAIN DESCRIPTION - LOCATION: LOCATION: BACK

## 2025-08-03 ASSESSMENT — PAIN DESCRIPTION - PAIN TYPE: TYPE: ACUTE PAIN

## 2025-08-03 ASSESSMENT — PAIN DESCRIPTION - FREQUENCY: FREQUENCY: CONTINUOUS

## 2025-08-04 ENCOUNTER — OFFICE VISIT (OUTPATIENT)
Dept: PRIMARY CARE CLINIC | Age: 25
End: 2025-08-04

## 2025-08-04 VITALS
SYSTOLIC BLOOD PRESSURE: 119 MMHG | HEART RATE: 90 BPM | BODY MASS INDEX: 41.23 KG/M2 | WEIGHT: 210 LBS | HEIGHT: 60 IN | DIASTOLIC BLOOD PRESSURE: 86 MMHG

## 2025-08-04 DIAGNOSIS — M54.50 ACUTE BILATERAL LOW BACK PAIN WITHOUT SCIATICA: Primary | ICD-10-CM

## 2025-08-04 DIAGNOSIS — Z02.89 ENCOUNTER TO OBTAIN EXCUSE FROM WORK: ICD-10-CM

## 2025-08-04 PROCEDURE — 3074F SYST BP LT 130 MM HG: CPT | Performed by: NURSE PRACTITIONER

## 2025-08-04 PROCEDURE — 3079F DIAST BP 80-89 MM HG: CPT | Performed by: NURSE PRACTITIONER

## 2025-08-04 PROCEDURE — 99212 OFFICE O/P EST SF 10 MIN: CPT | Performed by: NURSE PRACTITIONER

## 2025-08-04 RX ORDER — CYCLOBENZAPRINE HCL 5 MG
5 TABLET ORAL 3 TIMES DAILY PRN
Qty: 20 TABLET | Refills: 0 | Status: SHIPPED | OUTPATIENT
Start: 2025-08-04 | End: 2025-08-14

## 2025-08-04 ASSESSMENT — ENCOUNTER SYMPTOMS
COUGH: 0
WHEEZING: 0
RESPIRATORY NEGATIVE: 1
BACK PAIN: 1
SHORTNESS OF BREATH: 0
BOWEL INCONTINENCE: 0
CHEST TIGHTNESS: 0

## 2025-08-07 ENCOUNTER — APPOINTMENT (OUTPATIENT)
Dept: GENERAL RADIOLOGY | Age: 25
End: 2025-08-07

## 2025-08-07 ENCOUNTER — HOSPITAL ENCOUNTER (EMERGENCY)
Age: 25
Discharge: HOME | End: 2025-08-07

## 2025-08-07 VITALS
SYSTOLIC BLOOD PRESSURE: 130 MMHG | WEIGHT: 210 LBS | HEIGHT: 60 IN | DIASTOLIC BLOOD PRESSURE: 98 MMHG | RESPIRATION RATE: 20 BRPM | TEMPERATURE: 98.4 F | OXYGEN SATURATION: 94 % | BODY MASS INDEX: 41.23 KG/M2 | HEART RATE: 99 BPM

## 2025-08-07 DIAGNOSIS — J45.901 EXACERBATION OF ASTHMA, UNSPECIFIED ASTHMA SEVERITY, UNSPECIFIED WHETHER PERSISTENT: Primary | ICD-10-CM

## 2025-08-07 DIAGNOSIS — K59.00 CONSTIPATION, UNSPECIFIED CONSTIPATION TYPE: ICD-10-CM

## 2025-08-07 PROCEDURE — 6370000000 HC RX 637 (ALT 250 FOR IP)

## 2025-08-07 PROCEDURE — 94760 N-INVAS EAR/PLS OXIMETRY 1: CPT

## 2025-08-07 PROCEDURE — 94640 AIRWAY INHALATION TREATMENT: CPT

## 2025-08-07 PROCEDURE — 99283 EMERGENCY DEPT VISIT LOW MDM: CPT

## 2025-08-07 PROCEDURE — 71045 X-RAY EXAM CHEST 1 VIEW: CPT

## 2025-08-07 RX ORDER — PREDNISONE 50 MG/1
50 TABLET ORAL DAILY
Qty: 5 TABLET | Refills: 0 | Status: SHIPPED | OUTPATIENT
Start: 2025-08-07 | End: 2025-08-12

## 2025-08-07 RX ORDER — IPRATROPIUM BROMIDE AND ALBUTEROL SULFATE 2.5; .5 MG/3ML; MG/3ML
1 SOLUTION RESPIRATORY (INHALATION) ONCE
Status: COMPLETED | OUTPATIENT
Start: 2025-08-07 | End: 2025-08-07

## 2025-08-07 RX ORDER — MAGNESIUM SULFATE IN WATER 40 MG/ML
2000 INJECTION, SOLUTION INTRAVENOUS ONCE
Status: DISCONTINUED | OUTPATIENT
Start: 2025-08-07 | End: 2025-08-07

## 2025-08-07 RX ORDER — POLYETHYLENE GLYCOL 3350, SODIUM SULFATE ANHYDROUS, SODIUM BICARBONATE, SODIUM CHLORIDE, POTASSIUM CHLORIDE 236; 22.74; 6.74; 5.86; 2.97 G/4L; G/4L; G/4L; G/4L; G/4L
4 POWDER, FOR SOLUTION ORAL ONCE
Qty: 4000 ML | Refills: 0 | Status: SHIPPED | OUTPATIENT
Start: 2025-08-07 | End: 2025-08-07

## 2025-08-07 RX ORDER — PREDNISONE 20 MG/1
60 TABLET ORAL ONCE
Status: COMPLETED | OUTPATIENT
Start: 2025-08-07 | End: 2025-08-07

## 2025-08-07 RX ORDER — IPRATROPIUM BROMIDE AND ALBUTEROL SULFATE 2.5; .5 MG/3ML; MG/3ML
1 SOLUTION RESPIRATORY (INHALATION) ONCE
Status: DISCONTINUED | OUTPATIENT
Start: 2025-08-07 | End: 2025-08-07

## 2025-08-07 RX ADMIN — IPRATROPIUM BROMIDE AND ALBUTEROL SULFATE 1 DOSE: .5; 2.5 SOLUTION RESPIRATORY (INHALATION) at 06:33

## 2025-08-07 RX ADMIN — PREDNISONE 60 MG: 20 TABLET ORAL at 06:41

## 2025-08-07 RX ADMIN — IPRATROPIUM BROMIDE AND ALBUTEROL SULFATE 1 DOSE: .5; 2.5 SOLUTION RESPIRATORY (INHALATION) at 07:10

## 2025-08-07 ASSESSMENT — PAIN - FUNCTIONAL ASSESSMENT: PAIN_FUNCTIONAL_ASSESSMENT: 0-10

## 2025-08-07 ASSESSMENT — ENCOUNTER SYMPTOMS
COLOR CHANGE: 0
SHORTNESS OF BREATH: 1
NAUSEA: 0
CHEST TIGHTNESS: 0
WHEEZING: 1
VOMITING: 0
RHINORRHEA: 0

## 2025-08-07 ASSESSMENT — PAIN SCALES - GENERAL: PAINLEVEL_OUTOF10: 9

## 2025-08-09 ENCOUNTER — TELEPHONE (OUTPATIENT)
Dept: PRIMARY CARE CLINIC | Age: 25
End: 2025-08-09

## 2025-08-12 ENCOUNTER — OFFICE VISIT (OUTPATIENT)
Dept: PULMONOLOGY | Age: 25
End: 2025-08-12

## 2025-08-12 VITALS
OXYGEN SATURATION: 98 % | DIASTOLIC BLOOD PRESSURE: 76 MMHG | HEIGHT: 60 IN | SYSTOLIC BLOOD PRESSURE: 119 MMHG | RESPIRATION RATE: 16 BRPM | BODY MASS INDEX: 40.64 KG/M2 | HEART RATE: 102 BPM | WEIGHT: 207 LBS

## 2025-08-12 DIAGNOSIS — Z87.891 FORMER HEAVY TOBACCO SMOKER: ICD-10-CM

## 2025-08-12 DIAGNOSIS — J45.50 SEVERE PERSISTENT ASTHMA WITHOUT COMPLICATION (HCC): Primary | ICD-10-CM

## 2025-08-12 DIAGNOSIS — E66.09 OBESITY DUE TO EXCESS CALORIES, UNSPECIFIED OBESITY SEVERITY: ICD-10-CM

## 2025-08-12 PROCEDURE — 3074F SYST BP LT 130 MM HG: CPT | Performed by: NURSE PRACTITIONER

## 2025-08-12 PROCEDURE — 3078F DIAST BP <80 MM HG: CPT | Performed by: NURSE PRACTITIONER

## 2025-08-12 PROCEDURE — 99211 OFF/OP EST MAY X REQ PHY/QHP: CPT | Performed by: NURSE PRACTITIONER

## 2025-08-12 RX ORDER — MONTELUKAST SODIUM 10 MG/1
10 TABLET ORAL DAILY
Qty: 30 TABLET | Refills: 5 | Status: SHIPPED | OUTPATIENT
Start: 2025-08-12

## 2025-08-12 ASSESSMENT — ENCOUNTER SYMPTOMS
EYES NEGATIVE: 1
ALLERGIC/IMMUNOLOGIC NEGATIVE: 1
GASTROINTESTINAL NEGATIVE: 1

## (undated) DEVICE — DEFENDO AIR WATER SUCTION AND BIOPSY VALVE KIT FOR  OLYMPUS: Brand: DEFENDO AIR/WATER/SUCTION AND BIOPSY VALVE

## (undated) DEVICE — FORCEPS BX L240CM WRK CHN 2.8MM STD CAP W/ NDL MIC MESH

## (undated) DEVICE — BITEBLOCK 54FR W/ DENT RIM BLOX

## (undated) DEVICE — ENDO KIT W/SYRINGE: Brand: MEDLINE INDUSTRIES, INC.